# Patient Record
Sex: FEMALE | Race: WHITE | NOT HISPANIC OR LATINO | Employment: UNEMPLOYED | URBAN - METROPOLITAN AREA
[De-identification: names, ages, dates, MRNs, and addresses within clinical notes are randomized per-mention and may not be internally consistent; named-entity substitution may affect disease eponyms.]

---

## 2017-05-24 ENCOUNTER — ALLSCRIPTS OFFICE VISIT (OUTPATIENT)
Dept: OTHER | Facility: OTHER | Age: 57
End: 2017-05-24

## 2017-05-28 ENCOUNTER — GENERIC CONVERSION - ENCOUNTER (OUTPATIENT)
Dept: OTHER | Facility: OTHER | Age: 57
End: 2017-05-28

## 2017-11-24 ENCOUNTER — HOSPITAL ENCOUNTER (EMERGENCY)
Facility: HOSPITAL | Age: 57
Discharge: HOME | End: 2017-11-24
Attending: EMERGENCY MEDICINE | Admitting: EMERGENCY MEDICINE
Payer: COMMERCIAL

## 2017-11-24 VITALS
WEIGHT: 154 LBS | HEIGHT: 64 IN | DIASTOLIC BLOOD PRESSURE: 82 MMHG | RESPIRATION RATE: 20 BRPM | BODY MASS INDEX: 26.29 KG/M2 | OXYGEN SATURATION: 100 % | HEART RATE: 71 BPM | SYSTOLIC BLOOD PRESSURE: 145 MMHG | TEMPERATURE: 98 F

## 2017-11-24 DIAGNOSIS — S01.01XA SCALP LACERATION: Primary | ICD-10-CM

## 2017-11-24 PROCEDURE — 99282 EMERGENCY DEPT VISIT SF MDM: CPT

## 2017-11-24 PROCEDURE — 90471 IMMUNIZATION ADMIN: CPT

## 2017-11-24 PROCEDURE — 90715 TDAP VACCINE 7 YRS/> IM: CPT | Performed by: EMERGENCY MEDICINE

## 2017-11-24 RX ORDER — LIDOCAINE HYDROCHLORIDE 10 MG/ML
5 INJECTION, SOLUTION EPIDURAL; INFILTRATION; INTRACAUDAL; PERINEURAL ONCE
Status: COMPLETED | OUTPATIENT
Start: 2017-11-24 | End: 2017-11-24

## 2017-11-24 RX ORDER — PRAVASTATIN SODIUM 20 MG
20 TABLET ORAL DAILY
COMMUNITY
End: 2018-05-29 | Stop reason: HOSPADM

## 2017-11-24 RX ORDER — GINSENG 100 MG
1 CAPSULE ORAL ONCE
Status: COMPLETED | OUTPATIENT
Start: 2017-11-24 | End: 2017-11-24

## 2017-11-24 RX ORDER — ACETAMINOPHEN 325 MG/1
650 TABLET ORAL ONCE
Status: COMPLETED | OUTPATIENT
Start: 2017-11-24 | End: 2017-11-24

## 2017-11-24 RX ADMIN — TETANUS TOXOID, REDUCED DIPHTHERIA TOXOID AND ACELLULAR PERTUSSIS VACCINE, ADSORBED 0.5 ML: 5; 2.5; 8; 8; 2.5 SUSPENSION INTRAMUSCULAR at 08:28

## 2017-11-24 RX ADMIN — BACITRACIN ZINC 1 SMALL APPLICATION: 500 OINTMENT TOPICAL at 08:28

## 2017-11-24 RX ADMIN — LIDOCAINE HYDROCHLORIDE 5 ML: 10 INJECTION, SOLUTION EPIDURAL; INFILTRATION; INTRACAUDAL; PERINEURAL at 08:28

## 2017-11-24 RX ADMIN — ACETAMINOPHEN 650 MG: 325 TABLET ORAL at 08:28

## 2017-11-24 NOTE — ED PROCEDURE NOTE
PROCEDURE  Lac Repair  Date/Time: 11/24/2017 8:27 AM  Performed by: Chantal York by: Librado Cardoso     Patient location:  ED and bedside  Laceration details:     Location:  Scalp    Scalp location:  Occipital    Length (cm) of Repair:  3    Depth (mm):  5  Repair type:     Repair type:  Simple  Exploration:     Wound extent: no foreign bodies/material noted      Contaminated: no    Treatment:     Area cleansed with:  Saline  Skin repair:     Repair method:  Staples    Number of staples:  5  Approximation:     Approximation difficulty:  Simple  Post-procedure details:     Dressing:  Antibiotic ointment    Patient tolerance of procedure:   Tolerated well, no immediate complications

## 2017-11-24 NOTE — ED NOTES
Laceration repair to scalp by Dr Matthew Lebron  Patient tolerated well       Roger Carl, RN  11/24/17 7334

## 2017-11-24 NOTE — ED PROVIDER NOTES
History  Chief Complaint   Patient presents with    Head Laceration     To ED with c/o laceration to right posterior scalp  Pt states that she had a shelf fall and hit her head approx 20 minutes ago  Denies any LOC, states that she feels "whoosy"  Denies any N/V  This is 80-year-old female who presents for a 2 cm scalp  Laceration in the right occipital area after a port fell off a shelf at a shopping center just 20 minutes prior to arrival she is not on any anticoagulants there is no loss of consciousness no vomiting she does not have any amnesia retrograde or antegrade last tetanus is unknown [facility] CT head rule is negative and does not require imaging also nexus cervical spine imaging is negative and does not require imaging  Mechanism was considered low risk and I did not feel trauma  Evaluation activation was indicated        History provided by:  Patient  Head Laceration   Location:  Head/neck  Head/neck laceration location:  Head  Length:  2  Depth: Through dermis  Quality: straight    Bleeding: venous    Time since incident:  20 minutes  Injury mechanism:  board fell from shelf  Pain details:     Quality:  Aching    Severity:  Moderate    Timing:  Constant    Progression:  Unchanged  Foreign body present:  No foreign bodies  Tetanus status:  Unknown      Prior to Admission Medications   Prescriptions Last Dose Informant Patient Reported? Taking?   pravastatin (PRAVACHOL) 20 mg tablet  Self Yes Yes   Sig: Take 20 mg by mouth daily      Facility-Administered Medications: None       Past Medical History:   Diagnosis Date    Hyperlipidemia        History reviewed  No pertinent surgical history  History reviewed  No pertinent family history  I have reviewed and agree with the history as documented  Social History   Substance Use Topics    Smoking status: Never Smoker    Smokeless tobacco: Never Used    Alcohol use Yes      Comment: social        Review of Systems   Skin: Positive for wound  All other systems reviewed and are negative  Physical Exam  ED Triage Vitals [11/24/17 0817]   Temperature Pulse Respirations Blood Pressure SpO2   98 °F (36 7 °C) 66 20 (!) 171/82 99 %      Temp Source Heart Rate Source Patient Position - Orthostatic VS BP Location FiO2 (%)   Temporal Monitor Sitting Right arm --      Pain Score       7           Orthostatic Vital Signs  Vitals:    11/24/17 0817   BP: (!) 171/82   Pulse: 66   Patient Position - Orthostatic VS: Sitting       Physical Exam   Constitutional: She is oriented to person, place, and time  She appears well-developed and well-nourished  No distress  HENT:   Right Ear: External ear normal    Left Ear: External ear normal    Nose: Nose normal    Mouth/Throat: Oropharynx is clear and moist    Eyes: Conjunctivae and EOM are normal  Pupils are equal, round, and reactive to light  Neck: Normal range of motion  Neck supple  No tracheal deviation present  No midline cervical tenderness nexus criteria for imaging is negative   Cardiovascular: Normal rate, regular rhythm and intact distal pulses  Exam reveals no friction rub  No murmur heard  Pulmonary/Chest: Effort normal and breath sounds normal  No stridor  No respiratory distress  She has no wheezes  She has no rales  Abdominal: Soft  Bowel sounds are normal  She exhibits no distension  There is no tenderness  Musculoskeletal: Normal range of motion  She exhibits no edema, tenderness or deformity  Neurological: She is alert and oriented to person, place, and time  No cranial nerve deficit  She exhibits normal muscle tone  Coordination normal    Skin: She is not diaphoretic  2 cm right occipital scalp laceration   Psychiatric: She has a normal mood and affect  Her behavior is normal  Judgment and thought content normal    Nursing note and vitals reviewed        ED Medications  Medications   bacitracin topical ointment 1 small application (1 small application Topical Given 11/24/17 0828) lidocaine (PF) (XYLOCAINE-MPF) 1 % injection 5 mL (5 mL Infiltration Given 11/24/17 0828)   tetanus-diphtheria-acellular pertussis (BOOSTRIX) IM injection 0 5 mL (0 5 mL Intramuscular Given 11/24/17 0828)   acetaminophen (TYLENOL) tablet 650 mg (650 mg Oral Given 11/24/17 9713)       Diagnostic Studies  Results Reviewed     None                 No orders to display              Procedures  Procedures       Phone Contacts  ED Phone Contact    ED Course  ED Course                                MDM  CritCare Time    Disposition  Final diagnoses:   Scalp laceration     Time reflects when diagnosis was documented in both MDM as applicable and the Disposition within this note     Time User Action Codes Description Comment    11/24/2017  8:38 AM Dawn Marie Add [S01 01XA] Scalp laceration       ED Disposition     ED Disposition Condition Comment    Discharge  Adrian Buckley discharge to home/self care  Condition at discharge: Stable        Follow-up Information     Follow up With Specialties Details Why 5 Aye Osullivan DO Family Medicine In 1 week For suture removal [id number] [facility]  [phone]          Patient's Medications   Discharge Prescriptions    No medications on file     No discharge procedures on file      ED Provider  Electronically Signed by           Mejia Lisa DO  11/24/17 0900

## 2017-11-24 NOTE — DISCHARGE INSTRUCTIONS
Staple Care   WHAT YOU NEED TO KNOW:   Staples are often used to close a wound  Your staples may be placed for 3 to 14 days, depending on the location of your wound  DISCHARGE INSTRUCTIONS:   Care for your wound:   · Clean:      ¨ You may be able to shower in 24 hours  Do not soak your wound under water  ¨ Gently wash your wound with soap and warm water daily  Lightly pat it dry  Do not cover your wound unless your healthcare provider tells you to  ¨ You may also need to clean your wound with a mixture of hydrogen peroxide and water  Ask how to do this  ¨ Do not apply ointment or cream to the wound unless your healthcare provider tells you to  · Elevate:      ¨ Rest any arm or leg that has a wound on pillows above the level of your heart  Do this as often as possible for 2 days  This will help decrease swelling and pain, and help you heal faster  · Minimize scarring:      ¨ Avoid sunshine on your wound to reduce scarring  Follow up with your healthcare provider as directed: You may need to return for a wound checkup 3 days after your staples are placed  Ask when you should return to get your staples removed  Staple removal:   · A medical staple remover  will be used to take out your staples  Your healthcare provider will slide the tool under each staple, squeeze the handle, and gently pull the staple out  · Medical tape  will be placed on your wound once your staples are removed  This will help keep your wound closed  The medical tape will fall off on its own after several days  Contact your healthcare provider if:   · You have redness, pain, swelling, and pus draining from your wound  · Your pain medicine does not relieve your pain  · You have a fever of 101°F (38 5°C) or higher  · You have an odor coming from your wound  · You have questions or concerns about your condition or care  Return to the emergency department if:   · Your wound reopens      · You have red streaks in your skin that spread out from your wound  · You have severe pain or vomiting  © 2017 [address] [employer] Information is for End User's use only and may not be sold, redistributed or otherwise used for commercial purposes  All illustrations and images included in CareNotes® are the copyrighted property of A D [employer] M [employer]  or Jaime Pickard  The above information is an  only  It is not intended as medical advice for individual conditions or treatments  Talk to your doctor, nurse or pharmacist before following any medical regimen to see if it is safe and effective for you

## 2017-12-01 ENCOUNTER — ALLSCRIPTS OFFICE VISIT (OUTPATIENT)
Dept: OTHER | Facility: OTHER | Age: 57
End: 2017-12-01

## 2017-12-05 NOTE — PROGRESS NOTES
Assessment    1  Concussion without loss of consciousness, initial encounter (850 0) (S06 0X0A)   2  Laceration of scalp, initial encounter (873 0) (S01 01XA)   3  Head injury, closed (959 01) (S09 90XA)    Plan  Concussion without loss of consciousness, initial encounter    · Acetaminophen-Codeine #3 300-30 MG Oral Tablet; TAKE 1 TABLET 3 TIMES  DAILY AS NEEDED FOR PAIN  Flu vaccine need    · Fluzone Quadrivalent Intramuscular Suspension  Laceration of scalp, initial encounter    · Suture Removal POC, by physician other than who closed the wound; Status:Active -  Perform Order; Requested for:[id number]; The patient's laceration is well healed but I do feel she has a concussion  As her laceration is well healed she can take and seeds and I think that the will help her better than the Tylenol  She is going to take 2 Aleve twice a day  I am also going to give her Tylenol with codeine and she is going to take the next 2 days off  I provided her with a note and I recommended she go home and sleep/rest with complete brain rest for the next 2 days  If she is not improving by next week she should let me know at which time I will refer her to a concussion specialist/PT  Discussion/Summary  Possible side effects of new medications were reviewed with the patient/guardian today  The treatment plan was reviewed with the patient/guardian  The patient/guardian understands and agrees with the treatment plan      Chief Complaint  C/O A PIECE  [address] 11/24/2017, SHE WENT TO [facility]  SHE HAD A LACERATION TO HER HEAD  I REMOVED 5 SUTURES THIS AM WITH NO PROBLEM  NO BLEEDING  PATIENT HAS BEEN SUFFERING FROM NAUSEA, AND HEADACHES SINCE THE EVENT  SHE STATES HER VISION IS OK  MAMMO AND COLONOSCOPY ORDER GIVE  FLU VACCINE GIVEN TODAY   PATIENT IS DUE FOR BW BUT SHE ATE THIS AM      History of Present Illness  HPI: Patient is here today to follow-up on the laceration and have her staples removed  1 week ago today she was in a store, [employer], and part of a display fell onto her head  She was bleeding and did fill out an incident report  She they did offer to drive her to the hospital but she chose to drive herself, it was about 3 blocks away  She had 5 staples placed in the laceration on her head  Soon after she developed headache, dizziness, nausea, and some fogginess/confusion  She has had this since then and has not really gotten any better  She has had a very busy week of work-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  She has not really been sleeping well  She has been taking Tylenol which really has not been helping, they advised she not take any Aleve as it is a blood thinner and she had a laceration  She has had concussions in the past but nothing recent  She has not had any fevers or chills  No chest pain or shortness of breath  She has nausea but no vomiting      Active Problems    1  Arthritis (716 90) (M19 90)   2  Hyperlipidemia (272 4) (E78 5)   3  Knee joint cyst (719 86) (M25 869)   4  Knee pain, bilateral (719 46) (M25 561,M25 562)    Past Medical History    1  History of Acute upper respiratory infection (465 9) (J06 9)   2  History of Back Strain (847 9)   3  History of acute bronchitis (V12 69) (Z87 09)   4  History of bursitis (V13 59) (Z87 39)   5  History of Internal Hemorrhoids (455 0)   6  History of Subconjunctival hemorrhage, unspecified laterality (372 72) (H11 30)   7  History of Trauma (959 9)   8  History of Traumatic Arthritis Of Multiple Sites (716 19)  Active Problems And Past Medical History Reviewed: The active problems and past medical history were reviewed and updated today  Family History  Mother    1  Family history of CABG   2  Family history of Coronary artery disease  Father    3  Family history of Hiatal hernia  Maternal Grandmother    4  Family history of Stroke Syndrome (V17 1)  Family History    5   Family history of Hyperlipidemia   6  Family history of Migraine Headache   7  Family history of Thyroid Disorder (V18 19)  Family History Reviewed: The family history was reviewed and updated today  Social History    · Never a smoker  The social history was reviewed and updated today  Surgical History    1  History of Tubal Ligation  Surgical History Reviewed: The surgical history was reviewed and updated today  Current Meds   1  Pravastatin Sodium 40 MG Oral Tablet; Take 1 tablet by mouth at bedtime; Therapy: 71ANQ1805 to (Evaluate:25Mar2018)  Requested for: 33FPT6180; Last   Rx:26Nov2017 Ordered    The medication list was reviewed and updated today  Allergies    1  Meloxicam TABS    Vitals   Recorded: 15NQY4569 08:18AM   Temperature 98 1 F   Heart Rate 73   Systolic 608 mm Hg   Diastolic 92 mm Hg   Height 5 ft 3 in   Weight 155 lb 12 oz   BMI Calculated 27 59 kg/m2   BSA Calculated 1 74 m2   O2 Saturation 97     Physical Exam    Constitutional   General appearance: No acute distress, well appearing and well nourished  appears tired  Eyes   Conjunctiva and lids: No swelling, erythema or discharge  Ears, Nose, Mouth, and Throat   External inspection of ears and nose: Normal     Pulmonary   Respiratory effort: No increased work of breathing or signs of respiratory distress  Musculoskeletal   Gait and station: Normal     Skin   Skin and subcutaneous tissue: Abnormal   (There is a well-healed laceration on the right side of the back of the scalp, staples are removed)   Neurologic   Cranial nerves: Cranial nerves 2-12 intact  Sensation: No sensory loss  Psychiatric   Orientation to person, place, and time: Normal     Mood and affect: Normal          Message  Return to work or school:   Segun Olivas is under my professional care  She was seen in my office on 12/1/17       Please excuse her from work today, 12/1/17     Zahra Martinez MD       Signatures   Electronically signed by : Thierry Rosales Lucetta Prader, M D ; Dec  1 2017  8:52AM EST                       (Author)

## 2017-12-13 ENCOUNTER — GENERIC CONVERSION - ENCOUNTER (OUTPATIENT)
Dept: OTHER | Facility: OTHER | Age: 57
End: 2017-12-13

## 2017-12-27 ENCOUNTER — APPOINTMENT (OUTPATIENT)
Dept: PHYSICAL THERAPY | Facility: CLINIC | Age: 57
End: 2017-12-27
Payer: COMMERCIAL

## 2017-12-27 PROCEDURE — G8979 MOBILITY GOAL STATUS: HCPCS

## 2017-12-27 PROCEDURE — G8978 MOBILITY CURRENT STATUS: HCPCS

## 2017-12-27 PROCEDURE — 97162 PT EVAL MOD COMPLEX 30 MIN: CPT

## 2017-12-28 ENCOUNTER — GENERIC CONVERSION - ENCOUNTER (OUTPATIENT)
Dept: FAMILY MEDICINE CLINIC | Facility: CLINIC | Age: 57
End: 2017-12-28

## 2018-01-04 ENCOUNTER — APPOINTMENT (OUTPATIENT)
Dept: PHYSICAL THERAPY | Facility: CLINIC | Age: 58
End: 2018-01-04
Payer: COMMERCIAL

## 2018-01-04 PROCEDURE — G0283 ELEC STIM OTHER THAN WOUND: HCPCS

## 2018-01-04 PROCEDURE — 97010 HOT OR COLD PACKS THERAPY: CPT

## 2018-01-04 PROCEDURE — 97014 ELECTRIC STIMULATION THERAPY: CPT

## 2018-01-04 PROCEDURE — 97112 NEUROMUSCULAR REEDUCATION: CPT

## 2018-01-04 PROCEDURE — 97140 MANUAL THERAPY 1/> REGIONS: CPT

## 2018-01-08 ENCOUNTER — APPOINTMENT (OUTPATIENT)
Dept: PHYSICAL THERAPY | Facility: CLINIC | Age: 58
End: 2018-01-08
Payer: COMMERCIAL

## 2018-01-08 PROCEDURE — 97010 HOT OR COLD PACKS THERAPY: CPT

## 2018-01-08 PROCEDURE — 97014 ELECTRIC STIMULATION THERAPY: CPT

## 2018-01-08 PROCEDURE — 97140 MANUAL THERAPY 1/> REGIONS: CPT

## 2018-01-08 PROCEDURE — G0283 ELEC STIM OTHER THAN WOUND: HCPCS

## 2018-01-08 PROCEDURE — 97112 NEUROMUSCULAR REEDUCATION: CPT

## 2018-01-10 ENCOUNTER — APPOINTMENT (OUTPATIENT)
Dept: PHYSICAL THERAPY | Facility: CLINIC | Age: 58
End: 2018-01-10
Payer: COMMERCIAL

## 2018-01-10 PROCEDURE — 97140 MANUAL THERAPY 1/> REGIONS: CPT

## 2018-01-10 PROCEDURE — G0283 ELEC STIM OTHER THAN WOUND: HCPCS

## 2018-01-10 PROCEDURE — 97014 ELECTRIC STIMULATION THERAPY: CPT

## 2018-01-10 PROCEDURE — 97112 NEUROMUSCULAR REEDUCATION: CPT

## 2018-01-10 PROCEDURE — 97010 HOT OR COLD PACKS THERAPY: CPT

## 2018-01-10 NOTE — RESULT NOTES
Verified Results  (1) COMPREHENSIVE METABOLIC PANEL [id number] 19:80EU [facility]     Test Name Result Flag Reference   Glucose, Serum 89 mg/dL  65-99   BUN 22 mg/dL  6-24   Creatinine, Serum 0 84 mg/dL  0 57-1 00   eGFR If NonAfricn Am 78 mL/min/1 73  >59   eGFR If Africn Am 90 mL/min/1 73  >59   BUN/Creatinine Ratio 26 H 9-23   ALT (SGPT) 15 IU/L  0-32   Albumin, Serum 4 9 g/dL  3 5-5 5   Globulin, Total 2 2 g/dL  1 5-4 5   A/G Ratio 2 2  1 1-2 5   Bilirubin, Total 0 5 mg/dL  0 0-1 2   Alkaline Phosphatase, S 60 IU/L     AST (SGOT) 17 IU/L  0-40   Sodium, Serum 143 mmol/L  134-144   Potassium, Serum 4 2 mmol/L  3 5-5 2   Chloride, Serum 101 mmol/L     Carbon Dioxide, Total 25 mmol/L  18-29   Calcium, Serum 9 7 mg/dL  8 7-10 2   Protein, Total, Serum 7 1 g/dL  6 0-8 5     (LC) Lipid Profile With Non-HDL Cholesterol [id number] 12:00AM [facility]     Test Name Result Flag Reference   Cholesterol, Total 236 mg/dL H 100-199   Triglycerides 89 mg/dL  0-149   HDL Cholesterol 99 mg/dL  >39   According to ATP-III Guidelines, HDL-C >59 mg/dL is considered a  negative risk factor for CHD  VLDL Cholesterol Royal 18 mg/dL  5-40   LDL Cholesterol Calc 119 mg/dL H 0-99   Non-HDL Cholesterol 137 mg/dL H 0-129     (1) CBC/PLT/DIFF [id number] 12:00AM [facility]     Test Name Result Flag Reference   WBC 4 2 x10E3/uL  3 4-10 8   RBC 4 49 x10E6/uL  3 77-5 28   Hemoglobin 13 8 g/dL  11 1-15 9   Hematocrit 42 2 %  34 0-46  6   MCV 94 fL  79-97   MCH 30 7 pg  26 6-33 0   Baso (Absolute) 0 0 x10E3/uL  0 0-0 2   Immature Granulocytes 0 %     Immature Grans (Abs) 0 0 x10E3/uL  0 0-0 1   Eos 3 %     Basos 1 %     Neutrophils (Absolute) 3 1 x10E3/uL  1 4-7 0   Lymphs (Absolute) 0 7 x10E3/uL  0 7-3 1   Monocytes(Absolute) 0 3 x10E3/uL  0 1-0 9   Eos (Absolute) 0 1 x10E3/uL  0 0-0 4   MCHC 32 7 g/dL  31 5-35 7   RDW 12 9 %  12 3-15 4   Platelets 820 L71B6/RB  150-379   Neutrophils 75 %     Lymphs 15 %     Monocytes 6 % (1) TSH [id number] 12:00AM [facility]     Test Name Result Flag Reference   TSH 1 850 uIU/mL  0 450-4 500     [facility]) Sedimentation Rate-Westergren [id number] 12:00AM Val[facility] Moulding     Test Name Result Flag Reference   Sedimentation Rate-Westergren 2 mm/hr  0-40       Discussion/Summary   labs are stable   cont current Rx

## 2018-01-11 NOTE — MISCELLANEOUS
Message  Return to work or school:   Fany Miller is under my professional care  She was seen in my office on 12/1/17       Please excuse her from work today, 12/1/17     Carrie Reid MD       Signatures   Electronically signed by : JASNO Aragon ; Dec  1 2017  8:52AM EST                       (Author)

## 2018-01-12 NOTE — PROGRESS NOTES
Assessment   1  Encounter for screening for osteoporosis (V82 81) (X83 808)  2  Plantar fasciitis (728 71) (M72 2)  3  Hyperlipidemia (272 4) (E78 5)  4  Encounter for preventive health examination (V70 0) (Z00 00)    Plan  Encounter for screening for osteoporosis    · * DXA BONE DENSITY SPINE HIP AND PELVIS; Status:Hold For - Scheduling;  Requested for:01Sep2016;   Encounter for screening mammogram for malignant neoplasm of breast    · * MAMMO SCREENING BILATERAL W CAD; Status:Hold For - Scheduling; Requested  for:01Sep2016;   Hyperlipidemia    · Renew: Pravastatin Sodium 40 MG Oral Tablet; take 1 tablet daily at bedtime    Discussion/Summary  healthy adult female Currently, she eats a healthy diet and has an adequate exercise regimen  the risks and benefits of cervical cancer screening were discussed We'll schedule with Dr Jessica Rojas cancer screening: mammogram has been ordered  Colorectal cancer screening: colonoscopy has been ordered  Osteoporosis screening: bone mineral density testing has been ordered  Screening lab work includes hemoglobin, glucose, lipid profile and thyroid function testing  She was advised to be evaluated by an ophthalmologist and Dermatology  Advice and education were given regarding weight bearing exercise  She's given orders for mammography and DEXA scanning and she has her colonoscopy orders  She will return for fasting blood work  She will restart her pravastatin prior to getting the blood work  She will see the ophthalmologist as well as dermatologist  She is given information regarding exercises and is reminded to have an annual well exam       Chief Complaint  PT IS HERE FOR HER YEARLY [address]  PT WILL BE GIVEN MAMMO AND DEXA ORDERS TODAY IN OFFICE  PT CURRENTLY HAS COLONOSCOPY ORDERS  PT WILL SCHEDULE TO PAP DONE HERE WITH DR Parker Joy  History of Present Illness  , Adult Female:  The patient is being seen for a health maintenance evaluation  The last health maintenance visit was 2 year(s) ago  General Health: The patient's health since the last visit is described as good  She has regular dental visits  She denies vision problems  She denies hearing loss  Immunizations status: up to date  Lifestyle:  She consumes a diverse and healthy diet  She does not have any weight concerns  She exercises regularly  She does not use tobacco  She consumes alcohol  She denies drug use  Reproductive health: the patient is postmenopausal    Screening: cancer screening reviewed and updated  metabolic screening reviewed and updated  risk screening reviewed and updated  Additional History: [facility] was given mammography and DEXA scan orders  She has colonoscopy orders and will return for fasting blood work  HPI: Here for wellness visit      Review of Systems    Constitutional: No fever, no chills, feels well, no tiredness, no recent weight gain or weight loss  Eyes: No complaints of eye pain, no red eyes, no eyesight problems, no discharge, no dry eyes, no itching of eyes  ENT: no complaints of earache, no loss of hearing, no nose bleeds, no nasal discharge, no sore throat, no hoarseness  Cardiovascular: No complaints of slow heart rate, no fast heart rate, no chest pain, no palpitations, no leg claudication, no lower extremity edema  Respiratory: No complaints of shortness of breath, no wheezing, no cough, no SOB on exertion, no orthopnea, no PND  Gastrointestinal: No complaints of abdominal pain, no constipation, no nausea or vomiting, no diarrhea, no bloody stools  Genitourinary: No complaints of dysuria, no incontinence, no pelvic pain, no dysmenorrhea, no vaginal discharge or bleeding  Musculoskeletal: No complaints of arthralgias, no myalgias, no joint swelling or stiffness, no limb pain or swelling  Integumentary: No complaints of skin rash or lesions, no itching, no skin wounds, no breast pain or lump     Neurological: No complaints of headache, no confusion, no convulsions, no numbness, no dizziness or fainting, no tingling, no limb weakness, no difficulty walking  Psychiatric: Not suicidal, no sleep disturbance, no anxiety or depression, no change in personality, no emotional problems  Endocrine: No complaints of proptosis, no hot flashes, no muscle weakness, no deepening of the voice, no feelings of weakness  Hematologic/Lymphatic: No complaints of swollen glands, no swollen glands in the neck, does not bleed easily, does not bruise easily  Active Problems   1  Arthritis (716 90) (M19 90)  2  Bronchitis, acute (466 0) (J20 9)  3  Conjunctivitis (372 30) (H10 9)  4  Encounter for screening for malignant neoplasm of colon (V76 51) (Z12 11)  5  Encounter for screening for osteoporosis (V82 81) (Z13 820)  6  Encounter for screening mammogram for malignant neoplasm of breast (V76 12)   (Z12 31)  7  Encounter for vitamin deficiency screening (V77 99) (Z13 21)  8  Fatigue (780 79) (R53 83)  9  Hyperlipidemia (272 4) (E78 5)  10  Impingement syndrome of shoulder (726 2) (M75 40)  11  Screening for diabetes mellitus (V77 1) (Z13 1)  12   Screening for thyroid disorder (V77 0) (Z13 29)    Past Medical History    · History of Acute upper respiratory infection (465 9) (J06 9)   · History of Back Strain (847 9)   · History of Head Injury (959 01)   · History of acute bronchitis (V12 69) (Z87 09)   · History of bursitis (V13 59) (Z87 39)   · History of Internal Hemorrhoids (455 0)   · History of Subconjunctival hemorrhage, unspecified laterality (372 72) (H11 30)   · History of Trauma (959 9)   · History of Traumatic Arthritis Of Multiple Sites (716 19)    Surgical History    · History of Tubal Ligation    Family History  Mother    · Family history of CABG   · Family history of Coronary artery disease  Father    · Family history of Hiatal hernia  Maternal Grandmother    · Family history of Stroke Syndrome (V17 1)  Family History    · Family history of Hyperlipidemia   · Family history of Migraine Headache   · Family history of Thyroid Disorder (V18 19)    Social History    · Never a smoker    Current Meds  1  Pravastatin Sodium 40 MG Oral Tablet; take 1 tablet daily at bedtime; Therapy: [id number] to (Javier Tiwari)  Requested for: [id number]; Last   Rx:[id number] Ordered    Allergies   1  Meloxicam TABS    Vitals   Recorded: [id number] 81:56ZG   Systolic 803   Diastolic 82   Heart Rate 45   Temperature 98 1 F   O2 Saturation 98   Height 5 ft 3 in   Weight 147 lb 12 00 oz   BMI Calculated 26 17   BSA Calculated 1 7     Physical Exam    Constitutional   General appearance: No acute distress, well appearing and well nourished  Eyes   Conjunctiva and lids: No swelling, erythema or discharge  Pupils and irises: Equal, round and reactive to light  Ears, Nose, Mouth, and Throat   External inspection of ears and nose: Normal     Otoscopic examination: Tympanic membranes translucent with normal light reflex  Canals patent without erythema  Oropharynx: Normal with no erythema, edema, exudate or lesions  Pulmonary   Respiratory effort: No increased work of breathing or signs of respiratory distress  Auscultation of lungs: Clear to auscultation  Cardiovascular   Palpation of heart: Normal PMI, no thrills  Auscultation of heart: Normal rate and rhythm, normal S1 and S2, without murmurs  Examination of extremities for edema and/or varicosities: Normal     Abdomen   Abdomen: Non-tender, no masses  Liver and spleen: No hepatomegaly or splenomegaly  Lymphatic   Palpation of lymph nodes in neck: No lymphadenopathy  Musculoskeletal   Gait and station: Normal     Digits and nails: Normal without clubbing or cyanosis  Inspection/palpation of joints, bones, and muscles: Normal     Skin   Skin and subcutaneous tissue: Normal without rashes or lesions  Neurologic   Cranial nerves: Cranial nerves 2-12 intact      Reflexes: 2+ and symmetric  Sensation: No sensory loss      Psychiatric   Orientation to person, place, and time: Normal     Mood and affect: Normal        Signatures   Electronically signed by : Tereza Hinds DO; Sep  1 2016  6:47PM EST                       (Author)

## 2018-01-13 VITALS
BODY MASS INDEX: 26.58 KG/M2 | DIASTOLIC BLOOD PRESSURE: 80 MMHG | TEMPERATURE: 97.6 F | HEART RATE: 71 BPM | OXYGEN SATURATION: 98 % | SYSTOLIC BLOOD PRESSURE: 130 MMHG | HEIGHT: 63 IN | WEIGHT: 150 LBS

## 2018-01-14 NOTE — MISCELLANEOUS
Message  Message Free Text Note Form: pt braxton regarding sinus infection  seen in the office on Wednesday  started on augmentin  seems to be helping with sinus infection but still feeling very tired and achey  trying to do regular activities and getting very tired  no fever  no shortness of breath  still getting coughing  drinking fluids      Discussion/Summary  Discussion Summary:   Continue with augmentin  if no better in a few days, call office and will change antibiotic  increase fluids  rest    call if not feeling better        Signatures   Electronically signed by : Terry Gonzalez DO; May 28 2017 10:48AM EST                       (Author)

## 2018-01-15 ENCOUNTER — APPOINTMENT (OUTPATIENT)
Dept: PHYSICAL THERAPY | Facility: CLINIC | Age: 58
End: 2018-01-15
Payer: COMMERCIAL

## 2018-01-15 PROCEDURE — G0283 ELEC STIM OTHER THAN WOUND: HCPCS

## 2018-01-15 PROCEDURE — 97010 HOT OR COLD PACKS THERAPY: CPT

## 2018-01-15 PROCEDURE — 97140 MANUAL THERAPY 1/> REGIONS: CPT

## 2018-01-15 PROCEDURE — 97014 ELECTRIC STIMULATION THERAPY: CPT

## 2018-01-15 PROCEDURE — 97112 NEUROMUSCULAR REEDUCATION: CPT

## 2018-01-15 NOTE — RESULT NOTES
Verified Results  ([facility]) UA/M w/rflx Culture, Comp [id number] 12:00AM Vivian Nap     Test Name Result Flag Reference   Specific Gravity 1 028  1 005-1 030   pH 6 0  5 0-7 5   Urine-Color Yellow  Yellow   Appearance Clear  Clear   WBC Esterase Negative  Negative   Protein Trace  Negative/Trace   Bacteria None seen  None seen/Few   Microscopic Examination See below:     Urinalysis Reflex Comment     This specimen will not reflex to a Urine Culture  WBC 0-5 /hpf  0 -  5   RBC 0-2 /hpf  0 -  2   Epithelial Cells (non renal) 0-10 /hpf  0 - 10   Mucus Threads Present  Not Estab  Glucose Negative  Negative   Ketones Trace A Negative   Occult Blood Trace A Negative   Bilirubin Negative  Negative   Urobilinogen,Semi-Qn 0 2 EU/dL  0 2-1 0   Nitrite, Urine Negative  Negative       Discussion/Summary   urine continues to show blood  next step is CT abdomen and pelvis to look for source

## 2018-01-17 NOTE — RESULT NOTES
Verified Results  * CT ABDOMEN PELVIS WO CONTRAST [id number] 07:51AM Yari Brunson Order Number: [id number]    - Patient Instructions: To schedule this appointment, please contact Central Scheduling at [phone]   Order Number: [id number]    - Patient Instructions: To schedule this appointment, please contact Central Scheduling at [phone]  Test Name Result Flag Reference   CT ABDOMEN PELVIS WO CONTRAST (Report)     CT ABDOMEN AND PELVIS WITHOUT IV CONTRAST     INDICATION: 63-year-old woman with hematuria and pain for one month  History of appendectomy  COMPARISON: None  TECHNIQUE: CT examination of the abdomen and pelvis was performed without intravenous contrast  This examination, like all CT scans performed in the [facility], was performed utilizing techniques to minimize radiation dose exposure,    including the use of iterative reconstruction and automated exposure control  Axial, sagittal, and coronal reformatted images were submitted for interpretation  As requested, the study was performed without intravenous or gastrointestinal contrast  This limits evaluation of the abdominal and pelvic for surgery and a significant abnormality could go undetected as a result  FINDINGS:     ABDOMEN     LOWER CHEST: No significant abnormalities identified in the lower chest      LIVER/BILIARY TREE: Several relatively low-attenuation liver masses, the largest a 7 mm mass in the posterior segment of the right lobe, incompletely characterized on this unenhanced study, although most likely cysts  If clinically indicated, these can   be evaluated more accurately with contrast-enhanced CT or MRI  No bile duct dilatation  GALLBLADDER: No calcified gallstones  No pericholecystic inflammatory change  SPLEEN: Unremarkable  PANCREAS: Unremarkable  ADRENAL GLANDS: Unremarkable  KIDNEYS/URETERS: Unremarkable  No hydronephrosis       STOMACH AND BOWEL: Unremarkable  APPENDIX: No findings to suggest appendicitis  ABDOMINOPELVIC CAVITY: No lymphadenopathy or mass  No ascites  No extraluminal gas  VESSELS: Unremarkable for patient's age  PELVIS     REPRODUCTIVE ORGANS: Suggestion of one or two fibroids in the uterine fundus  No evidence of adnexal mass  URINARY BLADDER: Unremarkable  ABDOMINAL WALL/INGUINAL REGIONS: Unremarkable  OSSEOUS STRUCTURES: No acute fracture or destructive osseous lesion  IMPRESSION:     1  Several small indeterminate liver masses, most likely cysts  If clinically indicated, these can be characterized more accurately with contrast-enhanced CT or MRI  2  Probable small uterine fibroids  3  Otherwise unremarkable unenhanced CT the abdomen and pelvis  Workstation performed: [id number]     Signed by:   Rohan Arevalo MD   11/3/16       Discussion/Summary   CAT scan shows small hepatic cysts and a uterine fibroid  No apparent cause of hematuria is noted  Next step is to see urology

## 2018-01-18 ENCOUNTER — APPOINTMENT (OUTPATIENT)
Dept: PHYSICAL THERAPY | Facility: CLINIC | Age: 58
End: 2018-01-18
Payer: COMMERCIAL

## 2018-01-18 PROCEDURE — G0283 ELEC STIM OTHER THAN WOUND: HCPCS

## 2018-01-18 PROCEDURE — 97112 NEUROMUSCULAR REEDUCATION: CPT

## 2018-01-18 PROCEDURE — 97140 MANUAL THERAPY 1/> REGIONS: CPT

## 2018-01-18 PROCEDURE — 97010 HOT OR COLD PACKS THERAPY: CPT

## 2018-01-18 PROCEDURE — 97014 ELECTRIC STIMULATION THERAPY: CPT

## 2018-01-18 NOTE — RESULT NOTES
Verified Results  ([address] [facility]) UA/M w/rflx Culture, Routine [id number] 12:00AM Bradley Vera     Test Name Result Flag Reference   Specific Gravity 1 021  1 005-1 030   pH 5 5  5 0-7 5   Urine-Color Yellow  Yellow   Appearance Cloudy A Clear   WBC Esterase Negative  Negative   Protein Negative  Negative/Trace   Glucose Negative  Negative   Ketones Negative  Negative   Occult Blood 1+ A Negative   Bilirubin Negative  Negative   Urobilinogen,Semi-Qn 0 2 EU/dL  0 2-1 0   Nitrite, Urine Negative  Negative   Microscopic Examination See below:     Urinalysis Reflex Comment     This specimen will not reflex to a Urine Culture  WBC 0-5 /hpf  0 -  5   RBC 3-10 /hpf A 0 -  2   Epithelial Cells (non renal) 0-10 /hpf  0 - 10   Mucus Threads Present  Not Estab  Bacteria Few  None seen/Few       Discussion/Summary   urine shows small amount of blood  no sign of infection  I would like a repeat clean catch to verify if blood is present

## 2018-01-22 ENCOUNTER — APPOINTMENT (OUTPATIENT)
Dept: PHYSICAL THERAPY | Facility: CLINIC | Age: 58
End: 2018-01-22
Payer: COMMERCIAL

## 2018-01-22 PROCEDURE — 97110 THERAPEUTIC EXERCISES: CPT

## 2018-01-22 PROCEDURE — 97010 HOT OR COLD PACKS THERAPY: CPT

## 2018-01-22 PROCEDURE — G0283 ELEC STIM OTHER THAN WOUND: HCPCS

## 2018-01-22 PROCEDURE — 97140 MANUAL THERAPY 1/> REGIONS: CPT

## 2018-01-22 PROCEDURE — 97014 ELECTRIC STIMULATION THERAPY: CPT

## 2018-01-22 PROCEDURE — 97112 NEUROMUSCULAR REEDUCATION: CPT

## 2018-01-23 VITALS
HEART RATE: 73 BPM | DIASTOLIC BLOOD PRESSURE: 92 MMHG | TEMPERATURE: 98.1 F | HEIGHT: 63 IN | OXYGEN SATURATION: 97 % | BODY MASS INDEX: 27.6 KG/M2 | SYSTOLIC BLOOD PRESSURE: 148 MMHG | WEIGHT: 155.75 LBS

## 2018-01-24 VITALS
HEART RATE: 64 BPM | DIASTOLIC BLOOD PRESSURE: 78 MMHG | OXYGEN SATURATION: 96 % | SYSTOLIC BLOOD PRESSURE: 132 MMHG | BODY MASS INDEX: 27.82 KG/M2 | WEIGHT: 157 LBS | TEMPERATURE: 98.6 F | HEIGHT: 63 IN

## 2018-01-25 ENCOUNTER — OFFICE VISIT (OUTPATIENT)
Dept: PHYSICAL THERAPY | Facility: CLINIC | Age: 58
End: 2018-01-25
Payer: COMMERCIAL

## 2018-01-25 DIAGNOSIS — G44.89 OTHER HEADACHE SYNDROME: ICD-10-CM

## 2018-01-25 DIAGNOSIS — S06.0X9D CONCUSSION WITH PROLONGED (MORE THAN 24 HOURS) LOSS OF CONSCIOUSNESS AND RETURN TO PRE-EXISTING CONSCIOUS LEVEL, SUBSEQUENT ENCOUNTER: Primary | ICD-10-CM

## 2018-01-25 DIAGNOSIS — M54.2 CERVICALGIA: ICD-10-CM

## 2018-01-25 PROCEDURE — 97140 MANUAL THERAPY 1/> REGIONS: CPT

## 2018-01-25 PROCEDURE — 97110 THERAPEUTIC EXERCISES: CPT

## 2018-01-25 NOTE — PROGRESS NOTES
Daily Note     Today's date: 2018  Patient name: Jesús Jimenez  : 1960  MRN: [id number]  Referring provider: Al Terrell MD  Dx:   Encounter Diagnoses   Name Primary? 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, but now has trouble getting OOB  Objective: See treatment diary below      Assessment: Tolerated treatment fair  Patient c/o nausea post Rx  Plan:Cont POC  TENS missed this visit, cont NV    Dx: concussion  EPOC: 18  CO-MORBIDITIES:none  PERSONAL FACTORS:     Precautions: [address], concussion    Daily Treatment Diary     Manual              MFE Ut, levator, cspine psp 15 mins                                                                    Exercise Diary              UBE 1'/'            Balance- Side- stepping Foam 3laps            Balance- tandem walking 3 laps            rockerboard ML AP 1' ea            SLS  30" x2            VOR  x1 horz 30"x1            VOR x1 vert 30"x1            UT stretch hep            Levator stretch hep            Chin tucks supin np            Targets w/ n w/o stopping 10 x ea            Trunk rotations holding ball 15"x2                                                                                                                        Modalities              TENS w/ MH  NP

## 2018-01-29 ENCOUNTER — OFFICE VISIT (OUTPATIENT)
Dept: PHYSICAL THERAPY | Facility: CLINIC | Age: 58
End: 2018-01-29
Payer: COMMERCIAL

## 2018-01-29 DIAGNOSIS — G44.89 OTHER HEADACHE SYNDROME: ICD-10-CM

## 2018-01-29 DIAGNOSIS — S06.0X9D CONCUSSION WITH PROLONGED (MORE THAN 24 HOURS) LOSS OF CONSCIOUSNESS AND RETURN TO PRE-EXISTING CONSCIOUS LEVEL, SUBSEQUENT ENCOUNTER: Primary | ICD-10-CM

## 2018-01-29 DIAGNOSIS — M54.2 CERVICALGIA: ICD-10-CM

## 2018-01-29 PROCEDURE — 97014 ELECTRIC STIMULATION THERAPY: CPT

## 2018-01-29 PROCEDURE — G0283 ELEC STIM OTHER THAN WOUND: HCPCS

## 2018-01-29 PROCEDURE — 97140 MANUAL THERAPY 1/> REGIONS: CPT

## 2018-01-29 PROCEDURE — 97010 HOT OR COLD PACKS THERAPY: CPT

## 2018-01-29 PROCEDURE — 97112 NEUROMUSCULAR REEDUCATION: CPT

## 2018-01-29 NOTE — PROGRESS NOTES
Daily Note     Daily Note     Today's date: 2018  Patient name: Liyah Lord  : 1960  MRN: [id number]  Referring provider: Duane Roche MD  Dx:   Encounter Diagnoses   Name Primary?  Concussion with prolonged (more than 24 hours) loss of consciousness and return to pre-existing conscious level, subsequent encounter Yes    Other headache syndrome     Cervicalgia                   Subjective:Pt states she is so busy on her weekends working that by [facility] she is spent  C/o pain on the R side of her head  Objective: See treatment diary below      Assessment: Tolerated treatment fair  Pt w/ less tightness noted in psp  Cont'd TENS w/ MH post ex's  Plan:Cont POC  Progress balance and vest ex's as lefty'd    Dx: concussion  EPOC: 18  CO-MORBIDITIES:none  PERSONAL FACTORS:     Precautions: Ha, concussion    Daily Treatment Diary     Manual             MFE Ut, levator, cspine psp 15 mins 12  mins                                                                   Exercise Diary             UBE /' np           Balance- Side- stepping Foam 3laps Foam 3 laps           Balance- tandem walking 3 laps 3 laps           rockerboard ML AP 1' ea 1' ea           SLS  30" x2 30" x2           VOR  x1 horz 30"x1 30"x2           VOR x1 vert 30"x1 30"x2           UT stretch hep 30"x2           Levator stretch hep 30"x2           Chin tucks supin np 10           Targets w/ n w/o stopping 10 x ea 10x ea           Trunk rotations holding ball 15"x2 15"x2                                                                                                                       Modalities             TENS w/ MH  NP 10 min

## 2018-01-30 ENCOUNTER — TRANSCRIBE ORDERS (OUTPATIENT)
Dept: ADMINISTRATIVE | Facility: HOSPITAL | Age: 58
End: 2018-01-30

## 2018-01-30 DIAGNOSIS — F07.81 POST CONCUSSION SYNDROME: Primary | ICD-10-CM

## 2018-02-01 ENCOUNTER — EVALUATION (OUTPATIENT)
Dept: PHYSICAL THERAPY | Facility: CLINIC | Age: 58
End: 2018-02-01
Payer: COMMERCIAL

## 2018-02-01 ENCOUNTER — HOSPITAL ENCOUNTER (OUTPATIENT)
Dept: CT IMAGING | Facility: HOSPITAL | Age: 58
Discharge: HOME | End: 2018-02-01
Payer: COMMERCIAL

## 2018-02-01 DIAGNOSIS — G44.89 OTHER HEADACHE SYNDROME: ICD-10-CM

## 2018-02-01 DIAGNOSIS — F07.81 POST CONCUSSION SYNDROME: ICD-10-CM

## 2018-02-01 DIAGNOSIS — M54.2 CERVICALGIA: ICD-10-CM

## 2018-02-01 DIAGNOSIS — S06.0X9D CONCUSSION WITH PROLONGED (MORE THAN 24 HOURS) LOSS OF CONSCIOUSNESS AND RETURN TO PRE-EXISTING CONSCIOUS LEVEL, SUBSEQUENT ENCOUNTER: Primary | ICD-10-CM

## 2018-02-01 PROCEDURE — 97140 MANUAL THERAPY 1/> REGIONS: CPT | Performed by: PHYSICAL THERAPIST

## 2018-02-01 PROCEDURE — 97112 NEUROMUSCULAR REEDUCATION: CPT | Performed by: PHYSICAL THERAPIST

## 2018-02-01 PROCEDURE — G0283 ELEC STIM OTHER THAN WOUND: HCPCS | Performed by: PHYSICAL THERAPIST

## 2018-02-01 PROCEDURE — 97010 HOT OR COLD PACKS THERAPY: CPT | Performed by: PHYSICAL THERAPIST

## 2018-02-01 PROCEDURE — 97014 ELECTRIC STIMULATION THERAPY: CPT | Performed by: PHYSICAL THERAPIST

## 2018-02-01 PROCEDURE — 70450 CT HEAD/BRAIN W/O DYE: CPT

## 2018-02-01 NOTE — PROGRESS NOTES
PT RE EVALUATION    Today's date: 2018  Patient name: Kimo Girard  : 1960  MRN: [id number]  Referring provider: Shamika Johnston MD  Dx:   Encounter Diagnoses   Name Primary?  Concussion with prolonged (more than 24 hours) loss of consciousness and return to pre-existing conscious level, subsequent encounter Yes    Other headache syndrome     Cervicalgia                   Assessment  Impairments: abnormal or restricted ROM, impaired physical strength and pain with function  Other impairment: difficulty focusing, increased nausea, dizziness    Assessment details: Pt is a 62y o  year old female coming to outpatient PT s/p concussion  Pt presents with decreased cervical ROM, decreased balance, decreased UE ROM and strength and overall decreased functional mobility  Pt would benefit from skilled PT services in order to address these deficits and reach maximum level of function  Understanding of Dx/Px/POC: good   Prognosis: good    Plan    Planned modality interventions: thermotherapy: hydrocollator packs and TENS  Planned therapy interventions: manual therapy, joint mobilization, therapeutic exercise, home exercise program, flexibility and neuromuscular re-education  Other planned therapy interventions: oculomotor ex  Frequency: 2x week  Duration in weeks: 6  Treatment plan discussed with: patient        Subjective Evaluation    History of Present Illness  Mechanism of injury: Pt reports less tightness in her R posterior neck  Pt continues to have lateral head pain  Pt continues to have nausea  Pt continues to have dizziness and " fuzziness"  when scanning her eyes to look for times on a shelf in a store and when driving  Pt has some good days and other bad days    Pain  At best pain ratin  At worst pain ratin  Location: neck  Progression: no change (slight improvement in some areas, however no general improvement)          Objective     Active Range of Motion   Cervical/Thoracic Spine Cervical    Flexion: 40 degrees   Extension: 50 degrees   Left lateral flexion: 20 degrees   Right lateral flexion: 18 degrees   Left rotation: 48 degrees   Right rotation: 44 degrees with pain  Left Shoulder   Flexion: 140 degrees   Abduction: 110 degrees     Right Shoulder   Flexion: 150 degrees   Abduction: 130 degrees     Strength/Myotome Testing     Left Shoulder     Planes of Motion   Flexion: 4   Abduction: 4     Right Shoulder     Planes of Motion   Flexion: 4   Abduction: 4     Additional Strength Details  CT SIB testing ( balance):  EO firm surface: 2 03 sway index  EC firm surface: 3 98 sway index  EO foam surface; 2 72 sway index  EC foam surface; unable      Dx: concussion  EPOC: 2/7/18  CO-MORBIDITIES:none  PERSONAL FACTORS:     Precautions: Ha, concussion    Daily Treatment Diary     Manual  1/25 1/29 2/1          MFE Ut, levator, cspine psp 15 mins 12  mins 15'                                                                  Exercise Diary  1/25 1/269 2/1          UBE 1'/1' np np          Balance- Side- stepping Foam 3laps Foam 3 laps np          Balance- tandem walking 3 laps 3 laps np          rockerboard ML AP 1' ea 1' ea np          SLS  30" x2 30" x2 np          VOR  x1 horz 30"x1 30"x2 30"x2          VOR x1 vert 30"x1 30"x2 30"x2          UT stretch hep 30"x2 30"x2          Levator stretch hep 30"x2 30"x2          Chin tucks supin np 10 10          Targets w/ n w/o stopping 10 x ea 10x ea 10 ea          Trunk rotations holding ball 15"x2 15"x2 np                                                                                                                      Modalities  1/25 1/29 2/1          TENS w/ MH  NP 10 min 10'

## 2018-02-05 ENCOUNTER — APPOINTMENT (OUTPATIENT)
Dept: PHYSICAL THERAPY | Facility: CLINIC | Age: 58
End: 2018-02-05
Payer: COMMERCIAL

## 2018-02-08 ENCOUNTER — OFFICE VISIT (OUTPATIENT)
Dept: PHYSICAL THERAPY | Facility: CLINIC | Age: 58
End: 2018-02-08
Payer: COMMERCIAL

## 2018-02-08 DIAGNOSIS — M54.2 CERVICALGIA: Primary | ICD-10-CM

## 2018-02-08 DIAGNOSIS — S06.0X9D CONCUSSION WITH PROLONGED (MORE THAN 24 HOURS) LOSS OF CONSCIOUSNESS AND RETURN TO PRE-EXISTING CONSCIOUS LEVEL, SUBSEQUENT ENCOUNTER: ICD-10-CM

## 2018-02-08 DIAGNOSIS — G44.89 OTHER HEADACHE SYNDROME: ICD-10-CM

## 2018-02-08 PROCEDURE — G0283 ELEC STIM OTHER THAN WOUND: HCPCS | Performed by: PHYSICAL THERAPIST

## 2018-02-08 PROCEDURE — G8978 MOBILITY CURRENT STATUS: HCPCS | Performed by: PHYSICAL THERAPIST

## 2018-02-08 PROCEDURE — 97140 MANUAL THERAPY 1/> REGIONS: CPT | Performed by: PHYSICAL THERAPIST

## 2018-02-08 PROCEDURE — 97010 HOT OR COLD PACKS THERAPY: CPT | Performed by: PHYSICAL THERAPIST

## 2018-02-08 PROCEDURE — G8979 MOBILITY GOAL STATUS: HCPCS | Performed by: PHYSICAL THERAPIST

## 2018-02-08 PROCEDURE — 97110 THERAPEUTIC EXERCISES: CPT | Performed by: PHYSICAL THERAPIST

## 2018-02-08 PROCEDURE — 97112 NEUROMUSCULAR REEDUCATION: CPT | Performed by: PHYSICAL THERAPIST

## 2018-02-08 PROCEDURE — 97014 ELECTRIC STIMULATION THERAPY: CPT | Performed by: PHYSICAL THERAPIST

## 2018-02-08 NOTE — PROGRESS NOTES
Daily Note     Today's date: 2018  Patient name: Hedy Kapoor  : 1960  MRN: [id number]  Referring provider: Ajay Miguel MD  Dx:   Encounter Diagnoses   Name Primary?  Cervicalgia Yes    Concussion with prolonged (more than 24 hours) loss of consciousness and return to pre-existing conscious level, subsequent encounter     Other headache syndrome                   Subjective: Pt reports having more bad days than good days recently  Pt had a stressful ride to her daughter's home in bad weather and has had a stressful schedule recently with a birth of a grand daughter  Pt forgot to take medication given to her by the neurologist       Objective: See treatment diary below      Assessment: Tolerated treatment fair  Patient demonstrated fatigue post treatment  Pt felt nausea after the UBE  (-) [place] Fail test B      Plan: Continue per plan of care      Precautions: [place]ulk[place], concussion    Daily Treatment Diary     Manual           MFE Ut, levator, cspine psp 15 mins 12  mins 15' th         Cervical distraction    th         SOR    th                          15'             Exercise Diary           UBE ' np np 3' BW         Balance- Side- stepping Foam 3laps Foam 3 laps np          Balance- tandem walking 3 laps 3 laps np          rockerboard ML AP 1' ea 1' ea np          SLS  30" x2 30" x2 np          VOR  x1 horz 30"x1 30"x2 30"x2 30"x2         VOR x1 vert 30"x1 30"x2 30"x2 30"x2         UT stretch hep 30"x2 30"x2 30"x2         Levator stretch hep 30"x2 30"x2 30"x2         Chin tucks supin np 10 10 10         Targets w/ n w/o stopping 10 x ea 10x ea 10 ea 10 ea         Trunk rotations holding ball 15"x2 15"x2 np                                                                                                                      Modalities           TENS w/ MH  NP 10 min 10' 10'

## 2018-02-15 ENCOUNTER — OFFICE VISIT (OUTPATIENT)
Dept: PHYSICAL THERAPY | Facility: CLINIC | Age: 58
End: 2018-02-15
Payer: COMMERCIAL

## 2018-02-15 DIAGNOSIS — G44.89 OTHER HEADACHE SYNDROME: ICD-10-CM

## 2018-02-15 DIAGNOSIS — M54.2 CERVICALGIA: Primary | ICD-10-CM

## 2018-02-15 DIAGNOSIS — S06.0X9D CONCUSSION WITH PROLONGED (MORE THAN 24 HOURS) LOSS OF CONSCIOUSNESS AND RETURN TO PRE-EXISTING CONSCIOUS LEVEL, SUBSEQUENT ENCOUNTER: ICD-10-CM

## 2018-02-15 PROCEDURE — G0283 ELEC STIM OTHER THAN WOUND: HCPCS

## 2018-02-15 PROCEDURE — 97014 ELECTRIC STIMULATION THERAPY: CPT

## 2018-02-15 PROCEDURE — 97140 MANUAL THERAPY 1/> REGIONS: CPT

## 2018-02-15 PROCEDURE — 97010 HOT OR COLD PACKS THERAPY: CPT

## 2018-02-15 PROCEDURE — 97112 NEUROMUSCULAR REEDUCATION: CPT

## 2018-02-15 NOTE — PROGRESS NOTES
Daily Note     Today's date: 2/15/2018  Patient name: Molina Esteves  : 1960  MRN: [id number]  Referring provider: Barbi Rg MD  Dx:   Encounter Diagnoses   Name Primary?  Cervicalgia Yes    Concussion with prolonged (more than 24 hours) loss of consciousness and return to pre-existing conscious level, subsequent encounter     Other headache syndrome                   Subjective: Pt c/o having a bad morning w/ nausea and dizzy since yesterday  States she had to work 9-4 yesterday and stated it was difficult  Objective: See treatment diary below      Assessment: Limited ex's to vestibular and held balance ex's  Pt states since Tues she has had a rough time  Pt had difficulty w/ scanning ex  Plan: Continue per plan of care  Progress treatment as tolerated      Precautions: Ha, concussion    Daily Treatment Diary     Manual  1/25 1/29 2/1 2/8 2/15        MFE Ut, levator, cspine psp 15 mins 12  mins 15' th JK        Cervical distraction    th JK        SOR    th JK                         15' 15'            Exercise Diary  1/25 1/26 2/1 2/8 2/15        UBE ' np np 3' BW 3' BW        Balance- Side- stepping Foam 3laps Foam 3 laps np  np        Balance- tandem walking 3 laps 3 laps np  np        rockerboard ML AP 1' ea 1' ea np  np        SLS  30" x2 30" x2 np  np        VOR  x1 horz 30"x1 30"x2 30"x2 30"x2 30"x2        VOR x1 vert 30"x1 30"x2 30"x2 30"x2 30"x2        UT stretch hep 30"x2 30"x2 30"x2 30"x2        Levator stretch hep 30"x2 30"x2 30"x2 30"x2        Chin tucks supin np 10 10 10 10        Targets w/ n w/o stopping 10 x ea 10x ea 10 ea 10 ea 10 ea        Trunk rotations holding ball 15"x2 15"x2 np  np                                                                                                                    Modalities  1/25 1/29 2/1 2/8 2/15        TENS w/ MH  NP 10 min 10' 10' 10'

## 2018-02-19 ENCOUNTER — APPOINTMENT (OUTPATIENT)
Dept: PHYSICAL THERAPY | Facility: CLINIC | Age: 58
End: 2018-02-19
Payer: COMMERCIAL

## 2018-02-22 ENCOUNTER — OFFICE VISIT (OUTPATIENT)
Dept: PHYSICAL THERAPY | Facility: CLINIC | Age: 58
End: 2018-02-22
Payer: COMMERCIAL

## 2018-02-22 DIAGNOSIS — G44.89 OTHER HEADACHE SYNDROME: ICD-10-CM

## 2018-02-22 DIAGNOSIS — M54.2 CERVICALGIA: Primary | ICD-10-CM

## 2018-02-22 DIAGNOSIS — S06.0X9D CONCUSSION WITH PROLONGED (MORE THAN 24 HOURS) LOSS OF CONSCIOUSNESS AND RETURN TO PRE-EXISTING CONSCIOUS LEVEL, SUBSEQUENT ENCOUNTER: ICD-10-CM

## 2018-02-22 PROCEDURE — 97140 MANUAL THERAPY 1/> REGIONS: CPT | Performed by: PHYSICAL THERAPIST

## 2018-02-22 PROCEDURE — 97010 HOT OR COLD PACKS THERAPY: CPT | Performed by: PHYSICAL THERAPIST

## 2018-02-22 PROCEDURE — 97014 ELECTRIC STIMULATION THERAPY: CPT | Performed by: PHYSICAL THERAPIST

## 2018-02-22 PROCEDURE — G0283 ELEC STIM OTHER THAN WOUND: HCPCS | Performed by: PHYSICAL THERAPIST

## 2018-02-22 PROCEDURE — 97112 NEUROMUSCULAR REEDUCATION: CPT | Performed by: PHYSICAL THERAPIST

## 2018-02-22 NOTE — PROGRESS NOTES
Daily Note     Today's date: 2018  Patient name: Dennie Passe  : 1960  MRN: [id number]  Referring provider: Maxine Nicole MD  Dx:   Encounter Diagnosis     ICD-10-CM    1  Cervicalgia M54 2    2  Concussion with prolonged (more than 24 hours) loss of consciousness and return to pre-existing conscious level, subsequent encounter S06  0X9D    3  Other headache syndrome G44 89                   Subjective: Pt reports she is feeling better today  Mild neck pain noted  Objective: See treatment diary below      Assessment: Tolerated treatment well  Patient demonstrated fatigue post treatment  Pt was able to resume there ex/ balance program       Plan: Continue per plan of care       Dx: concussion, neck pain  EPOC: 3/22/18  CO-MORBIDITIES: none  PERSONAL FACTORS: Chyrl Rias; pt is a  and needs to perform fine motor tasks    Daily Treatment Diary     Manual  1/25 1/29 2/1 2/8 2/15 2/22       MFE Ut, levator, cspine psp 15 mins 12  mins 15' th JK th       Cervical distraction    th JK th       SOR    th JK th                        15' 15' 15'           Exercise Diary  1/25 1/26 2/1 2/8 2/15 2/22       UBE 1'/1' np np 3' BW 3' BW 1 5'/1 5'       Balance- Side- stepping Foam 3laps Foam 3 laps np  np 3 laps       Balance- tandem walking 3 laps 3 laps np  np 2 laps & BW       rockerboard ML AP 1' ea 1' ea np  np 1'       SLS  30" x2 30" x2 np  np        VOR  x1 horz 30"x1 30"x2 30"x2 30"x2 30"x2 30"x2       VOR x1 vert 30"x1 30"x2 30"x2 30"x2 30"x2 30"x2       UT stretch hep 30"x2 30"x2 30"x2 30"x2 30"x2       Levator stretch hep 30"x2 30"x2 30"x2 30"x2 30"x2       Chin tucks supin np 10 10 10 10 10       Targets w/ n w/o stopping 10 x ea 10x ea 10 ea 10 ea 10 ea 10 ea       Trunk rotations holding ball 15"x2 15"x2 np  np        Tb LPD      O2x10       Tb row      O2x10                                                                                         Modalities  1/25 1/29 2/1 2/8 2/15 2/22 TENS w/ MH  NP 10 min 10' 10' 10' 10'

## 2018-03-01 ENCOUNTER — OFFICE VISIT (OUTPATIENT)
Dept: PHYSICAL THERAPY | Facility: CLINIC | Age: 58
End: 2018-03-01
Payer: COMMERCIAL

## 2018-03-01 DIAGNOSIS — M54.2 CERVICALGIA: Primary | ICD-10-CM

## 2018-03-01 DIAGNOSIS — G44.89 OTHER HEADACHE SYNDROME: ICD-10-CM

## 2018-03-01 DIAGNOSIS — S06.0X9D CONCUSSION WITH PROLONGED (MORE THAN 24 HOURS) LOSS OF CONSCIOUSNESS AND RETURN TO PRE-EXISTING CONSCIOUS LEVEL, SUBSEQUENT ENCOUNTER: ICD-10-CM

## 2018-03-01 PROCEDURE — 97014 ELECTRIC STIMULATION THERAPY: CPT | Performed by: PHYSICAL THERAPIST

## 2018-03-01 PROCEDURE — G0283 ELEC STIM OTHER THAN WOUND: HCPCS | Performed by: PHYSICAL THERAPIST

## 2018-03-01 PROCEDURE — 97140 MANUAL THERAPY 1/> REGIONS: CPT | Performed by: PHYSICAL THERAPIST

## 2018-03-01 PROCEDURE — 97112 NEUROMUSCULAR REEDUCATION: CPT | Performed by: PHYSICAL THERAPIST

## 2018-03-01 PROCEDURE — 97010 HOT OR COLD PACKS THERAPY: CPT | Performed by: PHYSICAL THERAPIST

## 2018-03-01 NOTE — PROGRESS NOTES
Daily Note     Today's date: 3/1/2018  Patient name: Anabel Carter  : 1960  MRN: [id number]  Referring provider: Ely Manzanares MD  Dx:   Encounter Diagnosis     ICD-10-CM    1  Cervicalgia M54 2    2  Concussion with prolonged (more than 24 hours) loss of consciousness and return to pre-existing conscious level, subsequent encounter S06  0X9D    3  Other headache syndrome G44 89                   Subjective: Pt reports she finally had a good day yesterday  Yesterday was the first day that she did not have nausea at work  3/10 neck pain currently  Objective: See treatment diary below      Assessment: Tolerated treatment well  Patient exhibited good technique with therapeutic exercises      Plan: Continue per plan of care         Dx: concussion, neck pain  EPOC: 3/22/18  CO-MORBIDITIES: none  PERSONAL FACTORS: [facility]; pt is a  and needs to perform fine motor tasks    Daily Treatment Diary     Manual  1/25 1/29 2/1 2/8 2/15 2/22 3/1      MFE Ut, levator, cspine psp 15 mins 12  mins 13' th JK th th      Cervical distraction    th JK th th      SOR    th JK th th                       13' 15' 15' 15'          Exercise Diary  1/25 1/26 2/1 2/8 2/15 2/22 3/1      UBE 1'/1' np np 3' BW 3' BW 1 5'/1 5' 2'/2'      Balance- Side- stepping Foam 3laps Foam 3 laps np  np 3 laps 3 laps      Balance- tandem walking 3 laps 3 laps np  np 2 laps & BW 3 laps on foam      rockerboard ML AP 1' ea 1' ea np  np 1' 1' ea      SLS  30" x2 30" x2 np  np  15"x2 B      VOR  x1 horz 30"x1 30"x2 30"x2 30"x2 30"x2 30"x2 30"x2      VOR x1 vert 30"x1 30"x2 30"x2 30"x2 30"x2 30"x2       UT stretch hep 30"x2 30"x2 30"x2 30"x2 30"x2       Levator stretch hep 30"x2 30"x2 30"x2 30"x2 30"x2       Chin tucks supin np 10 10 10 10 10 10 quad      Targets w/ n w/o stopping 10 x ea 10x ea 10 ea 10 ea 10 ea 10 ea 10 ea      Trunk rotations holding ball 15"x2 15"x2 np  np        Tb LPD      O2x10 O2x10      Tb row      O2x10 O2x10 Imaginary target       30"x2                                                                           Modalities  1/25 1/29 2/1 2/8 2/15 2/22 3/1      TENS w/ MH  NP 10 min 10' 10' 10' 10' 10'

## 2018-03-05 ENCOUNTER — OFFICE VISIT (OUTPATIENT)
Dept: PHYSICAL THERAPY | Facility: CLINIC | Age: 58
End: 2018-03-05
Payer: COMMERCIAL

## 2018-03-05 DIAGNOSIS — S06.0X9D CONCUSSION WITH PROLONGED (MORE THAN 24 HOURS) LOSS OF CONSCIOUSNESS AND RETURN TO PRE-EXISTING CONSCIOUS LEVEL, SUBSEQUENT ENCOUNTER: ICD-10-CM

## 2018-03-05 DIAGNOSIS — M54.2 CERVICALGIA: Primary | ICD-10-CM

## 2018-03-05 DIAGNOSIS — G44.89 OTHER HEADACHE SYNDROME: ICD-10-CM

## 2018-03-05 PROCEDURE — 97010 HOT OR COLD PACKS THERAPY: CPT | Performed by: PHYSICAL THERAPIST

## 2018-03-05 PROCEDURE — G0283 ELEC STIM OTHER THAN WOUND: HCPCS | Performed by: PHYSICAL THERAPIST

## 2018-03-05 PROCEDURE — 97110 THERAPEUTIC EXERCISES: CPT | Performed by: PHYSICAL THERAPIST

## 2018-03-05 PROCEDURE — 97140 MANUAL THERAPY 1/> REGIONS: CPT | Performed by: PHYSICAL THERAPIST

## 2018-03-05 PROCEDURE — 97112 NEUROMUSCULAR REEDUCATION: CPT | Performed by: PHYSICAL THERAPIST

## 2018-03-05 PROCEDURE — 97014 ELECTRIC STIMULATION THERAPY: CPT | Performed by: PHYSICAL THERAPIST

## 2018-03-05 NOTE — PROGRESS NOTES
Dx: concussion, neck pain  EPOC: 3/22/18  CO-MORBIDITIES: none  PERSONAL FACTORS: Paty Neighbors; pt is a  and needs to perform fine motor tasks    Daily Treatment Diary     Manual  1/25 1/29 2/1 2/8 2/15 2/22 3/1 3/5     MFE Ut, levator, cspine psp 15 mins 12  mins 13' th JK th th      Cervical distraction    th JK th th      SOR    th JK th th                       13' 15' 15' 15'          Exercise Diary  1/25 1/26 2/1 2/8 2/15 2/22 3/1 3/5     UBE 1'/1' np np 3' BW 3' BW 1 5'/1 5' 2'/2' 2'/2'     Balance- Side- stepping Foam 3laps Foam 3 laps np  np 3 laps 3 laps 3 laps     Balance- tandem walking 3 laps 3 laps np  np 2 laps & BW 3 laps on foam 3 laps on foam     rockerboard ML AP 1' ea 1' ea np  np 1' 1' ea 1' ea     SLS  30" x2 30" x2 np  np  15"x2 B 15"x2B     VOR  x1 horz 30"x1 30"x2 30"x2 30"x2 30"x2 30"x2 30"x2 30"x2     VOR x1 vert 30"x1 30"x2 30"x2 30"x2 30"x2 30"x2       UT stretch hep 30"x2 30"x2 30"x2 30"x2 30"x2  20"x3     Levator stretch hep 30"x2 30"x2 30"x2 30"x2 30"x2  20"x3     Chin tucks supin np 10 10 10 10 10 10 quad 10 quad     Targets w/ n w/o stopping 10 x ea 10x ea 10 ea 10 ea 10 ea 10 ea 10 ea      Trunk rotations holding ball 15"x2 15"x2 np  np        Tb LPD      O2x10 O2x10 O2x10     Tb row      O2x10 O2x10 O2x10     Imaginary target       30"x2                                                                           Modalities  1/25 1/29 2/1 2/8 2/15 2/22 3/1      TENS w/ MH  NP 10 min 10' 10' 10' 10' 10'

## 2018-03-05 NOTE — PROGRESS NOTES
Daily Note     Today's date: 3/5/2018  Patient name: Layo Eagle  : 1960  MRN: [id number]  Referring provider: Farshad Love MD  Dx:   Encounter Diagnosis     ICD-10-CM    1  Cervicalgia M54 2    2  Concussion with prolonged (more than 24 hours) loss of consciousness and return to pre-existing conscious level, subsequent encounter S06  0X9D    3  Other headache syndrome G44 89                   Subjective: Pt reports her neck feels sore today, 3/10  However her head continues to feel clear  Objective: See treatment diary below      Assessment: Tolerated treatment well  Patient demonstrated fatigue post treatment  Pt continues to have increased muscle spasm and relief with cervical distraction and SOR  Plan: Continue per plan of care       Dx: concussion, neck pain  EPOC: 3/22/18  CO-MORBIDITIES: none  PERSONAL FACTORS: Chary Camp; pt is a  and needs to perform fine motor tasks    Daily Treatment Diary     Manual  1/25 1/29 2/1 2/8 2/15 2/22 3/1 3/5     MFE Ut, levator, cspine psp 15 mins 12  mins 15' th JK th th th     Cervical distraction    th JK th th th     SOR    th JK th th th                      13' 15' 15' 15' 15'         Exercise Diary  1/25 1/26 2/1 2/8 2/15 2/22 3/1 3/5     UBE 1'/1' np np 3' BW 3' BW 1 5'/1 5' 2'/2' 2'2'     Balance- Side- stepping Foam 3laps Foam 3 laps np  np 3 laps 3 laps 3 laps     Balance- tandem walking 3 laps 3 laps np  np 2 laps & BW 3 laps on foam 3 laps on foam     rockerboard ML AP 1' ea 1' ea np  np 1' 1' ea 1' ea     SLS  30" x2 30" x2 np  np  15"x2 B 15"x2     VOR  x1 horz 30"x1 30"x2 30"x2 30"x2 30"x2 30"x2 30"x2 30"x2     VOR x1 vert 30"x1 30"x2 30"x2 30"x2 30"x2 30"x2       UT stretch hep 30"x2 30"x2 30"x2 30"x2 30"x2  20"x3     Levator stretch hep 30"x2 30"x2 30"x2 30"x2 30"x2  20"x3     Chin tucks supin np 10 10 10 10 10 10 quad 10 quad     Targets w/ n w/o stopping 10 x ea 10x ea 10 ea 10 ea 10 ea 10 ea 10 ea      Trunk rotations holding ball 15"x2 15"x2 np  np        Tb LPD      O2x10 O2x10 O2x10     Tb row      O2x10 O2x10 O2x10     Imaginary target       30"x2                                                                           Modalities  1/25 1/29 2/1 2/8 2/15 2/22 3/1      TENS w/ MH  NP 10 min 10' 10' 10' 10' 10'

## 2018-03-08 ENCOUNTER — OFFICE VISIT (OUTPATIENT)
Dept: PHYSICAL THERAPY | Facility: CLINIC | Age: 58
End: 2018-03-08
Payer: COMMERCIAL

## 2018-03-08 DIAGNOSIS — M54.2 CERVICALGIA: Primary | ICD-10-CM

## 2018-03-08 DIAGNOSIS — G44.89 OTHER HEADACHE SYNDROME: ICD-10-CM

## 2018-03-08 DIAGNOSIS — S06.0X9D CONCUSSION WITH PROLONGED (MORE THAN 24 HOURS) LOSS OF CONSCIOUSNESS AND RETURN TO PRE-EXISTING CONSCIOUS LEVEL, SUBSEQUENT ENCOUNTER: ICD-10-CM

## 2018-03-08 PROCEDURE — 97112 NEUROMUSCULAR REEDUCATION: CPT | Performed by: PHYSICAL THERAPIST

## 2018-03-08 PROCEDURE — 97010 HOT OR COLD PACKS THERAPY: CPT | Performed by: PHYSICAL THERAPIST

## 2018-03-08 PROCEDURE — G0283 ELEC STIM OTHER THAN WOUND: HCPCS | Performed by: PHYSICAL THERAPIST

## 2018-03-08 PROCEDURE — 97014 ELECTRIC STIMULATION THERAPY: CPT | Performed by: PHYSICAL THERAPIST

## 2018-03-08 PROCEDURE — 97140 MANUAL THERAPY 1/> REGIONS: CPT | Performed by: PHYSICAL THERAPIST

## 2018-03-08 NOTE — PROGRESS NOTES
Daily Note     Today's date: 3/8/2018  Patient name: Jose Williamson  : 1960  MRN: [id number]  Referring provider: Pamela Nguyen MD  Dx:   Encounter Diagnosis     ICD-10-CM    1  Cervicalgia M54 2    2  Concussion with prolonged (more than 24 hours) loss of consciousness and return to pre-existing conscious level, subsequent encounter S06  0X9D    3  Other headache syndrome G44 89                   Subjective: Pt reports she felt off balance when performing an exercise video at home  Pt notes sound and light bother her  She is not able to listen to the radio in the car  Objective: See treatment diary below      Assessment: Tolerated treatment well  Patient exhibited good technique with therapeutic exercises  Pt is tolerating more ex, is able to complete ex without flare up of sx at a faster pace  Plan: Continue per plan of care       Dx: concussion, neck pain  EPOC: 3/22/18  CO-MORBIDITIES: none  PERSONAL FACTORS: Olu Infante; pt is a  and needs to perform fine motor tasks    Daily Treatment Diary     Manual  1/25 1/29 2/1 2/8 2/15 2/22 3/1 3/5 3/8    MFE Ut, levator, cspine psp 15 mins 12  mins 15' th JK th th th th    Cervical distraction    th JK th th th th    SOR    th JK th th th th                     13' 15' 15' 15' 15' 10'        Exercise Diary  1/25 1/26 2/1 2/8 2/15 2/22 3/1 3/5 3/8    UBE 1'/1' np np 3' BW 3' BW 1 5'/1 5' 2'/2' 2'2' 2'/2'    Balance- Side- stepping Foam 3laps Foam 3 laps np  np 3 laps 3 laps 3 laps 3 laps    Balance- tandem walking 3 laps 3 laps np  np 2 laps & BW 3 laps on foam 3 laps on foam 3 laps on foam    rockerboard ML AP 1' ea 1' ea np  np 1' 1' ea 1' ea 1' ea    SLS  30" x2 30" x2 np  np  15"x2 B 15"x2 20"x2    VOR  x1 horz 30"x1 30"x2 30"x2 30"x2 30"x2 30"x2 30"x2 30"x2 30"x2 busy    VOR x1 vert 30"x1 30"x2 30"x2 30"x2 30"x2 30"x2   30"x2 busy    UT stretch hep 30"x2 30"x2 30"x2 30"x2 30"x2  20"x3     Levator stretch hep 30"x2 30"x2 30"x2 30"x2 30"x2  20"x3 Chin tucks supin np 10 10 10 10 10 10 quad 10 quad 10    Targets w/ n w/o stopping 10 x ea 10x ea 10 ea 10 ea 10 ea 10 ea 10 ea  10 ea    Trunk rotations holding ball 15"x2 15"x2 np  np    15"x2    Tb LPD      O2x10 O2x10 O2x10 O2x10    Tb row      O2x10 O2x10 O2x10 O2x10    Imaginary target       30"x2  30"x2                                                                         Modalities  1/25 1/29 2/1 2/8 2/15 2/22 3/1  3/8    TENS w/ MH  NP 10 min 10' 10' 10' 10' 10'  10'

## 2018-03-19 ENCOUNTER — OFFICE VISIT (OUTPATIENT)
Dept: PHYSICAL THERAPY | Facility: CLINIC | Age: 58
End: 2018-03-19
Payer: COMMERCIAL

## 2018-03-19 DIAGNOSIS — M54.2 CERVICALGIA: ICD-10-CM

## 2018-03-19 DIAGNOSIS — S06.0X9D CONCUSSION WITH PROLONGED (MORE THAN 24 HOURS) LOSS OF CONSCIOUSNESS AND RETURN TO PRE-EXISTING CONSCIOUS LEVEL, SUBSEQUENT ENCOUNTER: Primary | ICD-10-CM

## 2018-03-19 DIAGNOSIS — G44.89 OTHER HEADACHE SYNDROME: ICD-10-CM

## 2018-03-19 PROCEDURE — 97110 THERAPEUTIC EXERCISES: CPT

## 2018-03-19 PROCEDURE — 97140 MANUAL THERAPY 1/> REGIONS: CPT

## 2018-03-19 PROCEDURE — G0283 ELEC STIM OTHER THAN WOUND: HCPCS

## 2018-03-19 PROCEDURE — 97014 ELECTRIC STIMULATION THERAPY: CPT

## 2018-03-19 PROCEDURE — 97010 HOT OR COLD PACKS THERAPY: CPT

## 2018-03-19 NOTE — PROGRESS NOTES
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  Pt c/o knee pain  Objective: See treatment diary below      Assessment: Tolerated treatment fair  Limited ex's due to knee pain  Pt deferred SLS and tandem on foam   Pt reports a little looser in the cspine post Rx  Good response to MFR  Plan: Continue per plan of care  Progress treatment as tolerated      Dx: concussion, neck pain  EPOC: 3/22/18  CO-MORBIDITIES: none  PERSONAL FACTORS: Jonathan Cohn; pt is a  and needs to perform fine motor tasks    Daily Treatment Diary     Manual  1/25 1/29 2/1 2/8 2/15 2/22 3/1 3/5 3/8 3/19   MFE Ut, levator, cspine psp 15 mins 12  mins 15' th JK th th th th JK   Cervical distraction    th JK th th th th JK     SOR    th JK th th th th JK                    15' 15' 15' 15' 15' 10' 15'       Exercise Diary  1/25 1/26 2/1 2/8 2/15 2/22 3/1 3/5 3/8 3/19   UBE 1'/1' np np 3' BW 3' BW 1 5'/1 5' 2'/2' 2'2' 2'/2' 2'/2'   Balance- Side- stepping Foam 3laps Foam 3 laps np  np 3 laps 3 laps 3 laps 3 laps 3 laps  On foam   Balance- tandem walking 3 laps 3 laps np  np 2 laps & BW 3 laps on foam 3 laps on foam 3 laps on foam np   rockerboard ML AP 1' ea 1' ea np  np 1' 1' ea 1' ea 1' ea 1'ea   SLS  30" x2 30" x2 np  np  15"x2 B 15"x2 20"x2 np   VOR  x1 horz 30"x1 30"x2 30"x2 30"x2 30"x2 30"x2 30"x2 30"x2 30"x2 busy 30"x2  busy   VOR x1 vert 30"x1 30"x2 30"x2 30"x2 30"x2 30"x2   30"x2 busy 30"x2  busy   UT stretch hep 30"x2 30"x2 30"x2 30"x2 30"x2  20"x3  20"x3   Levator stretch hep 30"x2 30"x2 30"x2 30"x2 30"x2  20"x3  20"x3   Chin tucks supin np 10 10 10 10 10 10 quad 10 quad 10 10   Targets w/ n w/o stopping 10 x ea 10x ea 10 ea 10 ea 10 ea 10 ea 10 ea  10 ea 10   Trunk rotations holding ball 15"x2 15"x2 np  np    15"x2 15"x2   Tb LPD      O2x10 O2x10 O2x10 O2x10 G20   Tb row      O2x10 O2x10 O2x10 O2x10 20GT   Imaginary target       30"x2  30"x2 30"x2                                                                        Modalities  1/25 1/29 2/1 2/8 2/15 2/22 3/1  3/8 3/19   TENS w/ MH  NP 10 min 10' 10' 10' 10' 10'  10' 10'

## 2018-03-20 ENCOUNTER — OFFICE VISIT (OUTPATIENT)
Dept: FAMILY MEDICINE CLINIC | Facility: CLINIC | Age: 58
End: 2018-03-20
Payer: COMMERCIAL

## 2018-03-20 VITALS
HEART RATE: 71 BPM | HEIGHT: 64 IN | TEMPERATURE: 98.1 F | SYSTOLIC BLOOD PRESSURE: 132 MMHG | DIASTOLIC BLOOD PRESSURE: 76 MMHG | OXYGEN SATURATION: 98 % | WEIGHT: 157 LBS | BODY MASS INDEX: 26.8 KG/M2

## 2018-03-20 DIAGNOSIS — S83.421A SPRAIN OF LATERAL COLLATERAL LIGAMENT OF RIGHT KNEE, INITIAL ENCOUNTER: Primary | ICD-10-CM

## 2018-03-20 DIAGNOSIS — S06.0X9S CONCUSSION WITH LOSS OF CONSCIOUSNESS, SEQUELA (HCC): ICD-10-CM

## 2018-03-20 PROBLEM — M25.561 KNEE PAIN, BILATERAL: Status: ACTIVE | Noted: 2017-05-24

## 2018-03-20 PROBLEM — S06.0X9A CONCUSSION WITH LOSS OF CONSCIOUSNESS: Status: ACTIVE | Noted: 2018-03-20

## 2018-03-20 PROBLEM — M25.869 KNEE JOINT CYST: Status: ACTIVE | Noted: 2017-05-24

## 2018-03-20 PROBLEM — M25.562 KNEE PAIN, BILATERAL: Status: ACTIVE | Noted: 2017-05-24

## 2018-03-20 PROCEDURE — 99213 OFFICE O/P EST LOW 20 MIN: CPT | Performed by: FAMILY MEDICINE

## 2018-03-20 NOTE — PROGRESS NOTES
Assessment/Plan:    Concussion with loss of consciousness  This is been a very long process and the patient has finally started to notice some improvement  She is attending physical therapy twice a week and will continue to do so until cleared by the   Physical therapist        Diagnoses and all orders for this visit:    Sprain of lateral collateral ligament of right knee, initial encounter  -     Ambulatory referral to Physical Therapy; Future      As the patient is already attending physical therapy I simply added an order for the addition of therapy on her knee  I think she has a mild sprain that has worsened over the past 2 months without any treatment  Hopefully physical therapy will cause resolution relatively quickly  If not, I did provide her with a card for orthopedic surgery  Subjective:      Patient ID: Vikki Muir is a 62 y o  female      The pt is here because her right knee has been hurting for about 2 months  Gradually getting worse  Now at the end of the day she's putting the heating pad on it  It hurts to drive, putting her foot on the brake  Hurts in side to side motion, not so much flexion and extension  Worse at night  Throbs  Sometimes even bothers up up towards her hip  Seemed a little swollen once, last week  Feels "inside"  Stairs are hard    She has meloxicam from when she had her head injury but has not been taking it  She is still in PT for her concussion  Over the past couple of weeks the headache/concussion sx have finally started to ease up              The following portions of the patient's history were reviewed and updated as appropriate: allergies, current medications, past family history, past medical history, past social history, past surgical history and problem list     Review of Systems      Objective:  Vitals:    03/20/18 0917   BP: 132/76   Pulse: 71   Temp: 98 1 °F (36 7 °C)   SpO2: 98%   Weight: 71 2 kg (157 lb)   Height: 5' 4" (1 626 m)      Physical Exam Constitutional: She is oriented to person, place, and time  She appears well-developed and well-nourished  Musculoskeletal:        Right knee: She exhibits swelling (mild edema over the lateral ligaments)  She exhibits normal range of motion, no effusion, no ecchymosis, no deformity, no laceration, no erythema, normal alignment, no LCL laxity, normal patellar mobility, no bony tenderness, normal meniscus and no MCL laxity  Tenderness found  LCL tenderness noted  No medial joint line, no lateral joint line, no MCL and no patellar tendon tenderness noted  Neurological: She is alert and oriented to person, place, and time  Skin: Skin is warm and dry  Psychiatric: She has a normal mood and affect

## 2018-03-20 NOTE — PATIENT INSTRUCTIONS
If you are taking meloxicam do not take advil (ibuprofen or aleve)  You can take 2 extra strength tylenol (acetaminophen) every 8 hours for pain

## 2018-03-20 NOTE — ASSESSMENT & PLAN NOTE
This is been a very long process and the patient has finally started to notice some improvement    She is attending physical therapy twice a week and will continue to do so until cleared by the   Physical therapist

## 2018-03-22 ENCOUNTER — APPOINTMENT (OUTPATIENT)
Dept: PHYSICAL THERAPY | Facility: CLINIC | Age: 58
End: 2018-03-22
Payer: COMMERCIAL

## 2018-03-26 ENCOUNTER — OFFICE VISIT (OUTPATIENT)
Dept: PHYSICAL THERAPY | Facility: CLINIC | Age: 58
End: 2018-03-26
Payer: COMMERCIAL

## 2018-03-26 DIAGNOSIS — S06.0X9S CONCUSSION WITH LOSS OF CONSCIOUSNESS, SEQUELA (HCC): ICD-10-CM

## 2018-03-26 DIAGNOSIS — G44.89 OTHER HEADACHE SYNDROME: ICD-10-CM

## 2018-03-26 DIAGNOSIS — M54.2 CERVICALGIA: ICD-10-CM

## 2018-03-26 DIAGNOSIS — S06.0X9D CONCUSSION WITH PROLONGED (MORE THAN 24 HOURS) LOSS OF CONSCIOUSNESS AND RETURN TO PRE-EXISTING CONSCIOUS LEVEL, SUBSEQUENT ENCOUNTER: Primary | ICD-10-CM

## 2018-03-26 PROCEDURE — 97014 ELECTRIC STIMULATION THERAPY: CPT

## 2018-03-26 PROCEDURE — 97010 HOT OR COLD PACKS THERAPY: CPT

## 2018-03-26 PROCEDURE — G0283 ELEC STIM OTHER THAN WOUND: HCPCS

## 2018-03-26 PROCEDURE — 97112 NEUROMUSCULAR REEDUCATION: CPT

## 2018-03-26 PROCEDURE — 97140 MANUAL THERAPY 1/> REGIONS: CPT

## 2018-03-26 PROCEDURE — G8979 MOBILITY GOAL STATUS: HCPCS

## 2018-03-26 PROCEDURE — G8978 MOBILITY CURRENT STATUS: HCPCS

## 2018-03-26 NOTE — PROGRESS NOTES
Daily Note     Today's date: 3/26/2018  Patient name: Nelson Maravilla  : 1960  MRN: [id number]  Referring provider: Chen Clement MD  Dx:   Encounter Diagnosis     ICD-10-CM    1  Concussion with prolonged (more than 24 hours) loss of consciousness and return to pre-existing conscious level, subsequent encounter S06  0X9D    2  Cervicalgia M54 2    3  Other headache syndrome G44 89                   Subjective: Pt reports falling down 3 steps at home on Fri   C/o increased knee pain from 3/10 to 8/10  States over the weekend she has had more dizziness and nausea stating she thought all of that had resolved  Pt has new script in sxs for knee  Objective: See treatment diary below      Assessment: Tolerated treatment fair  Patient cont'd w/ c/o's nausea post rx  Limited ex's to vestibular  Plan: Continue per plan of care  Work towards DC w/ vestibular Dx    Dx: concussion, neck pain  EPOC: 3/22/18  CO-MORBIDITIES: none  PERSONAL FACTORS: Maddie Netter; pt is a  and needs to perform fine motor tasks    Daily Treatment Diary     Manual  3/26 1/29 2/1 2/8 2/15 2/22 3/1 3/5 3/8 3/19   MFE Ut, levator, cspine psp JK 12  mins 15' th JK th th th th JK   Cervical distraction JK   th JK th th th th JK     SOR JK   th JK th th th th JK                 15'   15' 15' 15' 15' 15' 10' 15'       Exercise Diary  3/26 1/26 2/1 2/8 2/15 2/22 3/1 3/5 3/8 3/19   UBE 2'/2' np np 3' BW 3' BW 1 5'/1 5' 2'/2' 2'2' 2'/2' 2'/2'   Balance- Side- stepping NP Foam 3 laps np  np 3 laps 3 laps 3 laps 3 laps 3 laps  On foam   Balance- tandem walking np 3 laps np  np 2 laps & BW 3 laps on foam 3 laps on foam 3 laps on foam np   rockerboard ML AP np 1' ea np  np 1' 1' ea 1' ea 1' ea 1'ea   SLS  np 30" x2 np  np  15"x2 B 15"x2 20"x2 np   VOR  x1 horz 30"x2  busy 30"x2 30"x2 30"x2 30"x2 30"x2 30"x2 30"x2 30"x2 busy 30"x2  busy   VOR x1 vert 30"x2  busy 30"x2 30"x2 30"x2 30"x2 30"x2   30"x2 busy 30"x2  busy   UT stretch hep 30"x2 30"x2 30"x2 30"x2 30"x2  20"x3  20"x3   Levator stretch hep 30"x2 30"x2 30"x2 30"x2 30"x2  20"x3  20"x3   Chin tucks supin 10 10 10 10 10 10 10 quad 10 quad 10 10   Targets w/ n w/o stopping 10 x ea 10x ea 10 ea 10 ea 10 ea 10 ea 10 ea  10 ea 10   Trunk rotations holding ball 15"x2 15"x2 np  np    15"x2 15"x2   Tb LPD O10x2     O2x10 O2x10 O2x10 O2x10 G20   Tb row O10x2     O2x10 O2x10 O2x10 O2x10 20GT   Imaginary target       30"x2  30"x2 30"x2                                                                        Modalities  3/26 1/29 2/1 2/8 2/15 2/22 3/1  3/8 3/19   TENS w/ MH  10' 10 min 10' 10' 10' 10' 10'  10' 10'

## 2018-04-02 ENCOUNTER — APPOINTMENT (OUTPATIENT)
Dept: PHYSICAL THERAPY | Facility: CLINIC | Age: 58
End: 2018-04-02
Payer: COMMERCIAL

## 2018-04-03 ENCOUNTER — TELEPHONE (OUTPATIENT)
Dept: FAMILY MEDICINE CLINIC | Facility: CLINIC | Age: 58
End: 2018-04-03

## 2018-04-03 DIAGNOSIS — M25.569 KNEE PAIN, UNSPECIFIED CHRONICITY, UNSPECIFIED LATERALITY: Primary | ICD-10-CM

## 2018-04-05 ENCOUNTER — APPOINTMENT (OUTPATIENT)
Dept: RADIOLOGY | Facility: CLINIC | Age: 58
End: 2018-04-05
Payer: COMMERCIAL

## 2018-04-05 ENCOUNTER — APPOINTMENT (OUTPATIENT)
Dept: PHYSICAL THERAPY | Facility: CLINIC | Age: 58
End: 2018-04-05
Payer: COMMERCIAL

## 2018-04-05 ENCOUNTER — OFFICE VISIT (OUTPATIENT)
Dept: OBGYN CLINIC | Facility: CLINIC | Age: 58
End: 2018-04-05
Payer: COMMERCIAL

## 2018-04-05 VITALS
HEIGHT: 64 IN | SYSTOLIC BLOOD PRESSURE: 130 MMHG | BODY MASS INDEX: 26.46 KG/M2 | HEART RATE: 80 BPM | DIASTOLIC BLOOD PRESSURE: 82 MMHG | WEIGHT: 155 LBS

## 2018-04-05 DIAGNOSIS — S86.911A STRAIN OF RIGHT KNEE, INITIAL ENCOUNTER: Primary | ICD-10-CM

## 2018-04-05 DIAGNOSIS — M25.569 KNEE PAIN, UNSPECIFIED CHRONICITY, UNSPECIFIED LATERALITY: ICD-10-CM

## 2018-04-05 DIAGNOSIS — M25.561 RIGHT KNEE PAIN, UNSPECIFIED CHRONICITY: ICD-10-CM

## 2018-04-05 PROCEDURE — 99203 OFFICE O/P NEW LOW 30 MIN: CPT | Performed by: ORTHOPAEDIC SURGERY

## 2018-04-05 PROCEDURE — 73564 X-RAY EXAM KNEE 4 OR MORE: CPT

## 2018-04-05 NOTE — PROGRESS NOTES
Assessment:     1  Strain of right knee, initial encounter    2  Knee pain, unspecified chronicity, unspecified laterality        Plan:     Problem List Items Addressed This Visit        Other    Strain of right knee - Primary     Findings consistent with right knee strain  Discussed findings and treatment options with the patient  I reviewed patient's right knee x-ray with her  I recommended patient to attend physical therapy to rehabilitate her right knee  I also provided patient a prescription for getting a knee brace for support  If patient's symptoms persist, we may have to obtain a MRI of her right knee to further evaluate the soft tissue around the joint  Patient should continue taking meloxicam that was prescribed by her neurologist   I will see patient back in 6-8 weeks for re-evaluation  All patient's questions were answered to his satisfaction  This note is created using dictation transcription  It may contains topographical errors, grammatical errors, improperly dictated words, background noise and other errors  Relevant Orders    XR knee 4+ vw right injury    Ambulatory referral to Physical Therapy    Brace      Other Visit Diagnoses     Knee pain, unspecified chronicity, unspecified laterality             Subjective:     Patient ID: Phil Funez is a 62 y o  female  Chief Complaint:  68-year-old white female with few months duration of gradual increased right knee pain  She does not remember injury to her right knee  She did suffer a concussion from an object falling on her head  She thinks she might have pivoted on her right knee since the pain started after the concussion  She is complaining of pain or along the outer aspect of her right knee  Prolonged walking activity seems to increase the intensity of the pain  She has sensation of giving away but no locking  Pain is constant and dull aching sometime is worse    She does have difficulty with climbing stairs or fast walking activities  Her initial pain was 8 now has settled down to a 3  She also has some swelling initially which also has subsided  Information on patient's intake form was reviewed  Allergy:  No Known Allergies  Medications:  all current active meds have been reviewed  Past Medical History:  Past Medical History:   Diagnosis Date    Hyperlipidemia      Past Surgical History:  Past Surgical History:   Procedure Laterality Date     SECTION      FOOT SURGERY Left 2007    TUBAL LIGATION       Family History:  Family History   Problem Relation Age of Onset    Coronary artery disease Mother     Other Mother      CABG    Hiatal hernia Father     Stroke Maternal Grandmother     Hyperlipidemia Family     Migraines Family     Thyroid disease Family      Social History:  History   Alcohol Use    Yes     Comment: social     History   Drug Use No     History   Smoking Status    Never Smoker   Smokeless Tobacco    Never Used     Review of Systems   Constitutional: Negative  HENT: Negative  Eyes: Negative  Respiratory: Negative  Cardiovascular: Negative  Gastrointestinal: Negative  Endocrine: Negative  Genitourinary: Negative  Musculoskeletal: Positive for arthralgias (Right knee) and joint swelling (Right knee)  Skin: Negative  Allergic/Immunologic: Negative  Neurological: Negative  Hematological: Negative  Psychiatric/Behavioral: Negative  Objective:  BP Readings from Last 1 Encounters:   18 130/82      Wt Readings from Last 1 Encounters:   18 70 3 kg (155 lb)      BMI:   Estimated body mass index is 26 61 kg/m² as calculated from the following:    Height as of this encounter: 5' 4" (1 626 m)  Weight as of this encounter: 70 3 kg (155 lb)  BSA:   Estimated body surface area is 1 76 meters squared as calculated from the following:    Height as of this encounter: 5' 4" (1 626 m)  Weight as of this encounter: 70 3 kg (155 lb)     Physical Exam Constitutional: She is oriented to person, place, and time  She appears well-developed  HENT:   Head: Normocephalic and atraumatic  Eyes: EOM are normal  Pupils are equal, round, and reactive to light  Neck: Neck supple  Musculoskeletal:        Right knee: She exhibits no effusion  Neurological: She is alert and oriented to person, place, and time  Skin: Skin is warm  Psychiatric: She has a normal mood and affect  Nursing note and vitals reviewed  Right Knee Exam     Tenderness   The patient is experiencing no tenderness  Range of Motion   The patient has normal right knee ROM  Muscle Strength     The patient has normal right knee strength  Tests   Darlin:  Medial - negative Lateral - negative  Lachman:  Anterior - negative    Posterior - negative  Drawer:       Anterior - negative    Posterior - negative  Varus: negative  Valgus: negative  Patellar Apprehension: negative    Other   Erythema: absent  Sensation: normal  Pulse: present  Swelling: none  Other tests: no effusion present    Comments:  Some discomfort with Darlin's test but no sharp pain      Left Knee Exam   Left knee exam is normal             I have personally reviewed pertinent films in PACS and my interpretation is Right knee show good joint alignment  No soft tissue calcification or DJD

## 2018-04-05 NOTE — ASSESSMENT & PLAN NOTE
Findings consistent with right knee strain  Discussed findings and treatment options with the patient  I reviewed patient's right knee x-ray with her  I recommended patient to attend physical therapy to rehabilitate her right knee  I also provided patient a prescription for getting a knee brace for support  If patient's symptoms persist, we may have to obtain a MRI of her right knee to further evaluate the soft tissue around the joint  Patient should continue taking meloxicam that was prescribed by her neurologist   I will see patient back in 6-8 weeks for re-evaluation  All patient's questions were answered to his satisfaction  This note is created using dictation transcription  It may contains topographical errors, grammatical errors, improperly dictated words, background noise and other errors

## 2018-04-09 ENCOUNTER — OFFICE VISIT (OUTPATIENT)
Dept: PHYSICAL THERAPY | Facility: CLINIC | Age: 58
End: 2018-04-09
Payer: COMMERCIAL

## 2018-04-09 DIAGNOSIS — S06.0X9S CONCUSSION WITH LOSS OF CONSCIOUSNESS, SEQUELA (HCC): ICD-10-CM

## 2018-04-09 DIAGNOSIS — M54.2 CERVICALGIA: Primary | ICD-10-CM

## 2018-04-09 PROCEDURE — 97140 MANUAL THERAPY 1/> REGIONS: CPT | Performed by: PHYSICAL THERAPIST

## 2018-04-09 PROCEDURE — 97112 NEUROMUSCULAR REEDUCATION: CPT | Performed by: PHYSICAL THERAPIST

## 2018-04-09 PROCEDURE — 97010 HOT OR COLD PACKS THERAPY: CPT | Performed by: PHYSICAL THERAPIST

## 2018-04-09 PROCEDURE — 97014 ELECTRIC STIMULATION THERAPY: CPT | Performed by: PHYSICAL THERAPIST

## 2018-04-09 PROCEDURE — G0283 ELEC STIM OTHER THAN WOUND: HCPCS | Performed by: PHYSICAL THERAPIST

## 2018-04-09 NOTE — PROGRESS NOTES
PT RE EVALUATION    Today's date: 2018  Patient name: Jose Marx  : 1960  MRN: [id number]  Referring provider: Grace Rudolph MD  Dx:   Encounter Diagnoses   Name Primary?  Cervicalgia Yes    Concussion with loss of consciousness, sequela (HCC)                   Assessment  Impairments: abnormal or restricted ROM, impaired physical strength and pain with function  Other impairment: difficulty focusing, increased nausea, dizziness    Assessment details: Pt is a 62y o  year old female coming to outpatient PT s/p concussion  Pt presents with continued decreased cervical ROM, decreased balance, decreased UE ROM and strength and overall decreased functional mobility  Oculomotor deficits are improving gradually  Recommend pt continue 2-3 more weeks with transition to HEP 2* pt is reaching a functional plateau with concussion sx  Pt continues to have cervical spine pain and stiffness  Pt may need further diagnostic testing for this area  Cognitive concussion check list completed and will be scanned into the " media" section of patients chart  Pt may benefit from cognitive therapy at [facility] site  Understanding of Dx/Px/POC: good   Prognosis: good    Goals  STG (3-4 weeks)  1  Decrease pain by 25% -partial met  2  Improve cervical ROM to WFL's - partial met  LTG's ( 6-8 weeks)  1  Improve FOTO score by 8-11 points - not met  2   Work performance is improved to maximal level of function- partial met      Plan  Patient would benefit from: skilled PT  Planned modality interventions: thermotherapy: hydrocollator packs and TENS  Planned therapy interventions: manual therapy, joint mobilization, therapeutic exercise, home exercise program, flexibility and neuromuscular re-education  Other planned therapy interventions: oculomotor ex  Frequency: 2x week  Duration in weeks: 3  Treatment plan discussed with: patient        Subjective Evaluation    History of Present Illness  Mechanism of injury: Pt reports she continues to get boughts 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  Pt has difficulty remembering things that clients have told her and remembering names  Pt continues to have light and sound sensitivity  Pt plans on returning to sewing activities today     Pain  At best pain ratin  At worst pain ratin  Location: neck  Progression: no change (slight improvement in some areas, however no general improvement)    Social Support  Steps to enter house: yes  3  Stairs in house: yes   12  Lives in: multiple-level home  Lives with: significant other    Employment status: working  Treatments  Current treatment: physical therapy  Patient Goals  Patient goal: to be able to return to work and sewing with less pain and nausea        Objective     Active Range of Motion   Cervical/Thoracic Spine   Cervical    Flexion: 42 degrees   Extension: 50 degrees   Left lateral flexion: 23 degrees   Right lateral flexion: 15 degrees   Left rotation: 40 degrees   Right rotation: 58 degrees with pain  Left Shoulder   Flexion: 140 degrees   Abduction: 110 degrees     Right Shoulder   Flexion: 150 degrees   Abduction: 130 degrees     Strength/Myotome Testing     Left Shoulder     Planes of Motion   Flexion: 4   Abduction: 4     Right Shoulder     Planes of Motion   Flexion: 4   Abduction: 4     Additional Strength Details  CT SIB testing ( balance):  EO firm surface: 2 03 sway index  EC firm surface: 3 98 sway index  EO foam surface; 2 72 sway index  EC foam surface; unable    General Comments     Cervical/Thoracic Comments  Vestibular Examination:          Modified CT SIB testing:  EO on firm surface: 2 11   sway index  EC on firm surface: 3 54  sway index  EO on foam surface: 2 21   sway index -improved  EC on form surface:  3 56 sway index -improved    Spontaneous nystagmus room light: negative  Gaze holding nystagmus room light: negative  Skew deviation room light: negative  Smooth pursuits:  vertical:  normal   Horizontal: normal    VOR cancellation: normal  Near point convergence:  9 cm  ( normal 4- 6 cm)  Oculomotor mobility: good          Dx: concussion  EPOC: 2/7/18  CO-MORBIDITIES:none  PERSONAL FACTORS:     Daily Treatment Diary     Manual  3/26 4/9 2/1 2/8 2/15 2/22 3/1 3/5 3/8 3/19   MFE Ut, levator, cspine psp JK th 13' th JK th th th th JK   Cervical distraction JK th  th JK th th th th JK     SOR JK th  th JK th th th th JK                 15' 10'  15' 15' 15' 15' 15' 10' 15'       Exercise Diary  3/26 4/9 2/1 2/8 2/15 2/22 3/1 3/5 3/8 3/19   UBE 2'/2' np np 3' BW 3' BW 1 5'/1 5' 2'/2' 2'2' 2'/2' 2'/2'   Balance- Side- stepping NP  np  np 3 laps 3 laps 3 laps 3 laps 3 laps  On foam   Balance- tandem walking np  np  np 2 laps & BW 3 laps on foam 3 laps on foam 3 laps on foam np   rockerboard ML AP np  np  np 1' 1' ea 1' ea 1' ea 1'ea   SLS  np  np  np  15"x2 B 15"x2 20"x2 np   VOR  x1 horz 30"x2  busy 30"x2 30"x2 30"x2 30"x2 30"x2 30"x2 30"x2 30"x2 busy 30"x2  busy   VOR x1 vert 30"x2  busy 30"x2 30"x2 30"x2 30"x2 30"x2   30"x2 busy 30"x2  busy   UT stretch hep 30"x2 30"x2 30"x2 30"x2 30"x2  20"x3  20"x3   Levator stretch hep 30"x2 30"x2 30"x2 30"x2 30"x2  20"x3  20"x3   Chin tucks supin 10 10 10 10 10 10 10 quad 10 quad 10 10   Targets w/ n w/o stopping 10 x ea 10x ea 10 ea 10 ea 10 ea 10 ea 10 ea  10 ea 10   Trunk rotations holding ball 15"x2 15"x2 np  np    15"x2 15"x2   Tb LPD O10x2 Gx20    O2x10 O2x10 O2x10 O2x10 G20   Tb row O10x2 Gx20    O2x10 O2x10 O2x10 O2x10 20GT   Imaginary target       30"x2  30"x2 30"x2                                                                        Modalities  3/26 4/9   2/15 2/22 3/1  3/8 3/19   TENS w/ MH  10' 10'   10' 10' 10'  10' 10'

## 2018-04-14 DIAGNOSIS — E78.2 MIXED HYPERLIPIDEMIA: Primary | ICD-10-CM

## 2018-04-15 RX ORDER — PRAVASTATIN SODIUM 40 MG
TABLET ORAL
Qty: 30 TABLET | Refills: 3 | Status: SHIPPED | OUTPATIENT
Start: 2018-04-15 | End: 2018-08-15 | Stop reason: SDUPTHER

## 2018-04-16 ENCOUNTER — EVALUATION (OUTPATIENT)
Dept: PHYSICAL THERAPY | Facility: CLINIC | Age: 58
End: 2018-04-16
Payer: COMMERCIAL

## 2018-04-16 DIAGNOSIS — S86.911D STRAIN OF RIGHT KNEE, SUBSEQUENT ENCOUNTER: ICD-10-CM

## 2018-04-16 PROCEDURE — G8979 MOBILITY GOAL STATUS: HCPCS | Performed by: PHYSICAL THERAPIST

## 2018-04-16 PROCEDURE — 97161 PT EVAL LOW COMPLEX 20 MIN: CPT | Performed by: PHYSICAL THERAPIST

## 2018-04-16 PROCEDURE — G8978 MOBILITY CURRENT STATUS: HCPCS | Performed by: PHYSICAL THERAPIST

## 2018-04-16 NOTE — PROGRESS NOTES
PT Evaluation     Today's date: 2018  Patient name: Marcelina Colin  : 1960  MRN: [id number]  Referring provider: Karen Mitchell MD  Dx:   Encounter Diagnosis     ICD-10-CM    1  Strain of right knee, subsequent encounter S86 [id number] Ambulatory referral to Physical Therapy                  Assessment  Impairments: impaired physical strength, lacks appropriate home exercise program and pain with function    Assessment details: Pt is a 62y o  year old female coming to outpatient PT with R knee tendonitis onset several months ago  Pt presents with increased pain and tenderness to palpation,  decreased R LE strength and overall decreased functional mobility  Pt would benefit from skilled PT services in order to address these deficits and reach maximum level of function  Thank you kindly for the referral!  Understanding of Dx/Px/POC: good   Prognosis: good    Goals  STG's ( 3-4 weeks)  1  Pt will be independent in HEP  2  Decrease pain to 1-2/10 with activity  LTG's ( 6- 8 weeks)  1  Improve FOTO score by 8-10 points  2  Improve R LE strength by 1/2 grade  3  Pt will be able to go up the steps with less pain    Plan  Patient would benefit from: PT eval and skilled PT  Planned modality interventions: cryotherapy  Planned therapy interventions: manual therapy, joint mobilization, neuromuscular re-education, therapeutic exercise and home exercise program  Frequency: 2x week  Duration in weeks: 6  Treatment plan discussed with: patient        Subjective Evaluation    History of Present Illness  Mechanism of injury: Pt reports her R knee has been hurting at least 3 months 2* unknown etiology  Pt reports her R knee would throb and then she would need to crack her knee about 15 times per day  Pt has increased pain and difficulty going up and down the steps, driving when placing her foot from the gas pedal to the brake pedal  Pt notes it feels like her knee shifts when she moves her knee   Pt has increased pain when walking long distances  Pt shifts to her L side when standing at work  Hobbies: sewing  Work; hairdresser  Gait: mild antalgic gait pattern, decreased weight bearing R LE  Pain  At best pain rating: 3  At worst pain ratin  Location: R knee  Quality: dull ache  Relieving factors: rest and ice    Social Support  Steps to enter house: yes  3  Stairs in house: yes   12  Lives in: multiple-level home  Lives with: significant other      Diagnostic Tests  X-ray: normal  Treatments  Current treatment: physical therapy  Patient Goals  Patient goals for therapy: decreased pain  Patient goal: to learn exercises for her knee;  Pt would like to drive and go up and down her spiral stair case with less pain        Objective     Neurological Testing     Sensation     Hip   Left Hip   Intact: light touch    Right Hip   Intact: light touch    Reflexes   Left   Patellar (L4): normal (2+)  Achilles (S1): normal (2+)    Right   Patellar (L4): normal (2+)  Achilles (S1): normal (2+)    Active Range of Motion   Left Knee   Normal active range of motion    Right Knee   Flexion: 120 degrees   Extension: 0 degrees     Passive Range of Motion     Right Knee   Flexion: 140 degrees   Extension: 0 degrees     Strength/Myotome Testing     Left Hip   Normal muscle strength    Right Hip   Planes of Motion   Flexion: 4  Extension: 4+  Abduction: 4+ (pain)  Adduction: 4  External rotation: 4+  Internal rotation: 4+    Left Knee   Normal strength    Right Knee   Flexion: 4  Extension: 4+    Additional Strength Details  (+) TTP R ITB distal insertion in R knee  Ankle df MMT: 4+/5  HS flexibility: R: 58*  L: 72* with opposite knee extended  (+) Perla's test  (-) ACL, PCL, MCL testing  (+) pain with LCL test, no laxity        Dx: R knee ITB tendonitis  EPOC: 18  CO-MORBIDITIES: concussion  PERSONAL FACTORS: wants to be able to stand with less pain, walk, and go up spiral staircase      Daily Treatment Diary     Manual              R knee GISTM/ tiger tail ITB             MFR R lateral knee                                                        Exercise Diary              bike             HS stretch             ITB stretch             bridges             Quad sets             clamshells             SLR             DLS: adductor ball squee             DLS; bent leg fall out                                                                                                                                                                Modalities

## 2018-04-20 ENCOUNTER — OFFICE VISIT (OUTPATIENT)
Dept: PHYSICAL THERAPY | Facility: CLINIC | Age: 58
End: 2018-04-20
Payer: COMMERCIAL

## 2018-04-20 DIAGNOSIS — S86.911D STRAIN OF RIGHT KNEE, SUBSEQUENT ENCOUNTER: Primary | ICD-10-CM

## 2018-04-20 DIAGNOSIS — M54.2 CERVICALGIA: Primary | ICD-10-CM

## 2018-04-20 DIAGNOSIS — S06.0X9S CONCUSSION WITH LOSS OF CONSCIOUSNESS, SEQUELA (HCC): ICD-10-CM

## 2018-04-20 PROCEDURE — 97140 MANUAL THERAPY 1/> REGIONS: CPT

## 2018-04-20 PROCEDURE — 97110 THERAPEUTIC EXERCISES: CPT

## 2018-04-20 NOTE — PROGRESS NOTES
Daily Note     Today's date: 2018  Patient name: Rao Lora  : 1960  MRN: [id number]  Referring provider: Lauren Chong MD  Dx:   Encounter Diagnosis     ICD-10-CM    1  Strain of right knee, subsequent encounter S86 [id number]                   Subjective: Pt c/o pain in the knee when moving from the gas pedal to the brake  Objective: See treatment diary below      Assessment: Tolerated treatment well  Patient would benefit from continued PT      Plan: Continue per plan of care  Progress treatment as tolerated      Dx: R knee ITB tendonitis  EPOC: 18  CO-MORBIDITIES: concussion  PERSONAL FACTORS: wants to be able to stand with less pain, walk, and go up spiral staircase      Daily Treatment Diary     Manual              R knee GISTM/ tiger tail ITB JK            MFR R lateral knee JK                                       10'                Exercise Diary              bike 6'            HS stretch 1'            ITB stretch 1'            bridges 10x10"            Quad sets 10x10"            clamshells 10            SLR 10            DLS: adductor ball squee 10x10"            DLS; bent leg fall out 10                                                                                                                                                               Modalities

## 2018-04-20 NOTE — PROGRESS NOTES
Daily Note     Today's date: 2018  Patient name: Ashlyn Bellamy  : 1960  MRN: [id number]  Referring provider: Belle Chung MD  Dx:   Encounter Diagnosis     ICD-10-CM    1  Cervicalgia M54 2    2  Concussion with loss of consciousness, sequela ([facility] [address] ) S06 0X9S                   Subjective: Pt reports still getting a stiff neck  States the dizziness is only on occasion  Objective: See treatment diary below      Assessment: Tolerated treatment well  Patient demonstrated fatigue post treatment and would benefit from continued PT      Plan: Continue per plan of care  Progress treatment as tolerated      Dx: concussion  EPOC: 18  CO-MORBIDITIES:none  PERSONAL FACTORS:     Daily Treatment Diary     Manual  3/26 4/9 4/20          MFE Ut, levator, cspine psp JK th JK          Cervical distraction JK th JK          SOR JK th JK                        15' 10' 15'              Exercise Diary  3/26 4/9 4/20          UBE 2'/2' np 3'           Balance- Side- stepping NP            Balance- tandem walking np            rockerboard ML AP np            SLS  np            VOR  x1 horz 30"x2  busy 30"x2 30"x2          VOR x1 vert 30"x2  busy 30"x2 30"x2          UT stretch hep 30"x2 30"x2          Levator stretch hep 30"x2 30"x2          Chin tucks supin 10 10           Targets w/ n w/o stopping 10 x ea 10x ea           Trunk rotations holding ball 15"x2 15"x2           Tb LPD O10x2 Gx20 Gx20          Tb row O10x2 Gx20 Gx20          Imaginary target                                                                                  Modalities  3/26 4/9 4/20          TENS w/ MH  10' 10' defer

## 2018-04-23 ENCOUNTER — OFFICE VISIT (OUTPATIENT)
Dept: PHYSICAL THERAPY | Facility: CLINIC | Age: 58
End: 2018-04-23
Payer: COMMERCIAL

## 2018-04-23 DIAGNOSIS — S06.0X9S CONCUSSION WITH LOSS OF CONSCIOUSNESS, SEQUELA (HCC): ICD-10-CM

## 2018-04-23 DIAGNOSIS — S86.911D STRAIN OF RIGHT KNEE, SUBSEQUENT ENCOUNTER: Primary | ICD-10-CM

## 2018-04-23 DIAGNOSIS — M54.2 CERVICALGIA: Primary | ICD-10-CM

## 2018-04-23 PROCEDURE — 97110 THERAPEUTIC EXERCISES: CPT

## 2018-04-23 PROCEDURE — 97140 MANUAL THERAPY 1/> REGIONS: CPT

## 2018-04-23 NOTE — PROGRESS NOTES
Daily Note     Today's date: 2018  Patient name: Rao Lora  : 1960  MRN: [id number]  Referring provider: Lauern Chong MD  Dx:   Encounter Diagnosis     ICD-10-CM    1  Cervicalgia M54 2    2  Concussion with loss of consciousness, sequela ([address] ) S06 0X9S                   Subjective: Pt reports her neck is sore from working this weekend  States neck is stiff to turn austyn to the R       Objective: See treatment diary below      Assessment: Tolerated treatment well  Patient responds well to [address]  Pt reported neck is looser post manuals  Plan: Continue per plan of care  Progress treatment as tolerated      Dx: concussion  EPOC: 18  CO-MORBIDITIES:none  PERSONAL FACTORS:     Daily Treatment Diary     Manual  3/26 4/9 4/23       3/19   MFE Ut, levator, cspine psp JK th JK       JK   Cervical distraction JK th JK       JK     SOR JK th JK       JK                 15' 10' 15'       15'       Exercise Diary  3/26 4/9 4/23       3/19   UBE 2'/2' np 2'/2'       2'/2'   Balance- Side- stepping NP         3 laps  On foam   Balance- tandem walking np         np   rockerboard ML AP np  1' ea       1'ea   SLS  np         np   VOR  x1 horz 30"x2  busy 30"x2 30"x2  busy       30"x2  busy   VOR x1 vert 30"x2  busy 30"x2 Busy  30"x2       30"x2  busy   UT stretch hep 30"x2 30"x2       20"x3   Levator stretch hep 30"x2 30"x2       20"x3   Chin tucks supin 10 10 10       10   Targets w/ n w/o stopping 10 x ea 10x ea np       10   Trunk rotations holding ball 15"x2 15"x2 15"x2       15"x2   Tb LPD O10x2 Gx20 Gx20       G20   Tb row O10x2 Gx20 Gx20       20GT   Imaginary target          30"x2                                                                        Modalities  3/26 4/9 4/23       3/19   TENS w/ MH  10' 10' defer       10'

## 2018-04-26 ENCOUNTER — OFFICE VISIT (OUTPATIENT)
Dept: PHYSICAL THERAPY | Facility: CLINIC | Age: 58
End: 2018-04-26
Payer: COMMERCIAL

## 2018-04-26 ENCOUNTER — EVALUATION (OUTPATIENT)
Dept: PHYSICAL THERAPY | Facility: CLINIC | Age: 58
End: 2018-04-26
Payer: COMMERCIAL

## 2018-04-26 DIAGNOSIS — S86.911D STRAIN OF RIGHT KNEE, SUBSEQUENT ENCOUNTER: Primary | ICD-10-CM

## 2018-04-26 DIAGNOSIS — S06.0X9S CONCUSSION WITH LOSS OF CONSCIOUSNESS, SEQUELA (HCC): ICD-10-CM

## 2018-04-26 DIAGNOSIS — M54.2 CERVICALGIA: Primary | ICD-10-CM

## 2018-04-26 PROCEDURE — G8979 MOBILITY GOAL STATUS: HCPCS | Performed by: PHYSICAL THERAPIST

## 2018-04-26 PROCEDURE — 97140 MANUAL THERAPY 1/> REGIONS: CPT | Performed by: PHYSICAL THERAPIST

## 2018-04-26 PROCEDURE — G8980 MOBILITY D/C STATUS: HCPCS | Performed by: PHYSICAL THERAPIST

## 2018-04-26 PROCEDURE — 97112 NEUROMUSCULAR REEDUCATION: CPT | Performed by: PHYSICAL THERAPIST

## 2018-04-26 PROCEDURE — 97110 THERAPEUTIC EXERCISES: CPT | Performed by: PHYSICAL THERAPIST

## 2018-04-26 NOTE — PROGRESS NOTES
PT Discharge    Today's date: 2018  Patient name: Riki Ricardo  : 1960  MRN: [id number]  Referring provider: Jessica Ray MD  Dx:   Encounter Diagnoses   Name Primary?  Cervicalgia Yes    Concussion with loss of consciousness, sequela (HCC)                   Assessment  Impairments: abnormal or restricted ROM, impaired physical strength and pain with function  Other impairment: difficulty focusing, increased nausea, dizziness    Assessment details: Since last re assessment, pain levels in cervical spine are unchanged, CT- SIB testing has improved, with slow functional progress  Pt has reached a functional plateau at this time with concussion tx  Will d/c to an independent HEP  Understanding of Dx/Px/POC: good   Prognosis: good    Goals  STG's ( 3-4 weeks)  1  Decrease pain by 25% -partial met  2  Improve cervical ROM to WFL's - not met  3  Improve R shoulder ROM to WFL's - met  LTG's ( 6-8 weeks)  1  Pt will be independent in HEP- met  2  Work performance is improved to maximal is improved -met  3  Improve FOTO score by 8-11 points - not met    Plan  Planned modality interventions: thermotherapy: hydrocollator packs and TENS  Planned therapy interventions: manual therapy, joint mobilization, therapeutic exercise, home exercise program, flexibility and neuromuscular re-education  Other planned therapy interventions: oculomotor ex  Frequency: d/c to HEP  Treatment plan discussed with: patient        Subjective Evaluation    History of Present Illness  Mechanism of injury: Pt reports her symptoms have been feeling about the same with good days and bad days  Pt has increased tightness and pain today in R posterior neck  Pt continues to have lateral head pain  Pt continues to have nausea  Pt continues to have dizziness and " fuzziness"  when scanning her eyes to look for objects on a shelf in a store and when driving    Pt reports less dizziness when completing fine motor tasks at work, but still feels nausea and sick to her stomach at times  Pt has returned to some sewing activities, but is able to do it with effort    Pain  At best pain ratin  At worst pain ratin  Location: neck  Progression: no change (slight improvement in some areas, however no general improvement)          Objective     Active Range of Motion   Cervical/Thoracic Spine   Cervical    Flexion: 32 degrees with pain  Extension: 60 degrees with pain  Left lateral flexion: 25 degrees with pain  Right lateral flexion: 25 degrees   Left rotation: 40 degrees   Right rotation: 41 degrees with pain  Left Shoulder   Flexion: 140 degrees   Abduction: 162 degrees     Right Shoulder   Flexion: 150 degrees   Abduction: 160 degrees     Strength/Myotome Testing     Left Shoulder     Planes of Motion   Flexion: 4   Abduction: 4+   External rotation at 0°: 4+   Internal rotation at 0°: 4+     Right Shoulder     Planes of Motion   Flexion: 4   Abduction: 4+   External rotation at 0°: 4+   Internal rotation at 0°: 4+     Additional Strength Details  Balance testing: Modified CT- SIB  EO firm surface: 1 29 sway index -improved  EC firm surface: 2 25 sway index improved  EO foam surface: 1 86 sway index improved  EC firm surface: 3 88 sway index -improved  CT SIB testing ( balance):  EO firm surface: 2 03 sway index  EC firm surface: 3 98 sway index  EO foam surface; 2 72 sway index  EC foam surface; unable      Dx: concussion  EPOC: 18  CO-MORBIDITIES:none  PERSONAL FACTORS:     Precautions: Ha, concussion      Daily Treatment Diary     Manual  3/26 4/9 4/23 4/26      3/19   MFE Ut, levator, cspine psp JK th JK th      JK   Cervical distraction JK th JK th      JK     SOR JK th JK th      JK                 15' 10' 15' 10'      15'       Exercise Diary  3/26 4/9 4/23 4/26      3/19   UBE 2'/2' np 2'/2' 2'/2'      2'/2'   Balance- Side- stepping NP         3 laps  On foam   Balance- tandem walking np         np   rockerboard ML AP np 1' ea 1'      1'ea   SLS  np         np   VOR  x1 horz 30"x2  busy 30"x2 30"x2  busy 30"x2 busy      30"x2  busy   VOR x1 vert 30"x2  busy 30"x2 Busy  30"x2 30"x2 busy      30"x2  busy   UT stretch hep 30"x2 30"x2       20"x3   Levator stretch hep 30"x2 30"x2       20"x3   Chin tucks supin 10 10 10       10   Targets w/ n w/o stopping 10 x ea 10x ea np       10   Trunk rotations holding ball 15"x2 15"x2 15"x2 20"x2      15"x2   Tb LPD O10x2 Gx20 Gx20 Gx20      G20   Tb row O10x2 Gx20 Gx20 Gx20      20GT   Imaginary target          30"x2                                                                        Modalities  3/26 4/9 4/23       3/19   TENS w/ MH  10' 10' defer       10'

## 2018-04-26 NOTE — PROGRESS NOTES
Daily Note     Today's date: 2018  Patient name: Shana Islas  : 1960  MRN: [id number]  Referring provider: Michael Robledo MD  Dx:   Encounter Diagnosis     ICD-10-CM    1  Strain of right knee, subsequent encounter S86 911D                   Subjective: 3/10 R knee pain today  Pt reports it feels better in the beginning of the day, and feels more sore at the end of the day  Pt has been using ice  Objective: See treatment diary below      Assessment: Tolerated treatment well  Patient would benefit from continued PT  Pt continues to have tightness and pain in R ITB with increased muscle tone      Plan: Continue per plan of care   Trial of US performed for pain    Dx: R knee ITB tendonitis  EPOC: 18  CO-MORBIDITIES: concussion  PERSONAL FACTORS: wants to be able to stand with less pain, walk, and go up spiral staircase      Daily Treatment Diary     Manual            R knee GISTM/ tiger tail ITB JK JK th          MFR R lateral knee JK JK th                                     10' 10' 10'              Exercise Diary            bike 6' 6'           HS stretch 1' 1' 1'          ITB stretch 1' 1' 1'          bridges 10x10" 10x10" 10 x10"          Quad sets 10x10" 10x10" 10 x10"          clamshells 10  10          SLR 10 10 10          DLS: adductor ball squee 10x10" 10x10" 10 x10"          DLS; bent leg fall out 10 10 10 x10" velcro                                                                                                                                                             Modalities              CUS x8'/1 5 w/cm R ITB 8'            R ITB inhibition th

## 2018-04-30 ENCOUNTER — APPOINTMENT (OUTPATIENT)
Dept: PHYSICAL THERAPY | Facility: CLINIC | Age: 58
End: 2018-04-30
Payer: COMMERCIAL

## 2018-04-30 ENCOUNTER — OFFICE VISIT (OUTPATIENT)
Dept: PHYSICAL THERAPY | Facility: CLINIC | Age: 58
End: 2018-04-30
Payer: COMMERCIAL

## 2018-04-30 DIAGNOSIS — S86.911D STRAIN OF RIGHT KNEE, SUBSEQUENT ENCOUNTER: Primary | ICD-10-CM

## 2018-04-30 PROCEDURE — 97110 THERAPEUTIC EXERCISES: CPT

## 2018-04-30 PROCEDURE — G8980 MOBILITY D/C STATUS: HCPCS | Performed by: PHYSICAL THERAPIST

## 2018-04-30 PROCEDURE — 97140 MANUAL THERAPY 1/> REGIONS: CPT

## 2018-04-30 PROCEDURE — G8979 MOBILITY GOAL STATUS: HCPCS | Performed by: PHYSICAL THERAPIST

## 2018-04-30 NOTE — PROGRESS NOTES
Daily Note     Today's date: 2018  Patient name: Kaylene Augustine  : 1960  MRN: [id number]  Referring provider: Debrah Ahumada, MD  Dx:   Encounter Diagnosis     ICD-10-CM    1  Strain of right knee, subsequent encounter S86 911D                   Subjective: Pt reports swelling and increased pain post working  States she did get some relief post US LV   Objective: See treatment diary below      Assessment: Tolerated treatment fair  Patient exhibited good technique with therapeutic exercises and would benefit from continued PT      Plan: Continue per plan of care  Progress treatment as tolerated  Dx: R knee ITB tendonitis  EPOC: 18  CO-MORBIDITIES: concussion  PERSONAL FACTORS: wants to be able to stand with less pain, walk, and go up spiral staircase      Daily Treatment Diary     Manual           R knee GISTM/ tiger tail ITB JK JK  JK         MFR R lateral knee JK JK  JK                                    10' 10' 10' 10'             Exercise Diary           bike 6' 6'  6'         HS stretch 1' 1' 1' np         ITB stretch 1' 1' 1' np         bridges 10x10" 10x10" 10 x10" 10x10"         Quad sets 10x10" 10x10" 10 x10" 10x10"         clamshells 10  10 10 p!          SLR 10 10 10 15         DLS: adductor ball squee 10x10" 10x10" 10 x10" 10x10"         DLS; bent leg fall out 10 10 10 x10" velcro 10x10"  velcro                                                                                                                                                            Modalities             CUS x8'/1 5 w/cm R ITB 8' 8'           R ITB inhibition  JK

## 2018-05-03 ENCOUNTER — OFFICE VISIT (OUTPATIENT)
Dept: OBGYN CLINIC | Facility: CLINIC | Age: 58
End: 2018-05-03
Payer: COMMERCIAL

## 2018-05-03 VITALS
HEIGHT: 64 IN | SYSTOLIC BLOOD PRESSURE: 140 MMHG | HEART RATE: 76 BPM | BODY MASS INDEX: 25.61 KG/M2 | WEIGHT: 150 LBS | DIASTOLIC BLOOD PRESSURE: 82 MMHG

## 2018-05-03 DIAGNOSIS — M25.561 ACUTE PAIN OF RIGHT KNEE: ICD-10-CM

## 2018-05-03 DIAGNOSIS — M25.461 EFFUSION OF RIGHT KNEE: Primary | ICD-10-CM

## 2018-05-03 PROCEDURE — 99213 OFFICE O/P EST LOW 20 MIN: CPT | Performed by: PHYSICIAN ASSISTANT

## 2018-05-03 PROCEDURE — 20610 DRAIN/INJ JOINT/BURSA W/O US: CPT | Performed by: PHYSICIAN ASSISTANT

## 2018-05-03 RX ORDER — LIDOCAINE HYDROCHLORIDE 10 MG/ML
7 INJECTION, SOLUTION INFILTRATION; PERINEURAL
Status: COMPLETED | OUTPATIENT
Start: 2018-05-03 | End: 2018-05-03

## 2018-05-03 RX ORDER — BETAMETHASONE SODIUM PHOSPHATE AND BETAMETHASONE ACETATE 3; 3 MG/ML; MG/ML
6 INJECTION, SUSPENSION INTRA-ARTICULAR; INTRALESIONAL; INTRAMUSCULAR; SOFT TISSUE
Status: COMPLETED | OUTPATIENT
Start: 2018-05-03 | End: 2018-05-03

## 2018-05-03 RX ADMIN — LIDOCAINE HYDROCHLORIDE 7 ML: 10 INJECTION, SOLUTION INFILTRATION; PERINEURAL at 14:21

## 2018-05-03 RX ADMIN — BETAMETHASONE SODIUM PHOSPHATE AND BETAMETHASONE ACETATE 6 MG: 3; 3 INJECTION, SUSPENSION INTRA-ARTICULAR; INTRALESIONAL; INTRAMUSCULAR; SOFT TISSUE at 14:21

## 2018-05-03 NOTE — PROGRESS NOTES
Assessment:     1  Effusion of right knee    2  Acute pain of right knee        Plan:     Problem List Items Addressed This Visit        Musculoskeletal and Integument    Effusion of right knee - Primary    Relevant Orders    Lyme disease, PCR    Sedimentation rate, automated    C-reactive protein    RIGO Screen w/ Reflex to Titer/Pattern    RF Screen w/ Reflex to Titer       Other    Acute pain of right knee          The patient was seen and examined by Dr Nasir Johnson and myself  Findings consistent with right knee pain and effusion  Findings and treatment options were discussed with the patient  Recommend aspiration and cortisone injection to the right knee joint today  Patient tolerated the procedure well  Advised to apply cold compress today  She was sent for blood work for Lyme, rheumatoid factor, RIGO, C-reactive protein and sed rate  She will follow up in 2 weeks to go over results  All questions were answered to patient's satisfaction  Subjective:     Patient ID: Tea Pearl is a 62 y o  female  Chief Complaint:  44-year-old white female following up for right knee pain  She has been attending physical therapy since last visit  She states her knee has become more swollen and she is feeling no improvement  She describes the pain as dull and aching  She denies any sharp pain, locking, catching or giving way  She states she did have Lyme disease 21 years ago  It was treated with antibiotics at that time  She denies any recent tick bites, history of gout or rheumatoid arthritis        Allergy:  No Known Allergies  Medications:  all current active meds have been reviewed  Past Medical History:  Past Medical History:   Diagnosis Date    Concussion     Hyperlipidemia      Past Surgical History:  Past Surgical History:   Procedure Laterality Date     SECTION      FOOT SURGERY Left     TUBAL LIGATION       Family History:  Family History   Problem Relation Age of Onset    Coronary artery disease Mother     Other Mother      CABG    Hiatal hernia Father     Stroke Maternal Grandmother     Hyperlipidemia Family     Migraines Family     Thyroid disease Family      Social History:  History   Alcohol Use    Yes     Comment: social     History   Drug Use No     History   Smoking Status    Never Smoker   Smokeless Tobacco    Never Used     Review of Systems   Constitutional: Negative  HENT: Negative  Eyes: Negative  Respiratory: Negative  Cardiovascular: Negative  Gastrointestinal: Negative  Endocrine: Negative  Genitourinary: Negative  Musculoskeletal: Positive for arthralgias (Right knee) and joint swelling (Right knee)  Skin: Negative  Allergic/Immunologic: Negative  Neurological: Negative  Hematological: Negative  Psychiatric/Behavioral: Negative  Objective:  BP Readings from Last 1 Encounters:   05/03/18 140/82      Wt Readings from Last 1 Encounters:   05/03/18 68 kg (150 lb)      BMI:   Estimated body mass index is 25 75 kg/m² as calculated from the following:    Height as of this encounter: 5' 4" (1 626 m)  Weight as of this encounter: 68 kg (150 lb)  BSA:   Estimated body surface area is 1 73 meters squared as calculated from the following:    Height as of this encounter: 5' 4" (1 626 m)  Weight as of this encounter: 68 kg (150 lb)  Physical Exam   Constitutional: She is oriented to person, place, and time  She appears well-developed  HENT:   Head: Normocephalic and atraumatic  Eyes: EOM are normal  Pupils are equal, round, and reactive to light  Neck: Neck supple  Musculoskeletal:        Right knee: She exhibits no effusion  Neurological: She is alert and oriented to person, place, and time  Skin: Skin is warm  Psychiatric: She has a normal mood and affect  Nursing note and vitals reviewed  Right Knee Exam     Tenderness   The patient is experiencing no tenderness           Range of Motion   Extension: normal Flexion: 130     Muscle Strength     The patient has normal right knee strength      Tests   Darlin:  Medial - negative Lateral - negative  Lachman:  Anterior - negative    Posterior - negative  Drawer:       Anterior - negative    Posterior - negative  Varus: negative  Valgus: negative  Patellar Apprehension: negative    Other   Erythema: absent  Sensation: normal  Pulse: present  Swelling: none  Other tests: no effusion present      Left Knee Exam   Left knee exam is normal             Large joint arthrocentesis  Date/Time: 5/3/2018 2:21 PM  Consent given by: patient  Site marked: site marked  Timeout: Immediately prior to procedure a time out was called to verify the correct patient, procedure, equipment, support staff and site/side marked as required   Supporting Documentation  Indications: pain and joint swelling   Procedure Details  Location: knee - R knee  Needle size: 18 G (25 gauge for anesthetic)  Approach: superolateral   Medications administered: 7 mL lidocaine 1 %; 6 mg betamethasone acetate-betamethasone sodium phosphate 6 (3-3) mg/mL    Aspirate amount: 32 mL  Aspirate: clear and yellow  Patient tolerance: patient tolerated the procedure well with no immediate complications  Dressing:  Sterile dressing applied

## 2018-05-09 ENCOUNTER — OFFICE VISIT (OUTPATIENT)
Dept: OBGYN CLINIC | Facility: CLINIC | Age: 58
End: 2018-05-09
Payer: COMMERCIAL

## 2018-05-09 VITALS
HEART RATE: 80 BPM | DIASTOLIC BLOOD PRESSURE: 82 MMHG | SYSTOLIC BLOOD PRESSURE: 130 MMHG | WEIGHT: 150 LBS | BODY MASS INDEX: 25.61 KG/M2 | HEIGHT: 64 IN

## 2018-05-09 DIAGNOSIS — M25.561 ACUTE PAIN OF RIGHT KNEE: ICD-10-CM

## 2018-05-09 DIAGNOSIS — M25.461 EFFUSION OF RIGHT KNEE: Primary | ICD-10-CM

## 2018-05-09 PROCEDURE — 99213 OFFICE O/P EST LOW 20 MIN: CPT | Performed by: ORTHOPAEDIC SURGERY

## 2018-05-09 NOTE — PROGRESS NOTES
Assessment:     1  Effusion of right knee    2  Acute pain of right knee        Plan:     Problem List Items Addressed This Visit        Musculoskeletal and Integument    Effusion of right knee - Primary       Other    Acute pain of right knee          The patient was seen and examined by Dr Joyce Dolan and myself  Findings consistent with right knee pain and recurrent effusion  Findings and treatment options were discussed with the patient  RA factor, RIGO, C-reactive protein and sed rate blood work were negative  Lyme disease test is still pending  We will follow-up on Lyme disease test and call patient with results  If it is positive, we will have her follow up with her PCP to start antibiotic treatment  If it is negative, we may send her to a rheumatologist for further evaluation and treatment  Continue ice and NSAIDs  All questions were answered to patient's satisfaction  Subjective:     Patient ID: Marcelina Colin is a 62 y o  female  Chief Complaint:  45-year-old white female following up for right knee pain  An aspiration and cortisone injection to the right knee joint was performed a week ago  She felt relief immediately after the procedure, but since then the knee has been progressively getting more swollen and painful  She is here to review blood work        Allergy:  No Known Allergies  Medications:  all current active meds have been reviewed  Past Medical History:  Past Medical History:   Diagnosis Date    Concussion     Hyperlipidemia      Past Surgical History:  Past Surgical History:   Procedure Laterality Date     SECTION      FOOT SURGERY Left 2007    TUBAL LIGATION       Family History:  Family History   Problem Relation Age of Onset    Coronary artery disease Mother     Other Mother      CABG    Hiatal hernia Father     Stroke Maternal Grandmother     Hyperlipidemia Family     Migraines Family     Thyroid disease Family      Social History:  History   Alcohol Use    Yes Comment: social     History   Drug Use No     History   Smoking Status    Never Smoker   Smokeless Tobacco    Never Used     Review of Systems   Constitutional: Negative  HENT: Negative  Eyes: Negative  Respiratory: Negative  Cardiovascular: Negative  Gastrointestinal: Negative  Endocrine: Negative  Genitourinary: Negative  Musculoskeletal: Positive for arthralgias (Right knee) and joint swelling (Right knee)  Skin: Negative  Allergic/Immunologic: Negative  Neurological: Negative  Hematological: Negative  Psychiatric/Behavioral: Negative  Objective:  BP Readings from Last 1 Encounters:   05/09/18 130/82      Wt Readings from Last 1 Encounters:   05/09/18 68 kg (150 lb)      BMI:   Estimated body mass index is 25 75 kg/m² as calculated from the following:    Height as of this encounter: 5' 4" (1 626 m)  Weight as of this encounter: 68 kg (150 lb)  BSA:   Estimated body surface area is 1 73 meters squared as calculated from the following:    Height as of this encounter: 5' 4" (1 626 m)  Weight as of this encounter: 68 kg (150 lb)  Physical Exam   Constitutional: She is oriented to person, place, and time  She appears well-developed  HENT:   Head: Normocephalic and atraumatic  Eyes: EOM are normal  Pupils are equal, round, and reactive to light  Neck: Neck supple  Musculoskeletal:        Right knee: She exhibits no effusion  Neurological: She is alert and oriented to person, place, and time  Skin: Skin is warm  Psychiatric: She has a normal mood and affect  Nursing note and vitals reviewed  Right Knee Exam     Tenderness   The patient is experiencing no tenderness  Range of Motion   Extension: normal   Flexion: 130     Muscle Strength     The patient has normal right knee strength      Tests   Darlin:  Medial - negative Lateral - negative  Lachman:  Anterior - negative    Posterior - negative  Drawer:       Anterior - negative Posterior - negative  Varus: negative  Valgus: negative  Patellar Apprehension: negative    Other   Erythema: absent  Sensation: normal  Pulse: present  Swelling: moderate  Other tests: no effusion present      Left Knee Exam   Left knee exam is normal             Procedures

## 2018-05-10 LAB
ANA SER QL: NEGATIVE
B BURGDOR DNA SPEC QL NAA+PROBE: NEGATIVE
CRP SERPL-MCNC: 2.2 MG/L (ref 0–4.9)
ERYTHROCYTE [SEDIMENTATION RATE] IN BLOOD BY WESTERGREN METHOD: 2 MM/HR (ref 0–40)
RHEUMATOID FACT SERPL-ACNC: <10 IU/ML (ref 0–13.9)

## 2018-05-11 DIAGNOSIS — M25.561 ACUTE PAIN OF RIGHT KNEE: ICD-10-CM

## 2018-05-11 DIAGNOSIS — M25.461 EFFUSION OF RIGHT KNEE: Primary | ICD-10-CM

## 2018-05-17 ENCOUNTER — OFFICE VISIT (OUTPATIENT)
Dept: OBGYN CLINIC | Facility: CLINIC | Age: 58
End: 2018-05-17
Payer: COMMERCIAL

## 2018-05-17 VITALS
HEART RATE: 72 BPM | DIASTOLIC BLOOD PRESSURE: 90 MMHG | SYSTOLIC BLOOD PRESSURE: 142 MMHG | BODY MASS INDEX: 25.61 KG/M2 | HEIGHT: 64 IN | WEIGHT: 150 LBS

## 2018-05-17 DIAGNOSIS — M25.561 ACUTE PAIN OF RIGHT KNEE: Primary | ICD-10-CM

## 2018-05-17 DIAGNOSIS — M25.461 EFFUSION OF RIGHT KNEE: ICD-10-CM

## 2018-05-17 PROCEDURE — 99213 OFFICE O/P EST LOW 20 MIN: CPT | Performed by: ORTHOPAEDIC SURGERY

## 2018-05-17 NOTE — PROGRESS NOTES
Assessment:     1  Acute pain of right knee    2  Effusion of right knee        Plan:     Problem List Items Addressed This Visit        Musculoskeletal and Integument    Effusion of right knee    Relevant Orders    MRI knee right  wo contrast       Other    Acute pain of right knee - Primary    Relevant Orders    MRI knee right  wo contrast          The patient was seen and examined by Dr Elton Osborn and myself  Findings consistent with right knee pain and recurrent effusion  Findings and treatment options were discussed with the patient  RA factor, RIGO, C-reactive protein, Lyme disease and sed rate blood work were negative  She continues to have sharp pain over the lateral aspect of her knee despite aspiration and cortisone injection and physical therapy  Recommend MRI of the right knee to further evaluate the joint  She will follow-up with MRI results and Dr Elton Osborn will make further treatment recommendations at that time  All questions were answered to patient's satisfaction  Subjective:     Patient ID: Deedee Vyas is a 62 y o  female  Chief Complaint:  70-year-old white female following up for right knee pain and effusion  She continues to have swelling of her knee with pain  All of the blood work came out negative  She is unable to squat due to pain  She feels most the pain over the lateral aspect of her knee        Allergy:  No Known Allergies  Medications:  all current active meds have been reviewed  Past Medical History:  Past Medical History:   Diagnosis Date    Concussion     Hyperlipidemia      Past Surgical History:  Past Surgical History:   Procedure Laterality Date     SECTION      FOOT SURGERY Left 2007    TUBAL LIGATION       Family History:  Family History   Problem Relation Age of Onset    Coronary artery disease Mother     Other Mother      CABG    Hiatal hernia Father     Stroke Maternal Grandmother     Hyperlipidemia Family     Migraines Family     Thyroid disease Family Social History:  History   Alcohol Use    Yes     Comment: social     History   Drug Use No     History   Smoking Status    Never Smoker   Smokeless Tobacco    Never Used     Review of Systems   Constitutional: Negative  HENT: Negative  Eyes: Negative  Respiratory: Negative  Cardiovascular: Negative  Gastrointestinal: Negative  Endocrine: Negative  Genitourinary: Negative  Musculoskeletal: Positive for arthralgias (Right knee) and joint swelling (Right knee)  Skin: Negative  Allergic/Immunologic: Negative  Neurological: Negative  Hematological: Negative  Psychiatric/Behavioral: Negative  Objective:  BP Readings from Last 1 Encounters:   05/17/18 142/90      Wt Readings from Last 1 Encounters:   05/17/18 68 kg (150 lb)      BMI:   Estimated body mass index is 25 75 kg/m² as calculated from the following:    Height as of this encounter: 5' 4" (1 626 m)  Weight as of this encounter: 68 kg (150 lb)  BSA:   Estimated body surface area is 1 73 meters squared as calculated from the following:    Height as of this encounter: 5' 4" (1 626 m)  Weight as of this encounter: 68 kg (150 lb)  Physical Exam   Constitutional: She is oriented to person, place, and time  She appears well-developed  HENT:   Head: Normocephalic and atraumatic  Eyes: EOM are normal  Pupils are equal, round, and reactive to light  Neck: Neck supple  Musculoskeletal:        Right knee: She exhibits no effusion  Neurological: She is alert and oriented to person, place, and time  Skin: Skin is warm  Psychiatric: She has a normal mood and affect  Nursing note and vitals reviewed  Right Knee Exam     Tenderness   The patient is experiencing tenderness in the lateral joint line  Range of Motion   Extension: normal   Flexion: 130     Muscle Strength     The patient has normal right knee strength      Tests   Darlin:  Medial - negative Lateral - positive  Lachman:  Anterior - negative    Posterior - negative  Drawer:       Anterior - negative    Posterior - negative  Varus: negative  Valgus: negative  Patellar Apprehension: negative    Other   Erythema: absent  Sensation: normal  Pulse: present  Swelling: moderate  Other tests: no effusion present      Left Knee Exam   Left knee exam is normal             Procedures

## 2018-05-24 ENCOUNTER — HOSPITAL ENCOUNTER (OUTPATIENT)
Dept: MRI IMAGING | Facility: HOSPITAL | Age: 58
Discharge: HOME | End: 2018-05-24
Payer: COMMERCIAL

## 2018-05-24 DIAGNOSIS — M25.461 EFFUSION OF RIGHT KNEE: ICD-10-CM

## 2018-05-24 DIAGNOSIS — M25.561 ACUTE PAIN OF RIGHT KNEE: ICD-10-CM

## 2018-05-24 PROCEDURE — 73721 MRI JNT OF LWR EXTRE W/O DYE: CPT

## 2018-05-29 ENCOUNTER — OFFICE VISIT (OUTPATIENT)
Dept: OBGYN CLINIC | Facility: CLINIC | Age: 58
End: 2018-05-29
Payer: COMMERCIAL

## 2018-05-29 VITALS
HEIGHT: 64 IN | WEIGHT: 150 LBS | SYSTOLIC BLOOD PRESSURE: 130 MMHG | BODY MASS INDEX: 25.61 KG/M2 | DIASTOLIC BLOOD PRESSURE: 80 MMHG | HEART RATE: 82 BPM

## 2018-05-29 DIAGNOSIS — S83.241A ACUTE MEDIAL MENISCUS TEAR OF RIGHT KNEE, INITIAL ENCOUNTER: Primary | ICD-10-CM

## 2018-05-29 PROBLEM — M25.561 ACUTE PAIN OF RIGHT KNEE: Status: RESOLVED | Noted: 2018-04-05 | Resolved: 2018-05-29

## 2018-05-29 PROCEDURE — 99215 OFFICE O/P EST HI 40 MIN: CPT | Performed by: ORTHOPAEDIC SURGERY

## 2018-05-29 RX ORDER — COVID-19 ANTIGEN TEST
KIT MISCELLANEOUS
COMMUNITY
End: 2019-05-30 | Stop reason: ALTCHOICE

## 2018-05-29 RX ORDER — SODIUM CHLORIDE, SODIUM LACTATE, POTASSIUM CHLORIDE, CALCIUM CHLORIDE 600; 310; 30; 20 MG/100ML; MG/100ML; MG/100ML; MG/100ML
75 INJECTION, SOLUTION INTRAVENOUS CONTINUOUS
Status: CANCELLED | OUTPATIENT
Start: 2018-06-04

## 2018-05-29 RX ORDER — CHLORHEXIDINE GLUCONATE 4 G/100ML
SOLUTION TOPICAL DAILY PRN
Status: CANCELLED | OUTPATIENT
Start: 2018-06-04

## 2018-05-29 NOTE — H&P
Assessment:     1  Acute medial meniscus tear of right knee, initial encounter        Plan:     Problem List Items Addressed This Visit        Musculoskeletal and Integument    Acute medial meniscus tear of right knee - Primary     Findings consistent with right medial meniscal tear, posterior root  Discussed findings and treatment options with the patient  I review patient's right knee MRI with her  I discussed prognosis of her right knee injury  I recommended surgical treatment since patient continued to have symptoms in her right knee despite conservative treatment  Patient agreed to proceed with the surgery  We will schedule patient as outpatient procedure  All patient's questions were answered to his satisfaction  This note is created using dictation transcription  It may contain typographical errors, grammatical errors, improperly dictated words, background noise and other errors  Discussed with patient surgical risks and complications including but not limited to infection, persistent pain, nerve and vessel injury, complications associated with anesthesia, DVT, etc               Subjective:     Patient ID: Roman Negron is a 62 y o  female  Chief Complaint:  26-year-old white female following up for right knee pain  She continued to have symptoms in her right knee  She has the knee symptoms for a few months now  She had tried conservative treatment with aspiration and cortisone injection which did not resolve her knee issue  She is here to review her right knee MRI        Allergy:  No Known Allergies  Medications:  all current active meds have been reviewed  Past Medical History:  Past Medical History:   Diagnosis Date    Concussion     Hyperlipidemia      Past Surgical History:  Past Surgical History:   Procedure Laterality Date     SECTION      FOOT SURGERY Left 2007    TUBAL LIGATION       Family History:  Family History   Problem Relation Age of Onset    Coronary artery disease Mother     Other Mother      CABG    Hiatal hernia Father     Stroke Maternal Grandmother     Hyperlipidemia Family     Migraines Family     Thyroid disease Family      Social History:  History   Alcohol Use    Yes     Comment: social     History   Drug Use No     History   Smoking Status    Never Smoker   Smokeless Tobacco    Never Used     Review of Systems   Constitutional: Negative  HENT: Negative  Eyes: Negative  Respiratory: Negative  Cardiovascular: Negative  Gastrointestinal: Negative  Endocrine: Negative  Genitourinary: Negative  Musculoskeletal: Positive for arthralgias (Right knee), gait problem (Limping) and joint swelling (Right knee)  Skin: Negative  Allergic/Immunologic: Negative  Hematological: Negative  Psychiatric/Behavioral: Negative  Objective:  BP Readings from Last 1 Encounters:   05/29/18 130/80      Wt Readings from Last 1 Encounters:   05/29/18 68 kg (150 lb)      BMI:   Estimated body mass index is 25 75 kg/m² as calculated from the following:    Height as of this encounter: 5' 4" (1 626 m)  Weight as of this encounter: 68 kg (150 lb)  BSA:   Estimated body surface area is 1 73 meters squared as calculated from the following:    Height as of this encounter: 5' 4" (1 626 m)  Weight as of this encounter: 68 kg (150 lb)  Physical Exam   Constitutional: She is oriented to person, place, and time  She appears well-developed  HENT:   Head: Normocephalic and atraumatic  Eyes: EOM are normal  Pupils are equal, round, and reactive to light  Neck: Neck supple  Cardiovascular: Normal rate and regular rhythm  Exam reveals no gallop  No murmur heard  Pulmonary/Chest: Effort normal and breath sounds normal  She has no wheezes  She has no rales  Musculoskeletal:        Right knee: She exhibits effusion (Grade 2)  Neurological: She is alert and oriented to person, place, and time  Skin: Skin is warm     Psychiatric: She has a normal mood and affect  Nursing note and vitals reviewed  Right Knee Exam     Tenderness   The patient is experiencing no tenderness  Range of Motion   Extension: normal   Flexion: 130     Muscle Strength     The patient has normal right knee strength      Tests   Darlin:  Medial - negative Lateral - negative  Lachman:  Anterior - negative    Posterior - negative  Drawer:       Anterior - negative    Posterior - negative  Varus: negative  Valgus: negative  Patellar Apprehension: negative    Other   Erythema: absent  Sensation: normal  Pulse: present  Swelling: mild  Other tests: effusion (Grade 2) present      Left Knee Exam   Left knee exam is normal             Procedures

## 2018-05-29 NOTE — PROGRESS NOTES
Assessment:     1  Acute medial meniscus tear of right knee, initial encounter        Plan:     Problem List Items Addressed This Visit        Musculoskeletal and Integument    Acute medial meniscus tear of right knee - Primary     Findings consistent with right medial meniscal tear, posterior root  Discussed findings and treatment options with the patient  I review patient's right knee MRI with her  I discussed prognosis of her right knee injury  I recommended surgical treatment since patient continued to have symptoms in her right knee despite conservative treatment  Patient agreed to proceed with the surgery  We will schedule patient as outpatient procedure  All patient's questions were answered to his satisfaction  This note is created using dictation transcription  It may contain typographical errors, grammatical errors, improperly dictated words, background noise and other errors  Discussed with patient surgical risks and complications including but not limited to infection, persistent pain, nerve and vessel injury, complications associated with anesthesia, DVT, etc               Subjective:     Patient ID: Tea Pearl is a 62 y o  female  Chief Complaint:  60-year-old white female following up for right knee pain  She continued to have symptoms in her right knee  She has the knee symptoms for a few months now  She had tried conservative treatment with aspiration and cortisone injection which did not resolve her knee issue  She is here to review her right knee MRI        Allergy:  No Known Allergies  Medications:  all current active meds have been reviewed  Past Medical History:  Past Medical History:   Diagnosis Date    Concussion     Hyperlipidemia      Past Surgical History:  Past Surgical History:   Procedure Laterality Date     SECTION      FOOT SURGERY Left 2007    TUBAL LIGATION       Family History:  Family History   Problem Relation Age of Onset    Coronary artery disease Mother     Other Mother      CABG    Hiatal hernia Father     Stroke Maternal Grandmother     Hyperlipidemia Family     Migraines Family     Thyroid disease Family      Social History:  History   Alcohol Use    Yes     Comment: social     History   Drug Use No     History   Smoking Status    Never Smoker   Smokeless Tobacco    Never Used     Review of Systems   Constitutional: Negative  HENT: Negative  Eyes: Negative  Respiratory: Negative  Cardiovascular: Negative  Gastrointestinal: Negative  Endocrine: Negative  Genitourinary: Negative  Musculoskeletal: Positive for arthralgias (Right knee), gait problem (Limping) and joint swelling (Right knee)  Skin: Negative  Allergic/Immunologic: Negative  Hematological: Negative  Psychiatric/Behavioral: Negative  Objective:  BP Readings from Last 1 Encounters:   05/29/18 130/80      Wt Readings from Last 1 Encounters:   05/29/18 68 kg (150 lb)      BMI:   Estimated body mass index is 25 75 kg/m² as calculated from the following:    Height as of this encounter: 5' 4" (1 626 m)  Weight as of this encounter: 68 kg (150 lb)  BSA:   Estimated body surface area is 1 73 meters squared as calculated from the following:    Height as of this encounter: 5' 4" (1 626 m)  Weight as of this encounter: 68 kg (150 lb)  Physical Exam   Constitutional: She is oriented to person, place, and time  She appears well-developed  HENT:   Head: Normocephalic and atraumatic  Eyes: EOM are normal  Pupils are equal, round, and reactive to light  Neck: Neck supple  Cardiovascular: Normal rate and regular rhythm  Exam reveals no gallop  No murmur heard  Pulmonary/Chest: Effort normal and breath sounds normal  She has no wheezes  She has no rales  Musculoskeletal:        Right knee: She exhibits effusion (Grade 2)  Neurological: She is alert and oriented to person, place, and time  Skin: Skin is warm     Psychiatric: She has a normal mood and affect  Nursing note and vitals reviewed  Right Knee Exam     Tenderness   The patient is experiencing no tenderness  Range of Motion   Extension: normal   Flexion: 130     Muscle Strength     The patient has normal right knee strength      Tests   Darlin:  Medial - negative Lateral - negative  Lachman:  Anterior - negative    Posterior - negative  Drawer:       Anterior - negative    Posterior - negative  Varus: negative  Valgus: negative  Patellar Apprehension: negative    Other   Erythema: absent  Sensation: normal  Pulse: present  Swelling: mild  Other tests: effusion (Grade 2) present      Left Knee Exam   Left knee exam is normal             Procedures

## 2018-05-29 NOTE — ASSESSMENT & PLAN NOTE
Findings consistent with right medial meniscal tear, posterior root  Discussed findings and treatment options with the patient  I review patient's right knee MRI with her  I discussed prognosis of her right knee injury  I recommended surgical treatment since patient continued to have symptoms in her right knee despite conservative treatment  Patient agreed to proceed with the surgery  We will schedule patient as outpatient procedure  All patient's questions were answered to his satisfaction  This note is created using dictation transcription  It may contain typographical errors, grammatical errors, improperly dictated words, background noise and other errors      Discussed with patient surgical risks and complications including but not limited to infection, persistent pain, nerve and vessel injury, complications associated with anesthesia, DVT, etc

## 2018-05-30 ENCOUNTER — TRANSCRIBE ORDERS (OUTPATIENT)
Dept: ADMINISTRATIVE | Facility: HOSPITAL | Age: 58
End: 2018-05-30

## 2018-05-30 ENCOUNTER — HOSPITAL ENCOUNTER (OUTPATIENT)
Dept: NON INVASIVE DIAGNOSTICS | Facility: HOSPITAL | Age: 58
Discharge: HOME | End: 2018-05-30
Attending: ORTHOPAEDIC SURGERY
Payer: COMMERCIAL

## 2018-05-30 ENCOUNTER — APPOINTMENT (OUTPATIENT)
Dept: PREADMISSION TESTING | Facility: HOSPITAL | Age: 58
End: 2018-05-30
Payer: COMMERCIAL

## 2018-05-30 ENCOUNTER — APPOINTMENT (OUTPATIENT)
Dept: LAB | Facility: HOSPITAL | Age: 58
End: 2018-05-30
Attending: ORTHOPAEDIC SURGERY
Payer: COMMERCIAL

## 2018-05-30 DIAGNOSIS — S83.241A ACUTE MEDIAL MENISCUS TEAR OF RIGHT KNEE, INITIAL ENCOUNTER: Primary | ICD-10-CM

## 2018-05-30 DIAGNOSIS — S83.241A ACUTE MEDIAL MENISCUS TEAR OF RIGHT KNEE, INITIAL ENCOUNTER: ICD-10-CM

## 2018-05-30 LAB
ANION GAP SERPL CALCULATED.3IONS-SCNC: 10 MMOL/L (ref 4–13)
APTT PPP: 29 SECONDS (ref 24–36)
BASOPHILS # BLD AUTO: 0.04 THOUSANDS/ΜL (ref 0–0.1)
BASOPHILS NFR BLD AUTO: 1 % (ref 0–1)
BUN SERPL-MCNC: 16 MG/DL (ref 5–25)
CALCIUM SERPL-MCNC: 9.9 MG/DL (ref 8.3–10.1)
CHLORIDE SERPL-SCNC: 104 MMOL/L (ref 100–108)
CO2 SERPL-SCNC: 30 MMOL/L (ref 21–32)
CREAT SERPL-MCNC: 0.75 MG/DL (ref 0.6–1.3)
EOSINOPHIL # BLD AUTO: 0.07 THOUSAND/ΜL (ref 0–0.61)
EOSINOPHIL NFR BLD AUTO: 1 % (ref 0–6)
ERYTHROCYTE [DISTWIDTH] IN BLOOD BY AUTOMATED COUNT: 12.1 % (ref 11.6–15.1)
GFR SERPL CREATININE-BSD FRML MDRD: 89 ML/MIN/1.73SQ M
GLUCOSE SERPL-MCNC: 115 MG/DL (ref 65–140)
HCT VFR BLD AUTO: 39.5 % (ref 34.8–46.1)
HGB BLD-MCNC: 13 G/DL (ref 11.5–15.4)
IMM GRANULOCYTES # BLD AUTO: 0.02 THOUSAND/UL (ref 0–0.2)
IMM GRANULOCYTES NFR BLD AUTO: 0 % (ref 0–2)
INR PPP: 1.01 (ref 0.86–1.17)
LYMPHOCYTES # BLD AUTO: 0.64 THOUSANDS/ΜL (ref 0.6–4.47)
LYMPHOCYTES NFR BLD AUTO: 13 % (ref 14–44)
MCH RBC QN AUTO: 30.1 PG (ref 26.8–34.3)
MCHC RBC AUTO-ENTMCNC: 32.9 G/DL (ref 31.4–37.4)
MCV RBC AUTO: 91 FL (ref 82–98)
MONOCYTES # BLD AUTO: 0.33 THOUSAND/ΜL (ref 0.17–1.22)
MONOCYTES NFR BLD AUTO: 7 % (ref 4–12)
NEUTROPHILS # BLD AUTO: 3.81 THOUSANDS/ΜL (ref 1.85–7.62)
NEUTS SEG NFR BLD AUTO: 78 % (ref 43–75)
NRBC BLD AUTO-RTO: 0 /100 WBCS
PLATELET # BLD AUTO: 278 THOUSANDS/UL (ref 149–390)
PMV BLD AUTO: 11.8 FL (ref 8.9–12.7)
POTASSIUM SERPL-SCNC: 3.7 MMOL/L (ref 3.5–5.3)
PROTHROMBIN TIME: 12.7 SECONDS (ref 11.8–14.2)
RBC # BLD AUTO: 4.32 MILLION/UL (ref 3.81–5.12)
SODIUM SERPL-SCNC: 144 MMOL/L (ref 136–145)
WBC # BLD AUTO: 4.91 THOUSAND/UL (ref 4.31–10.16)

## 2018-05-30 PROCEDURE — 80048 BASIC METABOLIC PNL TOTAL CA: CPT

## 2018-05-30 PROCEDURE — 85025 COMPLETE CBC W/AUTO DIFF WBC: CPT

## 2018-05-30 PROCEDURE — 85610 PROTHROMBIN TIME: CPT

## 2018-05-30 PROCEDURE — 36415 COLL VENOUS BLD VENIPUNCTURE: CPT

## 2018-05-30 PROCEDURE — 93005 ELECTROCARDIOGRAM TRACING: CPT

## 2018-05-30 PROCEDURE — 85730 THROMBOPLASTIN TIME PARTIAL: CPT

## 2018-05-30 RX ORDER — ACETAMINOPHEN 500 MG
500 TABLET ORAL EVERY 6 HOURS PRN
COMMUNITY
End: 2019-02-25

## 2018-05-30 NOTE — PRE-PROCEDURE INSTRUCTIONS
Pre-Surgery Instructions:   Medication Instructions    acetaminophen (TYLENOL) 500 mg tablet Instructed patient per Anesthesia Guidelines   Glucosamine-Chondroitin (GLUCOSAMINE CHONDR COMPLEX PO) Instructed patient per Anesthesia Guidelines   pravastatin (PRAVACHOL) 40 mg tablet Instructed patient per Anesthesia Guidelines  Before your operation, you play an important role in decreasing your risk for infection by washing with special antiseptic soap  This is an effective way to reduce bacteria on the skin which may help to prevent infections at the surgical site  Please read the following directions in advance  1  In the week before your operation purchase a 4 ounce bottle of antiseptic soap containing chlorhexidine gluconate 4%  Some brand names include: Aplicare, Endure, and Hibiclens  The cost is usually less than $5 00  · For your convenience, the [address] [place] [address] carries the soap  · It may also be available at your doctor's office or pre-admission testing center, and at most retail pharmacies  · If you are allergic or sensitive to soaps containing chlorhexidine gluconate (CHG), please let your doctor know so another antiseptic soap can be suggested  · CHG antiseptic soap is for external use only  2      The day before your operation follow these directions carefully to get ready  · Place clean lines (sheets) on your bed; you should sleep on clean sheets after your evening shower  · Get clean towels and washcloths ready - you need enough for 2 showers  · Set aside clean underwear, pajamas, and clothing to wear after the shower  Reminders:  · DO NOT use any other soap or body rinse on your skin during or after the antiseptic showers  · DO NOT use lotion , powder, deodorant, or perfume/aftershave of any kind on your skin after your antiseptic shower  · DO NOT shave any body parts in the 24 hours/the day before your operation    · DO NOT get the antiseptic soap in your eyes, ears, nose, mouth, or vaginal area  3      You will need to shower the night before AND the morning of your Surgery  Shower 1:  · The evening before your operation, take the fist shower  · First, shampoo your hair with regular shampoo and rinse it completely before you use the anitseptic soap  After washing and rinsing your hair, rinse your body  · Next, use a clean wash cloth to apply the antiseptic soap and wash your body from the neck down to your toes using 1/2 bottle of the antiseptic soap  You will use the other 1/2 bottle for the second shower  · Clean the area where your incision will be; later this area well for about 2 minutes  · If you ar having head or neck surgery, wash areas with the antiseptic soap  · Rinse yourself completely with running water  · Use a clean towel to dry off  · Wear clean underwear and clothing/pajamas  Shower 2:  · The Morning of your operation, take the second shower following the same steps as Shower 1 using the second 1/2 of the bottle of antiseptic soap  · Use clean cloths and towels to was and dry yourself off  · Wear clean underwear and clothing

## 2018-05-31 ENCOUNTER — ANESTHESIA EVENT (OUTPATIENT)
Dept: PERIOP | Facility: HOSPITAL | Age: 58
End: 2018-05-31
Payer: COMMERCIAL

## 2018-05-31 LAB
ATRIAL RATE: 71 BPM
P AXIS: 60 DEGREES
PR INTERVAL: 184 MS
QRS AXIS: 39 DEGREES
QRSD INTERVAL: 84 MS
QT INTERVAL: 386 MS
QTC INTERVAL: 419 MS
T WAVE AXIS: -19 DEGREES
VENTRICULAR RATE: 71 BPM

## 2018-05-31 PROCEDURE — 93010 ELECTROCARDIOGRAM REPORT: CPT | Performed by: INTERNAL MEDICINE

## 2018-06-01 ENCOUNTER — TELEPHONE (OUTPATIENT)
Dept: FAMILY MEDICINE CLINIC | Facility: CLINIC | Age: 58
End: 2018-06-01

## 2018-06-01 NOTE — TELEPHONE ENCOUNTER
Anila called in looking for confirmation to see if they can move forward with the pt surgery due to the only EKG on file in epic shows timi High stated as long as your okay with it they will perform the surgery on the pt  Noman Peralta is in the office til 2pm today and can be reached at  [phone]

## 2018-06-01 NOTE — TELEPHONE ENCOUNTER
Please forward to Dr Clayton Carrillo  She saw the patient last in March   I have not seen her in several years

## 2018-06-02 NOTE — TELEPHONE ENCOUNTER
I spoke to Zulma Cordero her of this as a possible delay to her surgery    And left a message for Anila at pre admission

## 2018-06-02 NOTE — TELEPHONE ENCOUNTER
The patient had an abnormal EKG which unfortunately means that the cardiologist needs to say it is okay that she go forward with the surgery without any further workup  I see that the surgery is scheduled for Monday which is basically when were going to be able to start talking about all this as today is Saturday  If the orthopedist does not want to delay the surgery they can certainly speak with the cardiologist who read the EKG and see if they feel she needs any further workup prior to going forward with surgery  Otherwise, I can't clear her with an abnormal EKG until the cardiologist says it is okay

## 2018-06-04 ENCOUNTER — ANESTHESIA (OUTPATIENT)
Dept: PERIOP | Facility: HOSPITAL | Age: 58
End: 2018-06-04
Payer: COMMERCIAL

## 2018-06-04 ENCOUNTER — TELEPHONE (OUTPATIENT)
Dept: FAMILY MEDICINE CLINIC | Facility: CLINIC | Age: 58
End: 2018-06-04

## 2018-06-04 ENCOUNTER — HOSPITAL ENCOUNTER (OUTPATIENT)
Facility: HOSPITAL | Age: 58
Setting detail: OUTPATIENT SURGERY
Discharge: HOME | End: 2018-06-04
Attending: ORTHOPAEDIC SURGERY | Admitting: ORTHOPAEDIC SURGERY
Payer: COMMERCIAL

## 2018-06-04 VITALS
BODY MASS INDEX: 25.61 KG/M2 | SYSTOLIC BLOOD PRESSURE: 146 MMHG | HEART RATE: 58 BPM | WEIGHT: 150 LBS | OXYGEN SATURATION: 97 % | DIASTOLIC BLOOD PRESSURE: 71 MMHG | HEIGHT: 64 IN | RESPIRATION RATE: 16 BRPM | TEMPERATURE: 97.9 F

## 2018-06-04 DIAGNOSIS — S83.241D ACUTE MEDIAL MENISCUS TEAR OF RIGHT KNEE, SUBSEQUENT ENCOUNTER: Primary | ICD-10-CM

## 2018-06-04 PROCEDURE — 29881 ARTHRS KNE SRG MNISECTMY M/L: CPT | Performed by: ORTHOPAEDIC SURGERY

## 2018-06-04 PROCEDURE — 29881 ARTHRS KNE SRG MNISECTMY M/L: CPT | Performed by: PHYSICIAN ASSISTANT

## 2018-06-04 RX ORDER — GLYCOPYRROLATE 0.2 MG/ML
INJECTION INTRAMUSCULAR; INTRAVENOUS AS NEEDED
Status: DISCONTINUED | OUTPATIENT
Start: 2018-06-04 | End: 2018-06-04 | Stop reason: SURG

## 2018-06-04 RX ORDER — SODIUM CHLORIDE, SODIUM LACTATE, POTASSIUM CHLORIDE, CALCIUM CHLORIDE 600; 310; 30; 20 MG/100ML; MG/100ML; MG/100ML; MG/100ML
75 INJECTION, SOLUTION INTRAVENOUS CONTINUOUS
Status: DISCONTINUED | OUTPATIENT
Start: 2018-06-04 | End: 2018-06-04 | Stop reason: HOSPADM

## 2018-06-04 RX ORDER — METOCLOPRAMIDE HYDROCHLORIDE 5 MG/ML
10 INJECTION INTRAMUSCULAR; INTRAVENOUS ONCE AS NEEDED
Status: DISCONTINUED | OUTPATIENT
Start: 2018-06-04 | End: 2018-06-04 | Stop reason: HOSPADM

## 2018-06-04 RX ORDER — CHLORHEXIDINE GLUCONATE 4 G/100ML
SOLUTION TOPICAL DAILY PRN
Status: DISCONTINUED | OUTPATIENT
Start: 2018-06-04 | End: 2018-06-04 | Stop reason: HOSPADM

## 2018-06-04 RX ORDER — PROPOFOL 10 MG/ML
INJECTION, EMULSION INTRAVENOUS AS NEEDED
Status: DISCONTINUED | OUTPATIENT
Start: 2018-06-04 | End: 2018-06-04 | Stop reason: SURG

## 2018-06-04 RX ORDER — ONDANSETRON 2 MG/ML
4 INJECTION INTRAMUSCULAR; INTRAVENOUS EVERY 6 HOURS PRN
Status: DISCONTINUED | OUTPATIENT
Start: 2018-06-04 | End: 2018-06-04 | Stop reason: HOSPADM

## 2018-06-04 RX ORDER — BUPIVACAINE HYDROCHLORIDE AND EPINEPHRINE 5; 5 MG/ML; UG/ML
INJECTION, SOLUTION EPIDURAL; INTRACAUDAL; PERINEURAL AS NEEDED
Status: DISCONTINUED | OUTPATIENT
Start: 2018-06-04 | End: 2018-06-04 | Stop reason: HOSPADM

## 2018-06-04 RX ORDER — OXYCODONE HYDROCHLORIDE AND ACETAMINOPHEN 5; 325 MG/1; MG/1
1 TABLET ORAL EVERY 4 HOURS PRN
Qty: 20 TABLET | Refills: 0 | Status: SHIPPED | OUTPATIENT
Start: 2018-06-04 | End: 2018-06-14

## 2018-06-04 RX ORDER — MIDAZOLAM HYDROCHLORIDE 1 MG/ML
INJECTION INTRAMUSCULAR; INTRAVENOUS AS NEEDED
Status: DISCONTINUED | OUTPATIENT
Start: 2018-06-04 | End: 2018-06-04 | Stop reason: SURG

## 2018-06-04 RX ORDER — ACETAMINOPHEN 325 MG/1
650 TABLET ORAL EVERY 6 HOURS PRN
Status: DISCONTINUED | OUTPATIENT
Start: 2018-06-04 | End: 2018-06-04 | Stop reason: HOSPADM

## 2018-06-04 RX ORDER — FENTANYL CITRATE 50 UG/ML
INJECTION, SOLUTION INTRAMUSCULAR; INTRAVENOUS AS NEEDED
Status: DISCONTINUED | OUTPATIENT
Start: 2018-06-04 | End: 2018-06-04 | Stop reason: SURG

## 2018-06-04 RX ORDER — ONDANSETRON 2 MG/ML
INJECTION INTRAMUSCULAR; INTRAVENOUS AS NEEDED
Status: DISCONTINUED | OUTPATIENT
Start: 2018-06-04 | End: 2018-06-04 | Stop reason: SURG

## 2018-06-04 RX ORDER — OXYCODONE HYDROCHLORIDE AND ACETAMINOPHEN 5; 325 MG/1; MG/1
1 TABLET ORAL EVERY 4 HOURS PRN
Status: DISCONTINUED | OUTPATIENT
Start: 2018-06-04 | End: 2018-06-04 | Stop reason: HOSPADM

## 2018-06-04 RX ORDER — KETOROLAC TROMETHAMINE 30 MG/ML
INJECTION, SOLUTION INTRAMUSCULAR; INTRAVENOUS AS NEEDED
Status: DISCONTINUED | OUTPATIENT
Start: 2018-06-04 | End: 2018-06-04 | Stop reason: SURG

## 2018-06-04 RX ORDER — FENTANYL CITRATE/PF 50 MCG/ML
50 SYRINGE (ML) INJECTION
Status: DISCONTINUED | OUTPATIENT
Start: 2018-06-04 | End: 2018-06-04 | Stop reason: HOSPADM

## 2018-06-04 RX ADMIN — DEXAMETHASONE SODIUM PHOSPHATE 4 MG: 10 INJECTION INTRAMUSCULAR; INTRAVENOUS at 12:20

## 2018-06-04 RX ADMIN — CEFAZOLIN SODIUM 1000 MG: 1 SOLUTION INTRAVENOUS at 12:10

## 2018-06-04 RX ADMIN — PROPOFOL 200 MG: 10 INJECTION, EMULSION INTRAVENOUS at 12:10

## 2018-06-04 RX ADMIN — GLYCOPYRROLATE 0.2 MG: 0.2 INJECTION, SOLUTION INTRAMUSCULAR; INTRAVENOUS at 11:46

## 2018-06-04 RX ADMIN — KETOROLAC TROMETHAMINE 30 MG: 30 INJECTION, SOLUTION INTRAMUSCULAR at 12:37

## 2018-06-04 RX ADMIN — FENTANYL CITRATE 25 MCG: 50 INJECTION, SOLUTION INTRAMUSCULAR; INTRAVENOUS at 12:28

## 2018-06-04 RX ADMIN — MIDAZOLAM HYDROCHLORIDE 2 MG: 1 INJECTION, SOLUTION INTRAMUSCULAR; INTRAVENOUS at 11:46

## 2018-06-04 RX ADMIN — ONDANSETRON 4 MG: 2 INJECTION INTRAMUSCULAR; INTRAVENOUS at 12:35

## 2018-06-04 RX ADMIN — FENTANYL CITRATE 50 MCG: 50 INJECTION, SOLUTION INTRAMUSCULAR; INTRAVENOUS at 12:10

## 2018-06-04 RX ADMIN — SODIUM CHLORIDE, SODIUM LACTATE, POTASSIUM CHLORIDE, AND CALCIUM CHLORIDE 75 ML/HR: .6; .31; .03; .02 INJECTION, SOLUTION INTRAVENOUS at 10:45

## 2018-06-04 RX ADMIN — OXYCODONE HYDROCHLORIDE AND ACETAMINOPHEN 1 TABLET: 5; 325 TABLET ORAL at 13:28

## 2018-06-04 RX ADMIN — FENTANYL CITRATE 25 MCG: 50 INJECTION, SOLUTION INTRAMUSCULAR; INTRAVENOUS at 12:25

## 2018-06-04 NOTE — OP NOTE
OPERATIVE REPORT  PATIENT NAME: Uriel Barriga    :  1960  MRN: [id number]  Pt Location:  OR ROOM 01    SURGERY DATE: 2018    Surgeon(s) and Role:     * Lenore Harada, MD - Primary     * Deb Roe PA-C - Assisting    Preop Diagnosis:  Acute medial meniscus tear of right knee, initial encounter [S83 241A]    Post-Op Diagnosis Codes:     * Acute medial meniscus tear of right knee, initial encounter [S83 241A]    Procedure(s) (LRB):  ARTHROSCOPY KNEE, PARTIAL MEDIAL MENISCECTOMY: ABRASION CHONDROPLASTY (Right)    Specimen(s):  * No specimens in log *    Estimated Blood Loss:   Minimal    Drains:  NA    Anesthesia Type:   LMA    Operative Indications:  Acute medial meniscus tear of right knee, initial encounter [N44 547D]  Indications: Uriel Barriga is a 62y o  years old female diagnosed with right medial meniscal posterior root tear  Patient failed conservative treatments and elected to proceed with surgical intervention  The risks and complications are discussed with the patient  The patient consented to the procedure  Procedure: Patient was brought into the OR and placed in supine position  Patient was anesthetized and intubated with LMA without any complications  Patient's right knee was prep and draped in sterile fashion with tourniquet in the upper thigh  A time out was call and identified the right knee was the operating site  3 portals were utilized for instrumentation  Each portal was injected with 1 cc of Marcaine 0 5% with epinephrine  2cc was injected into the joint from the superior-lateral aspect  A superior-lateral protal was use for the inflow which is set to 30 mmHg  The scope was introduced into the knee from the lateral portal  Inspection of the knee was carried out in clockwise fashion  A medial protal was also created for instrumentation  Patellofemoral joint showed grade 1 degenerative changes  Anterior medial plica was not present   Medial compartment showed grade 2-3 degenerative changes  Medial meniscus was torn in the posterior root  ACL and PCL were intact  Lateral compartment showed grade 0 degenerative changes  Lateral meniscus was intact  Attention was turn to medial compartment and partial medial meniscectomy was done back to the stable rim in the posterior root  Medial femoral condyle articular surface was also debrided to remove loose cartilage  Using mechanical shaver and biters to carry out the procedure  Excess fluid was drain out of the knee 25 cc of 0 5% Marcaine with epinephrine was injected into the knee joint for post-op pain control  All counts were correct  The incisions were closed with Nylon  A sterile bulky dressing was applied  The patient tolerated the procedure well without any complications  Patient was then extubated and transferred to recovery room for post-op care  The family was contacted  Mrs Bonilla Cleaves was required in the OR in helping performing the procedures by manipulating the knee for visualization      Complications:   None    Patient Disposition:  PACU     SIGNATURE: Erasto De Anda MD  DATE: June 4, 2018  TIME: 12:52 PM

## 2018-06-04 NOTE — H&P (VIEW-ONLY)
Assessment:     1  Acute medial meniscus tear of right knee, initial encounter        Plan:     Problem List Items Addressed This Visit        Musculoskeletal and Integument    Acute medial meniscus tear of right knee - Primary     Findings consistent with right medial meniscal tear, posterior root  Discussed findings and treatment options with the patient  I review patient's right knee MRI with her  I discussed prognosis of her right knee injury  I recommended surgical treatment since patient continued to have symptoms in her right knee despite conservative treatment  Patient agreed to proceed with the surgery  We will schedule patient as outpatient procedure  All patient's questions were answered to his satisfaction  This note is created using dictation transcription  It may contain typographical errors, grammatical errors, improperly dictated words, background noise and other errors  Discussed with patient surgical risks and complications including but not limited to infection, persistent pain, nerve and vessel injury, complications associated with anesthesia, DVT, etc               Subjective:     Patient ID: Primo Arita is a 62 y o  female  Chief Complaint:  55-year-old white female following up for right knee pain  She continued to have symptoms in her right knee  She has the knee symptoms for a few months now  She had tried conservative treatment with aspiration and cortisone injection which did not resolve her knee issue  She is here to review her right knee MRI        Allergy:  No Known Allergies  Medications:  all current active meds have been reviewed  Past Medical History:  Past Medical History:   Diagnosis Date    Concussion     Hyperlipidemia      Past Surgical History:  Past Surgical History:   Procedure Laterality Date     SECTION      FOOT SURGERY Left 2007    TUBAL LIGATION       Family History:  Family History   Problem Relation Age of Onset    Coronary artery disease Mother     Other Mother      CABG    Hiatal hernia Father     Stroke Maternal Grandmother     Hyperlipidemia Family     Migraines Family     Thyroid disease Family      Social History:  History   Alcohol Use    Yes     Comment: social     History   Drug Use No     History   Smoking Status    Never Smoker   Smokeless Tobacco    Never Used     Review of Systems   Constitutional: Negative  HENT: Negative  Eyes: Negative  Respiratory: Negative  Cardiovascular: Negative  Gastrointestinal: Negative  Endocrine: Negative  Genitourinary: Negative  Musculoskeletal: Positive for arthralgias (Right knee), gait problem (Limping) and joint swelling (Right knee)  Skin: Negative  Allergic/Immunologic: Negative  Hematological: Negative  Psychiatric/Behavioral: Negative  Objective:  BP Readings from Last 1 Encounters:   05/29/18 130/80      Wt Readings from Last 1 Encounters:   05/29/18 68 kg (150 lb)      BMI:   Estimated body mass index is 25 75 kg/m² as calculated from the following:    Height as of this encounter: 5' 4" (1 626 m)  Weight as of this encounter: 68 kg (150 lb)  BSA:   Estimated body surface area is 1 73 meters squared as calculated from the following:    Height as of this encounter: 5' 4" (1 626 m)  Weight as of this encounter: 68 kg (150 lb)  Physical Exam   Constitutional: She is oriented to person, place, and time  She appears well-developed  HENT:   Head: Normocephalic and atraumatic  Eyes: EOM are normal  Pupils are equal, round, and reactive to light  Neck: Neck supple  Cardiovascular: Normal rate and regular rhythm  Exam reveals no gallop  No murmur heard  Pulmonary/Chest: Effort normal and breath sounds normal  She has no wheezes  She has no rales  Musculoskeletal:        Right knee: She exhibits effusion (Grade 2)  Neurological: She is alert and oriented to person, place, and time  Skin: Skin is warm     Psychiatric: She has a normal mood and affect  Nursing note and vitals reviewed  Right Knee Exam     Tenderness   The patient is experiencing no tenderness  Range of Motion   Extension: normal   Flexion: 130     Muscle Strength     The patient has normal right knee strength      Tests   Darlin:  Medial - negative Lateral - negative  Lachman:  Anterior - negative    Posterior - negative  Drawer:       Anterior - negative    Posterior - negative  Varus: negative  Valgus: negative  Patellar Apprehension: negative    Other   Erythema: absent  Sensation: normal  Pulse: present  Swelling: mild  Other tests: effusion (Grade 2) present      Left Knee Exam   Left knee exam is normal             Procedures

## 2018-06-04 NOTE — DISCHARGE INSTRUCTIONS
The principal surgical findings in your knee were:    1  Right knee medial meniscus tear    2  Right knee DJD      The following corrective procedures were performed:     1  Abrasion chrondroplasty medial femoral condyle    2  Arthroscopic partial meniscectomy       FOLLOW-UP:     You will need an appt  in: As scheduled   Please call the office at   GENERAL INSTRUCTIONS:    ·  Apply an ice pack to your knee for the next 12-24 hours  ·  If crutches were prescribed, you should limit weight bearing at all times as instructed  Keep knee stiff the first day, avoid too much bending  ·  Take it easy for at least 24 hours  Do not drive for 3-5 days  You may move about, but stay around home or hotel  ·  If you have an upset stomach, take only cool, clear liquids, such as Gatorade, Jello, or Ginger ale  If nausea persists for more than 25 hours, notify my office  ·  Low grade temperature is not uncommon after surgery  However, if your temperature exceeds 101 degrees, please notify my office  ·  The Novocain in your knee will keep your knee numb until tonight  When the medicine wears off, you will feel pain for several days to one week  This is normal after arthroscopic surgery  ·  On the day after surgery, you may walk normally and bend the knee normally  ·       You may continue using a cane or crutches to minimize any discomfort the first 24 to 48 hours  ·  It is normal to have swelling and discomfort in the knee for several days to one week after arthroscopic surgery  ·  You should take on 325 mg enteric-coated aspirin in the morning and one tablet at night to help minimize the chance of developing phlebitis (clots in the vein)  This should also be taken with food or a glass of milk to avoid stomach upset  Examples of enteric-coated aspirin are Ecotrin, Ascriptin, Or Bufferin  DO NOT TAKE this if you are known to be allergic to it or have a prior history of stomach ulcer disease    NOTE: If, after taking the enteric coated aspirin, you develop any pain in the stomach, nausea, vomiting, or stomach irritation, you should stop the medication immediately because it may cause stomach ulcers  Also, if you continue taking this medication while you are having pain in the stomach or nausea or stomach cramps, an ulcer could develop  ·       To reduce pain and swelling, place several pillows under your knee for the first 24 to 48 hours  The knee should be elevated above the heart  Ice should be applied to the knee during this period and this will help decrease discomfort and swelling  Apply to the knee for 30 minutes every 2 hours  ·       Any prescription you received before surgery or after surgery should be filled immediately, and taken according to directions on the label  Taking the medicine with food or with a glass of milk will avoid stomach upset  ·       Weight bearing as per instructions from the surgeon  EXERCISES:      Begin doing gentle exercises right away  Exercising will reduce the swelling, increase motion, and prevent muscle weakness  The following exercises should be performed during the first week and a half, following surgery  The advanced knee exercise program will be started when you are seen in the office  1  QUAD SETS: Straighten as straight as possible and then clench the thigh muscles tightly  Keep the muscles clenched tightly to the slow count of 3 then relax  Repeat this exercise 10-30 times every hour  2  STRAIGHT LEG RAISING: With the knee held straight and the quadriceps muscle contracted, raise your leg up 6 to 8 inches and hold it to the slow count of 3  Repeat this exercise 10-30 times every hour  3  RANGE OF MOTION: You should start bending your knee to the point of pain and increase bending it until full motion is obtained  Repeat this exercise 10-30 times every hour          For the first 48 hours inhale deeply and hold your breath for 3 seconds; exhale completely  Repeat 10 times, 4 times daily  If you smoke, avoid cigarettes for 48 hours  BANDAGES:     ·  Your bandage may show blood stains within 1-12 hours  This is motly fluid that was used to irrigate your knee, slightly tinged with blood  It is no cause for concern  However, if your bandage becomes saturated, notify my office right away  ·       You may remove the bulky dressings in 2 days and applied band aids to the small skin incisions  You may shower in 3 days after surgery  WORK:   Plan to take 3 or 4 days off from work  You can resume work when you are comfortable  (This can be a week or more depending on the type of work you do)  Do not walk or stand for excessive periods  Do not operate heavy machinery that requires pedals

## 2018-06-04 NOTE — ANESTHESIA PREPROCEDURE EVALUATION
Review of Systems/Medical History  Patient summary reviewed  Chart reviewed      Cardiovascular  EKG reviewed, Hyperlipidemia,    Pulmonary  Negative pulmonary ROS        GI/Hepatic  Negative GI/hepatic ROS          Negative  ROS        Endo/Other  Negative endo/other ROS      GYN  Negative gynecology ROS          Hematology  Negative hematology ROS      Musculoskeletal    Arthritis     Neurology  Negative neurology ROS      Psychology   Negative psychology ROS              Physical Exam    Airway    Mallampati score: II  TM Distance: >3 FB  Neck ROM: full     Dental   No notable dental hx     Cardiovascular  Cardiovascular exam normal    Pulmonary  Pulmonary exam normal     Other Findings        Anesthesia Plan  ASA Score- 2     Anesthesia Type- general with ASA Monitors  Additional Monitors:   Airway Plan:         Plan Factors-    Induction- intravenous  Postoperative Plan-     Informed Consent- Anesthetic plan and risks discussed with patient

## 2018-06-04 NOTE — DISCHARGE SUMMARY
[facility] [place]  Raman Chacon, 62 y o  female  MRN: [id number]      Preoperative diagnosis: right knee medial meniscus tear, DJD    Postoperative diagnosis: right knee medial meniscus tear, DJD    Procedure: right knee arthroscopy with partial medial meniscectomy and abrasion chondroplasty    After procedure, patient was brought to PACU  Pain was controlled with IV and oral pain medication  Patient was discharged to home after cleared by anesthesia and when criteria was met  Condition: good    Discharge medications:   See after visit summary for reconciled discharge medications provided to patient and family  Please refer to discharge instructions for further information

## 2018-06-14 ENCOUNTER — OFFICE VISIT (OUTPATIENT)
Dept: OBGYN CLINIC | Facility: CLINIC | Age: 58
End: 2018-06-14

## 2018-06-14 VITALS
BODY MASS INDEX: 25.61 KG/M2 | WEIGHT: 150 LBS | HEIGHT: 64 IN | SYSTOLIC BLOOD PRESSURE: 131 MMHG | HEART RATE: 72 BPM | DIASTOLIC BLOOD PRESSURE: 89 MMHG

## 2018-06-14 DIAGNOSIS — Z47.89 AFTERCARE FOLLOWING SURGERY OF THE MUSCULOSKELETAL SYSTEM: Primary | ICD-10-CM

## 2018-06-14 PROBLEM — S83.241A ACUTE MEDIAL MENISCUS TEAR OF RIGHT KNEE: Status: RESOLVED | Noted: 2018-04-05 | Resolved: 2018-06-14

## 2018-06-14 PROCEDURE — 99024 POSTOP FOLLOW-UP VISIT: CPT | Performed by: ORTHOPAEDIC SURGERY

## 2018-06-14 NOTE — PROGRESS NOTES
Assessment:     1  Aftercare following surgery of the musculoskeletal system        Plan:     Problem List Items Addressed This Visit        Other    Aftercare following surgery of the musculoskeletal system - Primary     Removed sutures  I review was patient her intraop photos of her knee  I advised patient to continue knee range of motion and low-impact exercises such as stationary bike  I provide patient a prescription for attending physical therapy if she feels her knee motion and strength in is not coming along as quickly  I would like to see patient back in 4 weeks for re-evaluation  All patient's questions were answered to his satisfaction  This note is created using dictation transcription  It may contain typographical errors, grammatical errors, improperly dictated words, background noise and other errors  Relevant Orders    Ambulatory referral to Physical Therapy         Subjective:     Patient ID: Sally Stevenson is a 62 y o  female  Chief Complaint:  31-year-old white female status post right knee arthroscopy partial medial meniscectomy on 2018  Patient is doing well  She denies fever, chills, or sweats  Her knee pain is better compared to before surgery  She still has some difficulty extending her knee completely and tightness in bending her knee  Patient is walking without use of any assistive device        Allergy:  No Known Allergies  Medications:  all current active meds have been reviewed  Past Medical History:  Past Medical History:   Diagnosis Date    Concussion     Hyperlipidemia      Past Surgical History:  Past Surgical History:   Procedure Laterality Date     SECTION      FOOT SURGERY Left     HEMORROIDECTOMY      KNEE ARTHROSCOPY Right 2018    LA KNEE SCOPE,MED/LAT MENISECTOMY Right 2018    Procedure: ARTHROSCOPY KNEE, PARTIAL MEDIAL MENISCECTOMY: ABRASION CHONDROPLASTY;  Surgeon: Gi Delgado MD;  Location: [facility] OR;  Service: Orthopedics  TUBAL LIGATION       Family History:  Family History   Problem Relation Age of Onset    Coronary artery disease Mother     Other Mother         CABG    Hiatal hernia Father     Stroke Maternal Grandmother     Hyperlipidemia Family     Migraines Family     Thyroid disease Family      Social History:  History   Alcohol Use    Yes     Comment: social     History   Drug Use No     History   Smoking Status    Former Smoker    Packs/day: 0 33    Years: 5 00    Quit date: 1984   Smokeless Tobacco    Never Used     Review of Systems   Constitutional: Negative  HENT: Negative  Eyes: Negative  Respiratory: Negative  Cardiovascular: Negative  Gastrointestinal: Negative  Endocrine: Negative  Genitourinary: Negative  Musculoskeletal: Positive for arthralgias (Right knee), gait problem (Limping) and joint swelling (Right knee)  Skin: Negative  Allergic/Immunologic: Negative  Hematological: Negative  Psychiatric/Behavioral: Negative  Objective:  BP Readings from Last 1 Encounters:   06/14/18 131/89      Wt Readings from Last 1 Encounters:   06/14/18 68 kg (150 lb)      BMI:   Estimated body mass index is 25 75 kg/m² as calculated from the following:    Height as of this encounter: 5' 4" (1 626 m)  Weight as of this encounter: 68 kg (150 lb)  BSA:   Estimated body surface area is 1 73 meters squared as calculated from the following:    Height as of this encounter: 5' 4" (1 626 m)  Weight as of this encounter: 68 kg (150 lb)  Physical Exam   Constitutional: She is oriented to person, place, and time  She appears well-developed  HENT:   Head: Normocephalic and atraumatic  Eyes: EOM are normal  Pupils are equal, round, and reactive to light  Neck: Neck supple  Musculoskeletal:        Right knee: She exhibits effusion (Grade 1)  Neurological: She is alert and oriented to person, place, and time  Skin: Skin is warm     Psychiatric: She has a normal mood and affect  Nursing note and vitals reviewed  Right Knee Exam     Tenderness   Right knee tenderness location: anterior  Range of Motion   Extension:  -5 abnormal   Flexion:  90 abnormal     Muscle Strength     The patient has normal right knee strength  Tests   Darlin:  Medial - negative Lateral - negative  Varus: negative  Valgus: negative  Patellar Apprehension: negative    Other   Erythema: absent  Scars: present (Incision intact    No signs of infection)  Sensation: normal  Pulse: present  Swelling: mild  Other tests: effusion (Grade 1) present    Comments:  Calf is soft nontender      Left Knee Exam   Left knee exam is normal             Procedures   Removed sutures

## 2018-06-14 NOTE — ASSESSMENT & PLAN NOTE
Removed sutures  I review was patient her intraop photos of her knee  I advised patient to continue knee range of motion and low-impact exercises such as stationary bike  I provide patient a prescription for attending physical therapy if she feels her knee motion and strength in is not coming along as quickly  I would like to see patient back in 4 weeks for re-evaluation  All patient's questions were answered to his satisfaction  This note is created using dictation transcription  It may contain typographical errors, grammatical errors, improperly dictated words, background noise and other errors

## 2018-07-26 ENCOUNTER — OFFICE VISIT (OUTPATIENT)
Dept: OBGYN CLINIC | Facility: CLINIC | Age: 58
End: 2018-07-26

## 2018-07-26 VITALS
HEIGHT: 64 IN | HEART RATE: 72 BPM | BODY MASS INDEX: 26.63 KG/M2 | WEIGHT: 156 LBS | DIASTOLIC BLOOD PRESSURE: 80 MMHG | SYSTOLIC BLOOD PRESSURE: 122 MMHG

## 2018-07-26 DIAGNOSIS — Z47.89 AFTERCARE FOLLOWING SURGERY OF THE MUSCULOSKELETAL SYSTEM: Primary | ICD-10-CM

## 2018-07-26 PROCEDURE — 99024 POSTOP FOLLOW-UP VISIT: CPT | Performed by: ORTHOPAEDIC SURGERY

## 2018-07-26 NOTE — PROGRESS NOTES
Assessment:     1  Aftercare following surgery of the musculoskeletal system        Plan:     Problem List Items Addressed This Visit        Other    Aftercare following surgery of the musculoskeletal system - Primary     Patient is doing excellent status post right knee arthroscopy with partial medial meniscectomy  Discussed importance of continuing low-impact exercises  She may slowly increase activity as tolerated  Continue ice and NSAIDs as needed  Follow-up as needed if any problems arise  All patient's questions were answered to his satisfaction  Plan discussed with Dr Michael Griffin  This note is created using dictation transcription  It may contain typographical errors, grammatical errors, improperly dictated words, background noise and other errors  Subjective:     Patient ID: Rao Lora is a 62 y o  female  Chief Complaint:  59-year-old white female status post right knee arthroscopy partial medial meniscectomy on 2018  Patient is doing well  She has been doing low-impact exercises on her own  She feels improvement in range of motion strength  The pain that she had prior to surgery has resolved  She is very pleased with the results        Allergy:  No Known Allergies  Medications:  all current active meds have been reviewed  Past Medical History:  Past Medical History:   Diagnosis Date    Concussion     Hyperlipidemia      Past Surgical History:  Past Surgical History:   Procedure Laterality Date     SECTION      FOOT SURGERY Left 2007    HEMORROIDECTOMY      KNEE ARTHROSCOPY Right 2018    VA KNEE SCOPE,MED/LAT MENISECTOMY Right 2018    Procedure: ARTHROSCOPY KNEE, PARTIAL MEDIAL MENISCECTOMY: ABRASION CHONDROPLASTY;  Surgeon: Yaneth Burns MD;  Location:  MAIN OR;  Service: Orthopedics    TUBAL LIGATION       Family History:  Family History   Problem Relation Age of Onset    Coronary artery disease Mother     Other Mother         CABG    Hiatal hernia Father     Stroke Maternal Grandmother     Hyperlipidemia Family     Migraines Family     Thyroid disease Family      Social History:  History   Alcohol Use    Yes     Comment: social     History   Drug Use No     History   Smoking Status    Former Smoker    Packs/day: 0 33    Years: 5 00    Quit date: 1984   Smokeless Tobacco    Never Used     Review of Systems   Constitutional: Negative  HENT: Negative  Eyes: Negative  Respiratory: Negative  Cardiovascular: Negative  Gastrointestinal: Negative  Endocrine: Negative  Genitourinary: Negative  Musculoskeletal: Positive for arthralgias (Right knee) and joint swelling (Right knee)  Negative for gait problem  Skin: Negative  Allergic/Immunologic: Negative  Hematological: Negative  Psychiatric/Behavioral: Negative  Objective:  BP Readings from Last 1 Encounters:   07/26/18 122/80      Wt Readings from Last 1 Encounters:   07/26/18 70 8 kg (156 lb)      BMI:   Estimated body mass index is 26 78 kg/m² as calculated from the following:    Height as of this encounter: 5' 4" (1 626 m)  Weight as of this encounter: 70 8 kg (156 lb)  BSA:   Estimated body surface area is 1 76 meters squared as calculated from the following:    Height as of this encounter: 5' 4" (1 626 m)  Weight as of this encounter: 70 8 kg (156 lb)  Physical Exam   Constitutional: She is oriented to person, place, and time  She appears well-developed  HENT:   Head: Normocephalic and atraumatic  Eyes: EOM are normal  Pupils are equal, round, and reactive to light  Neck: Neck supple  Musculoskeletal:        Right knee: She exhibits effusion (trace)  Neurological: She is alert and oriented to person, place, and time  Skin: Skin is warm  Psychiatric: She has a normal mood and affect  Nursing note and vitals reviewed  Right Knee Exam     Tenderness   The patient is experiencing no tenderness           Range of Motion   The patient has normal right knee ROM  Muscle Strength     The patient has normal right knee strength  Tests   Darlin:  Medial - negative Lateral - negative  Varus: negative  Valgus: negative  Patellar Apprehension: negative    Other   Erythema: absent  Scars: present  Sensation: normal  Pulse: present  Swelling: mild  Other tests: effusion (trace) present    Comments:  Calf is soft nontender  Healed incisions        Left Knee Exam   Left knee exam is normal             Procedures

## 2018-07-26 NOTE — ASSESSMENT & PLAN NOTE
Patient is doing excellent status post right knee arthroscopy with partial medial meniscectomy  Discussed importance of continuing low-impact exercises  She may slowly increase activity as tolerated  Continue ice and NSAIDs as needed  Follow-up as needed if any problems arise  All patient's questions were answered to his satisfaction  Plan discussed with Dr John Garcia  This note is created using dictation transcription  It may contain typographical errors, grammatical errors, improperly dictated words, background noise and other errors

## 2018-08-15 DIAGNOSIS — E78.2 MIXED HYPERLIPIDEMIA: ICD-10-CM

## 2018-08-15 RX ORDER — PRAVASTATIN SODIUM 40 MG
TABLET ORAL
Qty: 30 TABLET | Refills: 3 | Status: SHIPPED | OUTPATIENT
Start: 2018-08-15

## 2018-11-02 ENCOUNTER — OFFICE VISIT (OUTPATIENT)
Dept: FAMILY MEDICINE CLINIC | Facility: CLINIC | Age: 58
End: 2018-11-02
Payer: COMMERCIAL

## 2018-11-02 VITALS
SYSTOLIC BLOOD PRESSURE: 120 MMHG | WEIGHT: 153.5 LBS | OXYGEN SATURATION: 99 % | HEART RATE: 60 BPM | HEIGHT: 64 IN | DIASTOLIC BLOOD PRESSURE: 90 MMHG | BODY MASS INDEX: 26.21 KG/M2 | TEMPERATURE: 98.4 F

## 2018-11-02 DIAGNOSIS — Z23 NEED FOR VACCINATION: ICD-10-CM

## 2018-11-02 DIAGNOSIS — Z13.1 SCREENING FOR DIABETES MELLITUS: ICD-10-CM

## 2018-11-02 DIAGNOSIS — Z13.220 SCREENING, LIPID: ICD-10-CM

## 2018-11-02 DIAGNOSIS — M54.2 CHRONIC NECK PAIN: ICD-10-CM

## 2018-11-02 DIAGNOSIS — G89.29 CHRONIC NECK PAIN: ICD-10-CM

## 2018-11-02 DIAGNOSIS — Z13.29 SCREENING FOR THYROID DISORDER: ICD-10-CM

## 2018-11-02 DIAGNOSIS — Z12.39 BREAST CANCER SCREENING: ICD-10-CM

## 2018-11-02 DIAGNOSIS — Z11.59 ENCOUNTER FOR HEPATITIS C SCREENING TEST FOR LOW RISK PATIENT: ICD-10-CM

## 2018-11-02 DIAGNOSIS — M50.90 CERVICAL DISC DISEASE: Primary | ICD-10-CM

## 2018-11-02 DIAGNOSIS — Z87.828 HISTORY OF TEAR OF MENISCUS OF KNEE JOINT: ICD-10-CM

## 2018-11-02 DIAGNOSIS — Z13.0 SCREENING, ANEMIA, DEFICIENCY, IRON: ICD-10-CM

## 2018-11-02 DIAGNOSIS — Z12.11 COLON CANCER SCREENING: ICD-10-CM

## 2018-11-02 PROBLEM — M25.461 EFFUSION OF RIGHT KNEE: Status: RESOLVED | Noted: 2018-05-03 | Resolved: 2018-11-02

## 2018-11-02 PROBLEM — S06.0X9A CONCUSSION WITH LOSS OF CONSCIOUSNESS: Status: RESOLVED | Noted: 2018-03-20 | Resolved: 2018-11-02

## 2018-11-02 PROBLEM — M25.562 KNEE PAIN, BILATERAL: Status: RESOLVED | Noted: 2017-05-24 | Resolved: 2018-11-02

## 2018-11-02 PROBLEM — M25.561 KNEE PAIN, BILATERAL: Status: RESOLVED | Noted: 2017-05-24 | Resolved: 2018-11-02

## 2018-11-02 PROBLEM — M25.869 KNEE JOINT CYST: Status: RESOLVED | Noted: 2017-05-24 | Resolved: 2018-11-02

## 2018-11-02 PROBLEM — Z47.89 AFTERCARE FOLLOWING SURGERY OF THE MUSCULOSKELETAL SYSTEM: Status: RESOLVED | Noted: 2018-06-14 | Resolved: 2018-11-02

## 2018-11-02 PROCEDURE — 99214 OFFICE O/P EST MOD 30 MIN: CPT | Performed by: FAMILY MEDICINE

## 2018-11-02 PROCEDURE — 90682 RIV4 VACC RECOMBINANT DNA IM: CPT

## 2018-11-02 PROCEDURE — 3008F BODY MASS INDEX DOCD: CPT | Performed by: FAMILY MEDICINE

## 2018-11-02 PROCEDURE — 90471 IMMUNIZATION ADMIN: CPT

## 2018-11-02 NOTE — ASSESSMENT & PLAN NOTE
The patient is doing very well after surgery  She is still having some discomfort here and there and we discussed that this is common and may be something that she does deal with for the long term given her injury

## 2018-11-02 NOTE — PROGRESS NOTES
Subjective:   Chief Complaint   Patient presents with    Neck Pain     pt states she had a concussion last year and is still having pain on the rt side of her neck        Patient ID: Laverne Hinds is a 62 y o  female  The pt is here today because of her persistent neck pain  11 months ago she was hit in the head with a shelf that fell in a [employer]  She had a significant concussion  She had neck pain  She saw me initially afterwards and did miss work because of her concussion sx  As the concussion resolved the neck pain only got worse  She did do PT for both the concussion and the neck  Her neck pain still has not gotten much better  She has it all of the time  She feels it at night, can wake her from sleep  She does have some numbness and tingling in the left arm/hand - not sure if this is from the neck problem or because 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  She did eventually get a  - he had her get an MRI somewhere in [place]  She was advised that there were bulging discs, or something, on the MRI  I did not ever get this report  She has a hard time turning her neck with FROM without pain  She is tired of being in pain    Additionally, she has had surgery on the right knee for a torn meniscus since last seeing me with Dr Stephen Mayorga  She still has some discomfort and occasional swelling  Nothing like before the surgery  Feels this is healing well      The following portions of the patient's history were reviewed and updated as appropriate: allergies, current medications, past family history, past medical history, past social history, past surgical history and problem list     Review of Systems      Objective:  Vitals:    11/02/18 0840   BP: 120/90   Pulse: 60   Temp: 98 4 °F (36 9 °C)   SpO2: 99%   Weight: 69 6 kg (153 lb 8 oz)   Height: 5' 4" (1 626 m)      Physical Exam   Constitutional: She is oriented to person, place, and time  She appears well-developed and well-nourished  No distress  Musculoskeletal:        Cervical back: She exhibits decreased range of motion, tenderness, pain and spasm (left trapezius)  She exhibits no bony tenderness, no swelling and no edema  Pain starts at the base of the skull on the left - travels down the trapezius   Neurological: She is alert and oriented to person, place, and time  No cranial nerve deficit  Coordination normal    Skin: Skin is warm and dry  No rash noted  She is not diaphoretic  No erythema  Psychiatric: She has a normal mood and affect  Her behavior is normal  Judgment and thought content normal          Assessment/Plan:    Cervical disc disease  I have asked the patient to get me the results of her MRI  After I received these I will place a referral for pain management  She is going to need the films for pain management so I asked that she have the disc of the films mailed to her so she can take it to her appointment  History of tear of meniscus of knee joint  The patient is doing very well after surgery  She is still having some discomfort here and there and we discussed that this is common and may be something that she does deal with for the long term given her injury  Diagnoses and all orders for this visit:    Cervical disc disease    Chronic neck pain    History of tear of meniscus of knee joint    Breast cancer screening  -     Mammo screening bilateral w cad; Future    Colon cancer screening  -     Ambulatory referral to Gastroenterology; Future    Screening for diabetes mellitus  -     Comprehensive metabolic panel; Future  -     Comprehensive metabolic panel    Screening for thyroid disorder  -     TSH, 3rd generation with Free T4 reflex; Future  -     TSH, 3rd generation with Free T4 reflex    Screening, anemia, deficiency, iron  -     CBC and differential; Future  -     CBC and differential    Screening, lipid  -     Lipid Panel with Direct LDL reflex;  Future  -     Lipid Panel with Direct LDL reflex    Encounter for hepatitis C screening test for low risk patient  -     Hepatitis C antibody; Future  -     Hepatitis C antibody    The pt is due for colonoscopy, mammogram and routine bloodwork  I gave her orders for bloodwork, mammo and GI doctors names to call and schedule colonoscopy

## 2018-11-02 NOTE — ASSESSMENT & PLAN NOTE
I have asked the patient to get me the results of her MRI  After I received these I will place a referral for pain management  She is going to need the films for pain management so I asked that she have the disc of the films mailed to her so she can take it to her appointment

## 2018-11-02 NOTE — PATIENT INSTRUCTIONS
Have the  fax the results from the MRI to me - [phone]  Also have the  or the facility you had the MRI at [facility] you the actual film on a disc  You will take these films to the spine doctor

## 2018-11-06 ENCOUNTER — TELEPHONE (OUTPATIENT)
Dept: PAIN MEDICINE | Facility: MEDICAL CENTER | Age: 58
End: 2018-11-06

## 2018-11-06 NOTE — TELEPHONE ENCOUNTER
New pt being referred for Dr Debbie Ryan for neck pain    No prior pain [facility] (will get referral)  MRI done (will bring in disc)    Pt scheduled for 11/14 and new pt pw mailed

## 2018-11-09 ENCOUNTER — TELEPHONE (OUTPATIENT)
Dept: FAMILY MEDICINE CLINIC | Facility: CLINIC | Age: 58
End: 2018-11-09

## 2018-11-09 DIAGNOSIS — G89.29 CHRONIC NECK PAIN: ICD-10-CM

## 2018-11-09 DIAGNOSIS — M54.2 CHRONIC NECK PAIN: ICD-10-CM

## 2018-11-09 DIAGNOSIS — M50.90 CERVICAL DISC DISEASE: Primary | ICD-10-CM

## 2018-11-09 NOTE — TELEPHONE ENCOUNTER
Michelle from [employer] called we need a order because she has keystone first as a secondary  She is having the procedure Nov 13  The code is m54 2  [id number]    She is coming for a consult      npi [phone]    Fax [phone]

## 2018-11-09 NOTE — TELEPHONE ENCOUNTER
I ordered the referral  This is all that needs to be done when [facility] patient see a specialist   They should be able to see it in the system since they are within [facility] [place]

## 2018-11-13 ENCOUNTER — CONSULT (OUTPATIENT)
Dept: PAIN MEDICINE | Facility: CLINIC | Age: 58
End: 2018-11-13
Payer: COMMERCIAL

## 2018-11-13 VITALS
HEART RATE: 72 BPM | WEIGHT: 153 LBS | SYSTOLIC BLOOD PRESSURE: 120 MMHG | BODY MASS INDEX: 26.26 KG/M2 | DIASTOLIC BLOOD PRESSURE: 86 MMHG

## 2018-11-13 DIAGNOSIS — M54.2 NECK PAIN: ICD-10-CM

## 2018-11-13 DIAGNOSIS — M47.812 SPONDYLOSIS OF CERVICAL REGION WITHOUT MYELOPATHY OR RADICULOPATHY: Primary | ICD-10-CM

## 2018-11-13 PROCEDURE — 99244 OFF/OP CNSLTJ NEW/EST MOD 40: CPT | Performed by: ANESTHESIOLOGY

## 2018-11-13 RX ORDER — GABAPENTIN 300 MG/1
300 CAPSULE ORAL
Qty: 30 CAPSULE | Refills: 0 | Status: SHIPPED | OUTPATIENT
Start: 2018-11-13 | End: 2018-12-15 | Stop reason: SDUPTHER

## 2018-11-13 RX ORDER — CLONAZEPAM 0.5 MG/1
0.5 TABLET ORAL 2 TIMES DAILY
COMMUNITY
End: 2019-02-25

## 2018-11-13 NOTE — PROGRESS NOTES
Assessment:  1  Spondylosis of cervical region without myelopathy or radiculopathy    2  Neck pain        Plan:    The Patient's neck pain persists despite time, relative rest, activity modification and therapy  Based on the patient's symptoms and examination, I suspect that their pain is being generated by the cervical facet joints  The facet joints are only one of several possible cervical pain generators  Unfortunately, studies have demonstrated that history and examination alone are unreliable  I will schedule the patient for diagnostic cervical medial branch blockade using a double block paradigm  If the patient receives significant pain relief of appropriate duration with bupivacaine 0 25%, we will confirm with bupivacaine  0 75%  If the patient demonstrates appropriate response to medial branch blockade we will schedule for radiofrequency ablation of the blocked nerves to provide long-term pain relief  As her pain is keeping her up at bedtime I will start her on gabapentin 300 mg at bedtime  I did review the potential side effects of gabapentin, not limited to, but including dizziness, drowsiness, weakness, tired feeling, nausea, diarrhea, constipation, blurred vision, headache, swelling, dry mouth; or loss of balance or coordination  In the office today, we reviewed the nature of the patient's pathology in depth using  diagrams and models  I discussed the approach we would use for the medial branch block and provided literature for home review  The patient understands the risks associated with the procedure including bleeding, infection, tissue injury, allergic reaction and paralysis and provided written and verbal consent in the office today  My impressions and treatment recommendations were discussed in detail with the patient who verbalized understanding and had no further questions  Discharge instructions were provided   I personally saw and examined the patient and I agree with the above discussed plan of care  Orders Placed This Encounter   Procedures    FL spine and pain procedure     Standing Status:   Future     Standing Expiration Date:   11/13/2022     Order Specific Question:   Reason for Exam:     Answer:   Right C4, C5, C6, and C7 MBB 1  Order Specific Question:   Is the patient pregnant? Answer:   Unknown     Order Specific Question:   Anticoagulant hold needed? Answer:   no     New Medications Ordered This Visit   Medications    clonazePAM (KlonoPIN) 0 5 mg tablet     Sig: Take 0 5 mg by mouth 2 (two) times a day    gabapentin (NEURONTIN) 300 mg capsule     Sig: Take 1 capsule (300 mg total) by mouth daily at bedtime     Dispense:  30 capsule     Refill:  0     Referred by Dr Soniya Paris     History of Present Illness:    Radha Lee is a 62 y o  female with 1 year history of right-sided neck pain  She was at a store when a displaced fell on her head  She developed a concussion that she has had severe right-sided neck pain since  She has undergone physical therapy utilize exercise TENS heat nice and nonsteroidal anti-inflammatories  She follows with Neurology her right-sided neck pain persists which is moderate rates it a 7/10 on the visual analog scale interfering with daily living activities  Her pain is constant worse at night describes cramping shooting in achy noting that lying down, standing, bending, exercise all aggravate her symptoms  I have personally reviewed and/or updated the patient's past medical history, past surgical history, family history, social history, current medications, allergies, and vital signs today  Review of Systems:    Review of Systems   Constitutional: Negative for fever and unexpected weight change  HENT: Negative for trouble swallowing  Eyes: Negative for visual disturbance  Respiratory: Negative for shortness of breath and wheezing  Cardiovascular: Negative for chest pain and palpitations  Gastrointestinal: Positive for nausea  Negative for constipation, diarrhea and vomiting  Endocrine: Negative for cold intolerance, heat intolerance and polydipsia  Genitourinary: Negative for difficulty urinating and frequency  Musculoskeletal: Positive for joint swelling (joint stiffness)  Negative for arthralgias, gait problem and myalgias  Skin: Negative for rash  Neurological: Positive for dizziness and weakness  Negative for seizures, syncope and headaches  Hematological: Does not bruise/bleed easily  Psychiatric/Behavioral: Negative for dysphoric mood  All other systems reviewed and are negative  Patient Active Problem List   Diagnosis    Arthritis    Hyperlipidemia    History of tear of meniscus of knee joint    Cervical disc disease    Neck pain    Spondylosis of cervical region without myelopathy or radiculopathy       Past Medical History:   Diagnosis Date    Concussion     Hyperlipidemia        Past Surgical History:   Procedure Laterality Date     SECTION      FOOT SURGERY Left     HEMORROIDECTOMY      KNEE ARTHROSCOPY Right 2018    KNEE SURGERY      [place] KNEE SCOPE,MED/LAT MENISECTOMY Right 2018    Procedure: ARTHROSCOPY KNEE, PARTIAL MEDIAL MENISCECTOMY: ABRASION CHONDROPLASTY;  Surgeon: Kylee Alvarenga MD;  Location: [facility];  Service: Orthopedics    TUBAL LIGATION         Family History   Problem Relation Age of Onset    Coronary artery disease Mother     Other Mother         CABG    Hiatal hernia Father     Stroke Maternal Grandmother     Hyperlipidemia Family     Migraines Family     Thyroid disease Family        Social History     Occupational History    Not on file       Social History Main Topics    Smoking status: Former Smoker     Packs/day: 0 33     Years: 5 00     Quit date:     Smokeless tobacco: Never Used    Alcohol use Yes      Comment: social    Drug use: No    Sexual activity: Not on file       Current Outpatient Prescriptions on File Prior to Visit   Medication Sig    acetaminophen (TYLENOL) 500 mg tablet Take 500 mg by mouth every 6 (six) hours as needed for mild pain    Glucosamine-Chondroitin (GLUCOSAMINE CHONDR COMPLEX PO) Take by mouth daily      Naproxen Sodium (ALEVE) 220 MG CAPS Take by mouth    pravastatin (PRAVACHOL) 40 mg tablet TAKE 1 TABLET BY MOUTH AT BEDTIME     No current facility-administered medications on file prior to visit  No Known Allergies    Physical Exam:    /86   Pulse 72   Wt 69 4 kg (153 lb)   BMI 26 26 kg/m²     Constitutional: normal, well developed, well nourished, alert, in no distress and non-toxic and no overt pain behavior  Eyes: anicteric  HEENT: grossly intact  Neck: supple, symmetric, trachea midline and no masses   Pulmonary:even and unlabored  Cardiovascular:No edema or pitting edema present  Skin:Normal without rashes or lesions and well hydrated  Psychiatric:Mood and affect appropriate  Neurologic:Cranial Nerves II-XII grossly intact  Musculoskeletal:normal,   Inspection:  Normal station and gait  Normal cervical curves and head posture  Skin intact without erythema  No sensory loss  There is no atrophy  Palpation:  There is tenderness to palpation overlying the right cervical paraspinals, cervical facet joints  There is no tenderness over the upper trapezius muscles bilateral  No shoulder tenderness  Motor/Strength:  5/5 strength in the bilateral upper extremities  Reflexes:  equal and symmetric in the upper limbs  Sensation:   Sensation intact to light touch and pinprick in the upper limbs  Maneuvers:  Negative Spurling's maneuver  Negative Lhermitte's sign  Positive pain on the right side of the neck with had compression positive cervical facet loading and positive pain with right side bending and right rotation          Imaging  MRI Cervical Spine 6/13/18     Impression  1   Exaggerated lordosis without focal osseous abnormality    2  Disc bulges at C4-5 and C5-6   3  No focal disc herniation      I have personally reviewed pertinent films in PACS

## 2018-11-29 ENCOUNTER — HOSPITAL ENCOUNTER (OUTPATIENT)
Dept: RADIOLOGY | Facility: CLINIC | Age: 58
Discharge: HOME | End: 2018-11-29
Payer: COMMERCIAL

## 2018-11-29 VITALS
TEMPERATURE: 97.9 F | RESPIRATION RATE: 18 BRPM | DIASTOLIC BLOOD PRESSURE: 77 MMHG | SYSTOLIC BLOOD PRESSURE: 137 MMHG | HEART RATE: 69 BPM | OXYGEN SATURATION: 97 %

## 2018-11-29 DIAGNOSIS — M54.2 NECK PAIN: ICD-10-CM

## 2018-11-29 DIAGNOSIS — M47.812 SPONDYLOSIS OF CERVICAL REGION WITHOUT MYELOPATHY OR RADICULOPATHY: ICD-10-CM

## 2018-11-29 PROCEDURE — 64490 INJ PARAVERT F JNT C/T 1 LEV: CPT | Performed by: ANESTHESIOLOGY

## 2018-11-29 PROCEDURE — 64492 INJ PARAVERT F JNT C/T 3 LEV: CPT | Performed by: ANESTHESIOLOGY

## 2018-11-29 PROCEDURE — 64491 INJ PARAVERT F JNT C/T 2 LEV: CPT | Performed by: ANESTHESIOLOGY

## 2018-11-29 RX ORDER — BUPIVACAINE HCL/PF 2.5 MG/ML
10 VIAL (ML) INJECTION ONCE
Status: COMPLETED | OUTPATIENT
Start: 2018-11-29 | End: 2018-11-29

## 2018-11-29 RX ADMIN — BUPIVACAINE HYDROCHLORIDE 6 ML: 2.5 INJECTION, SOLUTION EPIDURAL; INFILTRATION; INTRACAUDAL at 08:46

## 2018-11-29 NOTE — H&P
History of Present Illness: The patient is a 62 y o  female who presents with complaints of  Right-sided neck pain      Patient Active Problem List   Diagnosis    Arthritis    Hyperlipidemia    History of tear of meniscus of knee joint    Cervical disc disease    Neck pain    Spondylosis of cervical region without myelopathy or radiculopathy       Past Medical History:   Diagnosis Date    Concussion     Hyperlipidemia        Past Surgical History:   Procedure Laterality Date     SECTION      FOOT SURGERY Left     HEMORROIDECTOMY      KNEE ARTHROSCOPY Right 2018    KNEE SURGERY      [place] KNEE SCOPE,MED/LAT MENISECTOMY Right 2018    Procedure: ARTHROSCOPY KNEE, PARTIAL MEDIAL MENISCECTOMY: ABRASION CHONDROPLASTY;  Surgeon: Irlanda Arnold MD;  Location:  MAIN OR;  Service: Orthopedics    TUBAL LIGATION           Current Outpatient Prescriptions:     acetaminophen (TYLENOL) 500 mg tablet, Take 500 mg by mouth every 6 (six) hours as needed for mild pain, Disp: , Rfl:     clonazePAM (KlonoPIN) 0 5 mg tablet, Take 0 5 mg by mouth 2 (two) times a day, Disp: , Rfl:     gabapentin (NEURONTIN) 300 mg capsule, Take 1 capsule (300 mg total) by mouth daily at bedtime, Disp: 30 capsule, Rfl: 0    Glucosamine-Chondroitin (GLUCOSAMINE CHONDR COMPLEX PO), Take by mouth daily  , Disp: , Rfl:     Naproxen Sodium (ALEVE) 220 MG CAPS, Take by mouth, Disp: , Rfl:     pravastatin (PRAVACHOL) 40 mg tablet, TAKE 1 TABLET BY MOUTH AT BEDTIME, Disp: 30 tablet, Rfl: 3    No Known Allergies    Physical Exam:   Vitals:    18 0837   BP: 139/84   Pulse: 88   Resp: 18   Temp: 97 9 °F (36 6 °C)   SpO2: 98%     General: Awake, Alert, Oriented x 3, Mood and affect appropriate  Respiratory: Respirations even and unlabored  Cardiovascular: Peripheral pulses intact; no edema  Musculoskeletal Exam: Decreased range of motion cervical spine    ASA Score: II    Patient/Chart Verification  Patient ID Verified: Verbal  ID Band Applied: No  Consents Confirmed: Procedural  H&P( within 30 days) Verified: To be obtained in the Pre-Procedure area  Interval H&P(within 24 hr) Complete (required for Outpatients and Surgery Admit only): To be obtained in the Pre-Procedure area  Allergies Reviewed: Yes  Anticoag/NSAID held?: No (denies)  Currently on antibiotics?: No  Pre-op Lab/Test Results Available: N/A  Pregnancy Lab Collected: N/A comment    Assessment:   1  Spondylosis of cervical region without myelopathy or radiculopathy    2  Neck pain        Plan: Right C4, C5, C6, and C7 MBB 1

## 2018-11-29 NOTE — DISCHARGE INSTRUCTIONS

## 2018-12-10 DIAGNOSIS — M54.2 NECK PAIN: ICD-10-CM

## 2018-12-10 RX ORDER — GABAPENTIN 300 MG/1
300 CAPSULE ORAL
Qty: 30 CAPSULE | Refills: 0 | OUTPATIENT
Start: 2018-12-10

## 2018-12-13 ENCOUNTER — HOSPITAL ENCOUNTER (OUTPATIENT)
Dept: RADIOLOGY | Facility: CLINIC | Age: 58
Discharge: HOME | End: 2018-12-13
Payer: COMMERCIAL

## 2018-12-13 VITALS
SYSTOLIC BLOOD PRESSURE: 148 MMHG | HEART RATE: 70 BPM | RESPIRATION RATE: 20 BRPM | OXYGEN SATURATION: 98 % | TEMPERATURE: 98.4 F | DIASTOLIC BLOOD PRESSURE: 88 MMHG

## 2018-12-13 DIAGNOSIS — M47.812 CERVICAL SPONDYLOSIS WITHOUT MYELOPATHY: ICD-10-CM

## 2018-12-13 DIAGNOSIS — M54.2 NECK PAIN: ICD-10-CM

## 2018-12-13 PROCEDURE — 64492 INJ PARAVERT F JNT C/T 3 LEV: CPT | Performed by: ANESTHESIOLOGY

## 2018-12-13 PROCEDURE — 64490 INJ PARAVERT F JNT C/T 1 LEV: CPT | Performed by: ANESTHESIOLOGY

## 2018-12-13 PROCEDURE — 64491 INJ PARAVERT F JNT C/T 2 LEV: CPT | Performed by: ANESTHESIOLOGY

## 2018-12-13 RX ORDER — BUPIVACAINE HYDROCHLORIDE 7.5 MG/ML
10 INJECTION, SOLUTION EPIDURAL; RETROBULBAR ONCE
Status: COMPLETED | OUTPATIENT
Start: 2018-12-13 | End: 2018-12-13

## 2018-12-13 RX ADMIN — BUPIVACAINE HYDROCHLORIDE 3 ML: 7.5 INJECTION, SOLUTION EPIDURAL; RETROBULBAR at 09:56

## 2018-12-13 NOTE — H&P
History of Present Illness: The patient is a 62 y o  female who presents with complaints of neck pain      Patient Active Problem List   Diagnosis    Arthritis    Hyperlipidemia    History of tear of meniscus of knee joint    Cervical disc disease    Neck pain    Spondylosis of cervical region without myelopathy or radiculopathy       Past Medical History:   Diagnosis Date    Concussion     Hyperlipidemia        Past Surgical History:   Procedure Laterality Date     SECTION      FOOT SURGERY Left 2007    HEMORROIDECTOMY      KNEE ARTHROSCOPY Right 2018    KNEE SURGERY      [place] KNEE SCOPE,MED/LAT MENISECTOMY Right 2018    Procedure: ARTHROSCOPY KNEE, PARTIAL MEDIAL MENISCECTOMY: ABRASION CHONDROPLASTY;  Surgeon: Nay Jaime MD;  Location:  MAIN OR;  Service: Orthopedics    TUBAL LIGATION           Current Outpatient Prescriptions:     acetaminophen (TYLENOL) 500 mg tablet, Take 500 mg by mouth every 6 (six) hours as needed for mild pain, Disp: , Rfl:     clonazePAM (KlonoPIN) 0 5 mg tablet, Take 0 5 mg by mouth 2 (two) times a day, Disp: , Rfl:     gabapentin (NEURONTIN) 300 mg capsule, Take 1 capsule (300 mg total) by mouth daily at bedtime, Disp: 30 capsule, Rfl: 0    Glucosamine-Chondroitin (GLUCOSAMINE CHONDR COMPLEX PO), Take by mouth daily  , Disp: , Rfl:     Naproxen Sodium (ALEVE) 220 MG CAPS, Take by mouth, Disp: , Rfl:     pravastatin (PRAVACHOL) 40 mg tablet, TAKE 1 TABLET BY MOUTH AT BEDTIME, Disp: 30 tablet, Rfl: 3    Current Facility-Administered Medications:     bupivacaine (PF) (MARCAINE) 0 75 % injection 10 mL, 10 mL, Other, Once, Loki Camargo, DO    No Known Allergies    Physical Exam:   Vitals:    18 0942   BP: 115/86   Pulse: 74   Resp: 20   Temp: 98 4 °F (36 9 °C)   SpO2: 95%     General: Awake, Alert, Oriented x 3, Mood and affect appropriate  Respiratory: Respirations even and unlabored  Cardiovascular: Peripheral pulses intact; no edema  Musculoskeletal Exam:  Decreased range of motion cervical spine    ASA Score: II    Patient/Chart Verification  Patient ID Verified: Verbal  ID Band Applied: No  Consents Confirmed: Procedural  H&P( within 30 days) Verified: To be obtained in the Pre-Procedure area  Interval H&P(within 24 hr) Complete (required for Outpatients and Surgery Admit only): To be obtained in the Pre-Procedure area  Allergies Reviewed: Yes  Anticoag/NSAID held?: No  Currently on antibiotics?: No  Pre-op Lab/Test Results Available: N/A  Pregnancy Lab Collected: N/A comment    Assessment:   1  Cervical spondylosis without myelopathy    2   Neck pain        Plan: RT C4,C5,C6,C7 MBB #2

## 2018-12-13 NOTE — DISCHARGE INSTRUCTIONS

## 2018-12-15 DIAGNOSIS — M54.2 NECK PAIN: ICD-10-CM

## 2018-12-16 RX ORDER — GABAPENTIN 300 MG/1
300 CAPSULE ORAL
Qty: 30 CAPSULE | Refills: 0 | Status: SHIPPED | OUTPATIENT
Start: 2018-12-16 | End: 2019-02-25

## 2018-12-19 ENCOUNTER — TELEPHONE (OUTPATIENT)
Dept: PAIN MEDICINE | Facility: MEDICAL CENTER | Age: 58
End: 2018-12-19

## 2018-12-19 NOTE — TELEPHONE ENCOUNTER
Pt scheduled for her RFA for 1/7/19, pt is aware that she needs to contact  no later than 1/2/19 with her new insurance changes or the procedure will need to be r/s'd

## 2018-12-19 NOTE — TELEPHONE ENCOUNTER
Pt called to see if her pain diary was received  States that she stopped by the office on Monday and stuck it in the window because no one was there  She wants to know if Dr Horn Home had a chance to review it yet, because she is anxious to schedule her next procedure  Please call patient to advise  She can be reached at

## 2018-12-26 NOTE — TELEPHONE ENCOUNTER
L/m to verify insurance  As pt left message with another  and Just need to verify she only has the 1 insurance now and the ID number

## 2019-01-03 ENCOUNTER — TELEPHONE (OUTPATIENT)
Dept: RADIOLOGY | Facility: CLINIC | Age: 59
End: 2019-01-03

## 2019-01-03 DIAGNOSIS — R25.2 SPASM: Primary | ICD-10-CM

## 2019-01-03 RX ORDER — METHOCARBAMOL 500 MG/1
500 TABLET, FILM COATED ORAL 3 TIMES DAILY PRN
Qty: 60 TABLET | Refills: 0 | Status: SHIPPED | OUTPATIENT
Start: 2019-01-03 | End: 2019-02-25

## 2019-01-03 NOTE — TELEPHONE ENCOUNTER
S/w pt, advised of above  Explained robaxin is a muscle relaxer, often causes drowsiness  Pt verbalized agreement  Advised pt, Do not drive or operate machinery until you are familiar with how this medication will affect you  Cb if se's or questions arise  Anticipate a rx will be sent to [facility] today per pt's request  Pt verbalized understanding and appreciation

## 2019-01-03 NOTE — TELEPHONE ENCOUNTER
S/w pt, c/o increased pain x ~ 3 days  No change in location  Pt states she did do some exercizing otherwise, no obvious cause for the increase in pain  Pt questioned something for pain  Confirmed gabapentin 300 mg po qhs as prescribed on 12/13 w/ no relief of daytime pain, no se's  Pt confirmed + sleep  Advised pt, ok to use rest, ice/ heat, otc medications as directed  Will discuss w/ Dr Vijay Song and cb to advise  Pt verbalized understanding and confirmed [facility] [place]

## 2019-01-03 NOTE — TELEPHONE ENCOUNTER
Patient came to the window asking question about her RF on Monday  She was given a brochure & time of the appt  Patient stated that she still has quite a bit of neck pain and is wondering if she could have a prescription for pain medication  Please call the patient at

## 2019-01-07 ENCOUNTER — HOSPITAL ENCOUNTER (OUTPATIENT)
Dept: RADIOLOGY | Facility: CLINIC | Age: 59
Discharge: HOME | End: 2019-01-07
Attending: ANESTHESIOLOGY | Admitting: ANESTHESIOLOGY
Payer: COMMERCIAL

## 2019-01-07 ENCOUNTER — TELEPHONE (OUTPATIENT)
Dept: RADIOLOGY | Facility: CLINIC | Age: 59
End: 2019-01-07

## 2019-01-07 VITALS
RESPIRATION RATE: 18 BRPM | HEART RATE: 84 BPM | DIASTOLIC BLOOD PRESSURE: 85 MMHG | TEMPERATURE: 97.8 F | OXYGEN SATURATION: 97 % | SYSTOLIC BLOOD PRESSURE: 147 MMHG

## 2019-01-07 DIAGNOSIS — M47.812 CERVICAL SPONDYLOSIS WITHOUT MYELOPATHY: ICD-10-CM

## 2019-01-07 DIAGNOSIS — M54.2 NECK PAIN: ICD-10-CM

## 2019-01-07 PROCEDURE — 64633 DESTROY CERV/THOR FACET JNT: CPT | Performed by: ANESTHESIOLOGY

## 2019-01-07 PROCEDURE — 64634 DESTROY C/TH FACET JNT ADDL: CPT | Performed by: ANESTHESIOLOGY

## 2019-01-07 RX ORDER — LIDOCAINE HYDROCHLORIDE 10 MG/ML
5 INJECTION, SOLUTION EPIDURAL; INFILTRATION; INTRACAUDAL; PERINEURAL ONCE
Status: COMPLETED | OUTPATIENT
Start: 2019-01-07 | End: 2019-01-07

## 2019-01-07 RX ADMIN — LIDOCAINE HYDROCHLORIDE 5 ML: 10 INJECTION, SOLUTION EPIDURAL; INFILTRATION; INTRACAUDAL; PERINEURAL at 13:51

## 2019-01-07 RX ADMIN — LIDOCAINE HYDROCHLORIDE 4 ML: 20 INJECTION, SOLUTION EPIDURAL; INFILTRATION; INTRACAUDAL at 13:51

## 2019-01-07 NOTE — DISCHARGE INSTRUCTIONS

## 2019-01-07 NOTE — H&P
History of Present Illness: The patient is a 62 y o  female who presents with complaints of neck pain      Patient Active Problem List   Diagnosis    Arthritis    Hyperlipidemia    History of tear of meniscus of knee joint    Cervical disc disease    Neck pain    Spondylosis of cervical region without myelopathy or radiculopathy       Past Medical History:   Diagnosis Date    Concussion     Hyperlipidemia        Past Surgical History:   Procedure Laterality Date     SECTION      FOOT SURGERY Left 2007    HEMORROIDECTOMY      KNEE ARTHROSCOPY Right 2018    KNEE SURGERY      [place] KNEE SCOPE,MED/LAT MENISECTOMY Right 2018    Procedure: ARTHROSCOPY KNEE, PARTIAL MEDIAL MENISCECTOMY: ABRASION CHONDROPLASTY;  Surgeon: Conner King MD;  Location:  MAIN OR;  Service: Orthopedics    TUBAL LIGATION           Current Outpatient Prescriptions:     acetaminophen (TYLENOL) 500 mg tablet, Take 500 mg by mouth every 6 (six) hours as needed for mild pain, Disp: , Rfl:     clonazePAM (KlonoPIN) 0 5 mg tablet, Take 0 5 mg by mouth 2 (two) times a day, Disp: , Rfl:     gabapentin (NEURONTIN) 300 mg capsule, TAKE 1 CAPSULE (300 MG TOTAL) BY MOUTH DAILY AT BEDTIME, Disp: 30 capsule, Rfl: 0    Glucosamine-Chondroitin (GLUCOSAMINE CHONDR COMPLEX PO), Take by mouth daily  , Disp: , Rfl:     methocarbamol (ROBAXIN) 500 mg tablet, Take 1 tablet (500 mg total) by mouth 3 (three) times a day as needed for muscle spasms, Disp: 60 tablet, Rfl: 0    Naproxen Sodium (ALEVE) 220 MG CAPS, Take by mouth, Disp: , Rfl:     pravastatin (PRAVACHOL) 40 mg tablet, TAKE 1 TABLET BY MOUTH AT BEDTIME, Disp: 30 tablet, Rfl: 3    Current Facility-Administered Medications:     lidocaine (PF) (XYLOCAINE-MPF) 1 % injection 5 mL, 5 mL, Other, Once, Loki Camargo,     lidocaine (PF) (XYLOCAINE-MPF) 2 % injection 5 mL, 5 mL, Perineural, Once, Loki Camargo DO    No Known Allergies    Physical Exam:   General: Awake, Alert, Oriented x 3, Mood and affect appropriate  Respiratory: Respirations even and unlabored  Cardiovascular: Peripheral pulses intact; no edema  Musculoskeletal Exam:  Pain with cervical extension    ASA Score: II         Assessment:   1  Cervical spondylosis without myelopathy    2   Neck pain        Plan: RT C4,C5,C6,C7 RFA

## 2019-01-08 NOTE — TELEPHONE ENCOUNTER
S/w pt, c/o increased pain s/t procedure  Improving  Advised pt, this should continue to improve with rest, ice / heat, medications as prescrbed / directed  cb if s/s infection present; redness, drainage, swelling at the site, fever 100+ or sunburn like sensation in the area of the procedure  Fu as scheduled on 2/25 at 0815 w/ DG, sooner if questions / concerns arise  Pt verbalized understanding and appreciation

## 2019-01-10 ENCOUNTER — TELEPHONE (OUTPATIENT)
Dept: PAIN MEDICINE | Facility: CLINIC | Age: 59
End: 2019-01-10

## 2019-01-20 DIAGNOSIS — R25.2 SPASM: ICD-10-CM

## 2019-01-20 DIAGNOSIS — M54.2 NECK PAIN: ICD-10-CM

## 2019-01-20 RX ORDER — GABAPENTIN 300 MG/1
300 CAPSULE ORAL
Qty: 30 CAPSULE | Refills: 0 | OUTPATIENT
Start: 2019-01-20

## 2019-01-21 RX ORDER — METHOCARBAMOL 500 MG/1
500 TABLET, FILM COATED ORAL 3 TIMES DAILY PRN
Qty: 60 TABLET | Refills: 0 | OUTPATIENT
Start: 2019-01-21

## 2019-01-29 ENCOUNTER — HOSPITAL ENCOUNTER (EMERGENCY)
Facility: HOSPITAL | Age: 59
Discharge: HOME | End: 2019-01-29
Attending: EMERGENCY MEDICINE
Payer: COMMERCIAL

## 2019-01-29 ENCOUNTER — APPOINTMENT (EMERGENCY)
Dept: RADIOLOGY | Facility: HOSPITAL | Age: 59
End: 2019-01-29
Payer: COMMERCIAL

## 2019-01-29 VITALS
BODY MASS INDEX: 25.61 KG/M2 | TEMPERATURE: 98.5 F | RESPIRATION RATE: 20 BRPM | WEIGHT: 150 LBS | HEART RATE: 78 BPM | HEIGHT: 64 IN | DIASTOLIC BLOOD PRESSURE: 88 MMHG | OXYGEN SATURATION: 98 % | SYSTOLIC BLOOD PRESSURE: 133 MMHG

## 2019-01-29 DIAGNOSIS — M75.31 CALCIFIC TENDONITIS OF RIGHT SHOULDER: Primary | ICD-10-CM

## 2019-01-29 PROCEDURE — 96372 THER/PROPH/DIAG INJ SC/IM: CPT

## 2019-01-29 PROCEDURE — 99283 EMERGENCY DEPT VISIT LOW MDM: CPT

## 2019-01-29 PROCEDURE — 73030 X-RAY EXAM OF SHOULDER: CPT

## 2019-01-29 RX ORDER — KETOROLAC TROMETHAMINE 30 MG/ML
30 INJECTION, SOLUTION INTRAMUSCULAR; INTRAVENOUS ONCE
Status: COMPLETED | OUTPATIENT
Start: 2019-01-29 | End: 2019-01-29

## 2019-01-29 RX ADMIN — KETOROLAC TROMETHAMINE 30 MG: 30 INJECTION, SOLUTION INTRAMUSCULAR; INTRAVENOUS at 11:12

## 2019-01-29 NOTE — ED NOTES
Estephanie applied rt arm, distal neurovasc intact, removed at D/C due to pt driving, pt verb understanding, pt states she will F/U with Ortho/surg as directed        Andre Pederson, KASSI  01/29/19 [id number]
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
## 2019-01-29 NOTE — DISCHARGE INSTRUCTIONS
Calcific Tendinitis   WHAT YOU NEED TO KNOW:   Calcific tendinitis is a condition that occurs when calcium collects in the tendons of the shoulder  Tendons are bands of tissue that connect muscle to bone  The calcium can make the tendons swell and cause severe pain  DISCHARGE INSTRUCTIONS:   Medicines: You may need any of the following:  · NSAIDs  may decrease swelling and pain  This medicine can be bought with or without a doctor's order  This medicine can cause stomach bleeding or kidney problems in certain people  If you take blood thinner medicine, always ask your healthcare provider if NSAIDs are safe for you  Always read the medicine label and follow the directions on it before using this medicine  · Steroids  help decrease inflammation  · Take your medicine as directed  Contact your healthcare provider if you think your medicine is not helping or if you have side effects  Tell him or her if you are allergic to any medicine  Keep a list of the medicines, vitamins, and herbs you take  Include the amounts, and when and why you take them  Bring the list or the pill bottles to follow-up visits  Carry your medicine list with you in case of an emergency  Go to physical therapy:  A physical therapist can teach you exercises to help improve movement and strength, and to decrease pain  Apply ice:  Apply ice on your shoulder for 15 to 20 minutes every hour or as directed  Use an ice pack, or put crushed ice in a plastic bag  Cover it with a towel  Ice helps prevent tissue damage and decreases swelling and pain  Apply heat:  Apply heat on your shoulder for 20 to 30 minutes every 2 hours for as many days as directed  Heat helps decrease pain and muscle spasms  Rest your arm:  Healthcare providers may have you place an item, such as a ball, between your side and elbow while you rest  This may help decrease stiffness and pain    Follow up with your healthcare provider as directed:  Bring a list of any questions you have so you remember to ask them during your visits  Contact your healthcare provider if:   · You have worse pain and stiffness in your shoulder  · You have new or more trouble moving your arm  · You have questions or concerns about your condition or care  Return to the emergency department if:   · You cannot move your arm  · You have severe pain in your arm or shoulder  © 2017 [employer] Information is for End User's use only and may not be sold, redistributed or otherwise used for commercial purposes  All illustrations and images included in CareNotes® are the copyrighted property of [employer]  or Jaime Pick[employer]  The above information is an  only  It is not intended as medical advice for individual conditions or treatments  Talk to your doctor, nurse or pharmacist before following any medical regimen to see if it is safe and effective for you

## 2019-01-31 ENCOUNTER — OFFICE VISIT (OUTPATIENT)
Dept: OBGYN CLINIC | Facility: CLINIC | Age: 59
End: 2019-01-31
Payer: COMMERCIAL

## 2019-01-31 VITALS
HEIGHT: 64 IN | BODY MASS INDEX: 26.46 KG/M2 | SYSTOLIC BLOOD PRESSURE: 150 MMHG | DIASTOLIC BLOOD PRESSURE: 80 MMHG | HEART RATE: 60 BPM | WEIGHT: 155 LBS

## 2019-01-31 DIAGNOSIS — M75.31 CALCIFIC TENDINITIS OF RIGHT SHOULDER: Primary | ICD-10-CM

## 2019-01-31 PROCEDURE — 99213 OFFICE O/P EST LOW 20 MIN: CPT | Performed by: ORTHOPAEDIC SURGERY

## 2019-01-31 PROCEDURE — 20610 DRAIN/INJ JOINT/BURSA W/O US: CPT | Performed by: PHYSICIAN ASSISTANT

## 2019-01-31 RX ORDER — LIDOCAINE HYDROCHLORIDE 10 MG/ML
7 INJECTION, SOLUTION INFILTRATION; PERINEURAL
Status: COMPLETED | OUTPATIENT
Start: 2019-01-31 | End: 2019-01-31

## 2019-01-31 RX ORDER — INDOMETHACIN 50 MG/1
50 CAPSULE ORAL 2 TIMES DAILY WITH MEALS
Qty: 10 CAPSULE | Refills: 0 | Status: SHIPPED | OUTPATIENT
Start: 2019-01-31 | End: 2019-02-25

## 2019-01-31 RX ORDER — BETAMETHASONE SODIUM PHOSPHATE AND BETAMETHASONE ACETATE 3; 3 MG/ML; MG/ML
6 INJECTION, SUSPENSION INTRA-ARTICULAR; INTRALESIONAL; INTRAMUSCULAR; SOFT TISSUE
Status: COMPLETED | OUTPATIENT
Start: 2019-01-31 | End: 2019-01-31

## 2019-01-31 RX ADMIN — BETAMETHASONE SODIUM PHOSPHATE AND BETAMETHASONE ACETATE 6 MG: 3; 3 INJECTION, SUSPENSION INTRA-ARTICULAR; INTRALESIONAL; INTRAMUSCULAR; SOFT TISSUE at 09:49

## 2019-01-31 RX ADMIN — LIDOCAINE HYDROCHLORIDE 7 ML: 10 INJECTION, SOLUTION INFILTRATION; PERINEURAL at 09:49

## 2019-01-31 NOTE — LETTER
January 31, 2019     Patient: Layo Eagle   YOB: 1960   Date of Visit: 1/31/2019       To Whom it May Concern:    Rachell Nice is under my professional care  She was seen in my office on 1/31/2019  She  is unable to return to work at this time due to calcific tendinitis in her right shoulder  If you have any questions or concerns, please don't hesitate to call           Sincerely,          Woodrow Toscano MD        CC: No Recipients
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
## 2019-02-07 ENCOUNTER — APPOINTMENT (OUTPATIENT)
Dept: PHYSICAL THERAPY | Facility: CLINIC | Age: 59
End: 2019-02-07
Payer: COMMERCIAL

## 2019-02-07 ENCOUNTER — EVALUATION (OUTPATIENT)
Dept: PHYSICAL THERAPY | Facility: CLINIC | Age: 59
End: 2019-02-07
Payer: COMMERCIAL

## 2019-02-07 DIAGNOSIS — M75.31 CALCIFIC TENDINITIS OF RIGHT SHOULDER: Primary | ICD-10-CM

## 2019-02-07 PROCEDURE — 97162 PT EVAL MOD COMPLEX 30 MIN: CPT | Performed by: PHYSICAL THERAPIST
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
## 2019-02-11 ENCOUNTER — OFFICE VISIT (OUTPATIENT)
Dept: PHYSICAL THERAPY | Facility: CLINIC | Age: 59
End: 2019-02-11
Payer: COMMERCIAL

## 2019-02-11 DIAGNOSIS — M75.31 CALCIFIC TENDINITIS OF RIGHT SHOULDER: Primary | ICD-10-CM

## 2019-02-11 PROCEDURE — 97110 THERAPEUTIC EXERCISES: CPT | Performed by: PHYSICAL THERAPIST

## 2019-02-11 PROCEDURE — 97140 MANUAL THERAPY 1/> REGIONS: CPT | Performed by: PHYSICAL THERAPIST

## 2019-02-11 NOTE — PROGRESS NOTES
Daily Note     Today's date: 2019  Patient name: George Martin  : 1960  MRN: [id number]  Referring provider: Ann Jean-Baptiste PA-C  Dx:   Encounter Diagnosis     ICD-10-CM    1  Calcific tendinitis of right shoulder M75 31                   Subjective: Patient reports feeling sore after initial evaluation  She reports having more pain in (L) shoulder today due to working at the [employer] yesterday  She reports good tolerance to HEP  Objective: See treatment diary below      Assessment: Patient completed documented program  Patient demonstrates improvement in (B) PROM during stretching today  She reports having difficulty with pulling up her pants and rolling in bed, so (B) shoulder IR and extension stretching with a cane was added  Patient continues to have pain with AROM in multiple planes, especially shoulder extension  Scapular stabilization exercises were modified to decrease extension motions at the shoulder  Plan: Continue with current POC         Daily Treatment Diary     DX: (R) calcific tendinitis, (B) shoulder pain - suspected frozen shoulder  EPOC: 3/21/19  Precautions: standard  CO-MORBIDITES: hyperlipidemia   PERSONAL FACTORS: NA    Manual           (B) shoulder PROM 6'         (B) Sh' Mobs 2'                   (B) Sh' IASTM                        Exercise Diary  HEP                   Pulleys flex/ABD  2'/2'        Pendulums          Sh' Isometrics                    Chair flexion stretch          Supine cane flexion   10x standing B        Supine cane horiz ABD/ADD          Standing IR/ext cane stretch  10x B        Supine cane ER          Towel Flex/Abd table stretch          Supine (B) Sh' AAROM Flexion          S/L (B) Sh' AAROM ABD  10x B                  Standing scap retractions  5"x10        TB rows          TB Sh Ext  OTB 10x to neutral        TB Elbow Ext          DB Elbow Flex Modalities

## 2019-02-18 ENCOUNTER — OFFICE VISIT (OUTPATIENT)
Dept: PHYSICAL THERAPY | Facility: CLINIC | Age: 59
End: 2019-02-18
Payer: COMMERCIAL

## 2019-02-18 DIAGNOSIS — M75.31 CALCIFIC TENDINITIS OF RIGHT SHOULDER: Primary | ICD-10-CM

## 2019-02-18 PROCEDURE — 97140 MANUAL THERAPY 1/> REGIONS: CPT | Performed by: PHYSICAL THERAPIST

## 2019-02-18 PROCEDURE — 97110 THERAPEUTIC EXERCISES: CPT | Performed by: PHYSICAL THERAPIST

## 2019-02-18 NOTE — PROGRESS NOTES
Daily Note     Today's date: 2019  Patient name: Rhina Naidu  : 1960  MRN: [id number]  Referring provider: Maryjane Lynch PA-C  Dx:   Encounter Diagnosis     ICD-10-CM    1  Calcific tendinitis of right shoulder M75 31                   Subjective: Patient reports good tolerance to LV  She continues to feel increased pain by the end of the day and feels achy all around  She is going to see her PCP tomorrow 19 to address her muscle aches  She thinks her ROM has gotten a bit better and feels less pain with hiking pants up  Objective: See treatment diary below      Assessment: Patient completed documented program  Patient continues to demonstrate improvement in (B) PROM during stretching with most limitations seen in (B) abduction  Cueing was required during stretching activities not to push range into pain  Strengthening activities are tolerated well in below shoulder level ranges  Continue progressing ROM and strength exercises as tolerated  Treatment and documentation performed by Cezar ZHU under the direct supervision of Elias Moreno PT, DPT  Plan: Continue with current POC         Daily Treatment Diary     DX: (R) calcific tendinitis, (B) shoulder pain - suspected frozen shoulder  EPOC: 3/21/19  Precautions: standard  CO-MORBIDITES: hyperlipidemia   PERSONAL FACTORS: NA    Manual          (B) shoulder PROM 6' 13'        (B) Sh' Mobs 2' 2'                  (B) Sh' IASTM                        Exercise Diary  HEP                  Pulleys flex/ABD  2'/2' 2'/2'       Pendulums 2/7         Sh' Isometrics 7                   Chair flexion stretch          Supine cane flexion   10x standing B        Supine cane horiz ABD/ADD   10x ea       Standing IR/ext cane stretch  10x B 3x30" ea       Supine cane ER   10x ea       Towel Flex/Abd table stretch   10x ea       Supine (B) Sh' AAROM Flexion          S/L (B) Sh' AAROM ABD  10x B                  Standing scap retractions  5"x10 5"x10       TB rows   OTB 10x       TB Sh Ext  OTB 10x to neutral OTB 10x       TB Elbow Ext   OTB 10x       DB Elbow Flex                                                                                              Modalities

## 2019-02-19 ENCOUNTER — OFFICE VISIT (OUTPATIENT)
Dept: FAMILY MEDICINE CLINIC | Facility: CLINIC | Age: 59
End: 2019-02-19
Payer: COMMERCIAL

## 2019-02-19 VITALS
DIASTOLIC BLOOD PRESSURE: 88 MMHG | WEIGHT: 153 LBS | TEMPERATURE: 97.7 F | HEIGHT: 64 IN | HEART RATE: 70 BPM | SYSTOLIC BLOOD PRESSURE: 122 MMHG | OXYGEN SATURATION: 99 % | BODY MASS INDEX: 26.12 KG/M2

## 2019-02-19 DIAGNOSIS — Z13.1 SCREENING FOR DIABETES MELLITUS: ICD-10-CM

## 2019-02-19 DIAGNOSIS — E78.2 MIXED HYPERLIPIDEMIA: ICD-10-CM

## 2019-02-19 DIAGNOSIS — R68.89 SUSPECTED LYME DISEASE: Primary | ICD-10-CM

## 2019-02-19 DIAGNOSIS — Z13.0 SCREENING, ANEMIA, DEFICIENCY, IRON: ICD-10-CM

## 2019-02-19 DIAGNOSIS — Z13.29 SCREENING FOR THYROID DISORDER: ICD-10-CM

## 2019-02-19 PROCEDURE — 99213 OFFICE O/P EST LOW 20 MIN: CPT | Performed by: FAMILY MEDICINE

## 2019-02-19 RX ORDER — DOXYCYCLINE 100 MG/1
100 CAPSULE ORAL 2 TIMES DAILY
Qty: 20 CAPSULE | Refills: 0 | Status: SHIPPED | OUTPATIENT
Start: 2019-02-19 | End: 2019-03-01
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
## 2019-02-21 LAB
ALBUMIN SERPL-MCNC: 4.8 G/DL (ref 3.5–5.5)
ALBUMIN/GLOB SERPL: 1.9 {RATIO} (ref 1.2–2.2)
ALP SERPL-CCNC: 116 IU/L (ref 39–117)
ALT SERPL-CCNC: 23 IU/L (ref 0–32)
AST SERPL-CCNC: 17 IU/L (ref 0–40)
B BURGDOR IGG PATRN SER IB-IMP: NEGATIVE
B BURGDOR IGG+IGM SER-ACNC: 1.5 ISR (ref 0–0.9)
B BURGDOR IGM PATRN SER IB-IMP: NEGATIVE
B BURGDOR18KD IGG SER QL IB: PRESENT
B BURGDOR23KD IGG SER QL IB: ABNORMAL
B BURGDOR23KD IGM SER QL IB: ABNORMAL
B BURGDOR28KD IGG SER QL IB: ABNORMAL
B BURGDOR30KD IGG SER QL IB: ABNORMAL
B BURGDOR39KD IGG SER QL IB: PRESENT
B BURGDOR39KD IGM SER QL IB: PRESENT
B BURGDOR41KD IGG SER QL IB: PRESENT
B BURGDOR41KD IGM SER QL IB: ABNORMAL
B BURGDOR45KD IGG SER QL IB: ABNORMAL
B BURGDOR58KD IGG SER QL IB: ABNORMAL
B BURGDOR66KD IGG SER QL IB: ABNORMAL
B BURGDOR93KD IGG SER QL IB: ABNORMAL
BASOPHILS # BLD AUTO: 0 X10E3/UL (ref 0–0.2)
BASOPHILS NFR BLD AUTO: 0 %
BILIRUB SERPL-MCNC: 0.3 MG/DL (ref 0–1.2)
BUN SERPL-MCNC: 17 MG/DL (ref 6–24)
BUN/CREAT SERPL: 29 (ref 9–23)
CALCIUM SERPL-MCNC: 10.2 MG/DL (ref 8.7–10.2)
CHLORIDE SERPL-SCNC: 100 MMOL/L (ref 96–106)
CHOLEST SERPL-MCNC: 162 MG/DL (ref 100–199)
CO2 SERPL-SCNC: 27 MMOL/L (ref 20–29)
CREAT SERPL-MCNC: 0.59 MG/DL (ref 0.57–1)
EOSINOPHIL # BLD AUTO: 0 X10E3/UL (ref 0–0.4)
EOSINOPHIL NFR BLD AUTO: 1 %
ERYTHROCYTE [DISTWIDTH] IN BLOOD BY AUTOMATED COUNT: 13.4 % (ref 12.3–15.4)
GLOBULIN SER-MCNC: 2.5 G/DL (ref 1.5–4.5)
GLUCOSE SERPL-MCNC: 92 MG/DL (ref 65–99)
HCT VFR BLD AUTO: 37.1 % (ref 34–46.6)
HDLC SERPL-MCNC: 69 MG/DL
HGB BLD-MCNC: 12.4 G/DL (ref 11.1–15.9)
IMM GRANULOCYTES # BLD: 0.1 X10E3/UL (ref 0–0.1)
IMM GRANULOCYTES NFR BLD: 1 %
LDLC SERPL CALC-MCNC: 73 MG/DL (ref 0–99)
LDLC/HDLC SERPL: 1.1 RATIO (ref 0–3.2)
LYMPHOCYTES # BLD AUTO: 1.1 X10E3/UL (ref 0.7–3.1)
LYMPHOCYTES NFR BLD AUTO: 15 %
MCH RBC QN AUTO: 29.9 PG (ref 26.6–33)
MCHC RBC AUTO-ENTMCNC: 33.4 G/DL (ref 31.5–35.7)
MCV RBC AUTO: 89 FL (ref 79–97)
MONOCYTES # BLD AUTO: 0.6 X10E3/UL (ref 0.1–0.9)
MONOCYTES NFR BLD AUTO: 8 %
NEUTROPHILS # BLD AUTO: 5.4 X10E3/UL (ref 1.4–7)
NEUTROPHILS NFR BLD AUTO: 75 %
PLATELET # BLD AUTO: 221 X10E3/UL (ref 150–379)
POTASSIUM SERPL-SCNC: 4.2 MMOL/L (ref 3.5–5.2)
PROT SERPL-MCNC: 7.3 G/DL (ref 6–8.5)
RBC # BLD AUTO: 4.15 X10E6/UL (ref 3.77–5.28)
SL AMB EGFR AFRICAN AMERICAN: 117 ML/MIN/1.73
SL AMB EGFR NON AFRICAN AMERICAN: 101 ML/MIN/1.73
SL AMB VLDL CHOLESTEROL CALC: 20 MG/DL (ref 5–40)
SODIUM SERPL-SCNC: 145 MMOL/L (ref 134–144)
TRIGL SERPL-MCNC: 101 MG/DL (ref 0–149)
TSH SERPL DL<=0.005 MIU/L-ACNC: 1.67 UIU/ML (ref 0.45–4.5)
WBC # BLD AUTO: 7.1 X10E3/UL (ref 3.4–10.8)

## 2019-02-22 ENCOUNTER — OFFICE VISIT (OUTPATIENT)
Dept: PHYSICAL THERAPY | Facility: CLINIC | Age: 59
End: 2019-02-22
Payer: COMMERCIAL

## 2019-02-22 DIAGNOSIS — M75.31 CALCIFIC TENDINITIS OF RIGHT SHOULDER: Primary | ICD-10-CM

## 2019-02-22 PROCEDURE — 97112 NEUROMUSCULAR REEDUCATION: CPT | Performed by: PHYSICAL THERAPIST

## 2019-02-22 PROCEDURE — 97140 MANUAL THERAPY 1/> REGIONS: CPT | Performed by: PHYSICAL THERAPIST

## 2019-02-22 PROCEDURE — 97110 THERAPEUTIC EXERCISES: CPT | Performed by: PHYSICAL THERAPIST

## 2019-02-22 NOTE — PROGRESS NOTES
Daily Note     Today's date: 2019  Patient name: Nelson Maravilla  : 1960  MRN: [id number]  Referring provider: Vilinda Leventhal, PA-C  Dx:   Encounter Diagnosis     ICD-10-CM    1  Calcific tendinitis of right shoulder M75 31                   Subjective: Patient reports feeling some muscle soreness after LV  She saw her PCP on Tuesday who tested her for Lyme's disease - she is now on antibiotics to address her muscle aches and has been feeling better  Her (L) shoulder continues to be more painful with movements and at work  Objective: See treatment diary below      Assessment: Patient completed documented program  Patient continues to progress ROM with (B) PROM stretching  AAROM was performed to increase strength throughout range to which patient was able to further increase ROM  She continues to have painful arcs during eccentric lowering and initiating movement, L>R  Cane AROM was added to HEP to practice initiating ROM below shoulder level  Continue progressing ROM and strength exercises as tolerated  Treatment and documentation performed by Yolanda ZHU under the direct supervision of Gagan Chauhan PT, DPT  Plan: Continue with current POC         Daily Treatment Diary     DX: (R) calcific tendinitis, (B) shoulder pain - suspected frozen shoulder  EPOC: 3/21/19  Precautions: standard  CO-MORBIDITES: hyperlipidemia   PERSONAL FACTORS: NA    Manual         (B) shoulder PROM 6' 13' 12'       (B) Sh' Mobs 2' 2' 4'                 (B) Sh' IASTM                        Exercise Diary  HEP                 Pulleys flex/ABD  2'/2' 2'/2'       Pendulums          Sh' Isometrics                    Doorway ER stretch    30"x3      Chair flexion stretch          Supine cane flexion   10x standing B  Standing 10x      Supine cane horiz ABD/ADD   10x ea Standing 10x abd      Standing IR/ext cane stretch  10x B 3x30" ea       Supine cane ER   10x ea Towel Flex/Abd table stretch   10x ea       Supine (B) Sh' AAROM Flexion    10x B      S/L (B) Sh' AAROM ABD  10x B  10x B                Standing scap retractions  5"x10 5"x10       TB rows   OTB 10x OTB 10x      TB Sh Ext  OTB 10x to neutral OTB 10x OTB 10x      TB Elbow Ext   OTB 10x OTB 10x      DB Elbow Flex          Sidelying ER    10x B                                                                                Modalities

## 2019-02-25 ENCOUNTER — OFFICE VISIT (OUTPATIENT)
Dept: PAIN MEDICINE | Facility: CLINIC | Age: 59
End: 2019-02-25
Payer: COMMERCIAL

## 2019-02-25 VITALS
HEIGHT: 64 IN | WEIGHT: 153 LBS | BODY MASS INDEX: 26.12 KG/M2 | SYSTOLIC BLOOD PRESSURE: 132 MMHG | DIASTOLIC BLOOD PRESSURE: 82 MMHG | HEART RATE: 68 BPM

## 2019-02-25 DIAGNOSIS — M50.90 CERVICAL DISC DISEASE: ICD-10-CM

## 2019-02-25 DIAGNOSIS — M54.2 NECK PAIN: Primary | ICD-10-CM

## 2019-02-25 DIAGNOSIS — M79.18 CERVICAL MYOFASCIAL PAIN SYNDROME: ICD-10-CM

## 2019-02-25 DIAGNOSIS — M47.812 SPONDYLOSIS OF CERVICAL REGION WITHOUT MYELOPATHY OR RADICULOPATHY: ICD-10-CM

## 2019-02-25 PROCEDURE — 99213 OFFICE O/P EST LOW 20 MIN: CPT | Performed by: NURSE PRACTITIONER

## 2019-02-25 RX ORDER — TIZANIDINE 2 MG/1
4 TABLET ORAL EVERY 8 HOURS PRN
Qty: 90 TABLET | Refills: 1 | Status: SHIPPED | OUTPATIENT
Start: 2019-02-25 | End: 2019-04-15

## 2019-02-25 NOTE — PROGRESS NOTES
Assessment:  1  Neck pain    2  Spondylosis of cervical region without myelopathy or radiculopathy    3  Cervical disc disease    4  Cervical myofascial pain syndrome        Plan:  While the patient was in the office today, I did discuss with the patient at this point time since she does note at least 50% improvement as a result of the radiofrequency ablation procedure on the right side, it may take some more time for the myofascial component to improve, even another 1-2 months  However, explained to the patient that with regards to the radiofrequency ablation procedure the results could last anywhere from 6-8 months or as long as 3-5 years and for now we will take a watch and wait approach  The patient was agreeable and verbalized an understanding  I encouraged the patient to continue with her home exercise and stretching program and to try to use 20 minutes of low heat before she does her home exercises and stretching  The patient was agreeable and verbalized an understanding  With regards to her medication regimen, explained to the patient at this point since the methocarbamol does not seem to be helpful we could try different muscle relaxer such as tizanidine and I advised the patient that does take a good 2 weeks of taking a muscle relaxer consistently to know if it is going to be helpful  I advised her to start the muscle relaxer at bedtime and then work herself up to 3 a day as tolerated  I advised the patient that if they experience any side effects or issues with the changes in their medication regiment, they should give our office a call to discuss  I also advised the patient not to drive or operate machinery until they see how the changes in the medication regimen affects them  The patient was agreeable and verbalized an understanding       At her next office visit we would consider may be starting her on Cymbalta since she had previously tried and failed gabapentin to see if that would help with her overall joint pains and just make her pain symptoms more manageable, however, we will see how she does with more time and the addition of the tizanidine in place of the methocarbamol  The patient was agreeable and verbalized an understanding  The patient will follow-up in 7 weeks for medication prescription refill and reevaluation  The patient was advised to contact the office should their symptoms worsen in the interim  The patient was agreeable and verbalized an understanding  History of Present Illness: The patient is a 62 y o  female last seen on 1/7/19 who presents for a follow up office visit in regards to chronic pain secondary to cervical spondylosis and myofascial pain  The patient currently reports that since her last office visit she does feel that the sharp burning pain that she feels was because of the nerves has definitely improved by at least 50% or more, however, she continues with the stiffness and feels that her shoulder issues are not helping her neck but that there is definitely overall improvement  However, she feels that her biggest issue is the stiffness and does not feel that the methocarbamol is helping  She is currently doing physical therapy for her shoulders and presents today to discuss her treatment plan  Current pain medications includes:   Methocarbamol 500 mg t i d  p r n  for spasms   The patient reports that this regimen is providing minimal pain relief  The patient is reporting no side effects from this pain medication regimen  I have personally reviewed and/or updated the patient's past medical history, past surgical history, family history, social history, current medications, allergies, and vital signs today  Review of Systems:    Review of Systems   Respiratory: Negative for shortness of breath  Cardiovascular: Negative for chest pain  Gastrointestinal: Negative for constipation, diarrhea, nausea and vomiting  Musculoskeletal: Negative for arthralgias, gait problem, joint swelling (joint stiffness) and myalgias  Skin: Negative for rash  Neurological: Negative for dizziness, seizures and weakness  All other systems reviewed and are negative          Past Medical History:   Diagnosis Date    Concussion     Hyperlipidemia     Tendinitis        Past Surgical History:   Procedure Laterality Date     SECTION      FOOT SURGERY Left 2007    HEMORROIDECTOMY      KNEE ARTHROSCOPY Right 2018    KNEE SURGERY      AL KNEE SCOPE,MED/LAT MENISECTOMY Right 2018    Procedure: ARTHROSCOPY KNEE, PARTIAL MEDIAL MENISCECTOMY: ABRASION CHONDROPLASTY;  Surgeon: Elle Eaton MD;  [facility] OR;  Service: Orthopedics    TUBAL LIGATION         Family History   Problem Relation Age of Onset    Coronary artery disease Mother     Other Mother         CABG    Hiatal hernia Father     Stroke Maternal Grandmother     Hyperlipidemia Family     Migraines Family     Thyroid disease Family        Social History     Occupational History    Not on file   Tobacco Use    Smoking status: Former Smoker     Packs/day: 0 33     Years: 5 00     Pack years: 1 65     Last attempt to quit:      Years since quittin 1    Smokeless tobacco: Never Used   Substance and Sexual Activity    Alcohol use: Yes     Comment: social    Drug use: No    Sexual activity: Not on file         Current Outpatient Medications:     acetaminophen (TYLENOL) 500 mg tablet, Take 500 mg by mouth every 6 (six) hours as needed for mild pain, Disp: , Rfl:     clonazePAM (KlonoPIN) 0 5 mg tablet, Take 0 5 mg by mouth 2 (two) times a day, Disp: , Rfl:     doxycycline monohydrate (MONODOX) 100 mg capsule, Take 1 capsule (100 mg total) by mouth 2 (two) times a day for 10 days, Disp: 20 capsule, Rfl: 0    gabapentin (NEURONTIN) 300 mg capsule, TAKE 1 CAPSULE (300 MG TOTAL) BY MOUTH DAILY AT BEDTIME (Patient not taking: Reported on 1/31/2019 ), Disp: 30 capsule, Rfl: 0    Glucosamine-Chondroitin (GLUCOSAMINE CHONDR COMPLEX PO), Take by mouth daily  , Disp: , Rfl:     Ibuprofen-Diphenhydramine Cit (MOTRIN PM PO), Take by mouth, Disp: , Rfl:     indomethacin (INDOCIN) 50 mg capsule, Take 1 capsule (50 mg total) by mouth 2 (two) times a day with meals (Patient not taking: Reported on 2/19/2019), Disp: 10 capsule, Rfl: 0    methocarbamol (ROBAXIN) 500 mg tablet, Take 1 tablet (500 mg total) by mouth 3 (three) times a day as needed for muscle spasms, Disp: 60 tablet, Rfl: 0    Naproxen Sodium (ALEVE) 220 MG CAPS, Take by mouth, Disp: , Rfl:     pravastatin (PRAVACHOL) 40 mg tablet, TAKE 1 TABLET BY MOUTH AT BEDTIME, Disp: 30 tablet, Rfl: 3    No Known Allergies    Physical Exam:    LMP  (LMP Unknown)     Constitutional:normal, well developed, well nourished, alert, in no distress and non-toxic and no overt pain behavior  Eyes:anicteric  HEENT:grossly intact  Neck:supple, symmetric, trachea midline and no masses   Pulmonary:even and unlabored  Cardiovascular:No edema or pitting edema present  Skin:Normal without rashes or lesions and well hydrated  Psychiatric:Mood and affect appropriate  Neurologic:Cranial Nerves II-XII grossly intact  Musculoskeletal:normal      Imaging  No orders to display         No orders of the defined types were placed in this encounter

## 2019-03-01 ENCOUNTER — OFFICE VISIT (OUTPATIENT)
Dept: PHYSICAL THERAPY | Facility: CLINIC | Age: 59
End: 2019-03-01
Payer: COMMERCIAL

## 2019-03-01 DIAGNOSIS — M75.31 CALCIFIC TENDINITIS OF RIGHT SHOULDER: Primary | ICD-10-CM

## 2019-03-01 PROCEDURE — 97110 THERAPEUTIC EXERCISES: CPT

## 2019-03-01 PROCEDURE — 97140 MANUAL THERAPY 1/> REGIONS: CPT

## 2019-03-01 NOTE — PROGRESS NOTES
Daily Note     Today's date: 3/1/2019  Patient name: Anabel Carter  : 1960  MRN: [id number]  Referring provider: Donna Polk PA-C  Dx:   Encounter Diagnosis     ICD-10-CM    1  Calcific tendinitis of right shoulder M75 31                   Subjective: Patient reports yesterday had been a good day at work, but she reports today is a bad day and has a hard time lifting her L arm  Objective: See treatment diary below      Assessment: Patient completed documented program  She presented with discomfort in b/l arms, greater in L, and limited mobility in both  She had pain and very limited ROM on pulleys  Focused session on Passive and AA ROM with therapist assist with good tolerance  Was able to significantly increase b/l ROM in all plains  Ended session with light strengthening which she tolerated well after shoulders had been loosened up  Plan: Continue with current POC         Daily Treatment Diary     DX: (R) calcific tendinitis, (B) shoulder pain - suspected frozen shoulder  EPOC: 3/21/19  Precautions: standard  CO-MORBIDITES: hyperlipidemia   PERSONAL FACTORS: NA    Manual  2/11 2/18 2/22 3/1      (B) shoulder PROM 6' 13' 12' 15'      (B) Sh' Mobs 2' 2' 4'                 (B) Sh' IASTM          Scap ROM    5'          Exercise Diary  HEP 2/11 2/18 2/22 3/1               Pulleys flex/ABD  2'/2' 2'/2'  2'/2'     Pendulums          Sh' Isometrics                    Doorway ER stretch    30"x3      Chair flexion stretch          Supine cane flexion   10x standing B  Standing 10x      Supine cane horiz ABD/ADD   10x ea Standing 10x abd      Standing IR/ext cane stretch  10x B 3x30" ea       Supine cane ER   10x ea       Towel Flex/Abd table stretch   10x ea       Supine (B) Sh' AAROM Flexion    10x B 10x S/L     S/L (B) Sh' AAROM ABD  10x B  10x B 10x S/L               Standing scap retractions  5"x10 5"x10       TB rows   OTB 10x OTB 10x OTB  10     TB Sh Ext  OTB 10x to neutral OTB 10x OTB 10x OTB  10     TB Elbow Ext   OTB 10x OTB 10x OTB  10     DB Elbow Flex          Sidelying ER    10x B 10x     AA S/L flex, abduction     B/l  10 ea                                                                     Modalities

## 2019-03-06 ENCOUNTER — TELEPHONE (OUTPATIENT)
Dept: FAMILY MEDICINE CLINIC | Facility: CLINIC | Age: 59
End: 2019-03-06

## 2019-03-06 NOTE — TELEPHONE ENCOUNTER
Was here over a week ago  She saw for you for lyme  She finished the medication and she is not feeling any better  What should she do now

## 2019-03-07 ENCOUNTER — TELEPHONE (OUTPATIENT)
Dept: FAMILY MEDICINE CLINIC | Facility: CLINIC | Age: 59
End: 2019-03-07

## 2019-03-07 NOTE — TELEPHONE ENCOUNTER
LEFT MESSAGE FOR YOU YESTERDAY, DID NOT RECEIVE A RESPONSE  IN A LOT OF PAIN, JOINT PAIN EVERYWHERE, CANNOT SLEEP, READY TO GO TO [facility] [address]  ABX NO HELP, NOT ANY BETTER SINCE BEING SEEN      PLEASE ADVISE .[phone]

## 2019-03-07 NOTE — TELEPHONE ENCOUNTER
If the antibiotic is not helping, and the pain is worse she can certainly go to the hospital  She can come in for a re-evaluation, it has been close to 3 weeks since she has been seen  She is seeing pain management  Maybe we need to consider rheumatology for autoimmune disorders  Would be helpful if she can come in

## 2019-03-08 ENCOUNTER — OFFICE VISIT (OUTPATIENT)
Dept: PHYSICAL THERAPY | Facility: CLINIC | Age: 59
End: 2019-03-08
Payer: COMMERCIAL

## 2019-03-08 ENCOUNTER — OFFICE VISIT (OUTPATIENT)
Dept: FAMILY MEDICINE CLINIC | Facility: CLINIC | Age: 59
End: 2019-03-08
Payer: COMMERCIAL

## 2019-03-08 VITALS
HEIGHT: 64 IN | SYSTOLIC BLOOD PRESSURE: 112 MMHG | HEART RATE: 78 BPM | DIASTOLIC BLOOD PRESSURE: 78 MMHG | OXYGEN SATURATION: 98 % | WEIGHT: 153.75 LBS | BODY MASS INDEX: 26.25 KG/M2 | TEMPERATURE: 98.1 F

## 2019-03-08 DIAGNOSIS — M25.512 ACUTE PAIN OF LEFT SHOULDER: Primary | ICD-10-CM

## 2019-03-08 DIAGNOSIS — M75.31 CALCIFIC TENDINITIS OF RIGHT SHOULDER: ICD-10-CM

## 2019-03-08 DIAGNOSIS — M75.31 CALCIFIC TENDINITIS OF RIGHT SHOULDER: Primary | ICD-10-CM

## 2019-03-08 DIAGNOSIS — M25.50 ARTHRALGIA OF MULTIPLE JOINTS: ICD-10-CM

## 2019-03-08 PROBLEM — Z13.29 SCREENING FOR THYROID DISORDER: Status: RESOLVED | Noted: 2019-02-19 | Resolved: 2019-03-08

## 2019-03-08 PROBLEM — R68.89 SUSPECTED LYME DISEASE: Status: RESOLVED | Noted: 2019-02-19 | Resolved: 2019-03-08

## 2019-03-08 PROBLEM — Z13.1 SCREENING FOR DIABETES MELLITUS: Status: RESOLVED | Noted: 2019-02-19 | Resolved: 2019-03-08

## 2019-03-08 PROBLEM — Z13.0 SCREENING, ANEMIA, DEFICIENCY, IRON: Status: RESOLVED | Noted: 2019-02-19 | Resolved: 2019-03-08

## 2019-03-08 PROCEDURE — 97140 MANUAL THERAPY 1/> REGIONS: CPT

## 2019-03-08 PROCEDURE — 3008F BODY MASS INDEX DOCD: CPT | Performed by: FAMILY MEDICINE

## 2019-03-08 PROCEDURE — 99213 OFFICE O/P EST LOW 20 MIN: CPT | Performed by: FAMILY MEDICINE

## 2019-03-08 PROCEDURE — 97110 THERAPEUTIC EXERCISES: CPT

## 2019-03-08 NOTE — PATIENT INSTRUCTIONS
Xray left shoulder   Continue physical therapy  Keep appt with ortho  Prednisone 10mg 4 tabs for 2 days, 3 tab for 2 days, 2 tabs for 2 days, 1 tab for 2 days   No advil, motrin or aleve  Ok for tylenol  Melatonin or tylenol pm at bedtime

## 2019-03-08 NOTE — TELEPHONE ENCOUNTER
Multiple notes on this patient  Asking if she can come in for re-evaluation since medications not helping     May need to check rheumatological tests and see specialist  (she is already seeing pain management)

## 2019-03-08 NOTE — PROGRESS NOTES
Daily Note     Today's date: 3/8/2019  Patient name: Nancy Tariq  : 1960  MRN: [id number]  Referring provider: Sp Ortiz PA-C  Dx:   Encounter Diagnosis     ICD-10-CM    1  Calcific tendinitis of right shoulder M75 31                   Subjective: Patient reports no improvement since last visit  Objective: See treatment diary below      Assessment: Patient completed documented program  She presented with discomfort in b/l arms, greater in L, and limited mobility in both  She had pain and very limited ROM on pulleys  Focused session on Passive and AA ROM with therapist assist with good tolerance  Was able to significantly increase b/l ROM in all plains  Ended session with light strengthening which she tolerated well after shoulders had been loosened up  Plan: Continue with current POC  Daily Treatment Diary     DX: (R) calcific tendinitis, (B) shoulder pain - suspected frozen shoulder  EPOC: 3/21/19  Precautions: standard  CO-MORBIDITES: hyperlipidemia   PERSONAL FACTORS: NA    Manual  2/11 2/18 2/22 3/1 3/8     (B) shoulder PROM 6' 13' 12' 15' 15     (B) Sh' Mobs 2' 2' 4'                 (B) Sh' IASTM          Scap ROM    5' 5         Exercise Diary  HEP 2/11 2/18 2/22 3/1 3/8              Pulleys flex/ABD  2'/2' 2'/2'  2'/2' 2'/4'    Pendulums          Sh' Isometrics      5"x10  ea              Doorway ER stretch    30"x3      Chair flexion stretch          Supine cane flexion   10x standing B  Standing 10x  p!     Supine cane horiz ABD/ADD   10x ea Standing 10x abd      Standing IR/ext cane stretch  10x B 3x30" ea       Supine cane ER   10x ea       Towel Flex/Abd table stretch   10x ea       Supine (B) Sh' AAROM Flexion    10x B 10x S/L 10x S/L    S/L (B) Sh' AAROM ABD  10x B  10x B 10x S/L 10x S/L              Standing scap retractions  5"x10 5"x10       TB rows   OTB 10x OTB 10x OTB  10 OTB  10    TB Sh Ext  OTB 10x to neutral OTB 10x OTB 10x OTB  10 OTB  10    TB Elbow Ext   OTB 10x OTB 10x OTB  10 OTB  10    DB Elbow Flex          Sidelying ER    10x B 10x 10x    AA S/L flex, abduction     B/l  10 ea B/l  10 ea                                                                    Modalities

## 2019-03-08 NOTE — PROGRESS NOTES
Subjective:   Chief Complaint   Patient presents with    Pain     pt is here to discuss constant pain all over her body  Pt has tried ointments, aleve, and heat for her pain and nothing seems to help  Pt is due for colonoscopy and mammo  Pt states she still has orders at home and is aware that she needs to have both procedures done  Pt had FBW on 2/19/2019  Pt is due for hep c screening at next blood draw  Patient ID: Laverne Hinds is a 62 y o  female  Complains of bilateral shoulder, constant pain, increased pain when laying on shoulders  Having trouble sleeping at night  Had neck ablation in December, right shoulder was painful prior to ablation and more pain after  The nerve pain improved with ablation,  Had xray of her right shoulder that showed calcification  Had injection in right shoulder and helped with pain in shoulder but still with decreased range of motion  Doing home exercises  Going to therapy twice a week  Knees and hips are painful intermittently  Had lyme disease 23 years ago when she was pregnant with son  Had concussion  1 year ago  Minimal relief of shoulder pain with hot shower  Left work early because she can only tolerate so much activity without aggravating her shoulder symptoms        The following portions of the patient's history were reviewed and updated as appropriate: allergies, current medications, past family history, past medical history, past social history, past surgical history and problem list     Review of Systems   Constitutional: Positive for fatigue  Negative for fever  HENT: Negative  Eyes: Negative  Respiratory: Negative  Negative for cough  Cardiovascular: Negative  Gastrointestinal: Negative  Endocrine: Negative  Genitourinary: Negative  Musculoskeletal: Positive for arthralgias and neck pain  Skin: Negative  Allergic/Immunologic: Negative  Neurological: Negative  Negative for numbness     Psychiatric/Behavioral: Positive for sleep disturbance  Objective:  Vitals:    03/08/19 0824   BP: 112/78   Pulse: 78   Temp: 98 1 °F (36 7 °C)   SpO2: 98%   Weight: 69 7 kg (153 lb 12 oz)   Height: 5' 4" (1 626 m)      Physical Exam   Constitutional: She is oriented to person, place, and time  She appears well-developed and well-nourished  HENT:   Head: Normocephalic and atraumatic  Cardiovascular: Normal rate, regular rhythm and normal heart sounds  Pulmonary/Chest: Effort normal and breath sounds normal    Abdominal: Soft  Bowel sounds are normal    Musculoskeletal: She exhibits tenderness  She exhibits no deformity  Tenderness anterior shoulder bilaterally  Decreased range of motion bilaterally    Neurological: She is alert and oriented to person, place, and time  Skin: Skin is warm and dry  Psychiatric: She has a normal mood and affect  Her behavior is normal  Judgment and thought content normal    Nursing note and vitals reviewed  Assessment/Plan:    No problem-specific Assessment & Plan notes found for this encounter  Diagnoses and all orders for this visit:    Acute pain of left shoulder  Comments:  xray left shoulder, trial prednsione taper dose  Orders:  -     XR shoulder 2+ vw left;  Future    Calcific tendinitis of right shoulder  Comments:  keep follow up with ortho    Arthralgia of multiple joints

## 2019-03-11 ENCOUNTER — OFFICE VISIT (OUTPATIENT)
Dept: PHYSICAL THERAPY | Facility: CLINIC | Age: 59
End: 2019-03-11
Payer: COMMERCIAL

## 2019-03-11 DIAGNOSIS — M25.512 ACUTE PAIN OF LEFT SHOULDER: ICD-10-CM

## 2019-03-11 DIAGNOSIS — M75.31 CALCIFIC TENDINITIS OF RIGHT SHOULDER: Primary | ICD-10-CM

## 2019-03-11 PROCEDURE — 97110 THERAPEUTIC EXERCISES: CPT | Performed by: PHYSICAL THERAPIST

## 2019-03-11 PROCEDURE — 97140 MANUAL THERAPY 1/> REGIONS: CPT | Performed by: PHYSICAL THERAPIST

## 2019-03-11 NOTE — PROGRESS NOTES
Daily Note     Today's date: 3/11/2019  Patient name: Yoselyn Lamar  : 1960  MRN: [id number]  Referring provider: Payal Ryan PA-C  Dx:   Encounter Diagnosis     ICD-10-CM    1  Calcific tendinitis of right shoulder M75 31    2  Acute pain of left shoulder M25 512 XR shoulder 2+ vw left    xray left shoulder, trial prednsione taper dose                  Subjective: Patient reports she is feeling slightly better today - saw PCP last week - prescribed prednisone taper dose  She reports she was able to sleep through the night without excruciating pain  Objective: See treatment diary below      Assessment: Patient completed documented program  She displayed improved ROM following manual techniques  Patient had difficulty and discomfort with AAROM flexion and abduction however no persistent increase in pain  She had good form with TB exercises - progressed repetitions  She reported feeling fatigued post treatment but had no complaints of increased pain  Plan: Continue with current POC  Daily Treatment Diary     DX: (R) calcific tendinitis, (B) shoulder pain - suspected frozen shoulder  EPOC: 3/21/19  Precautions: standard  CO-MORBIDITES: hyperlipidemia   PERSONAL FACTORS: NA    Manual  3/1 3/8 3/11       (B) shoulder PROM 15' 15 10'       (B) Sh' Mobs   5'                 (B) Sh' IASTM          Scap ROM 5' 5            Exercise Diary  HEP 3/1 3/8 3/11                Pulleys flex/ABD  2'/2' 2'/4' 2'/2'      Pendulums          Sh' Isometrics   5"x10  ea                 Doorway ER stretch          Chair flexion stretch          Supine cane flexion    p!        Supine cane horiz ABD/ADD          Standing IR/ext cane stretch          Supine cane ER          Towel Flex/Abd table stretch          Supine (B) Sh' AAROM Flexion  10x S/L 10x S/L Cane   10x      S/L (B) Sh' AAROM ABD  10x S/L 10x S/L 10x                Standing scap retractions          TB rows  OTB  10 OTB  10 OTB  20 TB Sh Ext  OTB  10 OTB  10 OTB  20x      TB Elbow Ext  OTB  10 OTB  10 OTB  20x      DB Elbow Flex          Sidelying ER  10x 10x 10x ea      AA S/L flex, abduction  B/l  10 ea B/l  10 ea                                                                       Modalities

## 2019-03-14 ENCOUNTER — OFFICE VISIT (OUTPATIENT)
Dept: OBGYN CLINIC | Facility: CLINIC | Age: 59
End: 2019-03-14
Payer: COMMERCIAL

## 2019-03-14 ENCOUNTER — APPOINTMENT (OUTPATIENT)
Dept: RADIOLOGY | Facility: CLINIC | Age: 59
End: 2019-03-14
Payer: COMMERCIAL

## 2019-03-14 VITALS
BODY MASS INDEX: 25.95 KG/M2 | HEIGHT: 64 IN | DIASTOLIC BLOOD PRESSURE: 80 MMHG | SYSTOLIC BLOOD PRESSURE: 122 MMHG | WEIGHT: 152 LBS | HEART RATE: 82 BPM

## 2019-03-14 DIAGNOSIS — M75.31 CALCIFIC TENDINITIS OF RIGHT SHOULDER: ICD-10-CM

## 2019-03-14 DIAGNOSIS — M25.512 LEFT SHOULDER PAIN, UNSPECIFIED CHRONICITY: ICD-10-CM

## 2019-03-14 DIAGNOSIS — M25.512 ACUTE PAIN OF LEFT SHOULDER: Primary | ICD-10-CM

## 2019-03-14 PROCEDURE — 73030 X-RAY EXAM OF SHOULDER: CPT

## 2019-03-14 PROCEDURE — 99213 OFFICE O/P EST LOW 20 MIN: CPT | Performed by: ORTHOPAEDIC SURGERY

## 2019-03-14 NOTE — PROGRESS NOTES
Assessment:     1  Acute pain of left shoulder    2  Calcific tendinitis of right shoulder        Plan:     Problem List Items Addressed This Visit        Musculoskeletal and Integument    Calcific tendinitis of right shoulder       Other    Acute pain of left shoulder - Primary    Relevant Orders    XR shoulder 2+ vw left          Findings consistent with right shoulder calcified tendinitis and acute left shoulder pain  Discussed findings and treatment options with the patient  Patient's right shoulder is improving with therapy  I recommend her to continue attending physical therapy to rehabilitate her right shoulder  As far as her left shoulder condition which may be related to her other joint pain  I recommend patient to follow up with her family physician for further evaluation  I also recommended patient to do shoulder exercises with her left shoulder  I will see patient back in 6 weeks for re-evaluation  All patient's questions were answered to her satisfaction  This note is created using dictation transcription  It may contain typographical errors, grammatical errors, improperly dictated words, background noise and other errors  Subjective:     Patient ID: Anabel Carter is a 62 y o  female  Chief Complaint:  60-year-old female follow-up right shoulder calcified tendinitis  She has been attending physical therapy and feels the shoulder is feeling better  She still do not have full motion but the pain has improved  She had new episode of pain in her neck, shoulder, hip, and knee few weeks ago and was seen by her family physician  She has Lyme titer test which is negative  She was placed on prednisone taper dose which does provide her with pain relief  She is concerned about her left shoulder has similar condition as the right and requested x-ray  Since she start taking the prednisone her shoulder pain is less and she is able to move it without limitation      Allergy:  No Known Allergies  Medications:  all current active meds have been reviewed  Past Medical History:  Past Medical History:   Diagnosis Date    Concussion     Hyperlipidemia     Tendinitis      Past Surgical History:  Past Surgical History:   Procedure Laterality Date     SECTION      FOOT SURGERY Left 2007    HEMORROIDECTOMY      KNEE ARTHROSCOPY Right 2018    KNEE SURGERY      PA KNEE SCOPE,MED/LAT MENISECTOMY Right 2018    Procedure: ARTHROSCOPY KNEE, PARTIAL MEDIAL MENISCECTOMY: ABRASION CHONDROPLASTY;  Surgeon: Nay Jaime MD;  Location:  MAIN OR;  Service: Orthopedics    TUBAL LIGATION       Family History:  Family History   Problem Relation Age of Onset    Coronary artery disease Mother     Other Mother         CABG    Hiatal hernia Father     Stroke Maternal Grandmother     Hyperlipidemia Family     Migraines Family     Thyroid disease Family      Social History:  Social History     Substance and Sexual Activity   Alcohol Use Yes    Comment: social     Social History     Substance and Sexual Activity   Drug Use No     Social History     Tobacco Use   Smoking Status Former Smoker    Packs/day: 0 33    Years: 5 00    Pack years: 1 65    Last attempt to quit:     Years since quittin 2   Smokeless Tobacco Never Used     Review of Systems   Constitutional: Negative  HENT: Negative  Eyes: Negative  Respiratory: Negative  Cardiovascular: Negative  Gastrointestinal: Negative  Endocrine: Negative  Genitourinary: Negative  Musculoskeletal: Positive for arthralgias (right shoulder) and neck pain  Skin: Negative  Allergic/Immunologic: Negative  Neurological: Negative  Hematological: Negative  Psychiatric/Behavioral: Negative            Objective:  BP Readings from Last 1 Encounters:   19 122/80      Wt Readings from Last 1 Encounters:   19 68 9 kg (152 lb)      BMI:   Estimated body mass index is 26 09 kg/m² as calculated from the following:    Height as of this encounter: 5' 4" (1 626 m)  Weight as of this encounter: 68 9 kg (152 lb)  BSA:   Estimated body surface area is 1 74 meters squared as calculated from the following:    Height as of this encounter: 5' 4" (1 626 m)  Weight as of this encounter: 68 9 kg (152 lb)  Physical Exam   Constitutional: She is oriented to person, place, and time  She appears well-developed  HENT:   Head: Normocephalic and atraumatic  Eyes: Conjunctivae and EOM are normal    Neck: Neck supple  Neurological: She is alert and oriented to person, place, and time  Skin: Skin is warm  Psychiatric: She has a normal mood and affect  Nursing note and vitals reviewed  Right Shoulder Exam     Tenderness   The patient is experiencing no tenderness  Range of Motion   Active abduction: 130   Forward flexion: 150     Muscle Strength   The patient has normal right shoulder strength  Tests   Apprehension: negative  Cross arm: negative  Impingement: positive  Drop arm: negative    Other   Erythema: absent  Sensation: normal  Pulse: present      Left Shoulder Exam     Tenderness   The patient is experiencing no tenderness  Range of Motion   The patient has normal left shoulder ROM  Muscle Strength   The patient has normal left shoulder strength  Tests   Drop arm: negative    Other   Erythema: absent  Sensation: normal  Pulse: present             I have personally reviewed pertinent films in PACS and my interpretation is left shoulder showed good joint alignment  No DJD or soft tissue calcification  Type II acromial process

## 2019-03-15 ENCOUNTER — OFFICE VISIT (OUTPATIENT)
Dept: PHYSICAL THERAPY | Facility: CLINIC | Age: 59
End: 2019-03-15
Payer: COMMERCIAL

## 2019-03-15 DIAGNOSIS — M75.31 CALCIFIC TENDINITIS OF RIGHT SHOULDER: ICD-10-CM

## 2019-03-15 DIAGNOSIS — M25.512 ACUTE PAIN OF LEFT SHOULDER: Primary | ICD-10-CM

## 2019-03-15 PROCEDURE — 97140 MANUAL THERAPY 1/> REGIONS: CPT | Performed by: PHYSICAL THERAPIST

## 2019-03-15 PROCEDURE — 97110 THERAPEUTIC EXERCISES: CPT | Performed by: PHYSICAL THERAPIST

## 2019-03-15 NOTE — PROGRESS NOTES
Daily Note     Today's date: 3/15/2019  Patient name: Yoselyn Lamar  : 1960  MRN: [id number]  Referring provider: Payal Ryan PA-C  Dx:   Encounter Diagnosis     ICD-10-CM    1  Acute pain of left shoulder M25 512    2  Calcific tendinitis of right shoulder M75 31                   Subjective: Patient reports she is feeling much better since taking the prednisone  Today is her last day with it so she is worried she will start to feel increased pain again  She feels her range has gotten much better with weakness being her biggest deficit  Objective: See treatment diary below      Assessment: Patient completed documented program  She continues to progress with PROM and AAROM demonstrating increased ranges t/o  Strength training was progressed today with additional theraband TE's and some increased sets, however patient anxious to increase resistance of band due to upcoming busy weekend  Plan: Continue with current POC  Daily Treatment Diary     DX: (R) calcific tendinitis, (B) shoulder pain - suspected frozen shoulder  EPOC: 3/21/19  Precautions: standard  CO-MORBIDITES: hyperlipidemia   PERSONAL FACTORS: NA    Manual  3/1 3/8 3/11 3/15      (B) shoulder PROM 15' 15 10' 10'      (B) Sh' Mobs   5' 2'                (B) Sh' IASTM          Scap ROM 5' 5            Exercise Diary  HEP 3/1 3/8 3/11 3/15               Pulleys flex/ABD  2'/2' 2'/4' 2'/2' 3'/3'     Pendulums          Sh' Isometrics   5"x10  ea                 Doorway ER stretch          Chair flexion stretch          Supine cane flexion    p!        Supine cane horiz ABD/ADD          Standing IR/ext cane stretch          Supine cane ER          Towel Flex/Abd table stretch          Supine (B) Sh' AAROM Flexion  10x S/L 10x S/L Cane   10x 10x ea     S/L (B) Sh' AAROM ABD  10x S/L 10x S/L 10x 10x ea               Standing scap retractions          TB rows  OTB  10 OTB  10 OTB  20 OTB 10x2      TB Sh Ext  OTB  10 OTB  10 OTB  20x OTB 10x2     TB Elbow Ext  OTB  10 OTB  10 OTB  20x OTB 10x2     TB sh' IR      OTB 10 ea     TB sh' ER     OTB 10 ea     DB Elbow Flex          Sidelying ER  10x 10x 10x ea      AA S/L flex, abduction  B/l  10 ea B/l  10 ea                 Standing sh' flex     10 ea     Standing sh' scaption      10 ea     Standing sh' abd     10 ea                             Modalities

## 2019-03-18 ENCOUNTER — APPOINTMENT (OUTPATIENT)
Dept: PHYSICAL THERAPY | Facility: CLINIC | Age: 59
End: 2019-03-18
Payer: COMMERCIAL

## 2019-03-21 ENCOUNTER — OFFICE VISIT (OUTPATIENT)
Dept: FAMILY MEDICINE CLINIC | Facility: CLINIC | Age: 59
End: 2019-03-21
Payer: COMMERCIAL

## 2019-03-21 VITALS
TEMPERATURE: 99.7 F | DIASTOLIC BLOOD PRESSURE: 90 MMHG | BODY MASS INDEX: 25.95 KG/M2 | HEART RATE: 80 BPM | SYSTOLIC BLOOD PRESSURE: 128 MMHG | HEIGHT: 64 IN | OXYGEN SATURATION: 98 % | WEIGHT: 152 LBS

## 2019-03-21 DIAGNOSIS — R06.2 WHEEZING WITHOUT DIAGNOSIS OF ASTHMA: ICD-10-CM

## 2019-03-21 DIAGNOSIS — J20.9 ACUTE BRONCHITIS, UNSPECIFIED ORGANISM: Primary | ICD-10-CM

## 2019-03-21 PROBLEM — M54.2 NECK PAIN: Status: RESOLVED | Noted: 2018-11-02 | Resolved: 2019-03-21

## 2019-03-21 PROCEDURE — 3008F BODY MASS INDEX DOCD: CPT | Performed by: FAMILY MEDICINE

## 2019-03-21 PROCEDURE — 99214 OFFICE O/P EST MOD 30 MIN: CPT | Performed by: FAMILY MEDICINE

## 2019-03-21 RX ORDER — PREDNISONE 10 MG/1
TABLET ORAL
Qty: 40 TABLET | Refills: 0 | Status: SHIPPED | OUTPATIENT
Start: 2019-03-21 | End: 2019-04-15

## 2019-03-21 RX ORDER — CEFUROXIME AXETIL 500 MG/1
500 TABLET ORAL EVERY 12 HOURS SCHEDULED
Qty: 20 TABLET | Refills: 0 | Status: SHIPPED | OUTPATIENT
Start: 2019-03-21 | End: 2019-03-31

## 2019-03-21 RX ORDER — PROMETHAZINE HYDROCHLORIDE AND CODEINE PHOSPHATE 6.25; 1 MG/5ML; MG/5ML
5 SYRUP ORAL EVERY 4 HOURS PRN
Qty: 120 ML | Refills: 0 | Status: SHIPPED | OUTPATIENT
Start: 2019-03-21 | End: 2019-04-15

## 2019-03-21 NOTE — PATIENT INSTRUCTIONS
Acute Bronchitis   WHAT YOU NEED TO KNOW:   Acute bronchitis is swelling and irritation in the air passages of your lungs  This irritation may cause you to cough or have other breathing problems  Acute bronchitis often starts because of another illness, such as a cold or the flu  The illness spreads from your nose and throat to your windpipe and airways  Bronchitis is often called a chest cold  Acute bronchitis lasts about 3 to 6 weeks and is usually not a serious illness  Your cough can last for several weeks  DISCHARGE INSTRUCTIONS:   Return to the emergency department if:   · You cough up blood  · Your lips or fingernails turn blue  · You feel like you are not getting enough air when you breathe  Contact your healthcare provider if:   · You have a fever  · Your breathing problems do not go away or get worse  · Your cough does not get better within 4 weeks  · You have questions or concerns about your condition or care  Self-care:   · Get more rest   Rest helps your body to heal  Slowly start to do more each day  Rest when you feel it is needed  · Avoid irritants in the air  Avoid chemicals, fumes, and dust  Wear a face mask if you must work around dust or fumes  Stay inside on days when air pollution levels are high  If you have allergies, stay inside when pollen counts are high  Do not use aerosol products, such as spray-on deodorant, bug spray, and hair spray  · Do not smoke or be around others who smoke  Nicotine and other chemicals in cigarettes and cigars damages the cilia that move mucus out of your lungs  Ask your healthcare provider for information if you currently smoke and need help to quit  E-cigarettes or smokeless tobacco still contain nicotine  Talk to your healthcare provider before you use these products  · Drink liquids as directed  Liquids help keep your air passages moist and help you cough up mucus   You may need to drink more liquids when you have acute bronchitis  Ask how much liquid to drink each day and which liquids are best for you  · Use a humidifier or vaporizer  Use a cool mist humidifier or a vaporizer to increase air moisture in your home  This may make it easier for you to breathe and help decrease your cough  Decrease risk for acute bronchitis:   · Get the vaccinations you need  Ask your healthcare provider if you should get vaccinated against the flu or pneumonia  · Prevent the spread of germs  You can decrease your risk of acute bronchitis and other illnesses by doing the following:     [facility] your hands often with soap and water  Carry germ-killing hand lotion or gel with you  You can use the lotion or gel to clean your hands when soap and water are not available  ¨ Do not touch your eyes, nose, or mouth unless you have washed your hands first     ¨ Always cover your mouth when you cough to prevent the spread of germs  It is best to cough into a tissue or your shirt sleeve instead of into your hand  Ask those around you cover their mouths when they cough  ¨ Try to avoid people who have a cold or the flu  If you are sick, stay away from others as much as possible  Medicines: Your healthcare provider may  give you any of the following:  · Ibuprofen or acetaminophen  are medicines that help lower your fever  They are available without a doctor's order  Ask your healthcare provider which medicine is right for you  Ask how much to take and how often to take it  Follow directions  These medicines can cause stomach bleeding if not taken correctly  Ibuprofen can cause kidney damage  Do not take ibuprofen if you have kidney disease, an ulcer, or allergies to aspirin  Acetaminophen can cause liver damage  Do not take more than 4,000 milligrams in 24 hours  · Decongestants  help loosen mucus in your lungs and make it easier to cough up  This can help you breathe easier  · Cough suppressants  decrease your urge to cough   If your cough produces mucus, do not take a cough suppressant unless your healthcare provider tells you to  Your healthcare provider may suggest that you take a cough suppressant at night so you can rest     · Inhalers  may be given  Your healthcare provider may give you one or more inhalers to help you breathe easier and cough less  An inhaler gives your medicine to open your airways  Ask your healthcare provider to show you how to use your inhaler correctly  · Take your medicine as directed  Contact your healthcare provider if you think your medicine is not helping or if you have side effects  Tell him of her if you are allergic to any medicine  Keep a list of the medicines, vitamins, and herbs you take  Include the amounts, and when and why you take them  Bring the list or the pill bottles to follow-up visits  Carry your medicine list with you in case of an emergency  Follow up with your healthcare provider as directed:  Write down questions you have so you will remember to ask them during your follow-up visits  © 2017 [employer] Information is for End User's use only and may not be sold, redistributed or otherwise used for commercial purposes  All illustrations and images included in CareNotes® are the copyrighted property of [employer]  or Jaime Pickard  The above information is an  only  It is not intended as medical advice for individual conditions or treatments  Talk to your doctor, nurse or pharmacist before following any medical regimen to see if it is safe and effective for you

## 2019-03-21 NOTE — PROGRESS NOTES
Subjective:   Chief Complaint   Patient presents with    Cold Like Symptoms     pt started with a runny nose, congestion and sneezing on Sunday  Pt now thinks it moved to her chest  She has a cough that is productive at times  Pt had been producing yellow mucus when blowing her nose  Pt also complains of chills and SOB  Pt took Dayquil and Nyquil  Pt is due for colonoscopy and mammo  Pt states she still has orders at home and is aware that she needs to have both procedures done  Pt had FBW on 2/19/2019  Pt is due for hep c screening at next blood draw  Patient ID: Solo Conley is a 62 y o  female  The pt is here because she has been sick for 6 days   It started with nasal congestion   Now she feels it more in her chest  She is c/o SOB and wheezing, says her chest feels very tight  No current sinus pain/pressure  no ear pain and pressure  + cough  + headaches  no PND  no sore throat  She has felt subjective fevers/chills  No body aches  She does not have a history of asthma  She has never used an inhaler      The following portions of the patient's history were reviewed and updated as appropriate: allergies, current medications, past family history, past medical history, past social history, past surgical history and problem list     Review of Systems      Objective:  Vitals:    03/21/19 1512   BP: 128/90   Pulse: 80   Temp: 99 7 °F (37 6 °C)   SpO2: 98%   Weight: 68 9 kg (152 lb)   Height: 5' 4" (1 626 m)      Physical Exam   Constitutional: She is oriented to person, place, and time  Vital signs are normal  She appears well-developed and well-nourished  She does not appear ill  HENT:   Head: Normocephalic and atraumatic  Right Ear: Tympanic membrane, external ear and ear canal normal    Left Ear: Tympanic membrane, external ear and ear canal normal    Nose: Rhinorrhea present  Right sinus exhibits no maxillary sinus tenderness and no frontal sinus tenderness   Left sinus exhibits no maxillary sinus tenderness and no frontal sinus tenderness  Mouth/Throat: Mucous membranes are normal  Posterior oropharyngeal erythema present  No oropharyngeal exudate or posterior oropharyngeal edema  Eyes: Conjunctivae, EOM and lids are normal    Pulmonary/Chest: Effort normal  No respiratory distress  She has wheezes (Inspiratory and expiratory wheezes bilateral upper lobes as well as intermittent in the lower lobes; generalized rhonchi in the bilateral upper lobes)  Lymphadenopathy:     She has no cervical adenopathy  Neurological: She is alert and oriented to person, place, and time  Skin: Skin is warm, dry and intact  Psychiatric: She has a normal mood and affect  Assessment/Plan:    No problem-specific Assessment & Plan notes found for this encounter  Diagnoses and all orders for this visit:    Acute bronchitis, unspecified organism  -     cefuroxime (CEFTIN) 500 mg tablet; Take 1 tablet (500 mg total) by mouth every 12 (twelve) hours for 10 days  -     promethazine-codeine (PHENERGAN WITH CODEINE) 6 25-10 mg/5 mL syrup; Take 5 mL by mouth every 4 (four) hours as needed for cough    Wheezing without diagnosis of asthma  -     predniSONE 10 mg tablet; 5 tabs daily x3 days, 4 tabs daily x2 days, 3 tabs daily x2 days, 2 tabs daily x 2 days, 1 tab daily x2 days        Given the fact that the patient is wheezing I am going to start her on a prednisone taper  She did just complete a prednisone taper for her shoulder pain though she felt considerably better as far as her shoulders were concerned while on it so has no problem restarting one  I am also going to prescribe an antibiotic given how her lungs sound today  I do not necessarily think she has pneumonia but I am concerned with the significant rhonchi  If her symptoms worsen I will need to get a chest x-ray, she will call if this is the case  I did also prescribe cough syrup for her

## 2019-03-22 ENCOUNTER — APPOINTMENT (OUTPATIENT)
Dept: PHYSICAL THERAPY | Facility: CLINIC | Age: 59
End: 2019-03-22
Payer: COMMERCIAL

## 2019-03-25 ENCOUNTER — EVALUATION (OUTPATIENT)
Dept: PHYSICAL THERAPY | Facility: CLINIC | Age: 59
End: 2019-03-25
Payer: COMMERCIAL

## 2019-03-25 DIAGNOSIS — M25.512 ACUTE PAIN OF LEFT SHOULDER: Primary | ICD-10-CM

## 2019-03-25 DIAGNOSIS — M75.31 CALCIFIC TENDINITIS OF RIGHT SHOULDER: ICD-10-CM

## 2019-03-25 PROCEDURE — 97110 THERAPEUTIC EXERCISES: CPT | Performed by: PHYSICAL THERAPIST

## 2019-03-25 NOTE — PROGRESS NOTES
PT Evaluation     Today's date: 3/25/2019  Patient name: Kimo Girard  : 1960  MRN: [id number]  Referring provider: Tee Moon PA-C  Dx:   Encounter Diagnosis     ICD-10-CM    1  Acute pain of left shoulder M25 512    2  Calcific tendinitis of right shoulder M75 31                   Assessment  Assessment details: Since IE, patient reports significant improvements with (B) shoulder pain, ROM, strength, and functional mobility  She demonstrates with decreased pain levels, improved AROM bilaterally and slight increases in strength t/o (B) shoulders  She continues to have some deficits with (R) shoulder AROM overhead and weakness bilaterally  Skilled PT is required to review HEP with patient to increase independence and allow patient to return to desired level of function with all activities  No further referral appears necessary at this time based upon examination results  Thank you very much for your referral    Impairments: activity intolerance, impaired physical strength, pain with function, scapular dyskinesis, poor posture  and poor body mechanics    Symptom irritability: lowUnderstanding of Dx/Px/POC: excellent   Prognosis: good    Goals  ST  Decrease pain to 6/10 max in 3 weeks  - met  2  Increase (B) Shoulder AROM: Flexion to 120 degrees, Abduction to 120 degrees, Functional ER to T1, Functional IR to L1 in 3 weeks  - met  3  Increase (B) UE strength by 1/2 MMT grade in 3 weeks  - met  4  I with HEP in 3 weeks  - met   5  Improve FOTO score to 48 in 3 weeks  - met  LT  Decrease pain to 3/10 max in 6 weeks  - partially met   2  Increase (B) Shoulder AROM: Flexion to 150 degrees, Abduction to 150 degrees, Functional ER to T3, Functional IR to T10 in 6 weeks  - partially met  3  Increase (B) UE strength to 4+/5 all planes in 6 weeks  - partially met  4  I with HEP in 6 weeks  - met   5  Improve FOTO score to 59 in 6 weeks  - met      Plan  Plan details: Review HEP with patient NV     Patient would benefit from: skilled physical therapy  Referral necessary: No  Planned therapy interventions: joint mobilization, activity modification, manual therapy, motor coordination training, neuromuscular re-education, body mechanics training, patient education, postural training, strengthening, stretching, therapeutic activities, therapeutic exercise, functional ROM exercises and home exercise program  Frequency: 1x week  Duration in visits: 2  Duration in weeks: 2  Plan of Care beginning date: 3/25/2019  Plan of Care expiration date: 2019  Treatment plan discussed with: patient        Subjective Evaluation    History of Present Illness  Mechanism of injury: Patient reports she feels a little stronger and has significantly improved her ROM and pain since starting therapy  She is able to get dressed, do her hair, sleep, and perform overhead activities with decreased pain  She continues to have difficulty with high overhead reaching and feels some weakness with picking up her grandchildren  Quality of life: good    Pain  Current pain ratin  At best pain rating: 3  At worst pain ratin  Location: (R) Shoulder  Quality: dull ache and sharp    Social Support    Employment status: working  Hand dominance: left      Diagnostic Tests  X-ray: abnormal  Patient Goals  Patient goals for therapy: decreased pain, increased motion, increased strength, return to work and return to [employer] activities  Patient goal: Return to active lifestyle         Objective     Static Posture     Head  Forward  Shoulders  Depressed  Thoracic Spine  Hyperkyphosis      Postural Observations  Seated posture: fair  Standing posture: fair        Palpation     Additional Palpation Details  Slight TTP (B) anterior shoulder - biceps region (R>L)    Active Range of Motion   Left Shoulder   Flexion: 140 degrees   Extension: 55 degrees   Abduction: 158 degrees   External rotation BTH: T1   Internal rotation BTB: T11     Right Shoulder   Flexion: 120 degrees   Extension: 50 degrees   Abduction: 121 degrees   External rotation BTH: C7   Internal rotation BTB: L3     Passive Range of Motion     Right Shoulder   Flexion: 94 degrees   Abduction: 76 degrees   External rotation 45°: 35 degrees   Internal rotation 45°: 45 degrees     Strength/Myotome Testing     Left Shoulder     Planes of Motion   Flexion: 4   Extension: 4+   Abduction: 4   External rotation at 0°: 4+   Internal rotation at 0°: 4     Isolated Muscles   Biceps: 4+   Triceps: 4+     Right Shoulder     Planes of Motion   Flexion: 4   Extension: 4+   Abduction: 4   External rotation at 0°: 4   Internal rotation at 0°: 4     Isolated Muscles   Biceps: 4+   Triceps: 4+     Tests     Left Shoulder   Negative empty can, full can and Hawkin's  Right Shoulder   Positive empty can and full can  Negative Hawkin's  Additional Tests Details  Empty can/full can on (R) (+) for weakness, slight pain  Daily Treatment Diary     DX: (R) calcific tendinitis, (B) shoulder pain - suspected frozen shoulder  EPOC: 3/21/19  Precautions: standard  CO-MORBIDITES: hyperlipidemia   PERSONAL FACTORS: NA    Manual  3/1 3/8 3/11 3/15      (B) shoulder PROM 15' 15 10' 10'      (B) Sh' Mobs   5' 2'                (B) Sh' IASTM          Scap ROM 5' 5            Exercise Diary  HEP 3/1 3/8 3/11 3/15 3/25              Pulleys flex/ABD  2'/2' 2'/4' 2'/2' 3'/3' 2'/2'    Pendulums 2/7         Sh' Isometrics 2/7  5"x10  ea                 Doorway ER stretch 2/22         Chair flexion stretch          Supine cane flexion  2/22  p!        Supine cane horiz ABD/ADD 2/22         Standing IR/ext cane stretch          Supine cane ER          Towel Flex/Abd table stretch          Supine (B) Sh' AAROM Flexion  10x S/L 10x S/L Cane   10x 10x ea     S/L (B) Sh' AAROM ABD  10x S/L 10x S/L 10x 10x ea               Standing scap retractions          TB rows  OTB  10 OTB  10 OTB  20 OTB 10x2  OTB 10x2    TB Sh Ext OTB  10 OTB  10 OTB  20x OTB 10x2 OTB 10x2    TB Elbow Ext  OTB  10 OTB  10 OTB  20x OTB 10x2 OTB 10x2    TB sh' IR      OTB 10 ea OTB 10x2    TB sh' ER     OTB 10 ea OTB 10x2    DB Elbow Flex      2# 10x2    Sidelying ER  10x 10x 10x ea      AA S/L flex, abduction  B/l  10 ea B/l  10 ea                 Standing sh' flex     10 ea     Standing sh' scaption      10 ea     Standing sh' abd     10 ea                             Modalities

## 2019-03-29 ENCOUNTER — OFFICE VISIT (OUTPATIENT)
Dept: PHYSICAL THERAPY | Facility: CLINIC | Age: 59
End: 2019-03-29
Payer: COMMERCIAL

## 2019-03-29 DIAGNOSIS — M25.512 ACUTE PAIN OF LEFT SHOULDER: ICD-10-CM

## 2019-03-29 DIAGNOSIS — M75.31 CALCIFIC TENDINITIS OF RIGHT SHOULDER: Primary | ICD-10-CM

## 2019-03-29 PROCEDURE — 97110 THERAPEUTIC EXERCISES: CPT | Performed by: PHYSICAL THERAPIST

## 2019-03-29 NOTE — PROGRESS NOTES
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 without feeling weakness  Objective: See treatment diary below      Assessment: Patient completed documented program  HEP was reviewed with strengthening exercises added and stretching progressions with cane  She demonstrates independence with HEP and continues to feel decreased pain and increased functional mobility - will continue to exercise on her own at home at this time  Plan: DC skilled PT         Daily Treatment Diary     DX: (R) calcific tendinitis, (B) shoulder pain - suspected frozen shoulder  EPOC: 3/21/19  Precautions: standard  CO-MORBIDITES: hyperlipidemia   PERSONAL FACTORS: NA    Manual  3/1 3/8 3/11 3/15      (B) shoulder PROM 15' 15 10' 10'      (B) Sh' Mobs   5' 2'                (B) Sh' IASTM          Scap ROM 5' 5            Exercise Diary  HEP 3/1 3/8 3/11 3/15 3/25 3/29             Pulleys flex/ABD  2'/2' 2'/4' 2'/2' 3'/3' 2'/2'    Pendulums          Sh' Isometrics   5"x10  ea                 Doorway ER stretch          Chair flexion stretch          Supine cane flexion    p!    10x ea stand   Supine cane horiz ABD/ADD       10x ea stand   Standing IR/ext cane stretch       10x ea   Supine cane ER       10x ea   Towel Flex/Abd table stretch          Supine (B) Sh' AAROM Flexion  10x S/L 10x S/L Cane   10x 10x ea     S/L (B) Sh' AAROM ABD  10x S/L 10x S/L 10x 10x ea               Standing scap retractions          TB rows  OTB  10 OTB  10 OTB  20 OTB 10x2  OTB 10x2 OTB 10x   TB Sh Ext  OTB  10 OTB  10 OTB  20x OTB 10x2 OTB 10x2 OTB 10x   TB Elbow Ext  OTB  10 OTB  10 OTB  20x OTB 10x2 OTB 10x2 OTB 10x   TB sh' IR      OTB 10 ea OTB 10x2 OTB 10x   TB sh' ER     OTB 10 ea OTB 10x2 OTB 10x   DB Elbow Flex      2# 10x2 3# 10x   Sidelying ER  10x 10x 10x ea      AA S/L flex, abduction  B/l  10 ea B/l  10 ea                 Standing sh' flex     10 ea  1# 10x    Standing sh' scaption      10 ea  1# 10x   Standing sh' abd     10 ea  1# 10x             TB horiz' abd       10x       Modalities

## 2019-04-05 ENCOUNTER — TELEPHONE (OUTPATIENT)
Dept: FAMILY MEDICINE CLINIC | Facility: CLINIC | Age: 59
End: 2019-04-05

## 2019-04-15 ENCOUNTER — OFFICE VISIT (OUTPATIENT)
Dept: PAIN MEDICINE | Facility: CLINIC | Age: 59
End: 2019-04-15
Payer: COMMERCIAL

## 2019-04-15 VITALS
DIASTOLIC BLOOD PRESSURE: 88 MMHG | SYSTOLIC BLOOD PRESSURE: 118 MMHG | BODY MASS INDEX: 26.29 KG/M2 | HEART RATE: 74 BPM | WEIGHT: 154 LBS | HEIGHT: 64 IN

## 2019-04-15 DIAGNOSIS — M54.2 NECK PAIN: Primary | ICD-10-CM

## 2019-04-15 DIAGNOSIS — M50.90 CERVICAL DISC DISEASE: ICD-10-CM

## 2019-04-15 DIAGNOSIS — M47.812 SPONDYLOSIS OF CERVICAL REGION WITHOUT MYELOPATHY OR RADICULOPATHY: ICD-10-CM

## 2019-04-15 DIAGNOSIS — M79.18 CERVICAL MYOFASCIAL PAIN SYNDROME: ICD-10-CM

## 2019-04-15 PROCEDURE — 99213 OFFICE O/P EST LOW 20 MIN: CPT | Performed by: NURSE PRACTITIONER

## 2019-05-30 ENCOUNTER — OFFICE VISIT (OUTPATIENT)
Dept: FAMILY MEDICINE CLINIC | Facility: CLINIC | Age: 59
End: 2019-05-30
Payer: COMMERCIAL

## 2019-05-30 VITALS
BODY MASS INDEX: 25.95 KG/M2 | OXYGEN SATURATION: 97 % | TEMPERATURE: 98.9 F | HEIGHT: 64 IN | DIASTOLIC BLOOD PRESSURE: 82 MMHG | WEIGHT: 152 LBS | SYSTOLIC BLOOD PRESSURE: 122 MMHG | HEART RATE: 76 BPM

## 2019-05-30 DIAGNOSIS — E66.3 OVERWEIGHT (BMI 25.0-29.9): ICD-10-CM

## 2019-05-30 DIAGNOSIS — M25.511 CHRONIC PAIN IN RIGHT SHOULDER: Primary | ICD-10-CM

## 2019-05-30 DIAGNOSIS — G89.29 CHRONIC PAIN IN RIGHT SHOULDER: Primary | ICD-10-CM

## 2019-05-30 DIAGNOSIS — Z12.39 BREAST CANCER SCREENING: ICD-10-CM

## 2019-05-30 PROCEDURE — 99213 OFFICE O/P EST LOW 20 MIN: CPT | Performed by: FAMILY MEDICINE

## 2019-05-30 PROCEDURE — 1036F TOBACCO NON-USER: CPT | Performed by: FAMILY MEDICINE

## 2019-05-30 PROCEDURE — 3008F BODY MASS INDEX DOCD: CPT | Performed by: FAMILY MEDICINE

## 2019-06-14 ENCOUNTER — TELEPHONE (OUTPATIENT)
Dept: FAMILY MEDICINE CLINIC | Facility: CLINIC | Age: 59
End: 2019-06-14

## 2019-06-18 ENCOUNTER — TELEPHONE (OUTPATIENT)
Dept: FAMILY MEDICINE CLINIC | Facility: CLINIC | Age: 59
End: 2019-06-18

## 2019-07-01 ENCOUNTER — HOSPITAL ENCOUNTER (OUTPATIENT)
Dept: MRI IMAGING | Facility: HOSPITAL | Age: 59
Discharge: HOME | End: 2019-07-01
Payer: COMMERCIAL

## 2019-07-01 DIAGNOSIS — G89.29 CHRONIC PAIN IN RIGHT SHOULDER: ICD-10-CM

## 2019-07-01 DIAGNOSIS — M25.511 CHRONIC PAIN IN RIGHT SHOULDER: ICD-10-CM

## 2019-07-01 PROCEDURE — 73221 MRI JOINT UPR EXTREM W/O DYE: CPT

## 2019-07-03 ENCOUNTER — DOCUMENTATION (OUTPATIENT)
Dept: FAMILY MEDICINE CLINIC | Facility: CLINIC | Age: 59
End: 2019-07-03

## 2019-07-03 NOTE — PROGRESS NOTES
I sent the results in a message about the results to her my chart account the other day  She does have multiple tears  She needs to see Orthopedic surgery

## 2019-07-05 ENCOUNTER — TELEPHONE (OUTPATIENT)
Dept: FAMILY MEDICINE CLINIC | Facility: CLINIC | Age: 59
End: 2019-07-05

## 2019-07-05 DIAGNOSIS — G89.29 CHRONIC LEFT SHOULDER PAIN: Primary | ICD-10-CM

## 2019-07-05 DIAGNOSIS — M25.512 CHRONIC LEFT SHOULDER PAIN: Primary | ICD-10-CM

## 2019-07-05 NOTE — TELEPHONE ENCOUNTER
Pt called and is wondering if she should see a rheumatologist for her sore muscles because that is a different problem than her tears in her shoulder  Please advise

## 2019-07-10 ENCOUNTER — TELEPHONE (OUTPATIENT)
Dept: FAMILY MEDICINE CLINIC | Facility: CLINIC | Age: 59
End: 2019-07-10

## 2019-07-10 DIAGNOSIS — M25.511 PAIN OF BOTH SHOULDER JOINTS: Primary | ICD-10-CM

## 2019-07-10 DIAGNOSIS — M25.512 PAIN OF BOTH SHOULDER JOINTS: Primary | ICD-10-CM

## 2019-07-10 NOTE — TELEPHONE ENCOUNTER
Pt is going to see [facility]  She needs a dr to dr de la torre to go to see a rumatology dr Candie Duran you put it in the system so she can make the appt       [phone]

## 2019-07-18 ENCOUNTER — TELEPHONE (OUTPATIENT)
Dept: FAMILY MEDICINE CLINIC | Facility: CLINIC | Age: 59
End: 2019-07-18

## 2019-07-18 ENCOUNTER — TELEPHONE (OUTPATIENT)
Dept: OBGYN CLINIC | Facility: HOSPITAL | Age: 59
End: 2019-07-18

## 2019-07-18 ENCOUNTER — OFFICE VISIT (OUTPATIENT)
Dept: OBGYN CLINIC | Facility: CLINIC | Age: 59
End: 2019-07-18
Payer: COMMERCIAL

## 2019-07-18 VITALS
SYSTOLIC BLOOD PRESSURE: 140 MMHG | WEIGHT: 151.2 LBS | HEIGHT: 64 IN | DIASTOLIC BLOOD PRESSURE: 83 MMHG | BODY MASS INDEX: 25.81 KG/M2

## 2019-07-18 DIAGNOSIS — M75.31 CALCIFIC TENDINITIS OF RIGHT SHOULDER: Primary | ICD-10-CM

## 2019-07-18 DIAGNOSIS — M77.8 TENDINITIS OF RIGHT SHOULDER: ICD-10-CM

## 2019-07-18 DIAGNOSIS — M77.8 TENDINITIS OF LEFT SHOULDER: ICD-10-CM

## 2019-07-18 DIAGNOSIS — G89.29 CHRONIC LEFT SHOULDER PAIN: ICD-10-CM

## 2019-07-18 DIAGNOSIS — M75.111 NONTRAUMATIC INCOMPLETE TEAR OF ROTATOR CUFF, RIGHT: ICD-10-CM

## 2019-07-18 DIAGNOSIS — M25.512 CHRONIC LEFT SHOULDER PAIN: ICD-10-CM

## 2019-07-18 PROCEDURE — 20610 DRAIN/INJ JOINT/BURSA W/O US: CPT | Performed by: ORTHOPAEDIC SURGERY

## 2019-07-18 PROCEDURE — 99213 OFFICE O/P EST LOW 20 MIN: CPT | Performed by: ORTHOPAEDIC SURGERY

## 2019-07-18 RX ORDER — LIDOCAINE HYDROCHLORIDE 10 MG/ML
7 INJECTION, SOLUTION INFILTRATION; PERINEURAL
Status: COMPLETED | OUTPATIENT
Start: 2019-07-18 | End: 2019-07-18

## 2019-07-18 RX ORDER — BETAMETHASONE SODIUM PHOSPHATE AND BETAMETHASONE ACETATE 3; 3 MG/ML; MG/ML
6 INJECTION, SUSPENSION INTRA-ARTICULAR; INTRALESIONAL; INTRAMUSCULAR; SOFT TISSUE
Status: COMPLETED | OUTPATIENT
Start: 2019-07-18 | End: 2019-07-18

## 2019-07-18 RX ORDER — NAPROXEN 500 MG/1
500 TABLET ORAL 2 TIMES DAILY WITH MEALS
Qty: 60 TABLET | Refills: 1 | Status: SHIPPED | OUTPATIENT
Start: 2019-07-18 | End: 2019-08-08

## 2019-07-18 RX ADMIN — BETAMETHASONE SODIUM PHOSPHATE AND BETAMETHASONE ACETATE 6 MG: 3; 3 INJECTION, SUSPENSION INTRA-ARTICULAR; INTRALESIONAL; INTRAMUSCULAR; SOFT TISSUE at 11:09

## 2019-07-18 RX ADMIN — LIDOCAINE HYDROCHLORIDE 7 ML: 10 INJECTION, SOLUTION INFILTRATION; PERINEURAL at 11:09

## 2019-07-18 NOTE — PROGRESS NOTES
Assessment:     1  Calcific tendinitis of right shoulder    2  Chronic left shoulder pain    3  Nontraumatic incomplete tear of rotator cuff, right    4  Tendinitis of left shoulder    5  Tendinitis of right shoulder        Plan:     Problem List Items Addressed This Visit        Musculoskeletal and Integument    Calcific tendinitis of right shoulder - Primary    Relevant Orders    Large joint arthrocentesis: bilateral subacromial bursa (Completed)      Other Visit Diagnoses     Chronic left shoulder pain        Nontraumatic incomplete tear of rotator cuff, right        Relevant Orders    Large joint arthrocentesis: bilateral subacromial bursa (Completed)    Tendinitis of left shoulder        Relevant Orders    Large joint arthrocentesis: bilateral subacromial bursa (Completed)    Tendinitis of right shoulder        Relevant Orders    Large joint arthrocentesis: bilateral subacromial bursa (Completed)          Mri reviewed right shoulder shallow partial thickness supraspinatus tendon tear, tendinopathy , bursitis, hypertrophic AC joint  The partial-thickness tear of her rotator cuff does not correlate with her symptoms severity  The MRI does demonstrate diffuse soft tissue edema consistent with inflammation  Diffuse pain right shoulder with limited function secondary to pain, left shoulder limited overhead function with pain  She has had physical therapy, cortisone injection in past, also prednisone taper which has helped calm her pain  She is requesting cortisone injections today to see if they get pain back to base level  She has multiple joint pain and does have appt with rheumatologist for underlying anti inflammatory condition  Will see back after she has appt with rheumatologist, if no better she may need surgery arthroscopic on right shoulder  Stressed the importance of maintaining motion to avoid frozen shoulder  Naproxen sent to [facility]  All patient's questions were answered to her satisfaction    This note is created using dictation transcription  It may contain typographical errors, grammatical errors, improperly dictated words, background noise and other errors  Subjective:     Patient ID: Dick Gong is a 62 y o  female  Chief Complaint:  62year old female in for f/u of bilateral shoulder pain  Seen last in March and given cortisone injection for calcific tendonitis right shoulder and prescribed physical therapy  She did get relief from injection, and did do physical therapy  She has also been treating with PCP for multiple joint pain and does have appt with rheumatologist in near future due to continued joint pain  Her PCP has given 2 prednisone tapers which have helped  Yesterday acute flare up of bilateral shoulder pain, today she is not able to lift or move right shoulder due to diffuse pain, she does have limited motion of left shoulder  She is not able to even comb her hair  Feels weakness in her arms  She does have MRI PCP ordered which showed shallow partial thickness supraspinatus tendon tear with tendinopathy       Allergy:  No Known Allergies  Medications:  all current active meds have been reviewed  Past Medical History:  Past Medical History:   Diagnosis Date    Concussion     Hyperlipidemia     Tendinitis      Past Surgical History:  Past Surgical History:   Procedure Laterality Date     SECTION      FOOT SURGERY Left     HEMORROIDECTOMY      KNEE ARTHROSCOPY Right 2018    KNEE SURGERY      LA KNEE SCOPE,MED/LAT MENISECTOMY Right 2018    Procedure: ARTHROSCOPY KNEE, PARTIAL MEDIAL MENISCECTOMY: ABRASION CHONDROPLASTY;  Surgeon: Juan Alberto Fontanez MD;  Location:  MAIN OR;  Service: Orthopedics    TUBAL LIGATION       Family History:  Family History   Problem Relation Age of Onset    Coronary artery disease Mother     Other Mother         CABG    Hiatal hernia Father     Stroke Maternal Grandmother     Hyperlipidemia Family     Migraines Family     Thyroid disease Family      Social History:  Social History     Substance and Sexual Activity   Alcohol Use Yes    Comment: social     Social History     Substance and Sexual Activity   Drug Use No     Social History     Tobacco Use   Smoking Status Former Smoker    Packs/day: 0 33    Years: 5 00    Pack years: 1 65    Last attempt to quit:     Years since quittin 5   Smokeless Tobacco Never Used     Review of Systems   Constitutional: Negative for chills and fever  HENT: Negative for drooling and sneezing  Eyes: Negative for redness  Respiratory: Negative for cough and wheezing  Cardiovascular: Negative  Gastrointestinal: Negative for nausea and vomiting  Endocrine: Negative  Genitourinary: Negative  Musculoskeletal: Positive for arthralgias (Bilateral shoulder and multiple joint pain) and myalgias  Neurological: Negative  Hematological: Negative  Psychiatric/Behavioral: The patient is not nervous/anxious  Objective:  BP Readings from Last 1 Encounters:   19 140/83      Wt Readings from Last 1 Encounters:   19 68 6 kg (151 lb 3 2 oz)      BMI:   Estimated body mass index is 25 95 kg/m² as calculated from the following:    Height as of this encounter: 5' 4" (1 626 m)  Weight as of this encounter: 68 6 kg (151 lb 3 2 oz)  BSA:   Estimated body surface area is 1 74 meters squared as calculated from the following:    Height as of this encounter: 5' 4" (1 626 m)  Weight as of this encounter: 68 6 kg (151 lb 3 2 oz)  Physical Exam   Constitutional: She is oriented to person, place, and time  She appears well-developed and well-nourished  HENT:   Head: Normocephalic and atraumatic  Eyes: Conjunctivae and EOM are normal    Neck: Neck supple  Pulmonary/Chest: Effort normal    Neurological: She is alert and oriented to person, place, and time  She has normal reflexes  Skin: Skin is warm and dry  Psychiatric: She has a normal mood and affect  Her behavior is normal  Judgment and thought content normal    Nursing note and vitals reviewed  Right Shoulder Exam     Tenderness   Right shoulder tenderness location: diffuse generalized shoulder pain  Range of Motion   Active abduction: 20   Passive abduction: 120   External rotation: 30   Forward flexion: 120     Tests   Villafana test: positive  Cross arm: positive  Impingement: positive    Other   Erythema: absent  Scars: absent  Sensation: normal  Pulse: present    Comments:  Noted weakness in all planes secondary to pain      Left Shoulder Exam     Tenderness   Left shoulder tenderness location: diffuse shoulder pain  Range of Motion   Active abduction: 90   Passive abduction: 130   Forward flexion: 90     Tests   Villafana test: positive  Impingement: positive    Other   Erythema: absent  Scars: absent  Sensation: normal  Pulse: present     Comments:  Noted weakness in all planes of left shoulder secondary to pain            I have personally reviewed pertinent films in PACS and my interpretation is MRI right shoulder 7/1/19 partial thickness tear of supraspinatus tendon, partial thickness supraspinatus bursal surface tear, moderate supraspinatus/infraspinatus tendinopathy, mild long head tendinopathy  X-rays right shoulder minimal degenerative changes, small calcification adjacent to humeral head  X-rays left left shoulder no acute osseus abnormalities, mild osteoarthritis glenohumeral joint         Large joint arthrocentesis: bilateral subacromial bursa  Date/Time: 7/18/2019 11:09 AM  Consent given by: patient  Site marked: site marked  Timeout: Immediately prior to procedure a time out was called to verify the correct patient, procedure, equipment, support staff and site/side marked as required   Supporting Documentation  Indications: pain   Procedure Details  Location: shoulder - bilateral subacromial bursa  Preparation: Patient was prepped and draped in the usual sterile fashion  Needle size: 22 G  Ultrasound guidance: no  Approach: posterolateral    Medications (Right): 7 mL lidocaine 1 %; 6 mg betamethasone acetate-betamethasone sodium phosphate 6 (3-3) mg/mLMedications (Left): 7 mL lidocaine 1 %; 6 mg betamethasone acetate-betamethasone sodium phosphate 6 (3-3) mg/mL   Patient tolerance: patient tolerated the procedure well with no immediate complications  Dressing:  Sterile dressing applied          Scribe Attestation    I,:   Andrew Tsang am acting as a scribe while in the presence of the attending physician :        I,:   Gi Delgado MD personally performed the services described in this documentation    as scribed in my presence :

## 2019-07-18 NOTE — LETTER
July 18, 2019     Patient: Tj Crowder   YOB: 1960   Date of Visit: 7/18/2019       To Whom it May Concern:    Mikey Mendieta is under my professional care  She was seen in my office on 7/18/2019  She has medical excuse for missed time at work 7/19/19, 7/21/19  If you have any questions or concerns, please don't hesitate to call           Sincerely,          Deidre Mcclellan MD        CC: No Recipients

## 2019-07-18 NOTE — TELEPHONE ENCOUNTER
Caller: patient  Callback# [phone]  Dr Michael Griffin        Patient was just seen in office 07/18/19 was told medication will be sent to pharmacy  She isn't sure the name of it but she went no medication was sent please advise thanks

## 2019-08-06 ENCOUNTER — OFFICE VISIT (OUTPATIENT)
Dept: FAMILY MEDICINE CLINIC | Facility: CLINIC | Age: 59
End: 2019-08-06
Payer: COMMERCIAL

## 2019-08-06 VITALS
HEIGHT: 64 IN | WEIGHT: 151.5 LBS | DIASTOLIC BLOOD PRESSURE: 72 MMHG | TEMPERATURE: 98.8 F | HEART RATE: 90 BPM | BODY MASS INDEX: 25.86 KG/M2 | OXYGEN SATURATION: 97 % | SYSTOLIC BLOOD PRESSURE: 110 MMHG

## 2019-08-06 DIAGNOSIS — Z12.39 BREAST CANCER SCREENING: ICD-10-CM

## 2019-08-06 DIAGNOSIS — G89.29 CHRONIC PAIN IN RIGHT SHOULDER: ICD-10-CM

## 2019-08-06 DIAGNOSIS — M25.50 ARTHRALGIA OF MULTIPLE JOINTS: Primary | ICD-10-CM

## 2019-08-06 DIAGNOSIS — M25.511 CHRONIC PAIN IN RIGHT SHOULDER: ICD-10-CM

## 2019-08-06 PROBLEM — M25.512 ACUTE PAIN OF LEFT SHOULDER: Status: RESOLVED | Noted: 2019-03-08 | Resolved: 2019-08-06

## 2019-08-06 PROCEDURE — 36415 COLL VENOUS BLD VENIPUNCTURE: CPT | Performed by: FAMILY MEDICINE

## 2019-08-06 PROCEDURE — 99214 OFFICE O/P EST MOD 30 MIN: CPT | Performed by: FAMILY MEDICINE

## 2019-08-06 RX ORDER — TRAMADOL HYDROCHLORIDE 50 MG/1
50-100 TABLET ORAL EVERY 8 HOURS PRN
Qty: 60 TABLET | Refills: 0 | Status: SHIPPED | OUTPATIENT
Start: 2019-08-06 | End: 2019-08-08

## 2019-08-06 RX ORDER — TIZANIDINE 4 MG/1
4 TABLET ORAL
Qty: 30 TABLET | Refills: 0 | Status: SHIPPED | OUTPATIENT
Start: 2019-08-06 | End: 2019-08-08

## 2019-08-06 NOTE — PATIENT INSTRUCTIONS
Fibromyalgia   WHAT YOU NEED TO KNOW:   Fibromyalgia is a long-term condition that causes pain and tender points throughout your body  Fibromyalgia can start at any age and is more common in women than in men  DISCHARGE INSTRUCTIONS:   Medicines:   · Acetaminophen and ibuprofen: These medicines decrease pain  They are available without a doctor's order  Ask your healthcare provider which medicine is right for you  Ask how much to take and how often to take it  Follow directions  These medicines can cause stomach bleeding if not taken correctly  Ibuprofen can cause kidney damage  Acetaminophen can cause liver damage  · Pain medicine: You may be given a prescription medicine to decrease pain  Do not wait until the pain is severe before you take this medicine  · Muscle relaxers  help decrease pain and muscle spasms  · Antidepressants: These help decrease depression, pain, and fatigue  · Antiseizure medicine: This is used to reduce fibromyalgia pain  · Take your medicine as directed  Contact your healthcare provider if you think your medicine is not helping or if you have side effects  Tell him of her if you are allergic to any medicine  Keep a list of the medicines, vitamins, and herbs you take  Include the amounts, and when and why you take them  Bring the list or the pill bottles to follow-up visits  Carry your medicine list with you in case of an emergency  Follow up with your healthcare provider or pain specialist as directed:  Write down your questions so you remember to ask them during your visits  Manage your symptoms:   · Keep a pain diary:  Record your symptoms and what activity caused them  This may also help you track pain cycles and show a pattern to your symptoms  · Exercise:  Ask your healthcare provider about the best exercise plan for you  Exercise and other strength-training activities may decrease pain and sleep problems      · Set good sleep habits:  Do not nap during the day  Go to bed at the same time each night  Make sure your bedroom is dark, quiet, and comfortable  Do not stay in bed if you cannot sleep  Get up and do something relaxing until you are sleepy  Do not drink caffeine or alcohol right before you go to bed  These can make it difficult for you to sleep  Limit other liquids to help decrease your need to urinate in the night  Contact your healthcare provider or pain specialist if:   · Your pain increases, even after you take pain medicine  · You have difficulty sleeping  · You have questions or concerns about your condition or care  Return to the emergency department if:   · You are depressed and feel you cannot cope with your condition  © 2017 [employer] Information is for End User's use only and may not be sold, redistributed or otherwise used for commercial purposes  All illustrations and images included in CareNotes® are the copyrighted property of [employer] , [email or web address]  or Jaime Pickard  The above information is an  only  It is not intended as medical advice for individual conditions or treatments  Talk to your doctor, nurse or pharmacist before following any medical regimen to see if it is safe and effective for you

## 2019-08-06 NOTE — ASSESSMENT & PLAN NOTE
The patient has complaints of joint pain in multiple joints but no obvious swelling  The pain in her shoulders is explained by findings on MRI  She is wondering about the possibility of fibromyalgia which could certainly be a possibility though with findings in her shoulders I do not necessarily think that fibromyalgia is causing this pain  We discussed the generally fibromyalgia as a diagnosis comes up with completely negative testing including blood work and imaging  That being said, it is certainly possible for some of her other complaints  I am going to repeat her rheumatologic lab work given that has been over a year since it was done  She has an appointment with Rheumatology in November but says she can't wait that long with how uncomfortable she has  After her labs come back I will reach out to the rheumatologist to see if there is any chance of getting her in sooner  In the meantime, I am going to give her tramadol and tizanidine to use at nighttime  I advised she used her naproxen during the day

## 2019-08-06 NOTE — PROGRESS NOTES
Subjective:   Chief Complaint   Patient presents with    Muscle Pain     PT IS HERE FOR ON-GOING  JOINT AND MUSCLE PAIN SINCE JANUARY  PT UNABLE TO GET INTO [facility]  UNTIL NOVEMBER  Patient ID: Toñito Cook is a 61 y o  female      The pt is here today complaining of generalized muscle, bone and joint pain  This is been going on for months, she says since at least November  Looking back, though, this is something she has complained about for years  She had a full rheumatologic panel including RIGO and rheumatoid factor with ESR and CRP in May of 2018  Everything was completely normal/negative    Her shoulders are a huge part of her problem  She does have significant tendinitis and problems with both shoulders, on MRI she had significant inflammation in both  She did see Dr John Garcia in July who advised that she see Rheumatology  He did give her a steroid injection in both shoulders which helped for a short amount of time but has not lasted  Knees hurt  Neck hurts  Sometimes her knees swell  Sometimes she feels the right side of her neck feels swollen  Has pain in her wrists    Can't sleep at night because it hurts to roll over in bed because she is in severe pain all of the time  Can't get out of the tub, has to turn around and get onto her hands and knees    Ortho put her on naproxen  Has been on various NSAIDs  All help a little for a week but then don't make any difference    Generalized fatigue  "can't go on like this"        The following portions of the patient's history were reviewed and updated as appropriate: allergies, current medications, past family history, past medical history, past social history, past surgical history and problem list     Review of Systems      Objective:  Vitals:    08/06/19 0931   BP: 110/72   Pulse: 90   Temp: 98 8 °F (37 1 °C)   SpO2: 97%   Weight: 68 7 kg (151 lb 8 oz)   Height: 5' 4" (1 626 m)      Physical Exam   Constitutional: She is oriented to person, place, and time  She appears well-developed and well-nourished  No distress  Musculoskeletal: Normal range of motion  She exhibits no edema  Neurological: She is alert and oriented to person, place, and time  No cranial nerve deficit  Coordination normal    Skin: Skin is warm and dry  No rash noted  She is not diaphoretic  No erythema  Psychiatric: She has a normal mood and affect  Her behavior is normal  Judgment and thought content normal          Assessment/Plan:    Arthralgia of multiple joints  The patient has complaints of joint pain in multiple joints but no obvious swelling  The pain in her shoulders is explained by findings on MRI  She is wondering about the possibility of fibromyalgia which could certainly be a possibility though with findings in her shoulders I do not necessarily think that fibromyalgia is causing this pain  We discussed the generally fibromyalgia as a diagnosis comes up with completely negative testing including blood work and imaging  That being said, it is certainly possible for some of her other complaints  I am going to repeat her rheumatologic lab work given that has been over a year since it was done  She has an appointment with Rheumatology in November but says she can't wait that long with how uncomfortable she has  After her labs come back I will reach out to the rheumatologist to see if there is any chance of getting her in sooner  In the meantime, I am going to give her tramadol and tizanidine to use at nighttime  I advised she used her naproxen during the day  Diagnoses and all orders for this visit:    Arthralgia of multiple joints  -     tiZANidine (ZANAFLEX) 4 mg tablet; Take 1 tablet (4 mg total) by mouth daily at bedtime as needed for muscle spasms  -     traMADol (ULTRAM) 50 mg tablet;  Take 1-2 tablets ( mg total) by mouth every 8 (eight) hours as needed for severe pain  -     RIGO w/Reflex if Positive  -     C-reactive protein  -     Rheumatoid Arthritis Factor  -     Uric acid    Chronic pain in right shoulder  -     traMADol (ULTRAM) 50 mg tablet;  Take 1-2 tablets ( mg total) by mouth every 8 (eight) hours as needed for severe pain    Breast cancer screening  -     Mammo screening bilateral w 3d & cad; Future

## 2019-08-07 LAB
ANA SER QL: NEGATIVE
CRP SERPL-MCNC: 81 MG/L (ref 0–10)
RHEUMATOID FACT SERPL-ACNC: <10 IU/ML (ref 0–13.9)
URATE SERPL-MCNC: 4.3 MG/DL (ref 2.5–7.1)

## 2019-08-08 ENCOUNTER — APPOINTMENT (OUTPATIENT)
Dept: RADIOLOGY | Facility: CLINIC | Age: 59
End: 2019-08-08
Payer: COMMERCIAL

## 2019-08-08 ENCOUNTER — OFFICE VISIT (OUTPATIENT)
Dept: RHEUMATOLOGY | Facility: CLINIC | Age: 59
End: 2019-08-08
Payer: COMMERCIAL

## 2019-08-08 VITALS
DIASTOLIC BLOOD PRESSURE: 85 MMHG | WEIGHT: 150.8 LBS | HEIGHT: 64 IN | HEART RATE: 84 BPM | BODY MASS INDEX: 25.74 KG/M2 | SYSTOLIC BLOOD PRESSURE: 129 MMHG

## 2019-08-08 DIAGNOSIS — M75.51 SUBDELTOID BURSITIS OF RIGHT SHOULDER JOINT: ICD-10-CM

## 2019-08-08 DIAGNOSIS — Z79.899 HIGH RISK MEDICATION USE: ICD-10-CM

## 2019-08-08 DIAGNOSIS — R09.1 PLEURISY: ICD-10-CM

## 2019-08-08 DIAGNOSIS — M25.50 DIFFUSE ARTHRALGIA: ICD-10-CM

## 2019-08-08 DIAGNOSIS — M19.90 INFLAMMATORY ARTHRITIS: Primary | ICD-10-CM

## 2019-08-08 DIAGNOSIS — M17.11 PRIMARY OSTEOARTHRITIS OF RIGHT KNEE: ICD-10-CM

## 2019-08-08 DIAGNOSIS — M19.90 INFLAMMATORY ARTHRITIS: ICD-10-CM

## 2019-08-08 PROCEDURE — 73630 X-RAY EXAM OF FOOT: CPT

## 2019-08-08 PROCEDURE — 71046 X-RAY EXAM CHEST 2 VIEWS: CPT

## 2019-08-08 PROCEDURE — 73130 X-RAY EXAM OF HAND: CPT

## 2019-08-08 PROCEDURE — 99245 OFF/OP CONSLTJ NEW/EST HI 55: CPT | Performed by: INTERNAL MEDICINE

## 2019-08-08 RX ORDER — PREDNISONE 20 MG/1
TABLET ORAL
Qty: 40 TABLET | Refills: 0 | Status: SHIPPED | OUTPATIENT
Start: 2019-08-08 | End: 2019-08-27 | Stop reason: SDUPTHER

## 2019-08-08 NOTE — PROGRESS NOTES
Assessment and Plan:   Ms Lianet Pedraza is a 28-year-old  female with history significant for mild right knee osteoarthritis, right knee meniscal tear status post surgery and right shoulder rotator cuff tears with subdeltoid/subacromial bursitis, who presents for further evaluation of diffuse joint pains and an elevated C reactive protein of 81  She is referred by Dr Esme Barker Jamal presents today for further evaluation of diffuse arthralgias which have been gradually progressive over the past 1 year  She does also describe significant stiffness affecting multiple joints which symptom wise appears to be concerning for an underlying inflammatory arthritis  Based on her physical examination there is significant synovitis noted at multiple joints as described below, with boutonniere like deformities  Given the symptoms she has been experiencing, physical examination findings, significant elevation in her inflammation markers, and significant response noted to oral steroids, I suspect her overall presentation is consistent with rheumatoid arthritis  To further evaluate I would like to check an anti CCP antibody as well to assess for seropositivity  Baseline x-rays of her hands and feet will be obtained to assess for changes consistent with erosive arthropathy  - I would like to see her back in the office in 2 weeks to review the results of the labs and x-rays, and determine further treatment at that time  In the interim I will prescribe her a course of prednisone to start at 40 mg daily for 1 week, then decrease to 20 mg daily until she follows up with me in the office  At the next office visit we will discuss definitive treatment, and gradually try to taper off the steroids  I advised her in the interim to discontinue use of the tramadol, NSAIDs as well as muscle relaxants  - Follow-up in 2 weeks        Plan:  Diagnoses and all orders for this visit:    Inflammatory arthritis  -     CBC and differential; Future  -     Comprehensive metabolic panel; Future  -     Sedimentation rate, automated; Future  -     Cyclic citrul peptide antibody, IgG; Future  -     Sjogren's Antibodies; Future  -     XR hand 3+ vw right; Future  -     XR hand 3+ vw left; Future  -     XR foot 3+ vw right; Future  -     XR foot 3+ vw left; Future  -     predniSONE 20 mg tablet; Take 2 tablets once daily x 7 days, then 1 tablet daily until you see me  Diffuse arthralgia    Subdeltoid bursitis of right shoulder joint    Primary osteoarthritis of right knee    High risk medication use  -     Hepatitis B surface antigen; Future  -     Hepatitis C antibody; Future    Pleurisy  -     XR chest pa & lateral; Future      I have personally reviewed pertinent films in PACS of the MRI right shoulder which shows partial-thickness rotator cuff tears, as well as subacromial/subdeltoid bursitis  The MRI of her right knee showed a medial meniscus tear with mild patellofemoral compartment osteoarthritis, as well as a moderate joint effusion with prominent synovitis  Activities as tolerated    Diet: low carb/low fat, more greens/vegetables, adequate hydration  Exercise: try to maintain a low impact exercise regimen as much as possible  Walk for 30 minutes a day for at least 3 days a week    Encouraged to maintain good sleep hygiene  Continue other medications as prescribed by PCP and other specialists        RTC in 2 weeks  HPI  Ms Jesse Fitzgerald is a 41-year-old  female with history significant for mild right knee osteoarthritis, right knee meniscal tear status post surgery and right shoulder rotator cuff tears with subdeltoid/subacromial bursitis, who presents for further evaluation of diffuse joint pains and an elevated C reactive protein of 81  She is referred by Dr Aleja Gauthier  Patient states in November 2017 she had an accidental injury resulting in a concussion, and states overall she has not felt well since then    She is able to recall more diffuse joint pains starting in December 2018, and states they have been more prominent as well as progressive since then  She experiences her symptoms on a daily basis in a mostly constant manner  She states it is significantly interfering with her activities of daily living, as well as with her sleeping habits  She describes pain most prominently affecting her bilateral shoulders, bilateral wrists, to a lesser degree affecting her bilateral hands, low back region, hips and knees  She does not really experience joint pains affecting her elbows, ankles or feet  She has subjectively only noticed swelling affecting her knees  She experiences morning stiffness which affects her diffusely and persists throughout the day  She states any sort of activity aggravates her symptoms, but she does not necessarily obtain relief with resting  At the onset of her symptoms in February/March 2019 she was prescribed a 10 day course of prednisone starting at 40 mg daily, which she states significantly helped her  She was then represcribed a course of prednisone of the same duration in June 2019 which also helped her  She states the symptoms recur quickly after cessation of the steroids  She was recently also prescribed tramadol and tizanidine for the pain, but this has not really been helping her  She has also tried over-the-counter ibuprofen, Advil, Aleve and naproxen without significant benefit of her symptoms  She denies any recent fevers, chills, night sweats, unintentional weight loss or infections prior to the onset of her joint complaints  She denies significant dry eyes, inflammatory eye disease, dry mouth, skin rashes, psoriasis, mouth/nose ulcers, shortness of breath, abdominal pain, vomiting, diarrhea, blood in stools, inflammatory bowel disease or family history of rheumatoid arthritis/autoimmune disease      She was seen by her primary care physician for the ongoing complaints and had recent testing done which showed an elevated C reactive protein of 81  RIGO screen, rheumatoid factor, uric acid and Lyme antibody profile were unremarkable  In view of the ongoing right shoulder pain she recently had an MRI of her right shoulder done which showed partial-thickness rotator cuff tears as well as subacromial/subdeltoid bursitis  She has been following up with Orthopedics for this, and it is not thought that the rotator cuff tears should correspond to the symptoms she has been experiencing  She also received bilateral intra-articular cortisone shoulder injections which only provided her with temporary relief  She was advised to follow-up with Rheumatology first, and if her symptoms are not to improve then there may be consideration for an arthroscopic procedure  The following portions of the patient's history were reviewed and updated as appropriate: allergies, current medications, past family history, past medical history, past social history, past surgical history and problem list       Review of Systems  Constitutional: Negative for weight change, fevers, chills, night sweats, fatigue  ENT/Mouth: Negative for hearing changes, ear pain, nasal congestion, sinus pain, hoarseness, sore throat, rhinorrhea, swallowing difficulty  Eyes: Negative for pain, redness, discharge, vision changes  Cardiovascular: Negative for SOB, palpitations  Positive for chest pain  Respiratory: Negative for cough, sputum, wheezing, dyspnea  Gastrointestinal: Negative for vomiting, diarrhea, constipation, pain, heartburn  Positive for nausea  Genitourinary: Negative for dysuria, urinary frequency, hematuria  Musculoskeletal: As per HPI  Skin: Negative for skin rash, color changes  Neuro: Negative for weakness, numbness, tingling, loss of consciousness  Psych: Negative for depression  Positive for anxiety  Heme/Lymph: Negative for easy bruising, bleeding, lymphadenopathy          Past Medical History: Diagnosis Date    Concussion     Hyperlipidemia     Tendinitis        Past Surgical History:   Procedure Laterality Date     SECTION      FOOT SURGERY Left 2007    HEMORROIDECTOMY      KNEE ARTHROSCOPY Right 2018    KNEE SURGERY      AR KNEE SCOPE,MED/LAT MENISECTOMY Right 2018    Procedure: ARTHROSCOPY KNEE, PARTIAL MEDIAL MENISCECTOMY: ABRASION CHONDROPLASTY;  Surgeon: Maik Combs MD;  [facility];  Service: Orthopedics    TUBAL LIGATION         Social History     Socioeconomic History    Marital status:      Spouse name: Not on file    Number of children: Not on file    Years of education: Not on file    Highest education level: Not on file   Occupational History    Not on file   Social Needs    Financial resource strain: Not on file    Food insecurity:     Worry: Not on file     Inability: Not on file    Transportation needs:     Medical: Not on file     Non-medical: Not on file   Tobacco Use    Smoking status: Former Smoker     Packs/day: 0 33     Years: 5 00     Pack years: 1 65     Last attempt to quit:      Years since quittin 6    Smokeless tobacco: Never Used   Substance and Sexual Activity    Alcohol use: Yes     Comment: social    Drug use: No    Sexual activity: Not on file   Lifestyle    Physical activity:     Days per week: Not on file     Minutes per session: Not on file    Stress: Not on file   Relationships    Social connections:     Talks on phone: Not on file     Gets together: Not on file     Attends Worship service: Not on file     Active member of club or organization: Not on file     Attends meetings of clubs or organizations: Not on file     Relationship status: Not on file    Intimate partner violence:     Fear of current or ex partner: Not on file     Emotionally abused: Not on file     Physically abused: Not on file     Forced sexual activity: Not on file   Other Topics Concern    Not on file   Social History Narrative    Not on file       Family History   Problem Relation Age of Onset    Coronary artery disease Mother     Other Mother         CABG    Hiatal hernia Father     Stroke Maternal Grandmother     Hyperlipidemia Family     Migraines Family     Thyroid disease Family     No Known Problems Sister     No Known Problems Brother     No Known Problems Son     No Known Problems Daughter     No Known Problems Paternal Grandmother     No Known Problems Paternal Grandfather     No Known Problems Maternal Aunt     No Known Problems Maternal Uncle     No Known Problems Paternal Aunt     No Known Problems Paternal Uncle     No Known Problems Cousin        No Known Allergies      Current Outpatient Medications:     Glucosamine-Chondroitin (GLUCOSAMINE CHONDR COMPLEX PO), Take by mouth daily  , Disp: , Rfl:     pravastatin (PRAVACHOL) 40 mg tablet, TAKE 1 TABLET BY MOUTH AT BEDTIME, Disp: 30 tablet, Rfl: 3    predniSONE 20 mg tablet, Take 2 tablets once daily x 7 days, then 1 tablet daily until you see me , Disp: 40 tablet, Rfl: 0      Objective:    Vitals:    08/08/19 1155   BP: 129/85   Pulse: 84   Weight: 68 4 kg (150 lb 12 8 oz)   Height: 5' 4" (1 626 m)       Physical Exam  General: Well appearing, well nourished, in no distress  Oriented x 3, normal mood and affect  Ambulating without difficulty  Skin: Good turgor, no rash, unusual bruising or prominent lesions  Hair: Normal texture and distribution  Nails: Normal color, no deformities  HEENT:  Head: Normocephalic, atraumatic  Eyes: Conjunctiva clear, sclera non-icteric, EOM intact  Nose: No external lesions, mucosa non-inflamed  Mouth: Mucous membranes moist, no mucosal lesions  Heart: Regular rate and rhythm, no murmur or gallop  Lungs: Clear to auscultation, no crackles or wheezing  Extremities: No amputations or deformities, cyanosis, edema    Musculoskeletal:   Hands - there is no significant tenderness to palpation of her bilateral DIPs, PIPs or MCPs  She does have fullness appreciated at her bilateral 2nd and 3rd PIPs, most prominent at the 3rd digits  Her MCPs are unremarkable in terms of soft tissue swelling  She is able to make full fists bilaterally  She does have mild boutonniere like deformities present at her right hand 3rd and 4th digits, as well as at her left hand 4th digit  She states this may be gradually changing over time  There is no ulnar deviation noted  Wrists - there is bilateral wrist soft tissue swelling noted, without significant tenderness  She has discomfort on full flexion bilaterally  There is no restriction in range of motion  Elbows - unremarkable  Shoulders - there is left shoulder soft tissue swelling present anteriorly  There is no significant tenderness noted bilaterally  She has limitation in abduction to 90° with her right shoulder on active range of motion, and to approximately 40° abduction with active range of motion of her left shoulder  Hips - unremarkable  Knees - there are bilateral moderate effusions present, more prominent on the left side  There is no tenderness present to palpation  She has near full range of motion bilaterally  Ankles and feet - unremarkable with negative MTP squeeze test bilaterally  Negative fibromyalgia tender points  Neurologic: Alert and oriented  No focal neurological deficits appreciated  Psychiatric: Normal mood and affect  JASON Mo    Rheumatology

## 2019-08-13 LAB
ALBUMIN SERPL-MCNC: 4.3 G/DL (ref 3.5–5.5)
ALBUMIN/GLOB SERPL: 2 {RATIO} (ref 1.2–2.2)
ALP SERPL-CCNC: 133 IU/L (ref 39–117)
ALT SERPL-CCNC: 30 IU/L (ref 0–32)
AST SERPL-CCNC: 9 IU/L (ref 0–40)
BASOPHILS # BLD AUTO: 0 X10E3/UL (ref 0–0.2)
BASOPHILS NFR BLD AUTO: 1 %
BILIRUB SERPL-MCNC: <0.2 MG/DL (ref 0–1.2)
BUN SERPL-MCNC: 18 MG/DL (ref 6–24)
BUN/CREAT SERPL: 27 (ref 9–23)
CALCIUM SERPL-MCNC: 9.7 MG/DL (ref 8.7–10.2)
CCP IGA+IGG SERPL IA-ACNC: <1 UNITS (ref 0–19)
CHLORIDE SERPL-SCNC: 104 MMOL/L (ref 96–106)
CO2 SERPL-SCNC: 24 MMOL/L (ref 20–29)
CREAT SERPL-MCNC: 0.66 MG/DL (ref 0.57–1)
ENA SS-A AB SER-ACNC: 0.4 AI (ref 0–0.9)
ENA SS-B AB SER-ACNC: <0.2 AI (ref 0–0.9)
EOSINOPHIL # BLD AUTO: 0.1 X10E3/UL (ref 0–0.4)
EOSINOPHIL NFR BLD AUTO: 1 %
ERYTHROCYTE [DISTWIDTH] IN BLOOD BY AUTOMATED COUNT: 14.3 % (ref 12.3–15.4)
ERYTHROCYTE [SEDIMENTATION RATE] IN BLOOD BY WESTERGREN METHOD: 16 MM/HR (ref 0–40)
GLOBULIN SER-MCNC: 2.2 G/DL (ref 1.5–4.5)
GLUCOSE SERPL-MCNC: 85 MG/DL (ref 65–99)
HBV SURFACE AG SERPL QL IA: NEGATIVE
HCT VFR BLD AUTO: 35.8 % (ref 34–46.6)
HCV AB S/CO SERPL IA: <0.1 S/CO RATIO (ref 0–0.9)
HGB BLD-MCNC: 11.8 G/DL (ref 11.1–15.9)
IMM GRANULOCYTES # BLD: 0 X10E3/UL (ref 0–0.1)
IMM GRANULOCYTES NFR BLD: 1 %
LYMPHOCYTES # BLD AUTO: 1.2 X10E3/UL (ref 0.7–3.1)
LYMPHOCYTES NFR BLD AUTO: 21 %
MCH RBC QN AUTO: 29.6 PG (ref 26.6–33)
MCHC RBC AUTO-ENTMCNC: 33 G/DL (ref 31.5–35.7)
MCV RBC AUTO: 90 FL (ref 79–97)
MONOCYTES # BLD AUTO: 0.5 X10E3/UL (ref 0.1–0.9)
MONOCYTES NFR BLD AUTO: 9 %
NEUTROPHILS # BLD AUTO: 4 X10E3/UL (ref 1.4–7)
NEUTROPHILS NFR BLD AUTO: 67 %
PLATELET # BLD AUTO: 409 X10E3/UL (ref 150–450)
POTASSIUM SERPL-SCNC: 4.2 MMOL/L (ref 3.5–5.2)
PROT SERPL-MCNC: 6.5 G/DL (ref 6–8.5)
RBC # BLD AUTO: 3.99 X10E6/UL (ref 3.77–5.28)
SL AMB EGFR AFRICAN AMERICAN: 112 ML/MIN/1.73
SL AMB EGFR NON AFRICAN AMERICAN: 97 ML/MIN/1.73
SODIUM SERPL-SCNC: 143 MMOL/L (ref 134–144)
WBC # BLD AUTO: 5.8 X10E3/UL (ref 3.4–10.8)

## 2019-08-15 ENCOUNTER — TELEPHONE (OUTPATIENT)
Dept: OBGYN CLINIC | Facility: CLINIC | Age: 59
End: 2019-08-15

## 2019-08-15 NOTE — TELEPHONE ENCOUNTER
lmom advising results with office call back number   ----- Message from Damian Godwin MD sent at 8/14/2019  2:16 PM EDT -----  Please let patient know there was evidence of inflammation consistent with rheumatoid arthritis on her x-rays  We will discuss specific treatment at the follow-up visit  Thanks

## 2019-08-27 ENCOUNTER — TELEPHONE (OUTPATIENT)
Dept: RHEUMATOLOGY | Facility: CLINIC | Age: 59
End: 2019-08-27

## 2019-08-27 ENCOUNTER — OFFICE VISIT (OUTPATIENT)
Dept: RHEUMATOLOGY | Facility: CLINIC | Age: 59
End: 2019-08-27
Payer: COMMERCIAL

## 2019-08-27 VITALS
DIASTOLIC BLOOD PRESSURE: 78 MMHG | BODY MASS INDEX: 26.22 KG/M2 | WEIGHT: 153.6 LBS | HEIGHT: 64 IN | SYSTOLIC BLOOD PRESSURE: 122 MMHG | HEART RATE: 66 BPM

## 2019-08-27 DIAGNOSIS — Z79.899 HIGH RISK MEDICATION USE: ICD-10-CM

## 2019-08-27 DIAGNOSIS — M75.51 SUBDELTOID BURSITIS OF RIGHT SHOULDER JOINT: ICD-10-CM

## 2019-08-27 DIAGNOSIS — M17.11 PRIMARY OSTEOARTHRITIS OF RIGHT KNEE: ICD-10-CM

## 2019-08-27 DIAGNOSIS — M06.00 SERONEGATIVE RHEUMATOID ARTHRITIS (HCC): Primary | ICD-10-CM

## 2019-08-27 DIAGNOSIS — M19.90 INFLAMMATORY ARTHRITIS: ICD-10-CM

## 2019-08-27 DIAGNOSIS — Z79.52 CURRENT USE OF STEROID MEDICATION: ICD-10-CM

## 2019-08-27 PROCEDURE — 99214 OFFICE O/P EST MOD 30 MIN: CPT | Performed by: INTERNAL MEDICINE

## 2019-08-27 RX ORDER — PREDNISONE 10 MG/1
10 TABLET ORAL DAILY
Qty: 60 TABLET | Refills: 2 | Status: SHIPPED | OUTPATIENT
Start: 2019-08-27 | End: 2019-11-27 | Stop reason: SDUPTHER

## 2019-08-27 RX ORDER — NAPROXEN 500 MG/1
TABLET ORAL
COMMUNITY
Start: 2019-08-13 | End: 2019-09-04 | Stop reason: CLARIF

## 2019-08-27 RX ORDER — FOLIC ACID 1 MG/1
1 TABLET ORAL DAILY
Qty: 30 TABLET | Refills: 5 | Status: SHIPPED | OUTPATIENT
Start: 2019-08-27 | End: 2019-10-18

## 2019-08-27 NOTE — PROGRESS NOTES
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)  - Flavio Izaguirre presents today for follow-up of an inflammatory arthritis with symptoms of diffuse arthralgias which have been gradually progressive over the past 1 year  At her initial office visit there was evidence of significant synovitis noted at multiple joints, with boutonniere like deformities  I did start her on a course of oral steroids which has significantly improved the joint pains/swelling/stiffness that she was experiencing, although she does continue to have evidence of mild to moderate synovitis at multiple joints as described below  - We did review her additional serologies which were unrevealing, but her x-rays did show concerns for erosive arthropathy at the right hand DIP joints  This would be an unusual distribution for rheumatoid arthritis, and I question the possibility of psoriatic arthritis as a cause of her symptoms, as opposed to seronegative rheumatoid arthritis  She denies a history of psoriasis  - At this time I recommend we initiate DMARD therapy, and recommend starting her on methotrexate at 4 tablets weekly with folic acid 1 mg daily  I reviewed the side effects of methotrexate including but not limited to, risk of infections, GI upset, mucositis and need for bone marrow/liver/kidney monitoring  She is aware not to combine methotrexate with alcohol due to concerns for hepatotoxicity  I requested she repeat a CBC and CMP in 4 weeks after initiation of methotrexate    If she is tolerating the medication well at that time I will plan to increase the dose to 6 tablets weekly  - In the interim she can continue to take prednisone daily, but I advised her to try decreasing the dose to 10 mg daily  She can stay on this dose until her follow-up with me  If she is to experience a flare of symptoms she can increase the dose to a maximum of 20 mg daily  I also advised her to call me for any questions or guidance on the steroid taper  - I will see her back in the office in 3 months, but advised her to call with any concerns or flare-up in symptoms  Plan:  Diagnoses and all orders for this visit:    Seronegative rheumatoid arthritis ([address] )  -     methotrexate (RHEUMATREX) 2 5 MG tablet; Take 4 tablets (10 mg total) by mouth once a week  -     folic acid (FOLVITE) 1 mg tablet; Take 1 tablet (1 mg total) by mouth daily    Primary osteoarthritis of right knee    Subdeltoid bursitis of right shoulder joint    Current use of steroid medication    Inflammatory arthritis  -     predniSONE 10 mg tablet; Take 1 tablet (10 mg total) by mouth daily    High risk medication use  -     Comprehensive metabolic panel; Future  -     CBC and differential; Future      I have personally reviewed pertinent films in PACS which shows concern for erosive changes at the right hand DIP joints  Activities as tolerated    Diet: low carb/low fat, more greens/vegetables, adequate hydration  Exercise: try to maintain a low impact exercise regimen as much as possible  Walk for 30 minutes a day for at least 3 days a week    Encouraged to maintain good sleep hygiene  Continue other medications as prescribed by PCP and other specialists        RTC in 3 months         HPI    INITIAL VISIT NOTE:  Ms Nikki Gallardo is a 59-year-old  female with history significant for mild right knee osteoarthritis, right knee meniscal tear status post surgery and right shoulder rotator cuff tears with subdeltoid/subacromial bursitis, who presents for further evaluation of diffuse joint pains and an elevated C reactive protein of 81  She is referred by Dr Cheryl Yoder      Patient states in November 2017 she had an accidental injury resulting in a concussion, and states overall she has not felt well since then  She is able to recall more diffuse joint pains starting in December 2018, and states they have been more prominent as well as progressive since then  She experiences her symptoms on a daily basis in a mostly constant manner  She states it is significantly interfering with her activities of daily living, as well as with her sleeping habits  She describes pain most prominently affecting her bilateral shoulders, bilateral wrists, to a lesser degree affecting her bilateral hands, low back region, hips and knees  She does not really experience joint pains affecting her elbows, ankles or feet  She has subjectively only noticed swelling affecting her knees  She experiences morning stiffness which affects her diffusely and persists throughout the day  She states any sort of activity aggravates her symptoms, but she does not necessarily obtain relief with resting  At the onset of her symptoms in February/March 2019 she was prescribed a 10 day course of prednisone starting at 40 mg daily, which she states significantly helped her  She was then represcribed a course of prednisone of the same duration in June 2019 which also helped her  She states the symptoms recur quickly after cessation of the steroids  She was recently also prescribed tramadol and tizanidine for the pain, but this has not really been helping her  She has also tried over-the-counter ibuprofen, Advil, Aleve and naproxen without significant benefit of her symptoms      She denies any recent fevers, chills, night sweats, unintentional weight loss or infections prior to the onset of her joint complaints    She denies significant dry eyes, inflammatory eye disease, dry mouth, skin rashes, psoriasis, mouth/nose ulcers, shortness of breath, abdominal pain, vomiting, diarrhea, blood in stools, inflammatory bowel disease or family history of rheumatoid arthritis/autoimmune disease      She was seen by her primary care physician for the ongoing complaints and had recent testing done which showed an elevated C reactive protein of 81  RIGO screen, rheumatoid factor, uric acid and Lyme antibody profile were unremarkable  In view of the ongoing right shoulder pain she recently had an MRI of her right shoulder done which showed partial-thickness rotator cuff tears as well as subacromial/subdeltoid bursitis  She has been following up with Orthopedics for this, and it is not thought that the rotator cuff tears should correspond to the symptoms she has been experiencing  She also received bilateral intra-articular cortisone shoulder injections which only provided her with temporary relief  She was advised to follow-up with Rheumatology first, and if her symptoms are not to improve then there may be consideration for an arthroscopic procedure        8/27/2019:  Patient presents for a follow-up today  We reviewed her recently done labs which showed an unremarkable CBC, CMP, ESR, anti CCP antibody, Sjogren's antibodies and chronic hepatitis panel  X-ray of her right hand showed periarticular soft tissue swelling of the 2nd through 5th PIP joints, as well as subtle juxta-articular erosions of the 2nd through 5th DIP joints  X-ray of her left hand and bilateral feet did not show any signs of inflammatory arthritis  Following the prior office visit patient had started prednisone 40 mg daily for 7 days, and is currently on prednisone 20 mg daily  She states being on the steroids has significantly improved her symptoms, and she is currently denying any joint pains, swelling or morning stiffness  She is feeling well today and denies any complaints  Of note she denies a history of psoriasis or skin rashes  She denies a family history of psoriasis      The following portions of the patient's history were reviewed and updated as appropriate: allergies, current medications, past family history, past medical history, past social history, past surgical history and problem list       Review of Systems  Constitutional: Negative for weight change, fevers, chills, night sweats, fatigue  ENT/Mouth: Negative for hearing changes, ear pain, nasal congestion, sinus pain, hoarseness, sore throat, rhinorrhea, swallowing difficulty  Eyes: Negative for pain, redness, discharge, vision changes  Cardiovascular: Negative for chest pain, SOB, palpitations  Respiratory: Negative for cough, sputum, wheezing, dyspnea  Gastrointestinal: Negative for nausea, vomiting, diarrhea, constipation, pain, heartburn  Genitourinary: Negative for dysuria, urinary frequency, hematuria  Musculoskeletal: As per HPI  Skin: Negative for skin rash, color changes  Neuro: Negative for weakness, numbness, tingling, loss of consciousness  Psych: Negative for anxiety, depression  Heme/Lymph: Negative for easy bruising, bleeding, lymphadenopathy          Past Medical History:   Diagnosis Date    Concussion     Hyperlipidemia     Tendinitis        Past Surgical History:   Procedure Laterality Date     SECTION      FOOT SURGERY Left     HEMORROIDECTOMY      KNEE ARTHROSCOPY Right 2018    KNEE SURGERY      [place] KNEE SCOPE,MED/LAT MENISECTOMY Right 2018    Procedure: ARTHROSCOPY KNEE, PARTIAL MEDIAL MENISCECTOMY: ABRASION CHONDROPLASTY;  Surgeon: Destini Mike MD;  Location: [facility] [place];  Service: Orthopedics    TUBAL LIGATION         Social History     Socioeconomic History    Marital status:      Spouse name: Not on file    Number of children: Not on file    Years of education: Not on file    Highest education level: Not on file   Occupational History    Not on file   Social Needs    Financial resource strain: Not on file    Food insecurity:     Worry: Not on file Inability: Not on file    Transportation needs:     Medical: Not on file     Non-medical: Not on file   Tobacco Use    Smoking status: Former Smoker     Packs/day: 0 33     Years: 5 00     Pack years: 1 65     Last attempt to quit:      Years since quittin 6    Smokeless tobacco: Never Used   Substance and Sexual Activity    Alcohol use: Yes     Comment: social    Drug use: No    Sexual activity: Not on file   Lifestyle    Physical activity:     Days per week: Not on file     Minutes per session: Not on file    Stress: Not on file   Relationships    Social connections:     Talks on phone: Not on file     Gets together: Not on file     Attends Gnosticist service: Not on file     Active member of club or organization: Not on file     Attends meetings of clubs or organizations: Not on file     Relationship status: Not on file    Intimate partner violence:     Fear of current or ex partner: Not on file     Emotionally abused: Not on file     Physically abused: Not on file     Forced sexual activity: Not on file   Other Topics Concern    Not on file   Social History Narrative    Not on file       Family History   Problem Relation Age of Onset    Coronary artery disease Mother     Other Mother         CABG    Hiatal hernia Father     Stroke Maternal Grandmother     Hyperlipidemia Family     Migraines Family     Thyroid disease Family     No Known Problems Sister     No Known Problems Brother     No Known Problems Son     No Known Problems Daughter     No Known Problems Paternal Grandmother     No Known Problems Paternal Grandfather     No Known Problems Maternal Aunt     No Known Problems Maternal Uncle     No Known Problems Paternal Aunt     No Known Problems Paternal Uncle     No Known Problems Cousin        No Known Allergies      Current Outpatient Medications:     pravastatin (PRAVACHOL) 40 mg tablet, TAKE 1 TABLET BY MOUTH AT BEDTIME, Disp: 30 tablet, Rfl: 3    predniSONE 10 mg tablet, Take 1 tablet (10 mg total) by mouth daily, Disp: 60 tablet, Rfl: 2    folic acid (FOLVITE) 1 mg tablet, Take 1 tablet (1 mg total) by mouth daily, Disp: 30 tablet, Rfl: 5    Glucosamine-Chondroitin (GLUCOSAMINE CHONDR COMPLEX PO), Take by mouth daily  , Disp: , Rfl:     methotrexate (RHEUMATREX) 2 5 MG tablet, Take 4 tablets (10 mg total) by mouth once a week, Disp: 16 tablet, Rfl: 2    naproxen (NAPROSYN) 500 mg tablet, , Disp: , Rfl:       Objective:    Vitals:    08/27/19 1354   BP: 122/78   BP Location: Right arm   Patient Position: Sitting   Cuff Size: Adult   Pulse: 66   Weight: 69 7 kg (153 lb 9 6 oz)   Height: 5' 4" (1 626 m)       Physical Exam  General: Well appearing, well nourished, in no distress  Oriented x 3, normal mood and affect  Ambulating without difficulty  Skin: Good turgor, no rash, unusual bruising or prominent lesions  No psoriasis  Hair: Normal texture and distribution  Nails: Normal color, no deformities  No nail pitting  HEENT:  Head: Normocephalic, atraumatic  Eyes: Conjunctiva clear, sclera non-icteric, EOM intact  Nose: No external lesions, mucosa non-inflamed  Mouth: Mucous membranes moist, no mucosal lesions  Neck: Supple, thyroid non-enlarged and non-tender  No lymphadenopathy  Extremities: No amputations or deformities, cyanosis, edema  Musculoskeletal:   Hands - she still appears to have fullness appreciated of her bilateral PIP joints, but there is no tenderness to palpation of her DIPs, PIPs or MCPs  She is able to make full fists bilaterally  She does have mild boutonniere like deformities present at her right hand 3rd and 4th digits, as well as at her left hand 4th digit  There is no ulnar deviation noted  Wrists - she still has bilateral wrist soft tissue swelling noted without tenderness or restriction in range of motion  Elbows - unremarkable  Shoulders - there is left shoulder soft tissue swelling present anteriorly    There is no significant tenderness noted bilaterally  She has limitation in abduction to 90° with the right shoulder on active range of motion, and to approximately 40° abduction with active range of motion of her left shoulder  Hips - unremarkable  Knees - the effusions have resolved and there is no tenderness noted  Ankles and feet - unremarkable with negative MTP squeeze test bilaterally  Neurologic: Alert and oriented  No focal neurological deficits appreciated  Psychiatric: Normal mood and affect  JASON Gonzalez    Rheumatology

## 2019-08-27 NOTE — PATIENT INSTRUCTIONS
Please have labs done in 4 weeks after starting methotrexate  Methotrexate (By mouth)   Methotrexate (meth-oh-TREX-ate)  Treats several kinds of cancer, including cancer of the blood, bone, lung, breast, head, or neck  Also treats rheumatoid arthritis and psoriasis (a skin disease)  Brand Name(s): Trexall   There may be other brand names for this medicine  When This Medicine Should Not Be Used: You should not use this medicine if you have had an allergic reaction to methotrexate or if you are breastfeeding  You should not use this medicine for arthritis or psoriasis if you are pregnant, or if you have liver disease or certain problems with your blood or immune system  Make sure your doctor knows if you drink alcohol of any kind on a regular basis  How to Use This Medicine:   Tablet  · Medicines used to treat cancer are very strong and can have many side effects  Before receiving this medicine, make sure you understand all the risks and benefits  It is important for you to work closely with your doctor during your treatment  · Your doctor will tell you how much medicine to use  Do not use more than directed  The correct schedule for this medicine is different for arthritis, psoriasis, and different kinds of cancer  For example, people who have arthritis or psoriasis often take this medicine only once a week  · Make sure you understand your personal dosing schedule  Ask your doctor and pharmacist if you are not sure when or how often to take your medicine  · If you take the medicine only once a week, it is best to take it on the same day each week  · Write down on a calendar or piece of paper when you are supposed to use your medicine  Keep the calendar or paper where you can see it  After you take your dose of medicine, cross off that date on your calendar or paper  This will help you remember if you took the medicine or if you still need to take it   If you need other ideas to help you keep track of your schedule, ask your pharmacist or other healthcare provider  If a dose is missed:   · Take a dose as soon as you remember  If it is almost time for your next dose, wait until then and take a regular dose  Do not take extra medicine to make up for a missed dose  · If you use this medicine only once a week and you miss a dose or forget to use your medicine, skip the missed dose and use your medicine as soon as possible  Return to your regular schedule the following week  Do not use extra medicine to make up for a missed dose  How to Store and Dispose of This Medicine:   · Store the medicine in a closed container at room temperature, away from heat, moisture, and direct light  · Ask your pharmacist, doctor, or health caregiver about the best way to dispose of any outdated medicine or medicine no longer needed  · Keep all medicine out of the reach of children  Never share your medicine with anyone  Drugs and Foods to Avoid:   Ask your doctor or pharmacist before using any other medicine, including over-the-counter medicines, vitamins, and herbal products  · There are many drugs that can interact with methotrexate  Some of these drugs are aspirin, phenytoin (Dilantin®), theophylline (John-Dur®), antibiotics (such as penicillin, tetracycline, Bactrim®, Septra®), and vitamin supplements that contain folic acid  Make sure your doctor knows about ALL other medicines you are using  · If you have bone cancer, ask your doctor if you need to avoid using pain or arthritis medicine (such as aspirin, diclofenac, ibuprofen, indomethacin, Advil®, Aleve®, Bextra®, Celebrex®) or steroid medicines (such as prednisone)  · If you have arthritis, make sure your doctor knows about all other medicines you are using to treat arthritis (such as aspirin, gold, ibuprofen, prednisone, sulfasalazine, Azulfidine®, Celebrex®, Plaquenil®)    · Make sure your doctor knows about any cancer treatments you are using, including cisplatin ([address]) or radiation  · Tell your doctor if you have used methotrexate before for any reason  · Talk to your doctor before getting flu shots or other vaccines while you are receiving methotrexate  Vaccines may not work as well while you are using this medicine  Warnings While Using This Medicine:   · Using this medicine while you are pregnant can harm your unborn baby  The medicine may also cause birth defects if the father is using it when his sexual partner becomes pregnant  Use an effective form of birth control during treatment with methotrexate  For a man, continue birth control for at least 3 months after stopping treatment  For a woman, continue birth control until you have had two menstrual periods after stopping treatment  If a pregnancy occurs while you are using this medicine, tell your doctor right away  · Make sure your doctor knows if you have kidney disease, liver disease, a stomach ulcer, colitis, diabetes, hepatitis, HIV or AIDS, problems with your immune system, or any kind of problem with your blood (such as anemia)  Tell your doctor if you get an infection of any kind  · This medicine may make your skin more sensitive to sunlight  Wear sunscreen  Do not use sunlamps or tanning beds  Tell your doctor if you have increased skin redness or other problems  · You may get infections more easily while you are using this medicine  Stay away from people with colds, flu, or other infections  Wash your hands often  · Your doctor will need to check your progress at regular visits while you are using this medicine  You may need to have blood tests or other kinds of medical tests  Be sure to keep all appointments  · Call your doctor right away if you think you have taken too much of this medicine    Possible Side Effects While Using This Medicine:   Call your doctor right away if you notice any of these side effects:  · Allergic reaction: Itching or hives, swelling in your face or hands, swelling or tingling in your mouth or throat, chest tightness, trouble breathing  · Blistering, peeling, red skin rash  · Cough, fever, chest pain, trouble breathing, blue lips or fingers  · Eyes or skin turn yellow, or dark-colored urine or pale stools  · Fever  · Nausea, vomiting, diarrhea, loss of appetite, stomach pain  · Sores or white patches on your lips, mouth, or throat  · Unusual bleeding, bruising, or weakness  If you notice these less serious side effects, talk with your doctor:   · Hair loss, headache, dizziness  If you notice other side effects that you think are caused by this medicine, tell your doctor  Call your doctor for medical advice about side effects  You may report side effects to FDA at 4-091-FDA-1884  © 2017 [address] Information is for End User's use only and may not be sold, redistributed or otherwise used for commercial purposes  The above information is an  only  It is not intended as medical advice for individual conditions or treatments  Talk to your doctor, nurse or pharmacist before following any medical regimen to see if it is safe and effective for you

## 2019-08-27 NOTE — TELEPHONE ENCOUNTER
DK UJVYLWO       [facility] pharmacy in [facility] called in wanting to verify medication  They would like to know if methotrexate (RHEUMATREX) 2 5 MG isok to fill? Due to patient having naproxen on her chart as well   Please advise, thank you

## 2019-09-04 ENCOUNTER — OFFICE VISIT (OUTPATIENT)
Dept: FAMILY MEDICINE CLINIC | Facility: CLINIC | Age: 59
End: 2019-09-04
Payer: COMMERCIAL

## 2019-09-04 VITALS
BODY MASS INDEX: 24.07 KG/M2 | HEART RATE: 85 BPM | HEIGHT: 64 IN | OXYGEN SATURATION: 97 % | WEIGHT: 141 LBS | SYSTOLIC BLOOD PRESSURE: 114 MMHG | DIASTOLIC BLOOD PRESSURE: 68 MMHG | TEMPERATURE: 97.4 F

## 2019-09-04 DIAGNOSIS — L40.50 PSORIATIC ARTHRITIS (HCC): ICD-10-CM

## 2019-09-04 DIAGNOSIS — Z01.419 ENCOUNTER FOR GYNECOLOGICAL EXAMINATION WITHOUT ABNORMAL FINDING: Primary | ICD-10-CM

## 2019-09-04 DIAGNOSIS — Z12.4 SCREENING FOR CERVICAL CANCER: ICD-10-CM

## 2019-09-04 DIAGNOSIS — M06.00 SERONEGATIVE RHEUMATOID ARTHRITIS (HCC): ICD-10-CM

## 2019-09-04 PROCEDURE — 99396 PREV VISIT EST AGE 40-64: CPT | Performed by: FAMILY MEDICINE

## 2019-09-04 NOTE — PROGRESS NOTES
Gregroia Phillips is a 61 y o  female here for a routine gynecologic exam       Current complaints:   none     Gynecologic History  No LMP recorded (lmp unknown)  Patient is postmenopausal   Contraception: none  Last Pap: years ago  Results were: normal  Last mammogram: 11/2017  Results were: normal  Last colonoscopy: 11/2017  Results were: normal    The patient is postmenopausal  She had an ablation many years ago because her  When not stop  She has not had a period in many years  She is not having any hot flashes or other menopausal symptoms    The following portions of the patient's history were reviewed and updated as appropriate: allergies, current medications, past family history, past medical history, past social history, past surgical history and problem list     Review of Systems   Constitutional: Negative for unexpected weight change  Genitourinary: Negative for difficulty urinating, dyspareunia, dysuria, frequency, menstrual problem, pelvic pain, vaginal bleeding, vaginal discharge and vaginal pain  Skin: Negative for rash  Objective    Vitals:    09/04/19 1442   BP: 114/68   Pulse: 85   Temp: (!) 97 4 °F (36 3 °C)   SpO2: 97%   Weight: 64 kg (141 lb)   Height: 5' 4" (1 626 m)       Physical Exam   Constitutional: She appears well-developed and well-nourished  Pulmonary/Chest: Effort normal    Genitourinary: Vagina normal and uterus normal  Pelvic exam was performed with patient supine  There is no rash, tenderness or lesion on the right labia  There is no rash, tenderness or lesion on the left labia  Cervix exhibits no motion tenderness, no discharge and no friability  Right adnexum displays no mass, no tenderness and no fullness  Left adnexum displays no mass, no tenderness and no fullness  Assessment     Healthy female exam       Plan     Education reviewed: self breast exams  Mammogram ordered  Follow up in: 5 Years if the Pap is normal in HPV negative  Seronegative rheumatoid arthritis ([facility] [address] )  The patient is now seen Rheumatology and has been officially diagnosed with rheumatoid/psoriatic arthritis  She is on prednisone which she is now weaning off of to get on methotrexate  She is taking her folic acid  She will continue follow-up with her rheumatologist   She is feeling considerably better

## 2019-09-04 NOTE — ASSESSMENT & PLAN NOTE
The patient is now seen Rheumatology and has been officially diagnosed with rheumatoid/psoriatic arthritis  She is on prednisone which she is now weaning off of to get on methotrexate  She is taking her folic acid  She will continue follow-up with her rheumatologist   She is feeling considerably better

## 2019-09-06 LAB
CYTOLOGIST CVX/VAG CYTO: NORMAL
DX ICD CODE: NORMAL
HPV I/H RISK 1 DNA CVX QL PROBE+SIG AMP: NEGATIVE
OTHER STN SPEC: NORMAL
PATH REPORT.FINAL DX SPEC: NORMAL
SL AMB NOTE:: NORMAL
SL AMB SPECIMEN ADEQUACY: NORMAL

## 2019-09-30 ENCOUNTER — TELEPHONE (OUTPATIENT)
Dept: OBGYN CLINIC | Facility: HOSPITAL | Age: 59
End: 2019-09-30

## 2019-09-30 NOTE — TELEPHONE ENCOUNTER
Please check with her where the rash is located and what it looks like  She can skip 1 week of the methotrexate to see if the rash subsides  Please have her call me back after this, as we will decide if she should continue the methotrexate or change to another medication for the arthritis  Thanks

## 2019-09-30 NOTE — TELEPHONE ENCOUNTER
Caller: patient   Call back number:   Patient's doctor: Dr Teresa Brunson    Patient started Methotrexate 4 weeks ago  She started with a rash last week that started on her right forearm  She has not taken anything else that is new and has not used any new products like body wash or laundry detergent  She is concerned it is from the methotrexate

## 2019-10-01 NOTE — TELEPHONE ENCOUNTER
Patient returning phone call  # [phone]    Patient said the rash is on her right forearm and looks like hives  Please advise

## 2019-10-10 NOTE — TELEPHONE ENCOUNTER
Called patient and recommended she retry methotrexate 1 more time, as I am unsure if the rash was definitively related to the medication  She is willing to do this  I advised her to let me know if the rash recurs, at which time we will stop the methotrexate and start an alternate DMARD

## 2019-10-10 NOTE — TELEPHONE ENCOUNTER
Patient is calling back  Her rash has cleared up  She is asking if she can get started on another medication?

## 2019-10-18 ENCOUNTER — TELEPHONE (OUTPATIENT)
Dept: OBGYN CLINIC | Facility: HOSPITAL | Age: 59
End: 2019-10-18

## 2019-10-18 DIAGNOSIS — M06.00 SERONEGATIVE RHEUMATOID ARTHRITIS (HCC): Primary | ICD-10-CM

## 2019-10-18 RX ORDER — LEFLUNOMIDE 20 MG/1
20 TABLET ORAL DAILY
Qty: 30 TABLET | Refills: 2 | Status: SHIPPED | OUTPATIENT
Start: 2019-10-18 | End: 2020-01-20 | Stop reason: SDUPTHER

## 2019-10-18 NOTE — TELEPHONE ENCOUNTER
Spoke with patient and she is willing to take the new medication, can we please send it to [facility](in chart)   I advised her to start the new medication on Monday (as per our conversation)  Thank you

## 2019-10-18 NOTE — TELEPHONE ENCOUNTER
Please advise her to discontinue the methotrexate  We will start her on a medication called leflunomide which will be 20 mg once daily  It has a similar side effect profile to the methotrexate as we discussed before  If she is willing to start this medication please let me know and I will send it to her pharmacy  Thanks

## 2019-10-18 NOTE — TELEPHONE ENCOUNTER
Caller: patient   Call back number:   Patient's doctor: Dr Cristi Garcia     Patient started Methotrexate again a week ago  She states she has a rash again  She is asking what to do   Please advise

## 2019-11-27 ENCOUNTER — OFFICE VISIT (OUTPATIENT)
Dept: RHEUMATOLOGY | Facility: CLINIC | Age: 59
End: 2019-11-27
Payer: COMMERCIAL

## 2019-11-27 VITALS
DIASTOLIC BLOOD PRESSURE: 84 MMHG | SYSTOLIC BLOOD PRESSURE: 132 MMHG | BODY MASS INDEX: 26.5 KG/M2 | HEART RATE: 72 BPM | WEIGHT: 154.4 LBS

## 2019-11-27 DIAGNOSIS — L40.50 PSORIATIC ARTHRITIS (HCC): Primary | ICD-10-CM

## 2019-11-27 DIAGNOSIS — Z79.899 HIGH RISK MEDICATION USE: ICD-10-CM

## 2019-11-27 DIAGNOSIS — M17.11 PRIMARY OSTEOARTHRITIS OF RIGHT KNEE: ICD-10-CM

## 2019-11-27 DIAGNOSIS — Z79.52 CURRENT CHRONIC USE OF SYSTEMIC STEROIDS: ICD-10-CM

## 2019-11-27 DIAGNOSIS — M19.90 INFLAMMATORY ARTHRITIS: ICD-10-CM

## 2019-11-27 PROCEDURE — 99214 OFFICE O/P EST MOD 30 MIN: CPT | Performed by: INTERNAL MEDICINE

## 2019-11-27 RX ORDER — PREDNISONE 1 MG/1
5 TABLET ORAL DAILY
Qty: 30 TABLET | Refills: 1 | Status: SHIPPED | OUTPATIENT
Start: 2019-11-27 | End: 2020-03-03 | Stop reason: SDUPTHER

## 2019-11-27 NOTE — PROGRESS NOTES
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  Previously tried methotrexate but developed a skin rash as an adverse effect)  - Espinoza presents today for follow-up of psoriatic arthritis which appears to be well controlled with the use of leflunomide 20 mg once daily and prednisone 10 mg once daily  She did attempt a steroid taper, but states upon discontinuing it she noticed slight worsening of joint pains which prompted her to restart it  She reports her main arthralgias affect her bilateral shoulders, but upon review of her shoulder x-rays and MRI she is found to have osteoarthritis, bursitis as well as rotator cuff tears  I discussed with her that most likely the ongoing shoulder pain is related to these non inflammatory conditions, and I would not expect a significant improvement with either the prednisone or leflunomide  I would also not like her to continue using prednisone solely for the shoulder pain  If it persists she should reestablish with Orthopedics   - For the psoriatic arthritis I would like her to continue leflunomide 20 mg once daily, and at this time we will attempt decreasing the prednisone to 5 mg once daily  She can stay on this dose until her follow-up visit with me  She is due for high risk medication lab monitoring today  - Of note I question if the skin rash that appeared on her right upper extremity, and is minimally present on her left hand could be related to psoriasis  As this is healing we will continue to monitor for now    I requested she make me aware if she has any recurrences of skin rash  - I will see her back in the office in 3 months, but advised her to call with any concerns or flare-up in symptoms  Plan:  Diagnoses and all orders for this visit:    Psoriatic arthritis ([address] )    High risk medication use  -     CBC and differential; Future  -     Comprehensive metabolic panel; Future    Primary osteoarthritis of right knee    Current chronic use of systemic steroids    Inflammatory arthritis  -     predniSONE 5 mg tablet; Take 1 tablet (5 mg total) by mouth daily    Other orders  -     Discontinue: methotrexate 2 5 mg tablet; TAKE 4 TABLETS BY MOUTH ONE TIME PER WEEK      Activities as tolerated    Diet: low carb/low fat, more greens/vegetables, adequate hydration  Exercise: try to maintain a low impact exercise regimen as much as possible  Walk for 30 minutes a day for at least 3 days a week    Encouraged to maintain good sleep hygiene  Continue other medications as prescribed by PCP and other specialists        RTC in 3 months          HPI    INITIAL VISIT NOTE:  Ms Pedroza Heads a 63-year-old  female with history significant for mild right knee osteoarthritis, right knee meniscal tear status post surgery and right shoulder rotator cuff tears with subdeltoid/subacromial bursitis, who presents for further evaluation of diffuse joint pains and an elevated C reactive protein of 81   She is referred by Dr Mayda Silverman      Patient states in November 2017 she had an accidental injury resulting in a concussion, and states overall she has not felt well since then  Conchis Reyes is able to recall more diffuse joint pains starting in December 2018, and states they have been more prominent as well as progressive since then   She experiences her symptoms on a daily basis in a mostly constant manner   She states it is significantly interfering with her activities of daily living, as well as with her sleeping habits   She describes pain most prominently affecting her bilateral shoulders, bilateral wrists, to a lesser degree affecting her bilateral hands, low back region, hips and knees   She does not really experience joint pains affecting her elbows, ankles or feet   She has subjectively only noticed swelling affecting her knees   She experiences morning stiffness which affects her diffusely and persists throughout the day   She states any sort of activity aggravates her symptoms, but she does not necessarily obtain relief with resting   At the onset of her symptoms in February/March 2019 she was prescribed a 10 day course of prednisone starting at 40 mg daily, which she states significantly helped her  Torey Tamez was then represcribed a course of prednisone of the same duration in June 2019 which also helped her  Torey Tamez states the symptoms recur quickly after cessation of the steroids   She was recently also prescribed tramadol and tizanidine for the pain, but this has not really been helping her  [place] has also tried over-the-counter ibuprofen, Advil, Aleve and naproxen without significant benefit of her symptoms      She denies any recent fevers, chills, night sweats, unintentional weight loss or infections prior to the onset of her joint complaints   She denies significant dry eyes, inflammatory eye disease, dry mouth, skin rashes, psoriasis, mouth/nose ulcers, shortness of breath, abdominal pain, vomiting, diarrhea, blood in stools, inflammatory bowel disease or family history of rheumatoid arthritis/autoimmune disease      She was seen by her primary care physician for the ongoing complaints and had recent testing done which showed an elevated C reactive protein of 81   RIGO screen, rheumatoid factor, uric acid and Lyme antibody profile were unremarkable   In view of the ongoing right shoulder pain she recently had an MRI of her right shoulder done which showed partial-thickness rotator cuff tears as well as subacromial/subdeltoid bursitis   She has been following up with Orthopedics for this, and it is not thought that the rotator cuff tears should correspond to the symptoms she has been experiencing  Nidia Ho also received bilateral intra-articular cortisone shoulder injections which only provided her with temporary relief   She was advised to follow-up with Rheumatology first, and if her symptoms are not to improve then there may be consideration for an arthroscopic procedure         8/27/2019:  Patient presents for a follow-up today  We reviewed her recently done labs which showed an unremarkable CBC, CMP, ESR, anti CCP antibody, Sjogren's antibodies and chronic hepatitis panel  X-ray of her right hand showed periarticular soft tissue swelling of the 2nd through 5th PIP joints, as well as subtle juxta-articular erosions of the 2nd through 5th DIP joints  X-ray of her left hand and bilateral feet did not show any signs of inflammatory arthritis      Following the prior office visit patient had started prednisone 40 mg daily for 7 days, and is currently on prednisone 20 mg daily  She states being on the steroids has significantly improved her symptoms, and she is currently denying any joint pains, swelling or morning stiffness  She is feeling well today and denies any complaints  Of note she denies a history of psoriasis or skin rashes  She denies a family history of psoriasis  11/27/2019:  Patient presents for a follow-up of psoriatic arthritis  She is currently on leflunomide 20 mg once daily and prednisone 10 mg once daily  At the last office visit I had started her on methotrexate but after taking a few doses she called the office with an outbreak of a skin rash affecting her right upper extremity  I requested she try the medication once more to ensure this was related to a side effect of the methotrexate, and she states that the rash persisted and worsened    In view of this we discontinued the methotrexate entirely and I switched her to leflunomide 20 mg once daily which she has been on for approximately 1 month now  She has been tolerating the medication well without any concerns for side effects or infections  She reports overall her joint pains have significantly improved  She did try to taper down on the prednisone to 5 mg once daily and then discontinue it, but states upon discontinuing her joint pains recurred  In view of this she restarted prednisone at 5 mg once daily, but over the past few days further increased to 10 mg once daily due to increased activities causing worsening joint pains  She is willing to try and taper down on the prednisone at this time  She reports pain that she usually experiences will affect her shoulders and knees  She denies any other significant areas of joint pains or persistent swelling  On occasion she will notice swelling of her knees  She states that the morning stiffness has mostly resolved as well  She denies any other history of skin rash or psoriasis  The following portions of the patient's history were reviewed and updated as appropriate: allergies, current medications, past family history, past medical history, past social history, past surgical history and problem list       Review of Systems  Constitutional: Negative for weight change, fevers, chills, night sweats  Positive for fatigue  ENT/Mouth: Negative for hearing changes, ear pain, nasal congestion, sinus pain, hoarseness, sore throat, rhinorrhea, swallowing difficulty  Eyes: Negative for pain, redness, discharge, vision changes  Cardiovascular: Negative for chest pain, SOB, palpitations  Respiratory: Negative for cough, sputum, wheezing, dyspnea  Gastrointestinal: Negative for nausea, vomiting, diarrhea, constipation, pain, heartburn  Genitourinary: Negative for dysuria, urinary frequency, hematuria  Musculoskeletal: As per HPI  Skin: Negative for skin rash, color changes  Neuro: Negative for weakness, numbness, tingling, loss of consciousness     Psych: Negative for anxiety, depression  Heme/Lymph: Negative for easy bruising, bleeding, lymphadenopathy          Past Medical History:   Diagnosis Date    Concussion     Hyperlipidemia     Psoriatic arthritis ([facility] ) 2019    Psoriatic arthritis ([facility] )     Seronegative rheumatoid arthritis ([facility] ) 2019    Tendinitis        Past Surgical History:   Procedure Laterality Date     SECTION      FOOT SURGERY Left 2007    HEMORROIDECTOMY      KNEE ARTHROSCOPY Right 2018    KNEE SURGERY      ND KNEE SCOPE,MED/LAT MENISECTOMY Right 2018    Procedure: ARTHROSCOPY KNEE, PARTIAL MEDIAL MENISCECTOMY: ABRASION CHONDROPLASTY;  Surgeon: Ginna Buckley MD;  Location: [facility] OR;  Service: Orthopedics    TUBAL LIGATION         Social History     Socioeconomic History    Marital status:      Spouse name: Not on file    Number of children: Not on file    Years of education: Not on file    Highest education level: Not on file   Occupational History    Not on file   Social Needs    Financial resource strain: Not on file    Food insecurity:     Worry: Not on file     Inability: Not on file    Transportation needs:     Medical: Not on file     Non-medical: Not on file   Tobacco Use    Smoking status: Former Smoker     Packs/day: 0 33     Years: 5 00     Pack years: 1 65     Last attempt to quit:      Years since quittin 9    Smokeless tobacco: Never Used   Substance and Sexual Activity    Alcohol use: Yes     Comment: social    Drug use: No    Sexual activity: Not on file   Lifestyle    Physical activity:     Days per week: Not on file     Minutes per session: Not on file    Stress: Not on file   Relationships    Social connections:     Talks on phone: Not on file     Gets together: Not on file     Attends Yarsani service: Not on file     Active member of club or organization: Not on file     Attends meetings of clubs or organizations: Not on file     Relationship status: Not on file    Intimate partner violence:     Fear of current or ex partner: Not on file     Emotionally abused: Not on file     Physically abused: Not on file     Forced sexual activity: Not on file   Other Topics Concern    Not on file   Social History Narrative    Not on file       Family History   Problem Relation Age of Onset    Coronary artery disease Mother     Other Mother         CABG    Hiatal hernia Father     Stroke Maternal Grandmother     Hyperlipidemia Family     Migraines Family     Thyroid disease Family     No Known Problems Sister     No Known Problems Brother     No Known Problems Son     No Known Problems Daughter     No Known Problems Paternal Grandmother     No Known Problems Paternal Grandfather     No Known Problems Maternal Aunt     No Known Problems Maternal Uncle     No Known Problems Paternal Aunt     No Known Problems Paternal Uncle     No Known Problems Cousin        No Known Allergies      Current Outpatient Medications:     Glucosamine-Chondroitin (GLUCOSAMINE CHONDR COMPLEX PO), Take by mouth daily  , Disp: , Rfl:     leflunomide (ARAVA) 20 MG tablet, Take 1 tablet (20 mg total) by mouth daily, Disp: 30 tablet, Rfl: 2    pravastatin (PRAVACHOL) 40 mg tablet, TAKE 1 TABLET BY MOUTH AT BEDTIME, Disp: 30 tablet, Rfl: 3    predniSONE 5 mg tablet, Take 1 tablet (5 mg total) by mouth daily, Disp: 30 tablet, Rfl: 1      Objective:    Vitals:    11/27/19 1052   BP: 132/84   Pulse: 72   Weight: 70 kg (154 lb 6 4 oz)       Physical Exam  General: Well appearing, well nourished, in no distress  Oriented x 3, normal mood and affect  Ambulating without difficulty  Skin: Good turgor, no unusual bruising or prominent lesions  She has a healing, dry/erythematous well-defined rash on her right upper extremity  She has an area of dryness present on her left hand  Hair: Normal texture and distribution  Nails: Normal color, no deformities  No pitting noted    HEENT:  Head: Normocephalic, atraumatic  Eyes: Conjunctiva clear, sclera non-icteric, EOM intact  Nose: No external lesions, mucosa non-inflamed  Mouth: Mucous membranes moist, no mucosal lesions  Extremities: No amputations or deformities, cyanosis, edema  Musculoskeletal:   Hands - there still appears to be mild fullness present at her bilateral PIP joints, but there is no significant soft tissue swelling noted  There is no tenderness noted of her bilateral DIPs, PIPs or MCPs  She is able to make full fists bilaterally  There are mild boutonniere like deformities present at her right hand 3rd and 4th digits, as well as at her left hand 4th digit  There is no ulnar deviation noted  Wrists - there is no soft tissue swelling or tenderness noted bilaterally  Elbows - unremarkable  Shoulders - there is no soft tissue swelling or tenderness noted bilaterally  She has full range of motion bilaterally but with discomfort noted  Hips - unremarkable  Knees, ankles and feet - unremarkable  Neurologic: Alert and oriented  No focal neurological deficits appreciated  Psychiatric: Normal mood and affect  JASON Villagran    Rheumatology

## 2020-01-20 ENCOUNTER — TELEPHONE (OUTPATIENT)
Dept: OBGYN CLINIC | Facility: HOSPITAL | Age: 60
End: 2020-01-20

## 2020-01-20 DIAGNOSIS — M06.00 SERONEGATIVE RHEUMATOID ARTHRITIS (HCC): ICD-10-CM

## 2020-01-20 RX ORDER — LEFLUNOMIDE 20 MG/1
20 TABLET ORAL DAILY
Qty: 30 TABLET | Refills: 2 | Status: SHIPPED | OUTPATIENT
Start: 2020-01-20 | End: 2020-03-03 | Stop reason: SDUPTHER

## 2020-01-20 NOTE — TELEPHONE ENCOUNTER
Pt contacted Call Center requested refill of their medication  Medication Name:  Sendy Lauren     Dosage of Med:  20 mg    Frequency of Med:  1 pill once daily     Remaining Medication:  2 pills    Pharmacy and Location:  [facility]    Pt   Preferred Callback Phone Number:  [phone]

## 2020-01-20 NOTE — TELEPHONE ENCOUNTER
Please mail her the CBC and CMP slips as she has this done at [facility]   Please advise her to have this done as she has not had labs done since August   Her medication was refilled  Thanks

## 2020-01-23 NOTE — TELEPHONE ENCOUNTER
Patient is calling back in wanting to know if her medication has been sent over to her pharmacy on file, she is going to contact the pharmacy to see if it is ready for her to pick it up

## 2020-02-17 ENCOUNTER — APPOINTMENT (EMERGENCY)
Dept: RADIOLOGY | Facility: HOSPITAL | Age: 60
End: 2020-02-17
Payer: COMMERCIAL

## 2020-02-17 ENCOUNTER — HOSPITAL ENCOUNTER (EMERGENCY)
Facility: HOSPITAL | Age: 60
Discharge: HOME | End: 2020-02-17
Attending: EMERGENCY MEDICINE | Admitting: EMERGENCY MEDICINE
Payer: COMMERCIAL

## 2020-02-17 ENCOUNTER — TELEPHONE (OUTPATIENT)
Dept: PHYSICAL THERAPY | Facility: OTHER | Age: 60
End: 2020-02-17

## 2020-02-17 VITALS
HEART RATE: 88 BPM | BODY MASS INDEX: 25.27 KG/M2 | DIASTOLIC BLOOD PRESSURE: 83 MMHG | RESPIRATION RATE: 18 BRPM | WEIGHT: 148 LBS | TEMPERATURE: 98.5 F | OXYGEN SATURATION: 99 % | HEIGHT: 64 IN | SYSTOLIC BLOOD PRESSURE: 130 MMHG

## 2020-02-17 DIAGNOSIS — M43.10 ANTEROLISTHESIS: ICD-10-CM

## 2020-02-17 DIAGNOSIS — M54.50 LOW BACK PAIN: Primary | ICD-10-CM

## 2020-02-17 DIAGNOSIS — M51.36 DEGENERATIVE DISC DISEASE, LUMBAR: ICD-10-CM

## 2020-02-17 PROCEDURE — 96372 THER/PROPH/DIAG INJ SC/IM: CPT

## 2020-02-17 PROCEDURE — 99284 EMERGENCY DEPT VISIT MOD MDM: CPT | Performed by: EMERGENCY MEDICINE

## 2020-02-17 PROCEDURE — 99283 EMERGENCY DEPT VISIT LOW MDM: CPT

## 2020-02-17 PROCEDURE — 72100 X-RAY EXAM L-S SPINE 2/3 VWS: CPT

## 2020-02-17 RX ORDER — KETOROLAC TROMETHAMINE 30 MG/ML
15 INJECTION, SOLUTION INTRAMUSCULAR; INTRAVENOUS ONCE
Status: COMPLETED | OUTPATIENT
Start: 2020-02-17 | End: 2020-02-17

## 2020-02-17 RX ADMIN — KETOROLAC TROMETHAMINE 15 MG: 30 INJECTION, SOLUTION INTRAMUSCULAR; INTRAVENOUS at 10:30

## 2020-02-17 NOTE — TELEPHONE ENCOUNTER
Message left for Pt to call us back  Our ph # and hours of business provided  Waiting for return call from Pt  This was our first attempt at calling this Pt

## 2020-02-17 NOTE — ED PROVIDER NOTES
History  Chief Complaint   Patient presents with    Back Pain     lower back pain  Chiropractor states its the S1 joint and she started having sciatic pain a month ago  Patient states she fell 3 months ago and this pain has persisted  59-year-old female presents for evaluation of chronic left-sided low back pain  Patient reports having a mechanical fall 6 months ago and has been having low back pain ever since  Reports taking Tylenol Motrin yesterday with mild relief  Pain is worse with specific positions  Patient denies any recent trauma, unexplained weight loss, numbness or tingling, bowel or bladder incontinence, weakness, fever, IVDA, steroid use or known history of cancer  Patient is postmenopausal             Prior to Admission Medications   Prescriptions Last Dose Informant Patient Reported? Taking?    Glucosamine-Chondroitin (GLUCOSAMINE CHONDR COMPLEX PO) Past Week at Unknown time Self Yes Yes   Sig: Take by mouth daily     leflunomide (ARAVA) 20 MG tablet 2020 at Unknown time  No Yes   Sig: Take 1 tablet (20 mg total) by mouth daily   pravastatin (PRAVACHOL) 40 mg tablet Past Week at Unknown time Self No Yes   Sig: TAKE 1 TABLET BY MOUTH AT BEDTIME   predniSONE 5 mg tablet Past Week at Unknown time  No Yes   Sig: Take 1 tablet (5 mg total) by mouth daily      Facility-Administered Medications: None       Past Medical History:   Diagnosis Date    Concussion     Hyperlipidemia     [facility]atic arthritis [facility] [address] ) 2019    [facility]iatic arthritis ([facility] [address] )     Seronegative rheumatoid arthritis ([facility] [address] ) 2019    Tendinitis        Past Surgical History:   Procedure Laterality Date     SECTION      FOOT SURGERY Left     HEMORROIDECTOMY      KNEE ARTHROSCOPY Right 2018    KNEE SURGERY      MI KNEE SCOPE,MED/LAT MENISECTOMY Right 2018    Procedure: ARTHROSCOPY KNEE, PARTIAL MEDIAL MENISCECTOMY: ABRASION CHONDROPLASTY;  Surgeon: Lucia Vasquez MD;  Location: [facility]; Service: Orthopedics    TUBAL LIGATION         Family History   Problem Relation Age of Onset    Coronary artery disease Mother     Other Mother         CABG    Hiatal hernia Father     Stroke Maternal Grandmother     Hyperlipidemia Family     Migraines Family     Thyroid disease Family     No Known Problems Sister     No Known Problems Brother     No Known Problems Son     No Known Problems Daughter     No Known Problems Paternal Grandmother     No Known Problems Paternal Grandfather     No Known Problems Maternal Aunt     No Known Problems Maternal Uncle     No Known Problems Paternal Aunt     No Known Problems Paternal Uncle     No Known Problems Cousin      I have reviewed and agree with the history as documented  Social History     Tobacco Use    Smoking status: Former Smoker     Packs/day: 0 33     Years: 5 00     Pack years: 1 65     Last attempt to quit:      Years since quittin 1    Smokeless tobacco: Never Used   Substance Use Topics    Alcohol use: Yes     Comment: social    Drug use: No       Review of Systems   Constitutional: Negative for chills, diaphoresis and fever  HENT: Negative for congestion and rhinorrhea  Eyes: Negative for pain and visual disturbance  Respiratory: Negative for cough, shortness of breath and wheezing  Cardiovascular: Negative for chest pain and leg swelling  Gastrointestinal: Negative for abdominal pain, diarrhea, nausea and vomiting  Genitourinary: Negative for difficulty urinating, dysuria, frequency and urgency  Musculoskeletal: Positive for back pain  Negative for neck pain  Skin: Negative for color change and rash  Neurological: Negative for syncope, numbness and headaches  All other systems reviewed and are negative  Physical Exam  Physical Exam   Constitutional: She is oriented to person, place, and time  She appears well-developed and well-nourished  HENT:   Head: Normocephalic and atraumatic     Eyes: Conjunctivae and EOM are normal    Neck: Normal range of motion  Neck supple  Cardiovascular: Normal rate and regular rhythm  Pulmonary/Chest: Effort normal and breath sounds normal  No respiratory distress  She has no wheezes  She has no rales  Abdominal: Soft  Bowel sounds are normal  There is no tenderness  There is no guarding  Musculoskeletal: Normal range of motion  She exhibits tenderness  She exhibits no edema  Tender to palpation and and left SI joint  Negative straight leg raise  No evidence of saddle anesthesia  Neurological: She is alert and oriented to person, place, and time  No cranial nerve deficit  Skin: Skin is warm  No erythema  Psychiatric: She has a normal mood and affect  Her behavior is normal    Nursing note and vitals reviewed  Vital Signs  ED Triage Vitals   Temperature Pulse Respirations Blood Pressure SpO2   02/17/20 1111 02/17/20 0956 02/17/20 0956 02/17/20 0956 02/17/20 0956   98 5 °F (36 9 °C) 88 18 130/83 99 %      Temp src Heart Rate Source Patient Position - Orthostatic VS BP Location FiO2 (%)   -- -- 02/17/20 0956 02/17/20 0956 --     Sitting Right arm       Pain Score       02/17/20 0956       8           Vitals:    02/17/20 0956   BP: 130/83   Pulse: 88   Patient Position - Orthostatic VS: Sitting         Visual Acuity      ED Medications  Medications   ketorolac (TORADOL) injection 15 mg (15 mg Intramuscular Given 2/17/20 1030)       Diagnostic Studies  Results Reviewed     None                 XR spine lumbar 2 or 3 views injury   Final Result by Beryl Friedman DO (02/17 1111)      No acute osseous abnormality  Degenerative changes as described  Anterolisthesis L4-5 measures 6 mm           Workstation performed: [id number]                    Procedures  Procedures         ED Course                               MDM  Number of Diagnoses or Management Options  Anterolisthesis:   Degenerative disc disease, lumbar:   Low back pain:   Diagnosis management comments: 63-year-old female presenting with acute on chronic back pain  Obtain a lumbar x-ray given history of old trauma  Comprehensive Spine referral   Symptom control  Disposition  Final diagnoses:   Low back pain   Anterolisthesis   Degenerative disc disease, lumbar     Time reflects when diagnosis was documented in both MDM as applicable and the Disposition within this note     Time User Action Codes Description Comment    2/17/2020 11:12 AM Blinda Nanas Add [M54 5] Low back pain     2/17/2020 11:13 AM Blinda Nanas Add [M43 10] Anterolisthesis     2/17/2020 11:13 AM Blinda Nanas Add [M51 36] Degenerative disc disease, lumbar       ED Disposition     ED Disposition Condition Date/Time Comment    Discharge Stable Mon Feb 17, 2020 11:12 AM Eunice Herrera discharge to home/self care              Follow-up Information     Follow up With Specialties Details Why Contact Info Additional Information    [facility] Comprehensive Spine Program Physical Therapy   [phone]    Ajay Hodges MD Family Medicine   [place] [address]  [phone]        [address] Emergency Department Emergency Medicine  If symptoms worsen [address] [address]  [phone]  ED, [address] [place] [address], [facility] [address]          Discharge Medication List as of 2/17/2020 11:14 AM      CONTINUE these medications which have NOT CHANGED    Details   Glucosamine-Chondroitin (GLUCOSAMINE CHONDR COMPLEX PO) Take by mouth daily  , Historical Med      leflunomide (ARAVA) 20 MG tablet Take 1 tablet (20 mg total) by mouth daily, Starting Mon 1/20/2020, Normal      pravastatin (PRAVACHOL) 40 mg tablet TAKE 1 TABLET BY MOUTH AT BEDTIME, Normal      predniSONE 5 mg tablet Take 1 tablet (5 mg total) by mouth daily, Starting Wed 11/27/2019, Normal               PDMP Review     None          ED Provider  Electronically Signed by           Gui Joya,   02/17/20 6507

## 2020-02-18 ENCOUNTER — NURSE TRIAGE (OUTPATIENT)
Dept: PHYSICAL THERAPY | Facility: OTHER | Age: 60
End: 2020-02-18

## 2020-02-18 DIAGNOSIS — M54.42 ACUTE LEFT-SIDED LOW BACK PAIN WITH LEFT-SIDED SCIATICA: Primary | ICD-10-CM

## 2020-02-18 NOTE — TELEPHONE ENCOUNTER
Additional Information   Negative: Is this related to a work injury?  Negative: Is this related to an MVA?  Negative: Are you currently recieving homecare services?  Negative: Is the patient experiencing blood in sputum?  Negative: Is the patient experiencing acute drop foot or paralysis?  Negative: Is the patient experiencing urine retention?  Negative: Does the patient have a fever?  Negative: Has the patient had unexplained weight loss?  Negative: Has the patient experienced major trauma? (fall from height, high speed collision, direct blow to spine) and is also experiencing nausea, light-headedness, or loss of consciousness?  Negative: Is this a chronic condition? Background - Initial Assessment  Clinical complaint: Acute low back, left sided, SI joint pain, radiates down the left leg to her calf  Pt states that she has had this for 3 months, but increased in pain over the last month  Pt had an oblation of her neck by Dr Porsha Ramirez, and has had an improvement with that neck pain  Pt is taking Advil, and is using ice/heat  Pt has a history of DDD with the lumbar area, and has seen Rheumatologist  Pt did have a fall 6 months ago  Date of onset: 1 month  Frequency of pain: constant  Quality of pain: aching and shooting down her left leg  Protocols used: [facility] COMPREHENSIVE SPINE PROGRAM PROTOCOL    This nurse did review in detail the comp spine program and what we can provide for the pt for their back pain  Pt is agreeable to being triaged by this nurse and would like to have physical therapy  Referrals were placed to the following:  Physical Therapy at the [facility]      site  Pt was given the ph # to that office  Behavioral Health referral was sent and Pt is aware that they will be receiving a phone call from that office  No further questions voiced at this time and Pt did state understanding of the referral that was placed

## 2020-02-20 LAB
ALBUMIN SERPL-MCNC: 4.3 G/DL (ref 3.8–4.9)
ALBUMIN/GLOB SERPL: 1.8 {RATIO} (ref 1.2–2.2)
ALP SERPL-CCNC: 102 IU/L (ref 39–117)
ALT SERPL-CCNC: 25 IU/L (ref 0–32)
AST SERPL-CCNC: 20 IU/L (ref 0–40)
BASOPHILS # BLD AUTO: 0 X10E3/UL (ref 0–0.2)
BASOPHILS NFR BLD AUTO: 0 %
BILIRUB SERPL-MCNC: 0.3 MG/DL (ref 0–1.2)
BUN SERPL-MCNC: 15 MG/DL (ref 6–24)
BUN/CREAT SERPL: 22 (ref 9–23)
CALCIUM SERPL-MCNC: 9.6 MG/DL (ref 8.7–10.2)
CHLORIDE SERPL-SCNC: 103 MMOL/L (ref 96–106)
CO2 SERPL-SCNC: 25 MMOL/L (ref 20–29)
CREAT SERPL-MCNC: 0.69 MG/DL (ref 0.57–1)
EOSINOPHIL # BLD AUTO: 0.1 X10E3/UL (ref 0–0.4)
EOSINOPHIL NFR BLD AUTO: 3 %
ERYTHROCYTE [DISTWIDTH] IN BLOOD BY AUTOMATED COUNT: 13.7 % (ref 11.7–15.4)
GLOBULIN SER-MCNC: 2.4 G/DL (ref 1.5–4.5)
GLUCOSE SERPL-MCNC: 95 MG/DL (ref 65–99)
HCT VFR BLD AUTO: 37.2 % (ref 34–46.6)
HGB BLD-MCNC: 12.1 G/DL (ref 11.1–15.9)
IMM GRANULOCYTES # BLD: 0 X10E3/UL (ref 0–0.1)
IMM GRANULOCYTES NFR BLD: 0 %
LYMPHOCYTES # BLD AUTO: 0.5 X10E3/UL (ref 0.7–3.1)
LYMPHOCYTES NFR BLD AUTO: 9 %
MCH RBC QN AUTO: 29.5 PG (ref 26.6–33)
MCHC RBC AUTO-ENTMCNC: 32.5 G/DL (ref 31.5–35.7)
MCV RBC AUTO: 91 FL (ref 79–97)
MONOCYTES # BLD AUTO: 0.6 X10E3/UL (ref 0.1–0.9)
MONOCYTES NFR BLD AUTO: 11 %
NEUTROPHILS # BLD AUTO: 4 X10E3/UL (ref 1.4–7)
NEUTROPHILS NFR BLD AUTO: 77 %
PLATELET # BLD AUTO: 318 X10E3/UL (ref 150–450)
POTASSIUM SERPL-SCNC: 4.2 MMOL/L (ref 3.5–5.2)
PROT SERPL-MCNC: 6.7 G/DL (ref 6–8.5)
RBC # BLD AUTO: 4.1 X10E6/UL (ref 3.77–5.28)
SL AMB EGFR AFRICAN AMERICAN: 110 ML/MIN/1.73
SL AMB EGFR NON AFRICAN AMERICAN: 96 ML/MIN/1.73
SODIUM SERPL-SCNC: 142 MMOL/L (ref 134–144)
WBC # BLD AUTO: 5.2 X10E3/UL (ref 3.4–10.8)

## 2020-02-21 ENCOUNTER — EVALUATION (OUTPATIENT)
Dept: PHYSICAL THERAPY | Facility: CLINIC | Age: 60
End: 2020-02-21
Payer: COMMERCIAL

## 2020-02-21 VITALS — DIASTOLIC BLOOD PRESSURE: 84 MMHG | TEMPERATURE: 98.3 F | SYSTOLIC BLOOD PRESSURE: 130 MMHG

## 2020-02-21 DIAGNOSIS — M54.42 ACUTE LEFT-SIDED LOW BACK PAIN WITH LEFT-SIDED SCIATICA: ICD-10-CM

## 2020-02-21 PROCEDURE — 97161 PT EVAL LOW COMPLEX 20 MIN: CPT | Performed by: PHYSICAL THERAPIST

## 2020-02-21 PROCEDURE — 97140 MANUAL THERAPY 1/> REGIONS: CPT | Performed by: PHYSICAL THERAPIST

## 2020-02-21 NOTE — PROGRESS NOTES
PT Evaluation     Today's date: 2020  Patient name: Sally Noriega  : 1960  MRN: [id number]  Referring provider: Caterina Pickard PT  Dx:   Encounter Diagnosis     ICD-10-CM    1  Acute left-sided low back pain with left-sided sciatica M54 42 Ambulatory referral to PT spine                  Assessment  Assessment details: Sally Noriega is a 61 y o  female presenting to outpatient physical therapy at [place] [address] with complaints of L LB and L LE pain  She is having the most difficulty when getting up from sitting due to pain  Patient was referred to PT through [place] [address]  She would benefit from skilled PT services with focus on manual treatment to address her hypomobility issues, stretching and core / L LE strengthening in order to return to all normal activities and reach maximum level of function  No further referral appears necessary at this time based upon examination results  Primary movement impairment diagnosis of hypomobility resulting in pathoanatomical symptoms of sacral dysfunction likely due to a fall 5 months ago  Impairments include:    1  Pain in endrange lumbar flexion and extension - addressing with mobs and flexibility  2  Decreased L LE and core strength - addressing with HEP strengthening  3  Limited flexibility - addressing with HEP  4  Poor postural awareness - addressing with education    Impairments: abnormal gait, abnormal or restricted ROM, activity intolerance, impaired balance, impaired physical strength, lacks appropriate home exercise program and pain with function  Barriers to therapy: None  Understanding of Dx/Px/POC: excellent   Prognosis: good    Goals  ST  Independent with HEP in 2 weeks  2  Increase lumbar AROM to WNL all motions in 3 weeks     LT  Achieve FOTO score of 63/100 in 4 weeks   2  Able to exercise normally without LBP or L LE pain in 4 weeks  3  Strength L LE and abdominals = 5/5 in 4 weeks  4    Good sacral mobility in 4 weeks  5  No tenderness L sacral base in 4 weeks    Plan  Patient would benefit from: skilled PT  Planned modality interventions: cryotherapy, TENS and thermotherapy: hydrocollator packs  Planned therapy interventions: abdominal trunk stabilization, ADL retraining, balance/weight bearing training, body mechanics training, flexibility, functional ROM exercises, home exercise program, joint mobilization, manual therapy, neuromuscular re-education, postural training, strengthening, stretching, therapeutic activities and therapeutic exercise  Frequency: 2x week  Duration in weeks: 4  Plan of Care beginning date: 2/21/2020  Plan of Care expiration date: 3/22/2020  Treatment plan discussed with: patient        Subjective Evaluation    History of Present Illness  Mechanism of injury: Pt reports falling from a small ladder onto her L LB in early September 2019 that resolved for a while, but then 3 months ago L lateral thigh and L upper lateral leg pain, plus continued LBP  Worse in the early AM   Saw a chiropractor 5x for adjustments without pain relief  Has RA  Unable to exercise normally at the gym due to to LBP  Working as a  full duty  Sit to stand increases pain  Recurrent probem    Quality of life: good    Pain  Current pain rating: 3  At best pain rating: 3  At worst pain rating: 10  Quality: dull ache  Relieving factors: heat  Progression: no change    Social Support  Steps to enter house: yes  Stairs in house: yes   Lives in: multiple-level home  Lives with: significant other    Employment status: working    Diagnostic Tests  X-ray: abnormal (6 mm anterolisthesis L4-5  Mild loss of disc height at L5-S1 compatible with degenerative disc disease     Facet degenerative changes are seen L3-4 through L5-S1 )  Treatments  Previous treatment: chiropractic  Patient Goals  Patient goals for therapy: decreased pain, increased motion, increased strength and return to sport/leisure activities          Objective     Concurrent Complaints  Negative for night pain, disturbed sleep and bladder dysfunction    Postural Observations  Seated posture: good  Standing posture: good  Correction of posture: has no consistent effect        Palpation   Left   No palpable tenderness to the erector spinae  Right   No palpable tenderness to the erector spinae  Tenderness     Lumbar Spine  Tenderness in the facet joint (L S1-2)  No tenderness in the spinous process  Left Hip   No tenderness in the PSIS  Right Hip   No tenderness in the PSIS  Neurological Testing     Sensation     Lumbar   Left   Intact: light touch    Right   Intact: light touch    Reflexes   Left   Patellar (L4): normal (2+)    Right   Patellar (L4): normal (2+)    Active Range of Motion     Lumbar   Flexion:  Restriction level: moderate  Extension:  with pain Restriction level: moderate  Left lateral flexion:  with pain Restriction level: moderate  Right lateral flexion:  Restriction level: moderate  Left rotation:  Restriction level: moderate  Right rotation:  Restriction level: moderate    Joint Play     Hypomobile: S1     Pain: S1     Strength/Myotome Testing     Lumbar     Right   Normal strength    Left Hip   Planes of Motion   Flexion: 4+  Extension: 4-  Abduction: 4+  Adduction: 5  Internal rotation: 5    Left Knee   Flexion: 5  Extension: 5    Left Ankle/Foot   Dorsiflexion: 5  Plantar flexion: 5    Additional Strength Details  Abdominals = 4-/5  Tests     Lumbar   Positive SIJ compression  Negative prone instability   Left   Negative passive SLR and slump test      Right   Negative passive SLR and slump test      Ambulation     Observational Gait   Gait: within functional limits   Graphical documentation        Daily Treatment Diary     Dx:    1   Acute left-sided low back pain with left-sided sciatica      POC EXPIRES On:  3/22/20  PRECAUTIONS:  None  CO-MORBIDITES:  Hx of concussion  PERSONAL FACTORS: None    Manual  2/21            Trigger point massage L SI jt 4'            Sacral rocking 4'                                                       Exercise Diary  2/21            Crossed leg stretch to R 10" 5            Supine piriformis stretch L 10" 5            Supine PPT                                                                                                                                                                                                                                              Modalities

## 2020-02-24 ENCOUNTER — OFFICE VISIT (OUTPATIENT)
Dept: PHYSICAL THERAPY | Facility: CLINIC | Age: 60
End: 2020-02-24
Payer: COMMERCIAL

## 2020-02-24 DIAGNOSIS — M54.42 ACUTE LEFT-SIDED LOW BACK PAIN WITH LEFT-SIDED SCIATICA: Primary | ICD-10-CM

## 2020-02-24 PROCEDURE — 97112 NEUROMUSCULAR REEDUCATION: CPT | Performed by: PHYSICAL THERAPIST

## 2020-02-24 PROCEDURE — 97140 MANUAL THERAPY 1/> REGIONS: CPT | Performed by: PHYSICAL THERAPIST

## 2020-02-24 NOTE — PROGRESS NOTES
Daily Note     Today's date: 2020  Patient name: Mary Melgar  : 1960  MRN: [id number]  Referring provider: Bladimir Medina, PT  Dx:   Encounter Diagnosis     ICD-10-CM    1  Acute left-sided low back pain with left-sided sciatica M54 42                   Subjective:  Pt reports being a little sore, but maybe not as tight as last week  Did a lot at home this weekend and is planning to go to the gym today after PT  Objective:  See treatment diary below      Assessment:  Pt presented to outpatient physical therapy at [place] [address] with complaints of L LB and L LE pain  She is having the most difficulty when getting up from sitting due to pain  Patient was referred to PT through [place] [address]  She will continue to benefit from skilled PT services with focus on manual treatment to address her hypomobility issues, stretching and core / L LE strengthening in order to return to all normal activities and reach maximum level of function  Mod L sacral pain with R hip ext in prone, but did well with all other new exercises  Better sacral mobility and less L sacral tightness with manual tx  Plan:   Add t-band to clamshells next session, standing t-band hip ext and abd            POC EXPIRES On:  3/22/20  PRECAUTIONS:  None  CO-MORBIDITES:  Hx of concussion  PERSONAL FACTORS:  None     Manual                     Trigger point massage L SI jt 4'  4'                   Sacral rocking 4'  4'                                                                                                 Exercise Diary                     Crossed leg stretch to R 10" 5 10" 10                   Supine piriformis stretch L 10" 5 10" 10                   Supine PPT    5" 20                    Bike    L1 7'                    Clamshells in R S/L   20   t-band                  Prone hip ext L/R   15 ea                                                                                                                                                                                                                                                                                                                                                                          Modalities

## 2020-02-26 ENCOUNTER — TELEPHONE (OUTPATIENT)
Dept: RHEUMATOLOGY | Facility: CLINIC | Age: 60
End: 2020-02-26

## 2020-02-28 ENCOUNTER — OFFICE VISIT (OUTPATIENT)
Dept: PHYSICAL THERAPY | Facility: CLINIC | Age: 60
End: 2020-02-28
Payer: COMMERCIAL

## 2020-02-28 DIAGNOSIS — M54.42 ACUTE LEFT-SIDED LOW BACK PAIN WITH LEFT-SIDED SCIATICA: Primary | ICD-10-CM

## 2020-02-28 PROCEDURE — 97112 NEUROMUSCULAR REEDUCATION: CPT | Performed by: PHYSICAL THERAPIST

## 2020-02-28 PROCEDURE — 97140 MANUAL THERAPY 1/> REGIONS: CPT | Performed by: PHYSICAL THERAPIST

## 2020-02-28 PROCEDURE — 97110 THERAPEUTIC EXERCISES: CPT | Performed by: PHYSICAL THERAPIST

## 2020-02-28 NOTE — PROGRESS NOTES
Daily Note     Today's date: 2020  Patient name: Eunice Herrera  : 1960  MRN: [id number]  Referring provider: Jonathan Peterson, PT  Dx:   Encounter Diagnosis     ICD-10-CM    1  Acute left-sided low back pain with left-sided sciatica M54 42                   Subjective:  Pt reports feeling a little better, but L calf is tight  Using a heating pad seems to help  Did well at the gym after last PT session except for ab crunch machine was a little painful  Objective:  See treatment diary below      Assessment:  Pt presented to outpatient physical therapy at [facility] with complaints of L LB and L LE pain  She is having the most difficulty when getting up from sitting due to pain  Patient was referred to PT through [place] [address]  She will continue to benefit from skilled PT services with focus on manual treatment to address her hypomobility issues, stretching and core / L LE strengthening in order to return to all normal activities and reach maximum level of function  Sacral mobility is improving with each session with less tightness post manual tx  Pt had min increase in LB and L LE pain with t-band hip PREs, but eliminated with slight trunk flexion  Plan: Add bird-dogs          POC EXPIRES On:  3/22/20  PRECAUTIONS:  None  CO-MORBIDITES:  Hx of concussion  PERSONAL FACTORS:  None     Manual                   Trigger point massage L SI jt 4'  4'  4'                 Sacral rocking 4'  4'  4'                                                                                               Exercise Diary                   Crossed leg stretch to R 10" 5 10" 10  10" 10                 Supine piriformis stretch L 10" 5 10" 10  10" 10                 Supine PPT    5" 20  5" 20                  Bike    L1 7'  L1 8'                  Clamshells in R S/L with t-band   20 no band Blue 20                  Prone hip ext L/R   15 ea   HEP                  Wedge L calf stretch      20" 5                  Standing t-band hip abd + ext L/R      Blue 15 ea                  Bird-dogs     NV                                                                                                                                                                                                                                                                                                Modalities

## 2020-03-02 ENCOUNTER — OFFICE VISIT (OUTPATIENT)
Dept: PHYSICAL THERAPY | Facility: CLINIC | Age: 60
End: 2020-03-02
Payer: COMMERCIAL

## 2020-03-02 DIAGNOSIS — M54.42 ACUTE LEFT-SIDED LOW BACK PAIN WITH LEFT-SIDED SCIATICA: Primary | ICD-10-CM

## 2020-03-02 PROCEDURE — 97112 NEUROMUSCULAR REEDUCATION: CPT | Performed by: PHYSICAL THERAPIST

## 2020-03-02 PROCEDURE — 97110 THERAPEUTIC EXERCISES: CPT | Performed by: PHYSICAL THERAPIST

## 2020-03-02 PROCEDURE — 97140 MANUAL THERAPY 1/> REGIONS: CPT | Performed by: PHYSICAL THERAPIST

## 2020-03-02 NOTE — PROGRESS NOTES
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  Previously tried methotrexate but developed a skin rash as an adverse effect)  - Shante presents today for follow-up of psoriatic arthritis which appears to be well controlled with the use of leflunomide 20 mg once daily  She has managed to successfully taper off the steroids, but will still use low doses of prednisone 5-10 mg daily as needed when she is to experience a flare up mostly muscle aches which seems to be related to her occupation  I advised her to try and minimize the steroid use as much as possible if she is using it for these indications  She can continue to take Tylenol and ibuprofen as needed  - For now we will continue with the leflunomide 20 mg once daily  I requested she repeat her labs 1 week prior to our follow-up visit  # Low back pain  - This could be related to the mild degenerative arthritis seen on the x-ray of her lumbar spine  I advised her to complete the course of physical therapy, and if her symptoms are to persist that she can follow up with her primary care physician for consideration of an MRI of her lumbar spine, or possibly a referral to Pain Management  - I will see her back in the office in 3 months, but advised her to call with any concerns or flare-up in symptoms        Plan:  Diagnoses and all orders for this visit:    Psoriatic arthropathy ([address] )    High risk medication use  -     CBC and differential; Future  -     Comprehensive metabolic panel;  Future    Primary osteoarthritis of right knee    Incomplete rotator cuff tear or rupture of right shoulder, not specified as traumatic    History of recent steroid use    Inflammatory arthritis  -     predniSONE 5 mg tablet; Take 5-10 mg daily as needed  Seronegative rheumatoid arthritis (HCC)  -     leflunomide (ARAVA) 20 MG tablet; Take 1 tablet (20 mg total) by mouth daily      Activities as tolerated    Diet: low carb/low fat, more greens/vegetables, adequate hydration  Exercise: try to maintain a low impact exercise regimen as much as possible  Walk for 30 minutes a day for at least 3 days a week    Encouraged to maintain good sleep hygiene  Continue other medications as prescribed by PCP and other specialists        RTC in 3 months          HPI    INITIAL VISIT NOTE:  Ms Teodoro Reyes a 49-year-old  female with history significant for mild right knee osteoarthritis, right knee meniscal tear status post surgery and right shoulder rotator cuff tears with subdeltoid/subacromial bursitis, who presents for further evaluation of diffuse joint pains and an elevated C reactive protein of 81   She is referred by Dr Jj Hidalgo      Patient states in November 2017 she had an accidental injury resulting in a concussion, and states overall she has not felt well since then  Radu Lopez is able to recall more diffuse joint pains starting in December 2018, and states they have been more prominent as well as progressive since then   She experiences her symptoms on a daily basis in a mostly constant manner   She states it is significantly interfering with her activities of daily living, as well as with her sleeping habits   She describes pain most prominently affecting her bilateral shoulders, bilateral wrists, to a lesser degree affecting her bilateral hands, low back region, hips and knees   She does not really experience joint pains affecting her elbows, ankles or feet   She has subjectively only noticed swelling affecting her knees   She experiences morning stiffness which affects her diffusely and persists throughout the day   She states any sort of activity aggravates her symptoms, but she does not necessarily obtain relief with resting   At the onset of her symptoms in February/March 2019 she was prescribed a 10 day course of prednisone starting at 40 mg daily, which she states significantly helped her  Olga Lidia Morales was then represcribed a course of prednisone of the same duration in June 2019 which also helped her  Olga Lidia Morales states the symptoms recur quickly after cessation of the steroids   She was recently also prescribed tramadol and tizanidine for the pain, but this has not really been helping her  Olga Lidia Morales has also tried over-the-counter ibuprofen, Advil, Aleve and naproxen without significant benefit of her symptoms      She denies any recent fevers, chills, night sweats, unintentional weight loss or infections prior to the onset of her joint complaints   She denies significant dry eyes, inflammatory eye disease, dry mouth, skin rashes, psoriasis, mouth/nose ulcers, shortness of breath, abdominal pain, vomiting, diarrhea, blood in stools, inflammatory bowel disease or family history of rheumatoid arthritis/autoimmune disease      She was seen by her primary care physician for the ongoing complaints and had recent testing done which showed an elevated C reactive protein of 81   RIGO screen, rheumatoid factor, uric acid and Lyme antibody profile were unremarkable   In view of the ongoing right shoulder pain she recently had an MRI of her right shoulder done which showed partial-thickness rotator cuff tears as well as subacromial/subdeltoid bursitis   She has been following up with Orthopedics for this, and it is not thought that the rotator cuff tears should correspond to the symptoms she has been experiencing  Olga Lidia Morales also received bilateral intra-articular cortisone shoulder injections which only provided her with temporary relief   She was advised to follow-up with Rheumatology first, and if her symptoms are not to improve then there may be consideration for an arthroscopic procedure         8/27/2019:  Patient presents for a follow-up today  Ezra Garcia reviewed her recently done labs which showed an unremarkable CBC, CMP, ESR, anti CCP antibody, Sjogren's antibodies and chronic hepatitis panel   X-ray of her right hand showed periarticular soft tissue swelling of the 2nd through 5th PIP joints, as well as subtle juxta-articular erosions of the 2nd through 5th DIP joints   X-ray of her left hand and bilateral feet did not show any signs of inflammatory arthritis      Following the prior office visit patient had started prednisone 40 mg daily for 7 days, and is currently on prednisone 20 mg daily   She states being on the steroids has significantly improved her symptoms, and she is currently denying any joint pains, swelling or morning stiffness   She is feeling well today and denies any complaints   Of note she denies a history of psoriasis or skin rashes   She denies a family history of psoriasis         11/27/2019:  Patient presents for a follow-up of psoriatic arthritis  She is currently on leflunomide 20 mg once daily and prednisone 10 mg once daily      At the last office visit I had started her on methotrexate but after taking a few doses she called the office with an outbreak of a skin rash affecting her right upper extremity  I requested she try the medication once more to ensure this was related to a side effect of the methotrexate, and she states that the rash persisted and worsened  In view of this we discontinued the methotrexate entirely and I switched her to leflunomide 20 mg once daily which she has been on for approximately 1 month now  She has been tolerating the medication well without any concerns for side effects or infections      She reports overall her joint pains have significantly improved    She did try to taper down on the prednisone to 5 mg once daily and then discontinue it, but states upon discontinuing her joint pains recurred  In view of this she restarted prednisone at 5 mg once daily, but over the past few days further increased to 10 mg once daily due to increased activities causing worsening joint pains  She is willing to try and taper down on the prednisone at this time  She reports pain that she usually experiences will affect her shoulders and knees  She denies any other significant areas of joint pains or persistent swelling  On occasion she will notice swelling of her knees  She states that the morning stiffness has mostly resolved as well      She denies any other history of skin rash or psoriasis  3/3/2020:  Patient presents for a follow-up of psoriatic arthritis  She is currently on leflunomide 20 mg once daily  I reviewed her recently done CBC and CMP which were unremarkable  Patient reports overall in terms of the arthritis she has been doing well and denies any significant peripheral joint pains, swelling or morning stiffness  For the past 3-4 months she has been dealing with left lower back pain and was seen in the emergency room for this  On occasion the pain will radiate down her leg  An x-ray of her lumbar spine was done which showed mild degenerative arthritis at two levels  She is following up with her primary care physician for this and completing a course of physical therapy  She is unsure if the PT has really helped her so far  She has been taking ibuprofen on a daily basis to take the edge off her symptoms  She also reports 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), she may develop achiness in her upper arm, chest and back region  Typically when this happens she may take prednisone 5-10 mg as needed which will help her  She uses the prednisone on a very infrequent basis  She denies a history of skin rash or psoriasis  She has been tolerating the leflunomide well without any concerns for side effects or infections      The following portions of the patient's history were reviewed and updated as appropriate: allergies, current medications, past family history, past medical history, past social history, past surgical history and problem list       Review of Systems  Constitutional: Negative for weight change, fevers, chills, night sweats, fatigue  ENT/Mouth: Negative for hearing changes, ear pain, nasal congestion, sinus pain, hoarseness, sore throat, rhinorrhea, swallowing difficulty  Eyes: Negative for pain, redness, discharge, vision changes  Cardiovascular: Negative for chest pain, SOB, palpitations  Respiratory: Negative for cough, sputum, wheezing, dyspnea  Gastrointestinal: Negative for nausea, vomiting, diarrhea, constipation, pain, heartburn  Genitourinary: Negative for dysuria, urinary frequency, hematuria  Musculoskeletal: As per HPI  Skin: Negative for skin rash, color changes  Neuro: Negative for weakness, numbness, tingling, loss of consciousness  Psych: Negative for anxiety, depression  Heme/Lymph: Negative for easy bruising, bleeding, lymphadenopathy          Past Medical History:   Diagnosis Date    Concussion     Hyperlipidemia     Psoriatic arthritis ([facility] ) 2019    Psoriatic arthritis ([facility] )     Seronegative rheumatoid arthritis ([facility] ) 2019    Tendinitis        Past Surgical History:   Procedure Laterality Date     SECTION      FOOT SURGERY Left     HEMORROIDECTOMY      KNEE ARTHROSCOPY Right 2018    KNEE SURGERY      MT KNEE SCOPE,MED/LAT MENISECTOMY Right 2018    Procedure: ARTHROSCOPY KNEE, PARTIAL MEDIAL MENISCECTOMY: ABRASION CHONDROPLASTY;  Surgeon: Ira Dove MD;  Location: [facility] OR;  Service: Orthopedics    TUBAL LIGATION         Social History     Socioeconomic History    Marital status:      Spouse name: Not on file    Number of children: Not on file    Years of education: Not on file    Highest education level: Not on file   Occupational History    Not on file   Social Needs    Financial resource strain: Not on file    Food insecurity:     Worry: Not on file     Inability: Not on file    Transportation needs:     Medical: Not on file     Non-medical: Not on file   Tobacco Use    Smoking status: Former Smoker     Packs/day: 0 33     Years: 5 00     Pack years: 1 65     Last attempt to quit:      Years since quittin 1    Smokeless tobacco: Never Used   Substance and Sexual Activity    Alcohol use: Yes     Comment: social    Drug use: No    Sexual activity: Not on file   Lifestyle    Physical activity:     Days per week: Not on file     Minutes per session: Not on file    Stress: Not on file   Relationships    Social connections:     Talks on phone: Not on file     Gets together: Not on file     Attends Taoism service: Not on file     Active member of club or organization: Not on file     Attends meetings of clubs or organizations: Not on file     Relationship status: Not on file    Intimate partner violence:     Fear of current or ex partner: Not on file     Emotionally abused: Not on file     Physically abused: Not on file     Forced sexual activity: Not on file   Other Topics Concern    Not on file   Social History Narrative    Not on file       Family History   Problem Relation Age of Onset    Coronary artery disease Mother     Other Mother         CABG    Hiatal hernia Father     Stroke Maternal Grandmother     Hyperlipidemia Family     Migraines Family     Thyroid disease Family     No Known Problems Sister     No Known Problems Brother     No Known Problems Son     No Known Problems Daughter     No Known Problems Paternal Grandmother     No Known Problems Paternal Grandfather     No Known Problems Maternal Aunt     No Known Problems Maternal Uncle     No Known Problems Paternal Aunt     No Known Problems Paternal Uncle     No Known Problems Cousin        Allergies   Allergen Reactions    Methotrexate Hives Current Outpatient Medications:     Glucosamine-Chondroitin (GLUCOSAMINE CHONDR COMPLEX PO), Take by mouth daily  , Disp: , Rfl:     leflunomide (ARAVA) 20 MG tablet, Take 1 tablet (20 mg total) by mouth daily, Disp: 30 tablet, Rfl: 2    pravastatin (PRAVACHOL) 40 mg tablet, TAKE 1 TABLET BY MOUTH AT BEDTIME, Disp: 30 tablet, Rfl: 3    predniSONE 5 mg tablet, Take 5-10 mg daily as needed  , Disp: 30 tablet, Rfl: 1      Objective:    Vitals:    03/03/20 1139   BP: 128/80   Pulse: 69   Weight: 68 9 kg (151 lb 12 8 oz)   Height: 5' 4" (1 626 m)       Physical Exam  General: Well appearing, well nourished, in no distress  Oriented x 3, normal mood and affect  Ambulating without difficulty  Skin: Good turgor, no rash, unusual bruising or prominent lesions  Hair: Normal texture and distribution  Nails: Normal color, no deformities  HEENT:  Head: Normocephalic, atraumatic  Eyes: Conjunctiva clear, sclera non-icteric, EOM intact  Nose: No external lesions, mucosa non-inflamed  Mouth: Mucous membranes moist, no mucosal lesions  Neck: Supple  Extremities: No amputations or deformities, cyanosis, edema  Musculoskeletal:  There is no peripheral joint soft tissue swelling or tenderness noted  She has mild myofascial tenderness present in her upper chest and upper back region  Neurologic: Alert and oriented  No focal neurological deficits appreciated  Psychiatric: Normal mood and affect  JASON Lunsford    Rheumatology

## 2020-03-02 NOTE — PROGRESS NOTES
Daily Note     Today's date: 3/2/2020  Patient name: Maury Rich  : 1960  MRN: [id number]  Referring provider: Susan Ma PT  Dx:   Encounter Diagnosis     ICD-10-CM    1  Acute left-sided low back pain with left-sided sciatica M54 [id number]                   Subjective:  Pt reports feeling sore after being very busy at work yesterday  Objective:  See treatment diary below      Assessment:  Pt presented to outpatient physical therapy at [place] [address] with complaints of L LB and L LE pain  She is having the most difficulty when getting up from sitting due to pain  Patient was referred to PT through [address] [place] [address]  She will continue to benefit from skilled PT services with focus on manual treatment to address her hypomobility issues, stretching and core / L LE strengthening in order to return to all normal activities and reach maximum level of function  Sacral mobility is improving with each session with very little tightness today  Pt's tolerance to standing at work is improving  Still min less pain with slight trunk flexion  Plan:  Last scheduled visit on 3/9/20  Pt away 3/10/19 x 1 week  Continue to add core strength           POC EXPIRES On:  3/22/20  PRECAUTIONS:  None  CO-MORBIDITES:  Hx of concussion  PERSONAL FACTORS:  None     Manual    32               Trigger point massage L SI jt 4'  4'  4'  4'               Sacral rocking 4'  4'  4'  4'                                                                                             Exercise Diary    3/2               Crossed leg stretch to R 10" 5 10" 10  10" 10  10" 10               Supine piriformis stretch L 10" 5 10" 10  10" 10  10" 10               Supine PPT    5" 20  5" 20  5" 20                Bike    L1 7'  L1 8'  L1 8'                Clamshells in R S/L with t-band   20 no band Blue 20  Blue 20                Prone hip ext L/R   15 ea   HEP                  Wedge L calf stretch      20" 5  20" 5                Standing t-band hip abd + ext L/R      Blue 15 ea Blue 20 ea                Bird-dogs     NV   10 ea                                                                                                                                                                                                                                                                                             Modalities

## 2020-03-03 ENCOUNTER — OFFICE VISIT (OUTPATIENT)
Dept: RHEUMATOLOGY | Facility: CLINIC | Age: 60
End: 2020-03-03
Payer: COMMERCIAL

## 2020-03-03 VITALS
SYSTOLIC BLOOD PRESSURE: 128 MMHG | BODY MASS INDEX: 25.92 KG/M2 | HEIGHT: 64 IN | WEIGHT: 151.8 LBS | DIASTOLIC BLOOD PRESSURE: 80 MMHG | HEART RATE: 69 BPM

## 2020-03-03 DIAGNOSIS — M17.11 PRIMARY OSTEOARTHRITIS OF RIGHT KNEE: ICD-10-CM

## 2020-03-03 DIAGNOSIS — M19.90 INFLAMMATORY ARTHRITIS: ICD-10-CM

## 2020-03-03 DIAGNOSIS — Z79.899 HIGH RISK MEDICATION USE: ICD-10-CM

## 2020-03-03 DIAGNOSIS — M75.111 INCOMPLETE ROTATOR CUFF TEAR OR RUPTURE OF RIGHT SHOULDER, NOT SPECIFIED AS TRAUMATIC: ICD-10-CM

## 2020-03-03 DIAGNOSIS — Z92.241 HISTORY OF RECENT STEROID USE: ICD-10-CM

## 2020-03-03 DIAGNOSIS — L40.50 PSORIATIC ARTHROPATHY (HCC): Primary | ICD-10-CM

## 2020-03-03 DIAGNOSIS — M06.00 SERONEGATIVE RHEUMATOID ARTHRITIS (HCC): ICD-10-CM

## 2020-03-03 PROCEDURE — 1036F TOBACCO NON-USER: CPT | Performed by: INTERNAL MEDICINE

## 2020-03-03 PROCEDURE — 99214 OFFICE O/P EST MOD 30 MIN: CPT | Performed by: INTERNAL MEDICINE

## 2020-03-03 PROCEDURE — 3008F BODY MASS INDEX DOCD: CPT | Performed by: INTERNAL MEDICINE

## 2020-03-03 RX ORDER — LEFLUNOMIDE 20 MG/1
20 TABLET ORAL DAILY
Qty: 30 TABLET | Refills: 2 | Status: SHIPPED | OUTPATIENT
Start: 2020-03-03 | End: 2020-06-02 | Stop reason: SDUPTHER

## 2020-03-03 RX ORDER — PREDNISONE 1 MG/1
TABLET ORAL
Qty: 30 TABLET | Refills: 1 | Status: SHIPPED | OUTPATIENT
Start: 2020-03-03 | End: 2020-04-16 | Stop reason: SDUPTHER

## 2020-03-06 ENCOUNTER — TELEPHONE (OUTPATIENT)
Dept: FAMILY MEDICINE CLINIC | Facility: CLINIC | Age: 60
End: 2020-03-06

## 2020-03-06 NOTE — TELEPHONE ENCOUNTER
Patient needs to go to orthopaedics  She is [employer], she is [facility]

## 2020-03-07 ENCOUNTER — TELEPHONE (OUTPATIENT)
Dept: FAMILY MEDICINE CLINIC | Facility: CLINIC | Age: 60
End: 2020-03-07

## 2020-03-07 DIAGNOSIS — M54.40 LOW BACK PAIN WITH SCIATICA, SCIATICA LATERALITY UNSPECIFIED, UNSPECIFIED BACK PAIN LATERALITY, UNSPECIFIED CHRONICITY: Primary | ICD-10-CM

## 2020-03-07 NOTE — TELEPHONE ENCOUNTER
Pt is having left side sciatica pain down her left leg  Pt has appt on 3/18/2020  Pt has [facility] first [id number]

## 2020-03-07 NOTE — TELEPHONE ENCOUNTER
Miri called requesting a [facility] first referral for pt for QT pain and spine Dr Carlos Ardon office,     NPI - [id number]  DX - [id number]  Procedure code - 35515  DOS - 3/16/2020    Please fax rx to [phone]

## 2020-03-09 ENCOUNTER — OFFICE VISIT (OUTPATIENT)
Dept: PHYSICAL THERAPY | Facility: CLINIC | Age: 60
End: 2020-03-09
Payer: COMMERCIAL

## 2020-03-09 DIAGNOSIS — M54.42 ACUTE LEFT-SIDED LOW BACK PAIN WITH LEFT-SIDED SCIATICA: Primary | ICD-10-CM

## 2020-03-09 PROCEDURE — 97112 NEUROMUSCULAR REEDUCATION: CPT | Performed by: PHYSICAL THERAPIST

## 2020-03-09 PROCEDURE — 97110 THERAPEUTIC EXERCISES: CPT | Performed by: PHYSICAL THERAPIST

## 2020-03-09 PROCEDURE — 97140 MANUAL THERAPY 1/> REGIONS: CPT | Performed by: PHYSICAL THERAPIST

## 2020-03-09 NOTE — PROGRESS NOTES
Daily Note     Today's date: 3/9/2020  Patient name: Fabian Miller  : 1960  MRN: [id number]  Referring provider: Tena Adkins PT  Dx:   Encounter Diagnosis     ICD-10-CM    1  Acute left-sided low back pain with left-sided sciatica M54 42      Addendum for note that auto froze on 3/9/20:  Pt's pain level = 1/10 - 4/10  Abdominal strength = 4/5  PT Discharge    Today's date: 3/9/2020  Patient name: Fabian Miller  : 1960  MRN: [id number]  Referring provider: Tena Adkins PT  Dx:   Encounter Diagnosis     ICD-10-CM    1  Acute left-sided low back pain with left-sided sciatica M54 42                   Assessment  Assessment details: Fabian Miller is a 61 y o  Female who presented to outpatient physical therapy at [place] [address] with complaints of L LB and L LE pain  She was having the most difficulty when getting up from sitting due to pain  Patient was referred to PT through [place] [id number]  She has progressed well and has no pain when sitting, but continues to have some L lateral thigh pain with walking and standing  A consult with pain management may be beneficial    Barriers to therapy: None  Understanding of Dx/Px/POC: excellent   Prognosis: good    Goals  ST  Independent with HEP in 2 weeks - Met  2  Increase lumbar AROM to WNL all motions in 3 weeks - Met     LT  Achieve FOTO score of 63/100 in 4 weeks - Mostly Met  2  Able to exercise normally without LBP or L LE pain in 4 weeks - Partially Met  3  Strength L LE and abdominals = 5/5 in 4 weeks - Partially Met  4  Good sacral mobility in 4 weeks - Met  5    No tenderness L sacral base in 4 weeks - Mostly Met    Plan  Treatment plan discussed with: patient        Subjective Evaluation    History of Present Illness  Mechanism of injury: Pt reports falling from a small ladder onto her L LB in early 2019 that resolved for a while, but then 3 months ago L lateral thigh and L upper lateral leg pain, plus continued LBP  Worse in the early AM   Saw a chiropractor 5x for adjustments without pain relief  Has RA  Unable to exercise normally at the gym due to to LBP  Working as a  full duty  Sit to stand increases pain  Recurrent probem    Quality of life: good    Pain  Current pain rating: 3  At best pain rating: 3  At worst pain rating: 10  Quality: dull ache  Relieving factors: heat  Progression: no change    Social Support  Steps to enter house: yes  Stairs in house: yes   Lives in: multiple-level home  Lives with: significant other    Employment status: working    Diagnostic Tests  X-ray: abnormal (6 mm anterolisthesis L4-5  Mild loss of disc height at L5-S1 compatible with degenerative disc disease  Facet degenerative changes are seen L3-4 through L5-S1 )  Treatments  Previous treatment: chiropractic  Patient Goals  Patient goals for therapy: decreased pain, increased motion, increased strength and return to sport/leisure activities          Objective     Concurrent Complaints  Negative for night pain, disturbed sleep and bladder dysfunction    Postural Observations  Seated posture: good  Standing posture: good  Correction of posture: has no consistent effect        Palpation   Left   No palpable tenderness to the erector spinae  Right   No palpable tenderness to the erector spinae  Tenderness     Lumbar Spine  Tenderness in the facet joint (L S1-2)  No tenderness in the spinous process  Left Hip   No tenderness in the PSIS  Right Hip   No tenderness in the PSIS       Neurological Testing     Sensation     Lumbar   Left   Intact: light touch    Right   Intact: light touch    Reflexes   Left   Patellar (L4): normal (2+)    Right   Patellar (L4): normal (2+)    Active Range of Motion     Lumbar   Flexion:  Restriction level: moderate  Extension:  with pain Restriction level: moderate  Left lateral flexion:  with pain Restriction level: moderate  Right lateral flexion:  Restriction level: moderate  Left rotation:  Restriction level: moderate  Right rotation:  Restriction level: moderate    Joint Play     Hypomobile: S1     Pain: S1     Strength/Myotome Testing     Lumbar     Right   Normal strength    Left Hip   Planes of Motion   Flexion: 4+  Extension: 4-  Abduction: 4+  Adduction: 5  Internal rotation: 5    Left Knee   Flexion: 5  Extension: 5    Left Ankle/Foot   Dorsiflexion: 5  Plantar flexion: 5    Additional Strength Details  Abdominals = 4-/5  Tests     Lumbar   Positive SIJ compression  Negative prone instability   Left   Negative passive SLR and slump test      Right   Negative passive SLR and slump test      Ambulation     Observational Gait   Gait: within functional limits   Graphical documentation        Daily Treatment Diary     Dx:    1   Acute left-sided low back pain with left-sided sciatica      POC EXPIRES On:  3/22/20  PRECAUTIONS:  None  CO-MORBIDITES:  Hx of concussion  PERSONAL FACTORS:  None     Manual  2/21  2/24  2/28  3/2  3/9             Trigger point massage L SI jt 4'  4'  4'  4'  4'             Sacral rocking 4'  4'  4'  4'  4'                                                                                           Exercise Diary  2/21  2/24  2/28  3/2  3/9             Crossed leg stretch to R 10" 5 10" 10  10" 10  10" 10  10" 10             Supine piriformis stretch L 10" 5 10" 10  10" 10  10" 10  10" 10             Supine PPT    5" 20  5" 20  5" 20  5" 20              Bike    L1 7'  L1 8'  L1 8'  L1 8'              Clamshells in R S/L with t-band   20 no band Blue 20  Blue 20  Blue 20              Prone hip ext L/R   15 ea   HEP                  Wedge L calf stretch      20" 5  20" 5  20" 5              Standing t-band hip abd + ext L/R      Blue 15 ea Blue 20 ea  Blue 20 ea              Bird-dogs     NV   10 ea  10 ea                                                                                                                                                                                                                                                                                           Modalities

## 2020-03-18 ENCOUNTER — OFFICE VISIT (OUTPATIENT)
Dept: PAIN MEDICINE | Facility: CLINIC | Age: 60
End: 2020-03-18
Payer: COMMERCIAL

## 2020-03-18 ENCOUNTER — TELEPHONE (OUTPATIENT)
Dept: PAIN MEDICINE | Facility: CLINIC | Age: 60
End: 2020-03-18

## 2020-03-18 VITALS
DIASTOLIC BLOOD PRESSURE: 76 MMHG | BODY MASS INDEX: 25.78 KG/M2 | SYSTOLIC BLOOD PRESSURE: 128 MMHG | HEIGHT: 64 IN | WEIGHT: 151 LBS | HEART RATE: 72 BPM

## 2020-03-18 DIAGNOSIS — M54.50 CHRONIC LEFT-SIDED LOW BACK PAIN WITHOUT SCIATICA: Primary | ICD-10-CM

## 2020-03-18 DIAGNOSIS — G89.29 CHRONIC LEFT-SIDED LOW BACK PAIN WITHOUT SCIATICA: Primary | ICD-10-CM

## 2020-03-18 DIAGNOSIS — M54.40 LOW BACK PAIN WITH SCIATICA, SCIATICA LATERALITY UNSPECIFIED, UNSPECIFIED BACK PAIN LATERALITY, UNSPECIFIED CHRONICITY: ICD-10-CM

## 2020-03-18 DIAGNOSIS — M53.3 SACROILIAC JOINT DYSFUNCTION OF LEFT SIDE: ICD-10-CM

## 2020-03-18 DIAGNOSIS — M79.18 MYOFASCIAL PAIN SYNDROME: ICD-10-CM

## 2020-03-18 DIAGNOSIS — M54.16 LUMBAR RADICULOPATHY: ICD-10-CM

## 2020-03-18 DIAGNOSIS — M47.816 LUMBAR SPONDYLOSIS: ICD-10-CM

## 2020-03-18 DIAGNOSIS — M46.1 SACROILIITIS (HCC): ICD-10-CM

## 2020-03-18 PROCEDURE — 3008F BODY MASS INDEX DOCD: CPT | Performed by: INTERNAL MEDICINE

## 2020-03-18 PROCEDURE — 1036F TOBACCO NON-USER: CPT | Performed by: NURSE PRACTITIONER

## 2020-03-18 PROCEDURE — 99214 OFFICE O/P EST MOD 30 MIN: CPT | Performed by: NURSE PRACTITIONER

## 2020-03-18 RX ORDER — TIZANIDINE 2 MG/1
TABLET ORAL
Qty: 45 TABLET | Refills: 1 | Status: SHIPPED | OUTPATIENT
Start: 2020-03-18 | End: 2021-01-26

## 2020-03-18 RX ORDER — DICLOFENAC SODIUM 25 MG/1
TABLET, DELAYED RELEASE ORAL
Qty: 90 TABLET | Refills: 1 | Status: SHIPPED | OUTPATIENT
Start: 2020-03-18 | End: 2020-11-10

## 2020-03-18 NOTE — PATIENT INSTRUCTIONS
Tizanidine (By mouth)   Tizanidine (kmy-SFC-a-opal)  Treats muscle spasticity  Brand Name(s): Comfort Pac w/tiZANidine, Zanaflex, Zanaflex Capsule   There may be other brand names for this medicine  When This Medicine Should Not Be Used: This medicine is not right for everyone  Do not use if you had an allergic reaction to tizanidine  How to Use This Medicine:   Capsule, Tablet  · Take your medicine as directed  Your dose may need to be changed several times to find what works best for you  · You may take this medicine with or without food, but always take it the same way every time  Tizanidine works differently depending on whether you take it on an empty stomach or a full stomach  Talk to your doctor if you have any questions about this  · Missed dose: Take a dose as soon as you remember  If it is almost time for your next dose, wait until then and take a regular dose  Do not take extra medicine to make up for a missed dose  · Store the medicine in a closed container at room temperature, away from heat, moisture, and direct light  Drugs and Foods to Avoid:   Ask your doctor or pharmacist before using any other medicine, including over-the-counter medicines, vitamins, and herbal products  · Do not use this medicine together with ciprofloxacin or fluvoxamine  · Some foods and medicines can affect how tizanidine works  Tell your doctor if you are using any of the following:  ¨ Acyclovir, baclofen, cimetidine, famotidine, ticlopidine, verapamil, zileuton  ¨ Birth control pills, blood pressure medicine, medicine for heart rhythm problems (such as amiodarone, mexiletine, propafenone), or medicine to treat an infection (such as levofloxacin, moxifloxacin)  · Do not drink alcohol while you are using this medicine  · Tell your doctor if you use anything else that makes you sleepy  Some examples are allergy medicine, narcotic pain medicine, and alcohol    Warnings While Using This Medicine:   · Tell your doctor if you are pregnant or breastfeeding, or if you have kidney disease or liver disease  · This medicine may cause the following problems:  ¨ Low blood pressure  ¨ Liver damage  · This medicine may make you dizzy or drowsy  Do not drive or do anything else that could be dangerous until you know how this medicine affects you  Stand or sit up slowly if you are dizzy  · Do not stop using this medicine suddenly  Your doctor will need to slowly decrease your dose before you stop it completely  · Your doctor will do lab tests at regular visits to check on the effects of this medicine  Keep all appointments  · Keep all medicine out of the reach of children  Never share your medicine with anyone  Possible Side Effects While Using This Medicine:   Call your doctor right away if you notice any of these side effects:  · Allergic reaction: Itching or hives, swelling in your face or hands, swelling or tingling in your mouth or throat, chest tightness, trouble breathing  · Dark urine or pale stools, nausea, vomiting, loss of appetite, stomach pain, yellow skin or eyes  · Lightheadedness, dizziness, or fainting  · Seeing or hearing things that are not really there  · Slow heartbeat  If you notice these less serious side effects, talk with your doctor:   · Dry mouth  · Drowsiness or sleepiness  · Weakness  If you notice other side effects that you think are caused by this medicine, tell your doctor  Call your doctor for medical advice about side effects  You may report side effects to FDA at 6-811-FDA-1206  © 2017 [address] [employer] Information is for End User's use only and may not be sold, redistributed or otherwise used for commercial purposes  The above information is an  only  It is not intended as medical advice for individual conditions or treatments  Talk to your doctor, nurse or pharmacist before following any medical regimen to see if it is safe and effective for you        Diclofenac (By mouth)   Diclofenac (dye-KLOE-fen-ak)  Treats pain  Also treats migraines  This medicine is an NSAID  Brand Name(s): Cambia, Cataflam, DermaSilkRx Anodynexa Anton, DermaSilkRx [employer], DermacinRx Gerardo, Cumeira, NuDiclo TabPAK, PrevidolRx Analgesic Anton, PrevidolRx Plus Analgesic Anton, Voltaren, Zipsor, Zorvolex   There may be other brand names for this medicine  When This Medicine Should Not Be Used: This medicine is not right for everyone  Do not use it if you had an allergic reaction to diclofenac, aspirin, or another NSAID  How to Use This Medicine:   Capsule, Liquid, Tablet, Coated Tablet, Long Acting Tablet  · Your doctor will tell you how much medicine to use  Do not use more than directed  · This medicine should come with a Medication Guide  Ask your pharmacist for a copy if you do not have one  · Oral solution: Mix the packet contents with 1 to 2 ounces (30 to 60 mL) of water  Do not use any liquid other than water for mixing the medicine  Mix well and drink it immediately on an empty stomach  · Missed dose: Take a dose as soon as you remember  If it is almost time for your next dose, wait until then and take a regular dose  Do not take extra medicine to make up for a missed dose  · Store the medicine in a closed container at room temperature, away from heat, moisture, and direct light     Drugs and Foods to Avoid:   Ask your doctor or pharmacist before using any other medicine, including over-the-counter medicines, vitamins, and herbal products  · Do not use any other NSAID unless your doctor says it is okay  Some other NSAIDs are aspirin, diflunisal, ibuprofen, naproxen, or salsalate  · Some medicines and foods can affect how diclofenac works   Tell your doctor if you are also using any of the following:  ¨ Cyclosporine, digoxin, lithium, methotrexate, pemetrexed  ¨ Blood pressure medicine  ¨ Blood thinner (such as warfarin)  ¨ Diuretic (water pill)  ¨ Medicine to treat depression  ¨ Steroid medicine  Warnings While Using This Medicine:   · Tell your doctor if you are pregnant or breastfeeding  Do not use this medicine during the later part of a pregnancy, unless your doctor tells you to  · Tell your doctor if you have kidney disease, liver disease, asthma, heart failure, high blood pressure, or heart or blood vessel problems, or a history of stomach ulcers or bleeding problems  Tell your doctor if you have phenylketonuria (PKU)  Also tell your doctor if you drink alcohol  · This medicine may cause the following problems:  ¨ Increased risk of heart attack, heart failure, or stroke  ¨ Bleeding in your stomach or intestines  ¨ Liver problems  ¨ Serious skin reactions  · Your headaches may become worse if you use a headache medicine for 10 or more days per month  Write down how often your headaches occur and how often you use this medicine  · Your doctor will do lab tests at regular visits to check on the effects of this medicine  Keep all appointments  · Keep all medicine out of the reach of children  Never share your medicine with anyone    Possible Side Effects While Using This Medicine:   Call your doctor right away if you notice any of these side effects:  · Allergic reaction: Itching or hives, swelling in your face or hands, swelling or tingling in your mouth or throat, chest tightness, trouble breathing  · Blistering, peeling, or red skin rash  · Bloody or black, tarry stools, severe stomach pain, vomiting blood or material that looks like coffee grounds  · Change in how much or how often you urinate  · Chest pain that may spread to your arms, jaw, back, or neck, trouble breathing, unusual sweating, faintness  · Dark urine or pale stools, nausea, vomiting, loss of appetite, stomach pain, yellow skin or eyes  · Numbness or weakness in your arm or leg, or on one side of your body, pain in your lower leg, sudden or severe headache, or problems with vision, speech, or walking  · Rapid weight gain, swelling in your hands, ankles, or feet  If you notice these less serious side effects, talk with your doctor:   · Constipation or diarrhea  · Mild headache  If you notice other side effects that you think are caused by this medicine, tell your doctor  Call your doctor for medical advice about side effects  You may report side effects to FDA at 0-434-FDA-2258  © 2017 [address] [employer] Information is for End User's use only and may not be sold, redistributed or otherwise used for commercial purposes  The above information is an  only  It is not intended as medical advice for individual conditions or treatments  Talk to your doctor, nurse or pharmacist before following any medical regimen to see if it is safe and effective for you

## 2020-03-18 NOTE — PROGRESS NOTES
Assessment:  1  Chronic left-sided low back pain without sciatica    2  Low back pain with sciatica, sciatica laterality unspecified, unspecified back pain laterality, unspecified chronicity    3  Lumbar radiculopathy    4  Myofascial pain syndrome    5  Lumbar spondylosis    6  Sacroiliitis ([address] )    7  Sacroiliac joint dysfunction of left side        Plan:  While the patient was in the office today, I did have a thorough conversation with the patient regarding her current pain symptoms and treatment plan options  I explained the patient although I do feel there is definitely significant inflammatory component and she may benefit from a left sacroiliac joint injection, however, for now, because of the COVID-19 virus, we are holding off any non essential or not emergent injections or procedures at this time  In the meantime I feel that she would benefit from a stronger anti-inflammatory such as diclofenac 25 mg t i d  I advised the patient that she should take the medication with food and that she should not take any other oral NSAIDs except for acetaminophen or Tylenol while on the diclofenac  She is to call our office if she experiences any GI upset or distress  The patient was agreeable and verbalized an understanding  I also explained to the patient that I feel there is definitely a significant myofascial component to the pain symptoms and for now I would like her to start her on a muscle relaxer such as tizanidine 2 mg, 1-2 pills at bedtime to try to help with the pain and spasms at nighttime and to help her sleep better  I advised the patient that if they experience any side effects or issues with the changes in their medication regiment, they should give our office a call to discuss  I also advised the patient not to drive or operate machinery until they see how the changes in the medication regimen affects them  The patient was agreeable and verbalized an understanding           I would like her to continue with the physical therapy and home exercise program for the next 6 weeks and see how she does  At her next office visit we will re-evaluate her pain symptoms and discuss the possibility of a left sacroiliac joint injection at that time  The patient was agreeable and verbalized an understanding  The patient will follow-up in 6 weeks for medication prescription refill and reevaluation  The patient was advised to contact the office should their symptoms worsen in the interim  The patient was agreeable and verbalized an understanding  History of Present Illness: The patient is a 61 y o  female last seen on 4/15/2019 who presents for a follow up office visit in regards to left-sided low back pain with radiculopathy secondary to spondylosis, myofascial pain, and sacroiliitis with sacroiliac joint dysfunction on the left  The patient currently reports that since her last office visit as she was seen almost a year ago for her cervical pain, she has been dealing with some left-sided low back, SI joint, and leg pain since approximately September, but it was not until December in January that the pain started to slowly start to kick up and now it is at a point that it is very uncomfortable  She denies any recent or significant trauma or injury as well as any signs or symptoms of cauda equina syndrome  She reports that over the past few months he has tried and failed 6 weeks of physical therapy, ice, heat, home exercise program, stretching, and even chiropractic treatment without any significant relief or improvement  She uses Aleve b i d  With minimal to moderate relief at times  She presents today for re-evaluation and to discuss her treatment plan options  I have personally reviewed and/or updated the patient's past medical history, past surgical history, family history, social history, current medications, allergies, and vital signs today         Review of Systems:    Review of Systems Respiratory: Negative for shortness of breath  Cardiovascular: Negative for chest pain  Gastrointestinal: Negative for constipation, diarrhea, nausea and vomiting  Musculoskeletal: Positive for gait problem  Negative for arthralgias, joint swelling and myalgias  Skin: Negative for rash  Neurological: Negative for dizziness, seizures and weakness  All other systems reviewed and are negative          Past Medical History:   Diagnosis Date    Chronic left-sided low back pain without sciatica 3/18/2020    Concussion     Hyperlipidemia     P[facility]atic arthritis ([facility] ) 2019    Psoriatic arthritis ([place] [address] )     Seronegative rheumatoid arthritis ([facility] ) 2019    Tendinitis        Past Surgical History:   Procedure Laterality Date     SECTION      FOOT SURGERY Left 2007    HEMORROIDECTOMY      KNEE ARTHROSCOPY Right 2018    KNEE SURGERY      ND KNEE SCOPE,MED/LAT MENISECTOMY Right 2018    Procedure: ARTHROSCOPY KNEE, PARTIAL MEDIAL MENISCECTOMY: ABRASION CHONDROPLASTY;  Surgeon: Jose Wren MD;  [facility];  Service: Orthopedics    TUBAL LIGATION         Family History   Problem Relation Age of Onset    Coronary artery disease Mother     Other Mother         CABG    Hiatal hernia Father     Stroke Maternal Grandmother     Hyperlipidemia Family     Migraines Family     Thyroid disease Family     No Known Problems Sister     No Known Problems Brother     No Known Problems Son     No Known Problems Daughter     No Known Problems Paternal Grandmother     No Known Problems Paternal Grandfather     No Known Problems Maternal Aunt     No Known Problems Maternal Uncle     No Known Problems Paternal Aunt     No Known Problems Paternal Uncle     No Known Problems Cousin        Social History     Occupational History    Not on file   Tobacco Use    Smoking status: Former Smoker     Packs/day: 0 33     Years: 5 00     Pack years: 1 65     Last attempt to quit: 1984     Years since quittin 2    Smokeless tobacco: Never Used   Substance and Sexual Activity    Alcohol use: Yes     Comment: social    Drug use: No    Sexual activity: Not on file         Current Outpatient Medications:     Glucosamine-Chondroitin (GLUCOSAMINE CHONDR COMPLEX PO), Take by mouth daily  , Disp: , Rfl:     leflunomide (ARAVA) 20 MG tablet, Take 1 tablet (20 mg total) by mouth daily, Disp: 30 tablet, Rfl: 2    pravastatin (PRAVACHOL) 40 mg tablet, TAKE 1 TABLET BY MOUTH AT BEDTIME, Disp: 30 tablet, Rfl: 3    predniSONE 5 mg tablet, Take 5-10 mg daily as needed  , Disp: 30 tablet, Rfl: 1    diclofenac (VOLTAREN) 25 MG EC tablet, Take 1 PO TID with Food , Disp: 90 tablet, Rfl: 1    tiZANidine (ZANAFLEX) 2 mg tablet, Take 1-2 HS PRN for pain/spasms  , Disp: 45 tablet, Rfl: 1    Allergies   Allergen Reactions    Methotrexate Hives       Physical Exam:    /76 (BP Location: Left arm, Patient Position: Sitting, Cuff Size: Standard)   Pulse 72   Ht 5' 4" (1 626 m)   Wt 68 5 kg (151 lb)   LMP  (LMP Unknown)   BMI 25 92 kg/m²     Constitutional:normal, well developed, well nourished, alert, in no distress and non-toxic and no overt pain behavior  Eyes:anicteric  HEENT:grossly intact  Neck:supple, symmetric, trachea midline and no masses   Pulmonary:even and unlabored  Cardiovascular:No edema or pitting edema present  Skin:Normal without rashes or lesions and well hydrated  Psychiatric:Mood and affect appropriate  Neurologic:Cranial Nerves II-XII grossly intact  Musculoskeletal:The patient's gait is slightly antalgic, but steady without the use of any assistive devices  Lumbar Spine Exam    Appearance:  Normal lordosis  Palpation/Tenderness:  left lumbar paraspinal tenderness  left sacroiliac joint tenderness  Sensory:  no sensory deficits noted  Range of Motion:  Flexion:   Moderately limited  with pain  Extension:  Minimally limited  without pain  Rotation - Left:  Minimally limited  with pain  Rotation - Right:  Minimally limited  without pain  Motor Strength:  Left hip flexion:  5/5  Left hip extension:  5/5  Right hip flexion:  5/5  Right hip extension:  5/5  Left knee flexion:  5/5  Left knee extension:  5/5  Right knee flexion:  5/5  Right knee extension:  5/5  Left foot dorsiflexion:  5/5  Left foot plantar flexion:  5/5  Right foot dorsiflexion:  5/5  Right foot plantar flexion:  5/5  Reflexes:  Left Patellar:  1+   Right Patellar:  1+   Left Achilles:  1+   Right Achilles:  1+   Special Tests:  Left Straight Leg Test:  positive  Right Straight Leg Test:  negative  Left Logan's Maneuver:  positive  Right Logan's Maneuver:  negative  Left Gaenslen's Test:  positive  Right Gaenslen's Test:  negative    Imaging    XRAY LUMBAR SPINE 3/18/2020 @ [facility]  INDICATION:   trauma  COMPARISON:  None  VIEWS:  XR SPINE LUMBAR 2 OR 3 VIEWS INJURY     FINDINGS:     There are 5 non rib bearing lumbar vertebral bodies       There is no evidence of acute fracture or destructive osseous lesion      6 mm anterolisthesis L4-5       Mild loss of disc height at L5-S1 compatible with degenerative disc disease  Facet degenerative changes are seen L3-4 through L5-S1      The pedicles appear intact      Soft tissues are unremarkable      IMPRESSION:     No acute osseous abnormality        Degenerative changes as described    Anterolisthesis L4-5 measures 6 mm

## 2020-04-16 ENCOUNTER — TELEPHONE (OUTPATIENT)
Dept: OBGYN CLINIC | Facility: MEDICAL CENTER | Age: 60
End: 2020-04-16

## 2020-04-16 DIAGNOSIS — M19.90 INFLAMMATORY ARTHRITIS: ICD-10-CM

## 2020-04-16 RX ORDER — PREDNISONE 1 MG/1
5 TABLET ORAL DAILY PRN
Qty: 30 TABLET | Refills: 1 | Status: SHIPPED | OUTPATIENT
Start: 2020-04-16 | End: 2020-06-02 | Stop reason: SDUPTHER

## 2020-04-27 DIAGNOSIS — G89.29 CHRONIC LEFT-SIDED LOW BACK PAIN WITHOUT SCIATICA: ICD-10-CM

## 2020-04-27 DIAGNOSIS — M54.50 CHRONIC LEFT-SIDED LOW BACK PAIN WITHOUT SCIATICA: ICD-10-CM

## 2020-04-27 DIAGNOSIS — M79.18 MYOFASCIAL PAIN SYNDROME: ICD-10-CM

## 2020-04-27 DIAGNOSIS — M46.1 SACROILIITIS (HCC): ICD-10-CM

## 2020-04-27 RX ORDER — TIZANIDINE 2 MG/1
TABLET ORAL
Qty: 45 TABLET | Refills: 1 | OUTPATIENT
Start: 2020-04-27

## 2020-04-28 PROBLEM — G89.4 CHRONIC PAIN SYNDROME: Status: ACTIVE | Noted: 2020-04-28

## 2020-05-05 DIAGNOSIS — M54.50 CHRONIC LEFT-SIDED LOW BACK PAIN WITHOUT SCIATICA: ICD-10-CM

## 2020-05-05 DIAGNOSIS — M79.18 MYOFASCIAL PAIN SYNDROME: ICD-10-CM

## 2020-05-05 DIAGNOSIS — M46.1 SACROILIITIS (HCC): ICD-10-CM

## 2020-05-05 DIAGNOSIS — G89.29 CHRONIC LEFT-SIDED LOW BACK PAIN WITHOUT SCIATICA: ICD-10-CM

## 2020-05-05 RX ORDER — DICLOFENAC SODIUM 25 MG/1
TABLET, DELAYED RELEASE ORAL
Qty: 90 TABLET | Refills: 1 | OUTPATIENT
Start: 2020-05-05

## 2020-05-24 DIAGNOSIS — G89.29 CHRONIC LEFT-SIDED LOW BACK PAIN WITHOUT SCIATICA: ICD-10-CM

## 2020-05-24 DIAGNOSIS — M79.18 MYOFASCIAL PAIN SYNDROME: ICD-10-CM

## 2020-05-24 DIAGNOSIS — M46.1 SACROILIITIS (HCC): ICD-10-CM

## 2020-05-24 DIAGNOSIS — M54.50 CHRONIC LEFT-SIDED LOW BACK PAIN WITHOUT SCIATICA: ICD-10-CM

## 2020-05-26 RX ORDER — DICLOFENAC SODIUM 25 MG/1
TABLET, DELAYED RELEASE ORAL
Qty: 90 TABLET | Refills: 1 | OUTPATIENT
Start: 2020-05-26

## 2020-06-02 ENCOUNTER — TELEPHONE (OUTPATIENT)
Dept: RHEUMATOLOGY | Facility: CLINIC | Age: 60
End: 2020-06-02

## 2020-06-02 ENCOUNTER — TELEMEDICINE (OUTPATIENT)
Dept: RHEUMATOLOGY | Facility: CLINIC | Age: 60
End: 2020-06-02
Payer: COMMERCIAL

## 2020-06-02 DIAGNOSIS — M19.90 INFLAMMATORY ARTHRITIS: ICD-10-CM

## 2020-06-02 DIAGNOSIS — M15.0 PRIMARY GENERALIZED (OSTEO)ARTHRITIS: ICD-10-CM

## 2020-06-02 DIAGNOSIS — L40.50 PSORIATIC ARTHROPATHY (HCC): Primary | ICD-10-CM

## 2020-06-02 DIAGNOSIS — Z79.899 HIGH RISK MEDICATION USE: ICD-10-CM

## 2020-06-02 DIAGNOSIS — M06.00 SERONEGATIVE RHEUMATOID ARTHRITIS (HCC): ICD-10-CM

## 2020-06-02 DIAGNOSIS — Z79.52 CURRENT CHRONIC USE OF SYSTEMIC STEROIDS: ICD-10-CM

## 2020-06-02 PROCEDURE — 99214 OFFICE O/P EST MOD 30 MIN: CPT | Performed by: INTERNAL MEDICINE

## 2020-06-02 RX ORDER — LEFLUNOMIDE 20 MG/1
20 TABLET ORAL DAILY
Qty: 30 TABLET | Refills: 2 | Status: SHIPPED | OUTPATIENT
Start: 2020-06-02 | End: 2020-11-10 | Stop reason: SDUPTHER

## 2020-06-02 RX ORDER — PREDNISONE 1 MG/1
10 TABLET ORAL DAILY PRN
Qty: 60 TABLET | Refills: 2 | Status: SHIPPED | OUTPATIENT
Start: 2020-06-02 | End: 2020-10-04

## 2020-07-02 ENCOUNTER — TELEPHONE (OUTPATIENT)
Dept: PSYCHIATRY | Facility: CLINIC | Age: 60
End: 2020-07-02

## 2020-08-30 ENCOUNTER — TELEPHONE (OUTPATIENT)
Dept: RHEUMATOLOGY | Facility: CLINIC | Age: 60
End: 2020-08-30

## 2020-09-23 NOTE — PROGRESS NOTES
Daily Note     Today's date: 2018  Patient name: Tj Crowder  : 1960  MRN: [id number]  Referring provider: Matilda Smith MD  Dx:   Encounter Diagnosis     ICD-10-CM    1  Strain of right knee, subsequent encounter S86 [id number]                   Subjective: Pt reports she has increased knee pain post the weekend due to working a lot on her feet and gardening  Objective: See treatment diary below      Assessment: Tolerated treatment well  Patient w/ tightness in lat quad and ITB  Plan: Continue per plan of care  Progress treatment as tolerated      Dx: R knee ITB tendonitis  EPOC: 18  CO-MORBIDITIES: concussion  PERSONAL FACTORS: wants to be able to stand with less pain, walk, and go up spiral staircase      Daily Treatment Diary     Manual             R knee GISTM/ tiger tail ITB JK JK           MFR R lateral knee JK JK                                      10' 10'               Exercise Diary             bike 6' 6'           HS stretch 1' 1'           ITB stretch 1' 1'           bridges 10x10" 10x10"           Quad sets 10x10" 10x10"           clamshells 10            SLR 10 10           DLS: adductor ball squee 10x10" 10x10"           DLS; bent leg fall out 10 10                                                                                                                                                              Modalities Complex Repair And Single Advancement Flap Text: The defect edges were debeveled with a #15 scalpel blade.  The primary defect was closed partially with a complex linear closure.  Given the location of the remaining defect, shape of the defect and the proximity to free margins a single advancement flap was deemed most appropriate for complete closure of the defect.  Using a sterile surgical marker, an appropriate advancement flap was drawn incorporating the defect and placing the expected incisions within the relaxed skin tension lines where possible.    The area thus outlined was incised deep to adipose tissue with a #15 scalpel blade.  The skin margins were undermined to an appropriate distance in all directions utilizing iris scissors.

## 2020-10-04 DIAGNOSIS — M19.90 INFLAMMATORY ARTHRITIS: ICD-10-CM

## 2020-10-04 RX ORDER — PREDNISONE 1 MG/1
10 TABLET ORAL DAILY PRN
Qty: 60 TABLET | Refills: 2 | Status: SHIPPED | OUTPATIENT
Start: 2020-10-04 | End: 2020-11-10

## 2020-11-04 LAB
ALBUMIN SERPL-MCNC: 4.6 G/DL (ref 3.8–4.9)
ALBUMIN/GLOB SERPL: 2.1 {RATIO} (ref 1.2–2.2)
ALP SERPL-CCNC: 93 IU/L (ref 39–117)
ALT SERPL-CCNC: 19 IU/L (ref 0–32)
AST SERPL-CCNC: 14 IU/L (ref 0–40)
BASOPHILS # BLD AUTO: 0 X10E3/UL (ref 0–0.2)
BASOPHILS NFR BLD AUTO: 1 %
BILIRUB SERPL-MCNC: 0.2 MG/DL (ref 0–1.2)
BUN SERPL-MCNC: 19 MG/DL (ref 8–27)
BUN/CREAT SERPL: 28 (ref 12–28)
CALCIUM SERPL-MCNC: 9.9 MG/DL (ref 8.7–10.3)
CHLORIDE SERPL-SCNC: 100 MMOL/L (ref 96–106)
CO2 SERPL-SCNC: 26 MMOL/L (ref 20–29)
CREAT SERPL-MCNC: 0.67 MG/DL (ref 0.57–1)
EOSINOPHIL # BLD AUTO: 0.1 X10E3/UL (ref 0–0.4)
EOSINOPHIL NFR BLD AUTO: 2 %
ERYTHROCYTE [DISTWIDTH] IN BLOOD BY AUTOMATED COUNT: 12.7 % (ref 11.7–15.4)
GLOBULIN SER-MCNC: 2.2 G/DL (ref 1.5–4.5)
GLUCOSE SERPL-MCNC: 82 MG/DL (ref 65–99)
HCT VFR BLD AUTO: 38.9 % (ref 34–46.6)
HGB BLD-MCNC: 12.8 G/DL (ref 11.1–15.9)
IMM GRANULOCYTES # BLD: 0 X10E3/UL (ref 0–0.1)
IMM GRANULOCYTES NFR BLD: 0 %
LYMPHOCYTES # BLD AUTO: 0.7 X10E3/UL (ref 0.7–3.1)
LYMPHOCYTES NFR BLD AUTO: 15 %
MCH RBC QN AUTO: 29.2 PG (ref 26.6–33)
MCHC RBC AUTO-ENTMCNC: 32.9 G/DL (ref 31.5–35.7)
MCV RBC AUTO: 89 FL (ref 79–97)
MONOCYTES # BLD AUTO: 0.5 X10E3/UL (ref 0.1–0.9)
MONOCYTES NFR BLD AUTO: 10 %
NEUTROPHILS # BLD AUTO: 3.6 X10E3/UL (ref 1.4–7)
NEUTROPHILS NFR BLD AUTO: 72 %
PLATELET # BLD AUTO: 361 X10E3/UL (ref 150–450)
POTASSIUM SERPL-SCNC: 3.9 MMOL/L (ref 3.5–5.2)
PROT SERPL-MCNC: 6.8 G/DL (ref 6–8.5)
RBC # BLD AUTO: 4.39 X10E6/UL (ref 3.77–5.28)
SL AMB EGFR AFRICAN AMERICAN: 110 ML/MIN/1.73
SL AMB EGFR NON AFRICAN AMERICAN: 96 ML/MIN/1.73
SODIUM SERPL-SCNC: 141 MMOL/L (ref 134–144)
WBC # BLD AUTO: 4.9 X10E3/UL (ref 3.4–10.8)

## 2020-11-10 ENCOUNTER — HOSPITAL ENCOUNTER (OUTPATIENT)
Dept: RADIOLOGY | Facility: HOSPITAL | Age: 60
Discharge: HOME | End: 2020-11-10
Payer: COMMERCIAL

## 2020-11-10 ENCOUNTER — OFFICE VISIT (OUTPATIENT)
Dept: RHEUMATOLOGY | Facility: CLINIC | Age: 60
End: 2020-11-10
Payer: COMMERCIAL

## 2020-11-10 VITALS
HEIGHT: 64 IN | TEMPERATURE: 98.4 F | BODY MASS INDEX: 25.78 KG/M2 | DIASTOLIC BLOOD PRESSURE: 74 MMHG | HEART RATE: 70 BPM | WEIGHT: 151 LBS | SYSTOLIC BLOOD PRESSURE: 112 MMHG

## 2020-11-10 DIAGNOSIS — L40.50 PSORIATIC ARTHROPATHY (HCC): Primary | ICD-10-CM

## 2020-11-10 DIAGNOSIS — M15.0 PRIMARY GENERALIZED (OSTEO)ARTHRITIS: ICD-10-CM

## 2020-11-10 DIAGNOSIS — Z79.52 CURRENT CHRONIC USE OF SYSTEMIC STEROIDS: ICD-10-CM

## 2020-11-10 DIAGNOSIS — M06.00 SERONEGATIVE RHEUMATOID ARTHRITIS (HCC): ICD-10-CM

## 2020-11-10 DIAGNOSIS — M17.12 ARTHRITIS OF LEFT KNEE: ICD-10-CM

## 2020-11-10 DIAGNOSIS — Z79.899 HIGH RISK MEDICATION USE: ICD-10-CM

## 2020-11-10 PROCEDURE — 73562 X-RAY EXAM OF KNEE 3: CPT

## 2020-11-10 PROCEDURE — 99214 OFFICE O/P EST MOD 30 MIN: CPT | Performed by: INTERNAL MEDICINE

## 2020-11-10 PROCEDURE — 1036F TOBACCO NON-USER: CPT | Performed by: INTERNAL MEDICINE

## 2020-11-10 PROCEDURE — 3008F BODY MASS INDEX DOCD: CPT | Performed by: INTERNAL MEDICINE

## 2020-11-10 RX ORDER — LEFLUNOMIDE 20 MG/1
TABLET ORAL
Qty: 30 TABLET | Refills: 2 | Status: SHIPPED | OUTPATIENT
Start: 2020-11-10 | End: 2021-03-10 | Stop reason: SDUPTHER

## 2020-11-10 RX ORDER — IBUPROFEN 600 MG/1
600 TABLET ORAL EVERY 8 HOURS PRN
Qty: 30 TABLET | Refills: 0 | Status: SHIPPED | OUTPATIENT
Start: 2020-11-10 | End: 2021-01-26

## 2021-01-26 ENCOUNTER — OFFICE VISIT (OUTPATIENT)
Dept: FAMILY MEDICINE CLINIC | Facility: CLINIC | Age: 61
End: 2021-01-26
Payer: COMMERCIAL

## 2021-01-26 VITALS
TEMPERATURE: 97.3 F | DIASTOLIC BLOOD PRESSURE: 84 MMHG | BODY MASS INDEX: 25.48 KG/M2 | HEIGHT: 64 IN | SYSTOLIC BLOOD PRESSURE: 122 MMHG | WEIGHT: 149.25 LBS | OXYGEN SATURATION: 98 % | HEART RATE: 77 BPM

## 2021-01-26 DIAGNOSIS — Z00.00 ANNUAL PHYSICAL EXAM: Primary | ICD-10-CM

## 2021-01-26 DIAGNOSIS — Z13.220 SCREENING FOR LIPID DISORDERS: ICD-10-CM

## 2021-01-26 DIAGNOSIS — R53.83 FATIGUE, UNSPECIFIED TYPE: ICD-10-CM

## 2021-01-26 DIAGNOSIS — Z12.31 ENCOUNTER FOR SCREENING MAMMOGRAM FOR MALIGNANT NEOPLASM OF BREAST: ICD-10-CM

## 2021-01-26 DIAGNOSIS — Z13.0 SCREENING, ANEMIA, DEFICIENCY, IRON: ICD-10-CM

## 2021-01-26 DIAGNOSIS — M25.511 CHRONIC RIGHT SHOULDER PAIN: ICD-10-CM

## 2021-01-26 DIAGNOSIS — G89.29 CHRONIC RIGHT SHOULDER PAIN: ICD-10-CM

## 2021-01-26 DIAGNOSIS — Z13.29 SCREENING FOR ENDOCRINE DISORDER: ICD-10-CM

## 2021-01-26 DIAGNOSIS — E78.2 MIXED HYPERLIPIDEMIA: ICD-10-CM

## 2021-01-26 PROCEDURE — 1036F TOBACCO NON-USER: CPT | Performed by: FAMILY MEDICINE

## 2021-01-26 PROCEDURE — 3725F SCREEN DEPRESSION PERFORMED: CPT | Performed by: FAMILY MEDICINE

## 2021-01-26 PROCEDURE — 99396 PREV VISIT EST AGE 40-64: CPT | Performed by: FAMILY MEDICINE

## 2021-01-26 PROCEDURE — 99214 OFFICE O/P EST MOD 30 MIN: CPT | Performed by: FAMILY MEDICINE

## 2021-01-26 PROCEDURE — 3008F BODY MASS INDEX DOCD: CPT | Performed by: FAMILY MEDICINE

## 2021-01-26 NOTE — PROGRESS NOTES
[id number]    NAME: Khushi Lyle  AGE: 61 y o  SEX: female  : 1960     DATE: 2021     Assessment and Plan:     Problem List Items Addressed This Visit        Other    Hyperlipidemia    Relevant Orders    Lipid panel      Other Visit Diagnoses     Annual physical exam    -  Primary    Fatigue, unspecified type        Relevant Orders    TSH, 3rd generation with Free T4 reflex    Iron    Acute pain of right shoulder        Relevant Orders    Ambulatory referral to Orthopedic Surgery    Encounter for screening mammogram for malignant neoplasm of breast        Relevant Orders    Mammo screening bilateral w 3d & cad    Screening for lipid disorders        Screening for endocrine disorder        Screening, anemia, deficiency, iron        Relevant Orders    CBC and differential    Comprehensive metabolic panel    Iron          Immunizations and preventive care screenings were discussed with patient today  Appropriate education was printed on patient's after visit summary  Counseling:  Dental Health: discussed importance of regular tooth brushing, flossing, and dental visits  · Exercise: the importance of regular exercise/physical activity was discussed  Recommend exercise 3-5 times per week for at least 30 minutes  Return in 1 year (on 2022)  Chief Complaint:     Chief Complaint   Patient presents with    Low Iron     Pt donated blood last Tuesday and they told her that her iron has been low  Pt was very tired last Monday and Tuesday  Pt now feels ok  Pt due for FBW and mammo  Mammo script given  History of Present Illness:     Adult Annual Physical   Patient here for a comprehensive physical exam  The patient reports no problems  Diet and Physical Activity  · Diet/Nutrition: well balanced diet  · Exercise: moderate cardiovascular exercise        Depression Screening  PHQ-9 Depression Screening PHQ-9:   Frequency of the following problems over the past two weeks:      Little interest or pleasure in doing things: 0 - not at all  Feeling down, depressed, or hopeless: 0 - not at all  PHQ-2 Score: 0       General Health  · Sleep: sleeps poorly  · Hearing: normal - bilateral   · Vision: most recent eye exam >1 year ago and wears glasses  · Dental: no dental visits for >1 year  Review of Systems:     Review of Systems   Constitutional: Negative for chills, fever and unexpected weight change  HENT: Negative for congestion, ear pain, hearing loss, postnasal drip, rhinorrhea and sore throat  Eyes: Negative for visual disturbance  Respiratory: Negative for cough and shortness of breath  Cardiovascular: Negative for chest pain  Gastrointestinal: Negative for abdominal pain, constipation and diarrhea  Genitourinary: Negative for difficulty urinating  Musculoskeletal: Positive for arthralgias (right shoulder)  Negative for gait problem  Skin: Negative for rash  Neurological: Negative for light-headedness and headaches  Psychiatric/Behavioral: Negative for dysphoric mood and sleep disturbance  The patient is not nervous/anxious         Past Medical History:     Past Medical History:   Diagnosis Date    Chronic left-sided low back pain without sciatica 3/18/2020    Concussion     Hyperlipidemia     [facility]soriatic arthritis [facility] [address] ) 2019    Psoriatic arthritis ([facility] [address] [facility]     Seronegative rheumatoid arthritis ([facility] [address] ) 2019    Tendinitis       Past Surgical History:     Past Surgical History:   Procedure Laterality Date     SECTION      FOOT SURGERY Left     HEMORROIDECTOMY      KNEE ARTHROSCOPY Right 2018    KNEE SURGERY      [place] KNEE SCOPE,MED/LAT MENISECTOMY Right 2018    Procedure: ARTHROSCOPY KNEE, PARTIAL MEDIAL MENISCECTOMY: ABRASION CHONDROPLASTY;  Surgeon: Andre Bronson MD;  Location: [facility];  Service: Orthopedics    TUBAL LIGATION        Social History:     E-Cigarette/Vaping    E-Cigarette Use Never User      E-Cigarette/Vaping Substances    Nicotine No     THC No     CBD No     Flavoring No     Other No     Unknown No      Social History     Socioeconomic History    Marital status:      Spouse name: None    Number of children: None    Years of education: None    Highest education level: None   Occupational History    None   Social Needs    Financial resource strain: None    Food insecurity     Worry: None     Inability: None    Transportation needs     Medical: None     Non-medical: None   Tobacco Use    Smoking status: Former Smoker     Packs/day: 0 33     Years: 5 00     Pack years: 1 65     Quit date:      Years since quittin 0    Smokeless tobacco: Never Used   Substance and Sexual Activity    Alcohol use: Yes     Frequency: 2-4 times a month     Drinks per session: 1 or 2     Binge frequency: Never     Comment: social    Drug use: No    Sexual activity: None   Lifestyle    Physical activity     Days per week: None     Minutes per session: None    Stress: None   Relationships    Social connections     Talks on phone: None     Gets together: None     Attends Confucianist service: None     Active member of club or organization: None     Attends meetings of clubs or organizations: None     Relationship status: None    Intimate partner violence     Fear of current or ex partner: None     Emotionally abused: None     Physically abused: None     Forced sexual activity: None   Other Topics Concern    None   Social History Narrative    None      Family History:     Family History   Problem Relation Age of Onset    Coronary artery disease Mother     Other Mother         CABG    Hiatal hernia Father     Stroke Maternal Grandmother     Hyperlipidemia Family     Migraines Family     Thyroid disease Family     No Known Problems Sister     No Known Problems Brother     No Known Problems Son     No Known Problems Daughter     No Known Problems Paternal Grandmother     No Known Problems Paternal Grandfather     No Known Problems Maternal Aunt     No Known Problems Maternal Uncle     No Known Problems Paternal Aunt     No Known Problems Paternal Uncle     No Known Problems Cousin       Current Medications:     Current Outpatient Medications   Medication Sig Dispense Refill    Glucosamine-Chondroitin (GLUCOSAMINE CHONDR COMPLEX PO) Take by mouth daily        leflunomide (ARAVA) 20 MG tablet TAKE 1 TABLET BY MOUTH EVERY DAY 30 tablet 2    pravastatin (PRAVACHOL) 40 mg tablet TAKE 1 TABLET BY MOUTH AT BEDTIME 30 tablet 3     No current facility-administered medications for this visit  Allergies: Allergies   Allergen Reactions    Methotrexate Hives      Physical Exam:     /84 (BP Location: Left arm, Patient Position: Sitting, Cuff Size: Standard)   Pulse 77   Temp (!) 97 3 °F (36 3 °C) (Tympanic)   Ht 5' 4" (1 626 m)   Wt 67 7 kg (149 lb 4 oz)   LMP  (LMP Unknown)   SpO2 98%   BMI 25 62 kg/m²     Physical Exam  Constitutional:       Appearance: She is well-developed  HENT:      Head: Normocephalic and atraumatic  Right Ear: Tympanic membrane, ear canal and external ear normal       Left Ear: Tympanic membrane, ear canal and external ear normal    Eyes:      Conjunctiva/sclera: Conjunctivae normal    Neck:      Musculoskeletal: Normal range of motion and neck supple  Cardiovascular:      Rate and Rhythm: Normal rate and regular rhythm  Heart sounds: Normal heart sounds  No murmur  Pulmonary:      Effort: Pulmonary effort is normal  No respiratory distress  Breath sounds: Normal breath sounds  Abdominal:      General: Bowel sounds are normal  There is no distension  Palpations: Abdomen is soft  Tenderness: There is no abdominal tenderness  Skin:     General: Skin is warm and dry  Findings: No rash  Neurological:      General: No focal deficit present  Mental Status: She is alert and oriented to person, place, and time  Psychiatric:         Mood and Affect: Mood normal          Behavior: Behavior normal          Thought Content:  Thought content normal          Judgment: Judgment normal           MD Bill Jose

## 2021-01-26 NOTE — PROGRESS NOTES
BMI Counseling: There is no height or weight on file to calculate BMI  The BMI is above normal  Exercise recommendations include exercising 3-5 times per week  Subjective:   Chief Complaint   Patient presents with    Low Iron     Pt donated blood last Tuesday and they told her that her iron has been low  Pt was very tired last Monday and Tuesday  Pt now feels ok  Pt due for FBW and mammo  Mammo script given  Patient ID: Erick Hogue is a 61 y o  female  The patient is here today because she tried to donate blood a few weeks ago and was told that her iron was low so she was not able to donate  She did feel pretty tired around that time but this has resolved  She has not had any bleeding from anywhere she is aware of  No blood in her stool  She had labs done in November with a normal hemoglobin by her rheumatologist  She otherwise is feeling well  Her only other complaint is that her right shoulder still hurts  This has been going on for a very long time  She is a hairdresser, 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  She has decreased range of motion  She does not have any radiation into the fingers      The following portions of the patient's history were reviewed and updated as appropriate: allergies, current medications, past family history, past medical history, past social history, past surgical history and problem list     Review of Systems      Objective:  Vitals:    01/26/21 1507   BP: 122/84   BP Location: Left arm   Patient Position: Sitting   Cuff Size: Standard   Pulse: 77   Temp: (!) 97 3 °F (36 3 °C)   TempSrc: Tympanic   SpO2: 98%   Weight: 67 7 kg (149 lb 4 oz)   Height: 5' 4" (1 626 m)      Physical Exam  Constitutional:       General: She is not in acute distress  Appearance: Normal appearance  She is not ill-appearing  Musculoskeletal:      Right shoulder: She exhibits decreased range of motion  She exhibits no tenderness, no swelling and normal strength     Skin:     General: Skin is warm and dry  Findings: No erythema or rash  Neurological:      General: No focal deficit present  Mental Status: She is alert and oriented to person, place, and time  Cranial Nerves: No cranial nerve deficit  Gait: Gait normal    Psychiatric:         Mood and Affect: Mood normal          Behavior: Behavior normal          Thought Content: Thought content normal          Judgment: Judgment normal          Diabetic Foot Exam      Assessment/Plan:    Hyperlipidemia  The patient's lipids were elevated in 2016 but normal in 2019  We will recheck now  She is not on any medication  I am going to order a full panel of labs that include an iron and CBC given her recent refusal at the blood donation center  I am also referring her to Orthopedics for her chronic shoulder pain  She really would like to get an injection in the shoulder  Diagnoses and all orders for this visit:      Fatigue, unspecified type  -     TSH, 3rd generation with Free T4 reflex; Future  -     Iron; Future  -     TSH, 3rd generation with Free T4 reflex  -     Iron    Chronic right shoulder pain  -     Ambulatory referral to Orthopedic Surgery; Future    Mixed hyperlipidemia  -     Lipid panel;  Future  -     Lipid panel    Encounter for screening mammogram for malignant neoplasm of breast  -     Mammo screening bilateral w 3d & cad; Future

## 2021-01-26 NOTE — PATIENT INSTRUCTIONS

## 2021-01-26 NOTE — ASSESSMENT & PLAN NOTE
The patient's lipids were elevated in 2016 but normal in 2019  We will recheck now  She is not on any medication

## 2021-02-15 LAB
ALBUMIN SERPL-MCNC: 4.6 G/DL (ref 3.8–4.9)
ALBUMIN/GLOB SERPL: 2 {RATIO} (ref 1.2–2.2)
ALP SERPL-CCNC: 92 IU/L (ref 39–117)
ALT SERPL-CCNC: 19 IU/L (ref 0–32)
AST SERPL-CCNC: 20 IU/L (ref 0–40)
BASOPHILS # BLD AUTO: 0.1 X10E3/UL (ref 0–0.2)
BASOPHILS NFR BLD AUTO: 1 %
BILIRUB SERPL-MCNC: 0.3 MG/DL (ref 0–1.2)
BUN SERPL-MCNC: 12 MG/DL (ref 8–27)
BUN/CREAT SERPL: 16 (ref 12–28)
CALCIUM SERPL-MCNC: 10 MG/DL (ref 8.7–10.3)
CHLORIDE SERPL-SCNC: 100 MMOL/L (ref 96–106)
CO2 SERPL-SCNC: 26 MMOL/L (ref 20–29)
CREAT SERPL-MCNC: 0.73 MG/DL (ref 0.57–1)
EOSINOPHIL # BLD AUTO: 0.1 X10E3/UL (ref 0–0.4)
EOSINOPHIL NFR BLD AUTO: 2 %
ERYTHROCYTE [DISTWIDTH] IN BLOOD BY AUTOMATED COUNT: 13.2 % (ref 11.7–15.4)
GLOBULIN SER-MCNC: 2.3 G/DL (ref 1.5–4.5)
GLUCOSE SERPL-MCNC: 95 MG/DL (ref 65–99)
HCT VFR BLD AUTO: 38.7 % (ref 34–46.6)
HGB BLD-MCNC: 13 G/DL (ref 11.1–15.9)
IMM GRANULOCYTES # BLD: 0 X10E3/UL (ref 0–0.1)
IMM GRANULOCYTES NFR BLD: 0 %
LYMPHOCYTES # BLD AUTO: 0.7 X10E3/UL (ref 0.7–3.1)
LYMPHOCYTES NFR BLD AUTO: 12 %
MCH RBC QN AUTO: 29.9 PG (ref 26.6–33)
MCHC RBC AUTO-ENTMCNC: 33.6 G/DL (ref 31.5–35.7)
MCV RBC AUTO: 89 FL (ref 79–97)
MONOCYTES # BLD AUTO: 0.6 X10E3/UL (ref 0.1–0.9)
MONOCYTES NFR BLD AUTO: 9 %
NEUTROPHILS # BLD AUTO: 4.6 X10E3/UL (ref 1.4–7)
NEUTROPHILS NFR BLD AUTO: 76 %
PLATELET # BLD AUTO: 348 X10E3/UL (ref 150–450)
POTASSIUM SERPL-SCNC: 4 MMOL/L (ref 3.5–5.2)
PROT SERPL-MCNC: 6.9 G/DL (ref 6–8.5)
RBC # BLD AUTO: 4.35 X10E6/UL (ref 3.77–5.28)
SL AMB EGFR AFRICAN AMERICAN: 104 ML/MIN/1.73
SL AMB EGFR NON AFRICAN AMERICAN: 90 ML/MIN/1.73
SODIUM SERPL-SCNC: 141 MMOL/L (ref 134–144)
WBC # BLD AUTO: 6.1 X10E3/UL (ref 3.4–10.8)

## 2021-02-16 LAB
ALBUMIN SERPL-MCNC: 4.6 G/DL (ref 3.8–4.9)
ALBUMIN/GLOB SERPL: 1.8 {RATIO} (ref 1.2–2.2)
ALP SERPL-CCNC: 92 IU/L (ref 39–117)
ALT SERPL-CCNC: 21 IU/L (ref 0–32)
AST SERPL-CCNC: 20 IU/L (ref 0–40)
BASOPHILS # BLD AUTO: 0.1 X10E3/UL (ref 0–0.2)
BASOPHILS NFR BLD AUTO: 1 %
BILIRUB SERPL-MCNC: 0.3 MG/DL (ref 0–1.2)
BUN SERPL-MCNC: 13 MG/DL (ref 8–27)
BUN/CREAT SERPL: 19 (ref 12–28)
CALCIUM SERPL-MCNC: 10.1 MG/DL (ref 8.7–10.3)
CHLORIDE SERPL-SCNC: 101 MMOL/L (ref 96–106)
CHOLEST SERPL-MCNC: 243 MG/DL (ref 100–199)
CHOLEST/HDLC SERPL: 2.8 RATIO (ref 0–4.4)
CO2 SERPL-SCNC: 26 MMOL/L (ref 20–29)
CREAT SERPL-MCNC: 0.68 MG/DL (ref 0.57–1)
EOSINOPHIL # BLD AUTO: 0.1 X10E3/UL (ref 0–0.4)
EOSINOPHIL NFR BLD AUTO: 2 %
ERYTHROCYTE [DISTWIDTH] IN BLOOD BY AUTOMATED COUNT: 13.2 % (ref 11.7–15.4)
GLOBULIN SER-MCNC: 2.5 G/DL (ref 1.5–4.5)
GLUCOSE SERPL-MCNC: 94 MG/DL (ref 65–99)
HCT VFR BLD AUTO: 39.2 % (ref 34–46.6)
HDLC SERPL-MCNC: 88 MG/DL
HGB BLD-MCNC: 13 G/DL (ref 11.1–15.9)
IMM GRANULOCYTES # BLD: 0 X10E3/UL (ref 0–0.1)
IMM GRANULOCYTES NFR BLD: 0 %
IRON SERPL-MCNC: 90 UG/DL (ref 27–159)
LDLC SERPL CALC-MCNC: 122 MG/DL (ref 0–99)
LYMPHOCYTES # BLD AUTO: 0.7 X10E3/UL (ref 0.7–3.1)
LYMPHOCYTES NFR BLD AUTO: 13 %
MCH RBC QN AUTO: 29.9 PG (ref 26.6–33)
MCHC RBC AUTO-ENTMCNC: 33.2 G/DL (ref 31.5–35.7)
MCV RBC AUTO: 90 FL (ref 79–97)
MONOCYTES # BLD AUTO: 0.6 X10E3/UL (ref 0.1–0.9)
MONOCYTES NFR BLD AUTO: 9 %
NEUTROPHILS # BLD AUTO: 4.4 X10E3/UL (ref 1.4–7)
NEUTROPHILS NFR BLD AUTO: 75 %
PLATELET # BLD AUTO: 346 X10E3/UL (ref 150–450)
POTASSIUM SERPL-SCNC: 4 MMOL/L (ref 3.5–5.2)
PROT SERPL-MCNC: 7.1 G/DL (ref 6–8.5)
RBC # BLD AUTO: 4.35 X10E6/UL (ref 3.77–5.28)
SL AMB EGFR AFRICAN AMERICAN: 110 ML/MIN/1.73
SL AMB EGFR NON AFRICAN AMERICAN: 95 ML/MIN/1.73
SL AMB VLDL CHOLESTEROL CALC: 33 MG/DL (ref 5–40)
SODIUM SERPL-SCNC: 141 MMOL/L (ref 134–144)
TRIGL SERPL-MCNC: 192 MG/DL (ref 0–149)
TSH SERPL DL<=0.005 MIU/L-ACNC: 1.82 UIU/ML (ref 0.45–4.5)
WBC # BLD AUTO: 5.9 X10E3/UL (ref 3.4–10.8)

## 2021-02-17 ENCOUNTER — CONSULT (OUTPATIENT)
Dept: OBGYN CLINIC | Facility: CLINIC | Age: 61
End: 2021-02-17
Payer: COMMERCIAL

## 2021-02-17 ENCOUNTER — TELEPHONE (OUTPATIENT)
Dept: PAIN MEDICINE | Facility: CLINIC | Age: 61
End: 2021-02-17

## 2021-02-17 VITALS
HEIGHT: 64 IN | TEMPERATURE: 97.8 F | WEIGHT: 149 LBS | SYSTOLIC BLOOD PRESSURE: 122 MMHG | DIASTOLIC BLOOD PRESSURE: 80 MMHG | BODY MASS INDEX: 25.44 KG/M2

## 2021-02-17 DIAGNOSIS — G89.29 CHRONIC RIGHT SHOULDER PAIN: ICD-10-CM

## 2021-02-17 DIAGNOSIS — M17.12 PRIMARY OSTEOARTHRITIS OF LEFT KNEE: ICD-10-CM

## 2021-02-17 DIAGNOSIS — M54.12 RADICULOPATHY, CERVICAL REGION: ICD-10-CM

## 2021-02-17 DIAGNOSIS — M75.31 CALCIFIC TENDINITIS OF RIGHT SHOULDER: Primary | ICD-10-CM

## 2021-02-17 DIAGNOSIS — M25.511 CHRONIC RIGHT SHOULDER PAIN: ICD-10-CM

## 2021-02-17 PROCEDURE — 20610 DRAIN/INJ JOINT/BURSA W/O US: CPT | Performed by: ORTHOPAEDIC SURGERY

## 2021-02-17 PROCEDURE — 99214 OFFICE O/P EST MOD 30 MIN: CPT | Performed by: ORTHOPAEDIC SURGERY

## 2021-02-17 RX ORDER — METHYLPREDNISOLONE ACETATE 40 MG/ML
1 INJECTION, SUSPENSION INTRA-ARTICULAR; INTRALESIONAL; INTRAMUSCULAR; SOFT TISSUE
Status: COMPLETED | OUTPATIENT
Start: 2021-02-17 | End: 2021-02-17

## 2021-02-17 RX ORDER — PREDNISONE 1 MG/1
TABLET ORAL
COMMUNITY
Start: 2020-12-17 | End: 2021-03-10

## 2021-02-17 RX ORDER — LIDOCAINE HYDROCHLORIDE 10 MG/ML
3 INJECTION, SOLUTION INFILTRATION; PERINEURAL
Status: COMPLETED | OUTPATIENT
Start: 2021-02-17 | End: 2021-02-17

## 2021-02-17 RX ADMIN — METHYLPREDNISOLONE ACETATE 1 ML: 40 INJECTION, SUSPENSION INTRA-ARTICULAR; INTRALESIONAL; INTRAMUSCULAR; SOFT TISSUE at 09:36

## 2021-02-17 RX ADMIN — METHYLPREDNISOLONE ACETATE 1 ML: 40 INJECTION, SUSPENSION INTRA-ARTICULAR; INTRALESIONAL; INTRAMUSCULAR; SOFT TISSUE at 09:30

## 2021-02-17 RX ADMIN — LIDOCAINE HYDROCHLORIDE 3 ML: 10 INJECTION, SOLUTION INFILTRATION; PERINEURAL at 09:36

## 2021-02-17 RX ADMIN — LIDOCAINE HYDROCHLORIDE 3 ML: 10 INJECTION, SOLUTION INFILTRATION; PERINEURAL at 09:30

## 2021-02-17 NOTE — TELEPHONE ENCOUNTER
LMOM FOR PATIENT TO RETURN OUR CALL TO SCHEDULE CONSULT WITH DR Albino Levi FOR SHOULDER PAIN/   REFERRING:  Krystian Wang
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
## 2021-02-19 NOTE — TELEPHONE ENCOUNTER
LMOM FOR PATIENT TO RETURN OUR CALL TO SCHEDULE CONSULT WITH DR Edelmira Dee FOR SHOULDER PAIN/   REFERRING:  Bello Ronquillo

## 2021-03-08 ENCOUNTER — OFFICE VISIT (OUTPATIENT)
Dept: URGENT CARE | Facility: CLINIC | Age: 61
End: 2021-03-08
Payer: COMMERCIAL

## 2021-03-08 ENCOUNTER — APPOINTMENT (OUTPATIENT)
Dept: RADIOLOGY | Facility: CLINIC | Age: 61
End: 2021-03-08
Payer: COMMERCIAL

## 2021-03-08 VITALS
OXYGEN SATURATION: 97 % | BODY MASS INDEX: 25.61 KG/M2 | SYSTOLIC BLOOD PRESSURE: 154 MMHG | HEART RATE: 86 BPM | TEMPERATURE: 98 F | RESPIRATION RATE: 18 BRPM | DIASTOLIC BLOOD PRESSURE: 72 MMHG | HEIGHT: 64 IN | WEIGHT: 150 LBS

## 2021-03-08 DIAGNOSIS — M79.641 HAND PAIN, RIGHT: ICD-10-CM

## 2021-03-08 DIAGNOSIS — M79.641 HAND PAIN, RIGHT: Primary | ICD-10-CM

## 2021-03-08 PROCEDURE — 73130 X-RAY EXAM OF HAND: CPT

## 2021-03-08 PROCEDURE — G0382 LEV 3 HOSP TYPE B ED VISIT: HCPCS | Performed by: PHYSICIAN ASSISTANT

## 2021-03-08 PROCEDURE — 99283 EMERGENCY DEPT VISIT LOW MDM: CPT | Performed by: PHYSICIAN ASSISTANT

## 2021-03-08 NOTE — PROGRESS NOTES
NAME: Herbert Gomez is a 61 y o  female  : 1960    MRN: [id number]      Assessment and Plan   Hand pain, right [M79 641]  1  Hand pain, right  XR hand 3+ vw right       X-ray right hand: no acute fractures visualized    Discussed with patient likely a sprain  No point tenderness on exam and x-rays appear without fracture  Will f/u on final read  Will have her keep using ice and alternate with heat and keep taking tylenol  If no improvement in 3-4 days f/u with PCP- if anything changes or worsens f/u sooner  She acknowledges  Patient Instructions   Patient Instructions   Alternate ice and heat to the area  Tylenol   Rest  If no improvement in 3-4 days f/u with PCP   If anything changes or worsens f/u sooner     Proceed to ER if symptoms worsen  Chief Complaint     Chief Complaint   Patient presents with    Hand Injury     slipped on ice and landed on right hand         History of Present Illness    Patient with history of RA presents complaining of right hand pain x1 day  Reports slipped and fell on ice yesterday landing on her right hand  Reports she has been having pain to the palm and dorsal aspect of the hand and wrist since  Has tried taking Tylenol with minimal improvement  Denies any change the numbness and tingling in her fingertips as she has a history of this  Denies any weakness in the hand  Reports  Minimal pain at rest and is worse with certain movements  Has also applied some ice  She is right-hand dominant  Review of Systems   Review of Systems   Constitutional: Negative for chills and fever     Musculoskeletal:         Right hand pain         Current Medications       Current Outpatient Medications:     Glucosamine-Chondroitin (GLUCOSAMINE CHONDR COMPLEX PO), Take by mouth daily  , Disp: , Rfl:     leflunomide (ARAVA) 20 MG tablet, TAKE 1 TABLET BY MOUTH EVERY DAY, Disp: 30 tablet, Rfl: 2    pravastatin (PRAVACHOL) 40 mg tablet, TAKE 1 TABLET BY MOUTH AT BEDTIME, Disp: 30 tablet, Rfl: 3    predniSONE 5 mg tablet, TAKE 2 TABLETS (10 MG TOTAL) BY MOUTH DAILY AS NEEDED (JOINT PAIN), Disp: , Rfl:     Current Allergies     Allergies as of 2021 - Reviewed 2021   Allergen Reaction Noted    Methotrexate Hives 2020              Past Medical History:   Diagnosis Date    Chronic left-sided low back pain without sciatica 3/18/2020    Concussion     Hyperlipidemia     [facility] ([facility] ) 2019    [facility]     Seronegative rheumatoid arthritis ([facility] ) 2019    Tendinitis        Past Surgical History:   Procedure Laterality Date     SECTION      FOOT SURGERY Left 2007    HEMORROIDECTOMY      KNEE ARTHROSCOPY Right 2018    KNEE SURGERY      ID KNEE SCOPE,MED/LAT MENISECTOMY Right 2018    Procedure: ARTHROSCOPY KNEE, PARTIAL MEDIAL MENISCECTOMY: ABRASION CHONDROPLASTY;  Surgeon: Jose Wren MD;  Location: [facility] OR;  Service: Orthopedics    TUBAL LIGATION         Family History   Problem Relation Age of Onset    Coronary artery disease Mother     Other Mother         CABG    Hiatal hernia Father     Stroke Maternal Grandmother     Hyperlipidemia Family     Migraines Family     Thyroid disease Family     No Known Problems Sister     No Known Problems Brother     No Known Problems Son     No Known Problems Daughter     No Known Problems Paternal Grandmother     No Known Problems Paternal Grandfather     No Known Problems Maternal Aunt     No Known Problems Maternal Uncle     No Known Problems Paternal Aunt     No Known Problems Paternal Uncle     No Known Problems Cousin          Medications have been verified      The following portions of the patient's history were reviewed and updated as appropriate: allergies, current medications, past family history, past medical history, past social history, past surgical history and problem list     Objective   /72   Pulse 86   Temp 98 °F (36 7 °C) Resp 18   Ht 5' 4" (1 626 m)   Wt 68 kg (150 lb)   LMP  (LMP Unknown)   SpO2 97%   BMI 25 75 kg/m²      Physical Exam     Physical Exam  Vitals signs and nursing note reviewed  Constitutional:       General: She is not in acute distress  Appearance: Normal appearance  She is not ill-appearing, toxic-appearing or diaphoretic  Musculoskeletal:      Comments: Right hand: No erythema, edema, ecchymosis or abrasions  Tender to palpation throughout the entire dorsal aspect of the hand in the carpals and metacarpals and at the distal radius and ulna  No snuffbox tenderness  Also tender at the thenar eminence and into the flexor tendons of the palm  No one area of point tenderness  Full AROM of wrist and fingers in all directions with pain worst on full extension of fingers  Full strength against resistance with pain on gripping  5/5  strength with pain  Radial pulse 2 +  Sensation intact throughout entire hand  Capillary refill at the fingertips less than 2 seconds  Neurological:      Mental Status: She is alert

## 2021-03-08 NOTE — PATIENT INSTRUCTIONS
Alternate ice and heat to the area  Tylenol   Rest  If no improvement in 3-4 days f/u with PCP   If anything changes or worsens f/u sooner

## 2021-03-09 NOTE — PROGRESS NOTES
Assessment and Plan:   Ms Jeannie Lundborg a 42-year-old  female with history significant for psoriatic arthritis, mild right knee osteoarthritis, right knee meniscal tear status post surgery and right shoulder rotator cuff tears with subdeltoid/subacromial bursitis, who presents for follow-up   She is currently on leflunomide 20 mg once daily         Rheumatic summary:  1) Diagnosed with PsA (subtle erosive changes on hand XRs) in 8/2019 - started on MTX, but d/c'ed in 9/2019 due to side effects of skin rash  Started on LEF 10/2019-present  Manages symptoms with prednisone 5-10 mg daily PRN  2) Lumbar DJD         # Psoriatic arthritis  - Shante presents today for follow-up of psoriatic arthritis which appears to be well controlled with the use of leflunomide 20 mg once daily  Overall she has been doing very well and there does not appear to be any symptoms related to a flare-up of the psoriatic arthritis  We will continue with the leflunomide at 20 mg once daily and she will repeat high risk medication lab monitoring prior to the follow-up visit  Of note she was able to successfully discontinue the steroids and she manages joint pains with Tylenol as needed  - In terms of the recent right wrist sprain I advised her to continue with conservative measures and resting  She also reports numbness in the lateral aspect of her right hand which may be secondary to carpal tunnel syndrome  I advised her to utilize a brace at night but if her symptoms progress we can obtain an EMG/NCS study  Plan:  Diagnoses and all orders for this visit:    Psoriatic arthritis ([facility] [address] )  -     leflunomide (ARAVA) 20 MG tablet; Take 1 tablet (20 mg total) by mouth daily    High risk medication use  -     CBC and differential; Future  -     Comprehensive metabolic panel; Future  -     CBC and differential  -     Comprehensive metabolic panel    Primary generalized (osteo)arthritis      Activities as tolerated     Exercise: try to maintain a low impact exercise regimen as much as possible  Walk for 30 minutes a day for at least 3 days a week  Continue other medications as prescribed by PCP and other specialists  RTC in 4 months          HPI    INITIAL VISIT NOTE:  Ms Bonner Horse a 54-year-old  female with history significant for mild right knee osteoarthritis, right knee meniscal tear status post surgery and right shoulder rotator cuff tears with subdeltoid/subacromial bursitis, who presents for further evaluation of diffuse joint pains and an elevated C reactive protein of 81   She is referred by Dr Uriel Castro      Patient states in November 2017 she had an accidental injury resulting in a concussion, and states overall she has not felt well since then  Bienvenido Ayala is able to recall more diffuse joint pains starting in December 2018, and states they have been more prominent as well as progressive since then   She experiences her symptoms on a daily basis in a mostly constant manner   She states it is significantly interfering with her activities of daily living, as well as with her sleeping habits   She describes pain most prominently affecting her bilateral shoulders, bilateral wrists, to a lesser degree affecting her bilateral hands, low back region, hips and knees   She does not really experience joint pains affecting her elbows, ankles or feet   She has subjectively only noticed swelling affecting her knees   She experiences morning stiffness which affects her diffusely and persists throughout the day   She states any sort of activity aggravates her symptoms, but she does not necessarily obtain relief with resting   At the onset of her symptoms in February/March 2019 she was prescribed a 10 day course of prednisone starting at 40 mg daily, which she states significantly helped her  Will Lucy was then represcribed a course of prednisone of the same duration in June 2019 which also helped her  Will Lucy states the symptoms recur quickly after cessation of the steroids   She was recently also prescribed tramadol and tizanidine for the pain, but this has not really been helping her  Will Lucy has also tried over-the-counter ibuprofen, Advil, Aleve and naproxen without significant benefit of her symptoms      She denies any recent fevers, chills, night sweats, unintentional weight loss or infections prior to the onset of her joint complaints   She denies significant dry eyes, inflammatory eye disease, dry mouth, skin rashes, psoriasis, mouth/nose ulcers, shortness of breath, abdominal pain, vomiting, diarrhea, blood in stools, inflammatory bowel disease or family history of rheumatoid arthritis/autoimmune disease      She was seen by her primary care physician for the ongoing complaints and had recent testing done which showed an elevated C reactive protein of 81   RIGO screen, rheumatoid factor, uric acid and Lyme antibody profile were unremarkable   In view of the ongoing right shoulder pain she recently had an MRI of her right shoulder done which showed partial-thickness rotator cuff tears as well as subacromial/subdeltoid bursitis   She has been following up with Orthopedics for this, and it is not thought that the rotator cuff tears should correspond to the symptoms she has been experiencing  Will Lucy also received bilateral intra-articular cortisone shoulder injections which only provided her with temporary relief   She was advised to follow-up with Rheumatology first, and if her symptoms are not to improve then there may be consideration for an arthroscopic procedure         8/27/2019:  Patient presents for a follow-up today  Yamel Child reviewed her recently done labs which showed an unremarkable CBC, CMP, ESR, anti CCP antibody, Sjogren's antibodies and chronic hepatitis panel   X-ray of her right hand showed periarticular soft tissue swelling of the 2nd through 5th PIP joints, as well as subtle juxta-articular erosions of the 2nd through 5th DIP joints   X-ray of her left hand and bilateral feet did not show any signs of inflammatory arthritis      Following the prior office visit patient had started prednisone 40 mg daily for 7 days, and is currently on prednisone 20 mg daily   She states being on the steroids has significantly improved her symptoms, and she is currently denying any joint pains, swelling or morning stiffness   She is feeling well today and denies any complaints   Of note she denies a history of psoriasis or skin rashes   She denies a family history of psoriasis         11/27/2019:  Patient presents for a follow-up of psoriatic arthritis  Yandy Berg is currently on leflunomide 20 mg once daily and prednisone 10 mg once daily      At the last office visit I had started her on methotrexate but after taking a few doses she called the office with an outbreak of a skin rash affecting her right upper extremity   I requested she try the medication once more to ensure this was related to a side effect of the methotrexate, and she states that the rash persisted and worsened   In view of this we discontinued the methotrexate entirely and I switched her to leflunomide 20 mg once daily which she has been on for approximately 1 month now  Yandy Berg has been tolerating the medication well without any concerns for side effects or infections      She reports overall her joint pains have significantly improved   She did try to taper down on the prednisone to 5 mg once daily and then discontinue it, but states upon discontinuing her joint pains recurred   In view of this she restarted prednisone at 5 mg once daily, but over the past few days further increased to 10 mg once daily due to increased activities causing worsening joint pains   She is willing to try and taper down on the prednisone at this time   She reports pain that she usually experiences will affect her shoulders and knees   She denies any other significant areas of joint pains or persistent swelling   On occasion she will notice swelling of her knees   She states that the morning stiffness has mostly resolved as well      She denies any other history of skin rash or psoriasis         3/3/2020:  Patient presents for a follow-up of psoriatic arthritis  Sima Antonio is currently on leflunomide 20 mg once daily   I reviewed her recently done CBC and CMP which were unremarkable      Patient reports overall in terms of the arthritis she has been doing well and denies any significant peripheral joint pains, swelling or morning stiffness   For the past 3-4 months she has been dealing with left lower back pain and was seen in the emergency room for this   On occasion the pain will radiate down her leg   An x-ray of her lumbar spine was done which showed mild degenerative arthritis at two levels   She is following up with her primary care physician for this and completing a course of physical therapy  Sima Antonio is unsure if the PT has really helped her so far  Sima Antonio has been taking ibuprofen on a daily basis to take the edge off her symptoms      She also reports 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), she may develop achiness in her upper arm, chest and back region   Typically when this happens she may take prednisone 5-10 mg as needed which will help her  Sima Antonio uses the prednisone on a very infrequent basis      She denies a history of skin rash or psoriasis   She has been tolerating the leflunomide well without any concerns for side effects or infections         6/2/2020:  Patient presents for a follow-up of psoriatic arthritis  Sima Antonio is currently on leflunomide 20 mg once daily and prednisone 5-10 mg daily as needed      Patient reports in April she was experiencing a flare-up diffuse hives, for which she increased the prednisone to 10 mg once daily and ran out of her script early  Sima Antonio has been out of the prednisone for the past few weeks   She reports without the steroids she does notice a change in her joint pains, which will primarily affect her shoulders, neck and knees   She has noticed intermittent swelling affecting her knees   She does not really experience any morning stiffness, but will have a sensation of diffuse achiness as well as fatigue   She is not taking any over-the-counter pain medications   She reports for maintenance therapy she is mostly taking the leflunomide 20 mg once daily as well as prednisone 5 mg once daily, and this seems to control her symptoms      She has been tolerating her medications well without any concerns for side effects or infections         11/9/2020:  Patient presents for a follow-up of psoriatic arthritis  She is currently on leflunomide 20 mg once daily and prednisone 5-10 mg once daily as needed  I reviewed her recently done CBC and CMP which were normal      Patient reports she has overall been doing well and this is the best that she has felt  She was able to start exercising at the gym again and thinks that this has helped with her shoulder pain  Over the past few weeks she has noticed pain and swelling affecting her left knee but cannot recall any injuries  Other than the left knee pain she is denying any other joint pains and has not noticed joint swelling  She is no longer experiencing morning stiffness  She still takes the prednisone as needed but thinks she is now able to taper off it completely  She will also take Tylenol as needed      She has been tolerating the leflunomide well without any concerns for side effects or infections  She has not had any psoriasis  3/10/2021:  Patient presents for a follow-up of psoriatic arthritis  She is currently on leflunomide 20 mg once daily  I reviewed her recently done CBC and CMP which were normal       She reports since the last office visit she was able to discontinue the steroids without a flare-up, and manages her joint pains with Tylenol as needed    She was experiencing pain affecting her left knee and right shoulder, but she was seen by Orthopedics and received intra-articular cortisone injections which helped  Last week she did have an accidental fall on the ice and fell on her right hand  She was seen in urgent care and had an x-ray done which did not show any fractures  She was advised that she probably sprained her right wrist and is continuing with conservative measures  She has been unable to rest her wrist as due to her occupation as a  she is constantly using her hands  Recently she has also noticed numbness affecting the lateral fingers on her right hand mostly at night, but at certain times during the day as well while she is working  She denies any other joint pains, joint swelling or morning stiffness  She has been tolerating the leflunomide well without any concerns for side effects or infections  She has not had any psoriasis  The following portions of the patient's history were reviewed and updated as appropriate: allergies, current medications, past family history, past medical history, past social history, past surgical history and problem list       Review of Systems  Constitutional: Negative for weight change, fevers, chills, night sweats, fatigue  ENT/Mouth: Negative for hearing changes, ear pain, nasal congestion, sinus pain, hoarseness, sore throat, rhinorrhea, swallowing difficulty  Eyes: Negative for pain, redness, discharge, vision changes  Cardiovascular: Negative for chest pain, SOB, palpitations  Respiratory: Negative for cough, sputum, wheezing, dyspnea  Gastrointestinal: Negative for nausea, vomiting, diarrhea, constipation, pain, heartburn  Genitourinary: Negative for dysuria, urinary frequency, hematuria  Musculoskeletal: As per HPI  Skin: Negative for skin rash, color changes  Neuro: Negative for weakness, numbness, tingling, loss of consciousness  Psych: Negative for anxiety, depression  Heme/Lymph: Negative for easy bruising, bleeding, lymphadenopathy  Past Medical History:   Diagnosis Date    Chronic left-sided low back pain without sciatica 3/18/2020    Concussion     Hyperlipidemia     Psoriatic arthritis ([facility] [address] ) 2019    [facility]     Seronegative rheumatoid arthritis ([facility] ) 2019    Tendinitis        Past Surgical History:   Procedure Laterality Date     SECTION      FOOT SURGERY Left 2007    HEMORROIDECTOMY      KNEE ARTHROSCOPY Right 2018    KNEE SURGERY      GA KNEE SCOPE,MED/LAT MENISECTOMY Right 2018    Procedure: ARTHROSCOPY KNEE, PARTIAL MEDIAL MENISCECTOMY: ABRASION CHONDROPLASTY;  Surgeon: Kush Mcdermott MD;  Location: [facility] OR;  Service: Orthopedics    TUBAL LIGATION         Social History     Socioeconomic History    Marital status:      Spouse name: Not on file    Number of children: Not on file    Years of education: Not on file    Highest education level: Not on file   Occupational History    Not on file   Social Needs    Financial resource strain: Not on file    Food insecurity     Worry: Not on file     Inability: Not on file    Transportation needs     Medical: Not on file     Non-medical: Not on file   Tobacco Use    Smoking status: Former Smoker     Packs/day: 0 33     Years: 5 00     Pack years: 1 65     Quit date:      Years since quittin 2    Smokeless tobacco: Never Used   Substance and Sexual Activity    Alcohol use: Yes     Frequency: 2-4 times a month     Drinks per session: 1 or 2     Binge frequency: Never     Comment: social    Drug use: No    Sexual activity: Not on file   Lifestyle    Physical activity     Days per week: Not on file     Minutes per session: Not on file    Stress: Not on file   Relationships    Social connections     Talks on phone: Not on file     Gets together: Not on file     Attends Congregational service: Not on file     Active member of club or organization: Not on file     Attends meetings of clubs or organizations: Not on file     Relationship status: Not on file    Intimate partner violence     Fear of current or ex partner: Not on file     Emotionally abused: Not on file     Physically abused: Not on file     Forced sexual activity: Not on file   Other Topics Concern    Not on file   Social History Narrative    Not on file       Family History   Problem Relation Age of Onset    Coronary artery disease Mother     Other Mother         CABG    Hiatal hernia Father     Stroke Maternal Grandmother     Hyperlipidemia Family     Migraines Family     Thyroid disease Family     No Known Problems Sister     No Known Problems Brother     No Known Problems Son     No Known Problems Daughter     No Known Problems Paternal Grandmother     No Known Problems Paternal Grandfather     No Known Problems Maternal Aunt     No Known Problems Maternal Uncle     No Known Problems Paternal Aunt     No Known Problems Paternal Uncle     No Known Problems Cousin        Allergies   Allergen Reactions    Methotrexate Hives       Current Outpatient Medications:     Glucosamine-Chondroitin (GLUCOSAMINE CHONDR COMPLEX PO), Take by mouth daily  , Disp: , Rfl:     leflunomide (ARAVA) 20 MG tablet, Take 1 tablet (20 mg total) by mouth daily, Disp: 90 tablet, Rfl: 1    pravastatin (PRAVACHOL) 40 mg tablet, TAKE 1 TABLET BY MOUTH AT BEDTIME, Disp: 30 tablet, Rfl: 3      Objective:    Vitals:    03/10/21 0827   BP: 132/90   Pulse: 67       Physical Exam  General: Well appearing, well nourished, in no distress  Oriented x 3, normal mood and affect  Ambulating without difficulty  Skin: Good turgor, no rash, unusual bruising or prominent lesions  Hair: Normal texture and distribution  Nails: Normal color, no deformities  HEENT:  Head: Normocephalic, atraumatic  Eyes: Conjunctiva clear, sclera non-icteric, EOM intact  Extremities: No amputations or deformities, cyanosis, edema    Musculoskeletal:   Hands -  Mild soft tissue swelling at the left hand 3rd PIP joint without tenderness  Otherwise her bilateral PIP and MCP joints are unremarkable  She does have osteoarthritic changes noted  Wrists - there is mild fullness at the right wrist with slight limitation in full extension and flexion  No tenderness  The left wrist is unremarkable  Elbows and shoulders - unremarkable  Knees, ankles and feet - unremarkable  Neurologic: Alert and oriented  No focal neurological deficits appreciated  Psychiatric: Normal mood and affect  JASON Hicks    Rheumatology

## 2021-03-10 ENCOUNTER — OFFICE VISIT (OUTPATIENT)
Dept: RHEUMATOLOGY | Facility: CLINIC | Age: 61
End: 2021-03-10
Payer: COMMERCIAL

## 2021-03-10 VITALS — SYSTOLIC BLOOD PRESSURE: 132 MMHG | HEART RATE: 67 BPM | DIASTOLIC BLOOD PRESSURE: 90 MMHG

## 2021-03-10 DIAGNOSIS — Z23 ENCOUNTER FOR IMMUNIZATION: ICD-10-CM

## 2021-03-10 DIAGNOSIS — M15.0 PRIMARY GENERALIZED (OSTEO)ARTHRITIS: ICD-10-CM

## 2021-03-10 DIAGNOSIS — L40.50 PSORIATIC ARTHRITIS (HCC): Primary | ICD-10-CM

## 2021-03-10 DIAGNOSIS — Z79.899 HIGH RISK MEDICATION USE: ICD-10-CM

## 2021-03-10 PROCEDURE — 99214 OFFICE O/P EST MOD 30 MIN: CPT | Performed by: INTERNAL MEDICINE

## 2021-03-10 RX ORDER — LEFLUNOMIDE 20 MG/1
20 TABLET ORAL DAILY
Qty: 90 TABLET | Refills: 1 | Status: SHIPPED | OUTPATIENT
Start: 2021-03-10 | End: 2021-09-15

## 2021-03-23 ENCOUNTER — OFFICE VISIT (OUTPATIENT)
Dept: FAMILY MEDICINE CLINIC | Facility: CLINIC | Age: 61
End: 2021-03-23
Payer: COMMERCIAL

## 2021-03-23 VITALS
WEIGHT: 145.5 LBS | SYSTOLIC BLOOD PRESSURE: 122 MMHG | HEART RATE: 70 BPM | DIASTOLIC BLOOD PRESSURE: 86 MMHG | TEMPERATURE: 99 F | HEIGHT: 64 IN | OXYGEN SATURATION: 97 % | BODY MASS INDEX: 24.84 KG/M2

## 2021-03-23 DIAGNOSIS — G56.01 CARPAL TUNNEL SYNDROME OF RIGHT WRIST: ICD-10-CM

## 2021-03-23 DIAGNOSIS — S63.501A SPRAIN OF RIGHT WRIST, INITIAL ENCOUNTER: Primary | ICD-10-CM

## 2021-03-23 PROCEDURE — 99214 OFFICE O/P EST MOD 30 MIN: CPT | Performed by: FAMILY MEDICINE

## 2021-03-23 PROCEDURE — 3008F BODY MASS INDEX DOCD: CPT | Performed by: FAMILY MEDICINE

## 2021-03-23 PROCEDURE — 1036F TOBACCO NON-USER: CPT | Performed by: FAMILY MEDICINE

## 2021-03-23 RX ORDER — ACETAMINOPHEN 325 MG/1
650 TABLET ORAL EVERY 6 HOURS PRN
COMMUNITY
End: 2021-10-21 | Stop reason: HOSPADM

## 2021-03-23 RX ORDER — TRAMADOL HYDROCHLORIDE 50 MG/1
50 TABLET ORAL EVERY 6 HOURS PRN
Qty: 30 TABLET | Refills: 0 | Status: SHIPPED | OUTPATIENT
Start: 2021-03-23 | End: 2021-07-20

## 2021-03-23 RX ORDER — TRAMADOL HYDROCHLORIDE 50 MG/1
50 TABLET ORAL EVERY 6 HOURS PRN
Qty: 30 TABLET | Refills: 0 | Status: SHIPPED | OUTPATIENT
Start: 2021-03-23 | End: 2021-03-23

## 2021-03-23 NOTE — PROGRESS NOTES
Subjective:   Chief Complaint   Patient presents with    Fall     Pt slipped on the ice and tried to catch herself with her hands  Pt did have x-ray and everything was fine  [facility]  Fall happened on 3/6  They told her it was sprained  Pt has been fine during the day, but it throbs at night  She has been icing it  Pt has mammo script at home  FBW complete 2/15/21  Patient ID: Liset Gutierrez is a 61 y o  female  The pt is here today because of right wrist pain  On 3/6 she fell on black ice in her driveway  She landed onto her outstretched right hand   She went to urgent care 2 days later  Negative xray     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, 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, but unfortunately she needs to keep working  She does have the next few days off and plans to rest the wrist  Bad in the morning, can't make a fist, hurts  Making it through the day, throbs at night, can't sleep  Has used ice, heat, tylenol  Nothing really helps    Has numbness in her fingers which is chronic   She saw ortho - they ordered a cervical spine MRI which she has scheduled for tomorrow  She does have carpal tunnel splints, she wears them at night, she is unable to wear them while she works      The following portions of the patient's history were reviewed and updated as appropriate: allergies, current medications, past family history, past medical history, past social history, past surgical history and problem list     Review of Systems          Objective:  Vitals:    03/23/21 0936   BP: 122/86   BP Location: Left arm   Patient Position: Sitting   Cuff Size: Standard   Pulse: 70   Temp: 99 °F (37 2 °C)   TempSrc: Tympanic   SpO2: 97%   Weight: 66 kg (145 lb 8 oz)   Height: 5' 4" (1 626 m)      Physical Exam  Constitutional:       General: She is not in acute distress  Appearance: Normal appearance  She is not ill-appearing     Musculoskeletal:      Right wrist: She exhibits decreased range of motion and tenderness (over the thenar area when she pushes on it)  She exhibits no bony tenderness and no swelling  Skin:     General: Skin is warm and dry  Findings: No erythema or rash  Neurological:      General: No focal deficit present  Mental Status: She is alert and oriented to person, place, and time  Cranial Nerves: No cranial nerve deficit  Gait: Gait normal    Psychiatric:         Mood and Affect: Mood normal          Behavior: Behavior normal          Thought Content: Thought content normal          Judgment: Judgment normal            Assessment/Plan:    No problem-specific Assessment & Plan notes found for this encounter  the patient has a combination of pain likely from the wrist sprain and her carpal tunnel  I am referring her to physical therapy for further evaluation and care  I am going to give her tramadol as needed for pain especially at night she has been unable to sleep  If things are worsening we will consider EMG and/ or MRI of the wrist depending on what physical therapy findings  I did encourage that she rest as much as possible and wear her splints as much as she can  If she is able to wear a splint while doing hair this is strongly recommended  She should definitely ice her wrist and hand after work  Diagnoses and all orders for this visit:    Sprain of right wrist, initial encounter  -     Ambulatory referral to Physical Therapy; Future  -     Discontinue: traMADol (ULTRAM) 50 mg tablet; Take 1 tablet (50 mg total) by mouth every 6 (six) hours as needed for moderate pain  -     traMADol (ULTRAM) 50 mg tablet; Take 1 tablet (50 mg total) by mouth every 6 (six) hours as needed for moderate pain    Carpal tunnel syndrome of right wrist  -     Ambulatory referral to Physical Therapy; Future    Other orders  -     acetaminophen (TYLENOL) 325 mg tablet; Take 650 mg by mouth every 6 (six) hours as needed for mild pain Taking twice a day

## 2021-03-24 ENCOUNTER — HOSPITAL ENCOUNTER (OUTPATIENT)
Dept: MRI IMAGING | Facility: HOSPITAL | Age: 61
Discharge: HOME | End: 2021-03-24
Attending: ORTHOPAEDIC SURGERY
Payer: COMMERCIAL

## 2021-03-24 DIAGNOSIS — M25.511 CHRONIC RIGHT SHOULDER PAIN: ICD-10-CM

## 2021-03-24 DIAGNOSIS — M54.12 RADICULOPATHY, CERVICAL REGION: ICD-10-CM

## 2021-03-24 DIAGNOSIS — M17.12 PRIMARY OSTEOARTHRITIS OF LEFT KNEE: ICD-10-CM

## 2021-03-24 DIAGNOSIS — G89.29 CHRONIC RIGHT SHOULDER PAIN: ICD-10-CM

## 2021-03-24 DIAGNOSIS — M75.31 CALCIFIC TENDINITIS OF RIGHT SHOULDER: ICD-10-CM

## 2021-03-24 PROCEDURE — 72141 MRI NECK SPINE W/O DYE: CPT

## 2021-03-24 PROCEDURE — G1004 CDSM NDSC: HCPCS

## 2021-03-30 ENCOUNTER — IMMUNIZATIONS (OUTPATIENT)
Dept: FAMILY MEDICINE CLINIC | Facility: HOSPITAL | Age: 61
End: 2021-03-30

## 2021-03-30 DIAGNOSIS — Z23 ENCOUNTER FOR IMMUNIZATION: Primary | ICD-10-CM

## 2021-03-30 PROCEDURE — 0001A SARS-COV-2 / COVID-19 MRNA VACCINE (PFIZER-BIONTECH) 30 MCG: CPT

## 2021-03-30 PROCEDURE — 91300 SARS-COV-2 / COVID-19 MRNA VACCINE (PFIZER-BIONTECH) 30 MCG: CPT

## 2021-04-02 ENCOUNTER — OFFICE VISIT (OUTPATIENT)
Dept: OBGYN CLINIC | Facility: CLINIC | Age: 61
End: 2021-04-02
Payer: COMMERCIAL

## 2021-04-02 VITALS
TEMPERATURE: 98.2 F | SYSTOLIC BLOOD PRESSURE: 122 MMHG | WEIGHT: 145 LBS | DIASTOLIC BLOOD PRESSURE: 82 MMHG | HEIGHT: 64 IN | BODY MASS INDEX: 24.75 KG/M2

## 2021-04-02 DIAGNOSIS — M75.31 CALCIFIC TENDINITIS OF RIGHT SHOULDER: ICD-10-CM

## 2021-04-02 DIAGNOSIS — G56.01 CARPAL TUNNEL SYNDROME OF RIGHT WRIST: Primary | ICD-10-CM

## 2021-04-02 DIAGNOSIS — M25.462 EFFUSION OF LEFT KNEE: ICD-10-CM

## 2021-04-02 DIAGNOSIS — M17.12 PRIMARY OSTEOARTHRITIS OF LEFT KNEE: ICD-10-CM

## 2021-04-02 PROCEDURE — 1036F TOBACCO NON-USER: CPT | Performed by: ORTHOPAEDIC SURGERY

## 2021-04-02 PROCEDURE — 99213 OFFICE O/P EST LOW 20 MIN: CPT | Performed by: ORTHOPAEDIC SURGERY

## 2021-04-02 PROCEDURE — 3008F BODY MASS INDEX DOCD: CPT | Performed by: ORTHOPAEDIC SURGERY

## 2021-04-02 NOTE — PROGRESS NOTES
Ortho Sports Medicine Shoulder Visit     Assesment:   right shoulder calcific tendinitis with improving symptoms from cortisone injection on 2/17/2021    Carpal tunnel syndrome of the right wrist    Left Knee, mild to moderate medial and patellofemoral compartment osteoarthritis with effusion due to recent fall    Plan:    Conservative treatment:    Ice to shoulder 1-2 times daily, for 20 minutes at a time  Home exercise program for shoulder, including ROM and strenthening  Instructions given to patient of what exercises to perform  Let pain guide return to activities  Will refer patient to Dr Anoop Vera for carpal tunnel syndrome on the right wrist   She also provided with a prescription for a cock-up wrist splint to wear mainly at night    As for her left knee, patient was instructed to continue to monitor symptoms  Ice and elevate as much as possible to decrease effusion  If effusion and symptoms persist in 4-6 weeks then instructed to call office to make appointment for repeat cortisone injection and possible aspiration  Imaging: All imaging from today was reviewed by myself and explained to the patient  Injection:    No Injection planned at this time  Surgery:     No surgery is recommended at this point, continue with conservative treatment plan as noted  History of Present Illness: The patient is returns for follow up of the left shoulder, calcific tendinitis, left knee osteoarthritis and numbness/tingling into her right hand and first 3 digits   Since the prior visit, She reports improvement in her right shoulder symptoms but states that she continues to have numbness and tingling about her hand and fingers  She also recently fell onto her right wrist   X Rays were taken revealing no fracture  She has been taking OTC anti inflammatories and ice prn for pain relief  She also notes aggravated left knee pain after the fall  Pain is improved by rest and ice       The patient denies weakness  The patient has tried rest, ice, NSAIDS, physical therapy and injection  I have reviewed the past medical, surgical, social and family history, medications and allergies as documented in the EMR  Review of systems: ROS is negative other than that noted in the HPI  Constitutional: Negative for fatigue and fever  Cardiovascular: Negative for chest pain  Pulmonary: negative for shortness of breath    PMH/PSH:  Past Medical History:   Diagnosis Date    Chronic left-sided low back pain without sciatica 3/18/2020    Concussion     Hyperlipidemia     P[facility]atic [facility] ([facility] ) 2019    [facility] ([facility] )     Seronegative rheumatoid arthritis ([facility] ) 2019    Tendinitis      Past Surgical History:   Procedure Laterality Date     SECTION      FOOT SURGERY Left     HEMORROIDECTOMY      KNEE ARTHROSCOPY Right 2018    KNEE SURGERY      GA KNEE SCOPE,MED/LAT MENISECTOMY Right 2018    Procedure: ARTHROSCOPY KNEE, PARTIAL MEDIAL MENISCECTOMY: ABRASION CHONDROPLASTY;  Surgeon: Naomi Prajapati MD;  Location: [facility];  Service: Orthopedics    TUBAL LIGATION          Physical Exam:    Blood pressure 122/82, temperature 98 2 °F (36 8 °C), height 5' 4" (1 626 m), weight 65 8 kg (145 lb), not currently breastfeeding  General/Constitutional: NAD, well developed, well nourished  HENT: Normocephalic, atraumatic  CV: Intact distal pulses, regular rate  Resp: No respiratory distress or labored breathing  Lymphatic: No lymphadenopathy palpated  Neuro: Alert and Oriented x 3, no focal deficits  Psych: Normal mood, normal affect, normal judgement, normal behavior  Skin: Warm, dry, no rashes, no erythema     Shoulder Exam (focused):     Shoulder focused exam:       RIGHT LEFT    Scapula Atrophy Negative Negative     Winging Negative Negative     Protraction Negative Negative    Rotator cuff SS 5/5 5/5     IS 5/ 5/5     SubS / 5    ROM  170     ER0 60 60     ER90 90 90     IR90 40 40     IRb T6 T6    TTP: AC Negative Negative     Biceps Negative Negative     Coracoid Negative Negative    Special Tests: O'Briens Negative Negative     Whelan-shear Negative Negative     Cross body Adduction Negative Negative     Speeds  Negative Negative     Florence's Negative Negative     Whipple Negative Negative       Neer Negative Negative     Villafana Negative Negative    Instability: Apprehension & relocation not tested not tested     Load & shift not tested not tested    Other: Crank Negative Negative               UE NV Exam: +2 Radial pulses bilaterally  Sensation intact to light touch C5-T1 bilaterally, Radial/median/ulnar nerve motor intact    Cervical ROM is full without pain  Numbness and tingling into the hand and fingers consistent with carpal tunnel syndrome  + Tinel's sign at the wrist  + Phalen's Test    Left Knee Exam: Skin is intact  No erythema or ecchymosis  Mild effusion  No tenderness to palpation  Stable to varus and valgus stress  Sensation intact distally  Imaging    MRI of the cervical spine were reviewed, which demonstrate mild degenerative changes without high grade canal or foraminal stenosis  I have reviewed the radiology report and agree with their impression      Scribe Attestation    I,:  Kristie Wolf am acting as a scribe while in the presence of the attending physician :       I,:  Geo Arnold DO personally performed the services described in this documentation    as scribed in my presence :

## 2021-04-06 ENCOUNTER — EVALUATION (OUTPATIENT)
Dept: OCCUPATIONAL THERAPY | Facility: CLINIC | Age: 61
End: 2021-04-06
Payer: COMMERCIAL

## 2021-04-06 DIAGNOSIS — G56.01 CARPAL TUNNEL SYNDROME OF RIGHT WRIST: ICD-10-CM

## 2021-04-06 DIAGNOSIS — S63.501A SPRAIN OF RIGHT WRIST, INITIAL ENCOUNTER: ICD-10-CM

## 2021-04-06 PROCEDURE — 97110 THERAPEUTIC EXERCISES: CPT | Performed by: OCCUPATIONAL THERAPIST

## 2021-04-06 PROCEDURE — 97165 OT EVAL LOW COMPLEX 30 MIN: CPT | Performed by: OCCUPATIONAL THERAPIST

## 2021-04-06 PROCEDURE — 97140 MANUAL THERAPY 1/> REGIONS: CPT | Performed by: OCCUPATIONAL THERAPIST

## 2021-04-06 NOTE — PROGRESS NOTES
OT Evaluation     Today's date: 2021  Patient name: Parker Campos  : 1960  MRN: [id number]  Referring provider: Jef Chavis MD  Dx:   Encounter Diagnosis     ICD-10-CM    1  Sprain of right wrist, initial encounter  S63 501A Ambulatory referral to Physical Therapy   2  Carpal tunnel syndrome of right wrist  G56 01 Ambulatory referral to Physical Therapy                  Assessment  Assessment details: Esha Lopez presents s/p fall 4 wks  She suffered a wrist sprain and flared up her carpal tunnel  She has decreased wrist ROM  She has weak  and pinch   She has normal 2 point disc  She c/o frequent parasthesias , austyn at night  She is limited with gripping , lifting , jars, scissors  She works a    She lives with fiancee who is able to assist with ADL      Impairments: abnormal or restricted ROM, impaired physical strength, lacks appropriate home exercise program and pain with function  Functional limitations: limited with gripping , lifting , jars , scissors  Symptom irritability: moderateUnderstanding of Dx/Px/POC: good   Prognosis: good    Goals  STG- 1 wk  1- I with HEP  2- compliant with nocturnal splinting  3- improve wrist 10 HART  4- improve worst pain  To 7/20    LTG- 6 wks  1- no pain/ parasthesias  2- improve wrist 40 HART  3- increase  strength = to L  4- I with ADL and work     Plan  Patient would benefit from: OT eval and skilled occupational therapy  Planned modality interventions: cryotherapy, ultrasound and thermotherapy: hydrocollator packs  Planned therapy interventions: activity modification, joint mobilization, manual therapy, massage, strengthening, stretching, therapeutic activities, therapeutic exercise, flexibility, functional ROM exercises, home exercise program, graded exercise and graded activity  Other planned therapy interventions: K tape  Frequency: 2x week  Duration in weeks: 6  Treatment plan discussed with: patient        Subjective Evaluation    History of Present Illness  Date of onset: 3/6/2021  Mechanism of injury: trauma  Mechanism of injury: Shante slipped and fell on black ice and landed on her r wrist  She was seen at urgent care  For xray  She then f/u with Dr Mateo Luther    Quality of life: good    Pain  Current pain ratin  At best pain ratin  At worst pain rating: 10  Location: R median dist  Quality: needle-like, radiating and burning  Relieving factors: ice and rest  Aggravating factors: lifting  Progression: no change    Social Support  Steps to enter house: yes  Stairs in house: yes   Lives in: multiple-level home  Lives with: spouse    Employment status: working  Hand dominance: ambidextrous      Diagnostic Tests  X-ray: normal  Treatments  No previous or current treatments  Patient Goals  Patient goals for therapy: decreased pain, increased strength, independence with ADLs/IADLs and return to sport/leisure activities  Patient goal: no pain/numbness R hand        Objective     Palpation     Additional Palpation Details  Tender R volar wrist - FCU , CMC thumb    Neurological Testing     Additional Neurological Details  2 point 4 mm R all    Active Range of Motion     Right Wrist   Wrist flexion: 38 degrees   Wrist extension: 40 degrees   Radial deviation: 15 degrees   Ulnar deviation: 25 degrees     Additional Active Range of Motion Details  Thumb and digit ROM WNL    Passive Range of Motion     Right Wrist   Wrist flexion: 50 degrees   Wrist extension: 55 degrees     Strength/Myotome Testing     Left Wrist/Hand      (2nd hand position)     Trial 1: 55    Thumb Strength  Key/Lateral Pinch     Trial 1: 11  Palmar/Three-Point Pinch     Trial 1: 10    Right Wrist/Hand      (2nd hand position)     Trial 1: 20    Thumb Strength   Key/Lateral Pinch     Trial 1: 8  Palmar/Three-Point Pinch     Trial 1: 7    Additional Strength Details  opp 5/5    Tests     Right Wrist/Hand   Positive intrinsic muscle tightness, Phalen's sign and Tinel's sign (medial nerve)       Swelling     Left Wrist/Hand   Circumference wrist: 16 2 cm    Right Wrist/Hand   Circumference wrist: 17 2 cm             Precautions: universal      Manuals 4/6            graston wrist /CT 5m            MOB wrist gd 3 3m            MFR  2m            Intrinsic streches 2m            HEP 4-6 x day             Behavior mod discussed 4m                                                                                          Ther Ex             A/PROM wrist  4m                                                                                                       Ther Activity                                       Gait Training                                       Modalities             Pulsed US CT 8m            CP 5m

## 2021-04-13 ENCOUNTER — OFFICE VISIT (OUTPATIENT)
Dept: OCCUPATIONAL THERAPY | Facility: CLINIC | Age: 61
End: 2021-04-13
Payer: COMMERCIAL

## 2021-04-13 DIAGNOSIS — S63.501A SPRAIN OF RIGHT WRIST, INITIAL ENCOUNTER: Primary | ICD-10-CM

## 2021-04-13 DIAGNOSIS — G56.01 CARPAL TUNNEL SYNDROME OF RIGHT WRIST: ICD-10-CM

## 2021-04-13 PROCEDURE — 97140 MANUAL THERAPY 1/> REGIONS: CPT | Performed by: OCCUPATIONAL THERAPIST

## 2021-04-13 PROCEDURE — 97110 THERAPEUTIC EXERCISES: CPT | Performed by: OCCUPATIONAL THERAPIST

## 2021-04-13 NOTE — PROGRESS NOTES
Daily Note     Today's date: 2021  Patient name: Scooby Gayle  : 1960  MRN: [id number]  Referring provider: Katie Smiley MD  Dx:   Encounter Diagnosis     ICD-10-CM    1  Sprain of right wrist, initial encounter  S63 501A    2  Carpal tunnel syndrome of right wrist  G56 01                   Subjective: I am doing ok      Objective: See treatment diary below      Assessment: Tolerated treatment well  Patient has improved pain post tx   Plan: Continue per plan of care        Precautions: universal      Manuals            graston wrist /CT 5m 3m           MOB wrist gd 3 3m 3m           MFR  2m 2m           Intrinsic streches 2m 2m           HEP 4-6 x day             Behavior mod discussed 4m                                                                                          Ther Ex             A/PROM wrist  4m 4m           Flex  ext   3x10                                                                                         Ther Activity                                       Gait Training                                       Modalities             Pulsed US CT 8m MH  10m           CP 5m 5m

## 2021-04-15 ENCOUNTER — OFFICE VISIT (OUTPATIENT)
Dept: OCCUPATIONAL THERAPY | Facility: CLINIC | Age: 61
End: 2021-04-15
Payer: COMMERCIAL

## 2021-04-15 DIAGNOSIS — S63.501A SPRAIN OF RIGHT WRIST, INITIAL ENCOUNTER: Primary | ICD-10-CM

## 2021-04-15 DIAGNOSIS — G56.01 CARPAL TUNNEL SYNDROME OF RIGHT WRIST: ICD-10-CM

## 2021-04-15 PROCEDURE — 97140 MANUAL THERAPY 1/> REGIONS: CPT | Performed by: OCCUPATIONAL THERAPIST

## 2021-04-15 PROCEDURE — 97110 THERAPEUTIC EXERCISES: CPT | Performed by: OCCUPATIONAL THERAPIST

## 2021-04-20 ENCOUNTER — OFFICE VISIT (OUTPATIENT)
Dept: OCCUPATIONAL THERAPY | Facility: CLINIC | Age: 61
End: 2021-04-20
Payer: COMMERCIAL

## 2021-04-20 DIAGNOSIS — S63.501A SPRAIN OF RIGHT WRIST, INITIAL ENCOUNTER: Primary | ICD-10-CM

## 2021-04-20 DIAGNOSIS — G56.01 CARPAL TUNNEL SYNDROME OF RIGHT WRIST: ICD-10-CM

## 2021-04-20 PROCEDURE — 97110 THERAPEUTIC EXERCISES: CPT | Performed by: OCCUPATIONAL THERAPIST

## 2021-04-20 PROCEDURE — 97140 MANUAL THERAPY 1/> REGIONS: CPT | Performed by: OCCUPATIONAL THERAPIST

## 2021-04-20 NOTE — PROGRESS NOTES
Daily Note     Today's date: 2021  Patient name: Rhina Naidu  : 1960  MRN: [id number]  Referring provider: Cassie Reeves MD  Dx:   Encounter Diagnosis     ICD-10-CM    1  Sprain of right wrist, initial encounter  S63 501A    2  Carpal tunnel syndrome of right wrist  G56 01                   Subjective: I am sore  I wake up at night   I worked a lot this weekend      Objective: See treatment diary below      Assessment: Tolerated treatment fair  Patient remains symptomatic   Proximal weakness ans neural tension contributing to symptoms   Plan: Continue per plan of care        Precautions: universal      Manuals 4/6 4/13 4/15 4/20         graston wrist /CT 5m 3m 3m 3m         MOB wrist gd 3/MWM 3m 3m 3m 5m         MFR  2m 2m 2m 2m         Intrinsic streches 2m 2m 2m 2m         HEP 4-6 x day             Behavior mod discussed 4m            Viigl MOB median    2m                                                                          Ther Ex             A/PROM wrist  4m 4m 4m 4m         Flex  ext   3x10           Wrist ROm w/cone   2m 2m         Wrist maze   10x          ER/IR/PRO    2# CUFF  3x10                                                Ther Activity                                       Gait Training                                       Modalities             Pulsed US CT 8m MH  10m MH   10m US   8m         CP 5m 5m 5m 5m

## 2021-04-21 ENCOUNTER — IMMUNIZATIONS (OUTPATIENT)
Dept: FAMILY MEDICINE CLINIC | Facility: HOSPITAL | Age: 61
End: 2021-04-21

## 2021-04-21 DIAGNOSIS — Z23 ENCOUNTER FOR IMMUNIZATION: Primary | ICD-10-CM

## 2021-04-21 PROCEDURE — 0002A SARS-COV-2 / COVID-19 MRNA VACCINE (PFIZER-BIONTECH) 30 MCG: CPT

## 2021-04-21 PROCEDURE — 91300 SARS-COV-2 / COVID-19 MRNA VACCINE (PFIZER-BIONTECH) 30 MCG: CPT

## 2021-04-22 ENCOUNTER — OFFICE VISIT (OUTPATIENT)
Dept: OCCUPATIONAL THERAPY | Facility: CLINIC | Age: 61
End: 2021-04-22
Payer: COMMERCIAL

## 2021-04-22 DIAGNOSIS — G56.01 CARPAL TUNNEL SYNDROME OF RIGHT WRIST: ICD-10-CM

## 2021-04-22 DIAGNOSIS — S63.501A SPRAIN OF RIGHT WRIST, INITIAL ENCOUNTER: Primary | ICD-10-CM

## 2021-04-22 PROCEDURE — 97110 THERAPEUTIC EXERCISES: CPT | Performed by: OCCUPATIONAL THERAPIST

## 2021-04-22 PROCEDURE — 97140 MANUAL THERAPY 1/> REGIONS: CPT | Performed by: OCCUPATIONAL THERAPIST

## 2021-04-22 NOTE — PROGRESS NOTES
Daily Note     Today's date: 2021  Patient name: Nancy Tariq  : 1960  MRN: [id number]  Referring provider: Sandra Hdz MD  Dx:   Encounter Diagnosis     ICD-10-CM    1  Sprain of right wrist, initial encounter  S63 501A    2  Carpal tunnel syndrome of right wrist  G56 01                   Subjective: I am a little better than Monday        Objective: See treatment diary below      Assessment: Tolerated treatment well  Patient is making slow progress   Work aggravates symptoms  Plan: Continue per plan of care        Precautions: universal      Manuals 4/6 4/13 4/15 4/20 4/22        graston wrist /CT 5m 3m 3m 3m 3m        MOB wrist gd 3/MWM 3m 3m 3m 5m 4m        MFR  2m 2m 2m 2m 2m        Intrinsic streches 2m 2m 2m 2m 2m        HEP 4-6 x day             Behavior mod discussed 4m            Vigil MOB median    2m                                                                          Ther Ex             A/PROM wrist  4m 4m 4m 4m 4m        Flex  ext   3x10           Wrist ROm w/cone   2m 2m 2m        Wrist maze   10x          ER/IR/PRO    2# CUFF  3x10 2#  Cuff  3x10                                               Ther Activity                                       Gait Training                                       Modalities             Pulsed US CT 8m MH  10m MH   10m US   8m US  8 m        CP 5m 5m 5m 5m 5m

## 2021-04-27 ENCOUNTER — APPOINTMENT (OUTPATIENT)
Dept: OCCUPATIONAL THERAPY | Facility: CLINIC | Age: 61
End: 2021-04-27
Payer: COMMERCIAL

## 2021-04-29 ENCOUNTER — OFFICE VISIT (OUTPATIENT)
Dept: OCCUPATIONAL THERAPY | Facility: CLINIC | Age: 61
End: 2021-04-29
Payer: COMMERCIAL

## 2021-04-29 DIAGNOSIS — G56.01 CARPAL TUNNEL SYNDROME OF RIGHT WRIST: ICD-10-CM

## 2021-04-29 DIAGNOSIS — S63.501A SPRAIN OF RIGHT WRIST, INITIAL ENCOUNTER: Primary | ICD-10-CM

## 2021-04-29 PROCEDURE — 97140 MANUAL THERAPY 1/> REGIONS: CPT | Performed by: OCCUPATIONAL THERAPIST

## 2021-04-29 PROCEDURE — 97110 THERAPEUTIC EXERCISES: CPT | Performed by: OCCUPATIONAL THERAPIST

## 2021-04-29 NOTE — PROGRESS NOTES
Daily Note     Today's date: 2021  Patient name: Genaro Rivero  : 1960  MRN: [id number]  Referring provider: Destinee Seaman MD  Dx:   Encounter Diagnosis     ICD-10-CM    1  Sprain of right wrist, initial encounter  S63 501A    2  Carpal tunnel syndrome of right wrist  G56 01                   Subjective: I am stiff       Objective: See treatment diary below      Assessment: Tolerated treatment well  Patient has continued pain and stiffness  Plan: Continue per plan of care        Precautions: universal      Manuals 4/6 4/13 4/15 4/20 4/22        graston wrist /CT 5m 3m 3m 3m 3m        MOB wrist gd 3/MWM 3m 3m 3m 5m 4m        MFR  2m 2m 2m 2m 2m        Intrinsic streches 2m 2m 2m 2m 2m        HEP 4-6 x day             Behavior mod discussed 4m            Vigil MOB median    2m                                                                          Ther Ex             A/PROM wrist  4m 4m 4m 4m 4m        Flex  ext   3x10           Wrist ROm w/cone   2m 2m 2m        Wrist maze   10x          ER/IR/PRO    2# CUFF  3x10 2#  Cuff  3x10        Wrist ext      1#  3x10        Ant shoulder raise      2#  Cuff  3x10                     Ther Activity                                       Gait Training                                       Modalities             Pulsed US CT 8m MH  10m MH   10m US   8m MH pre  10m        CP 5m 5m 5m 5m 5m

## 2021-05-04 ENCOUNTER — APPOINTMENT (OUTPATIENT)
Dept: OCCUPATIONAL THERAPY | Facility: CLINIC | Age: 61
End: 2021-05-04
Payer: COMMERCIAL

## 2021-05-06 ENCOUNTER — OFFICE VISIT (OUTPATIENT)
Dept: OCCUPATIONAL THERAPY | Facility: CLINIC | Age: 61
End: 2021-05-06
Payer: COMMERCIAL

## 2021-05-06 DIAGNOSIS — G56.01 CARPAL TUNNEL SYNDROME OF RIGHT WRIST: ICD-10-CM

## 2021-05-06 DIAGNOSIS — S63.501A SPRAIN OF RIGHT WRIST, INITIAL ENCOUNTER: Primary | ICD-10-CM

## 2021-05-06 PROCEDURE — 97140 MANUAL THERAPY 1/> REGIONS: CPT | Performed by: OCCUPATIONAL THERAPIST

## 2021-05-06 PROCEDURE — 97110 THERAPEUTIC EXERCISES: CPT | Performed by: OCCUPATIONAL THERAPIST

## 2021-05-06 NOTE — PROGRESS NOTES
Daily Note     Today's date: 2021  Patient name: Rhina Naidu  : 1960  MRN: [id number]  Referring provider: Cassie Reeves MD  Dx:   Encounter Diagnosis     ICD-10-CM    1  Sprain of right wrist, initial encounter  S63 501A    2  Carpal tunnel syndrome of right wrist  G56 01                   Subjective: I am sore - my wrist       Objective: See treatment diary below      Assessment: Tolerated treatment fair  Patient has pain with wrist ext   She has improved CTS symptoms  Plan: Continue per plan of care        Precautions: universal      Manuals 4/6 4/13 4/15 4/20 4/22 5/6       graston wrist /CT 5m 3m 3m 3m 3m 3m       MOB wrist gd 3/MWM 3m 3m 3m 5m 4m 4m       MFR  2m 2m 2m 2m 2m 2m       Intrinsic streches 2m 2m 2m 2m 2m 2m       HEP 4-6 x day             Behavior mod discussed 4m            Vigil MOB median    2m                                                                          Ther Ex             A/PROM wrist  4m 4m 4m 4m 4m 4m       Flex  ext   3x10           Wrist ROm w/cone   2m 2m 2m 2m       Wrist maze   10x          ER/IR/PRO    2# CUFF  3x10 2#  Cuff  3x10 2#  Cuff  3x10       Wrist ext      1#  3x10 1#  3x10       Ant shoulder raise      2#  Cuff  3x10 2#  Cuff  3x10       Flex        Yellow  3x10       Ther Activity                                       Gait Training                                       Modalities             Pulsed US CT 8m MH  10m MH   10m US   8m MH pre  10m MH  pre       CP 5m 5m 5m 5m 5m 5m

## 2021-05-11 ENCOUNTER — OFFICE VISIT (OUTPATIENT)
Dept: OCCUPATIONAL THERAPY | Facility: CLINIC | Age: 61
End: 2021-05-11
Payer: COMMERCIAL

## 2021-05-11 DIAGNOSIS — G56.01 CARPAL TUNNEL SYNDROME OF RIGHT WRIST: Primary | ICD-10-CM

## 2021-05-11 DIAGNOSIS — S63.501A SPRAIN OF RIGHT WRIST, INITIAL ENCOUNTER: ICD-10-CM

## 2021-05-11 PROCEDURE — 97140 MANUAL THERAPY 1/> REGIONS: CPT | Performed by: OCCUPATIONAL THERAPIST

## 2021-05-11 PROCEDURE — 97110 THERAPEUTIC EXERCISES: CPT | Performed by: OCCUPATIONAL THERAPIST

## 2021-05-11 NOTE — PROGRESS NOTES
Daily Note     Today's date: 2021  Patient name: Radha Lee  : 1960  MRN: [id number]  Referring provider: Raquel Cox MD  Dx:   Encounter Diagnosis     ICD-10-CM    1  Carpal tunnel syndrome of right wrist  G56 01    2  Sprain of right wrist, initial encounter  S63 501A                   Subjective: My hand still hurts all the time      Objective: See treatment diary below      Assessment: Tolerated treatment well  Patient has continued pain that gets worse with activity   Plan: Continue per plan of care        Precautions: universal      Manuals 4/6 4/13 4/15 4/20 4/22 5/6 5/11      graston wrist /CT 5m 3m 3m 3m 3m 3m 3m      MOB wrist gd 3/MWM 3m 3m 3m 5m 4m 4m 4m      MFR  2m 2m 2m 2m 2m 2m 2m      Intrinsic streches 2m 2m 2m 2m 2m 2m 2m      HEP 4-6 x day       Tape midcarpal      Behavior mod discussed 4m            Vigil MOB median    2m                                                                          Ther Ex             A/PROM wrist  4m 4m 4m 4m 4m 4m 4m      Flex  ext   3x10           Wrist ROm w/cone   2m 2m 2m 2m 2m      Wrist maze   10x          ER/IR/PRO    2# CUFF  3x10 2#  Cuff  3x10 2#  Cuff  3x10        Wrist ext      1#  3x10 1#  3x10 1# 3x10      Ant shoulder raise      2#  Cuff  3x10 2#  Cuff  3x10           Flex        Yellow  3x10 Yellow  3x10      Ther Activity                                       Gait Training                                       Modalities             Pulsed US CT 8m MH  10m MH   10m US   8m MH pre  10m MH  pre Mh pre        CP 5m 5m 5m 5m 5m 5m 5m

## 2021-05-13 ENCOUNTER — OFFICE VISIT (OUTPATIENT)
Dept: OCCUPATIONAL THERAPY | Facility: CLINIC | Age: 61
End: 2021-05-13
Payer: COMMERCIAL

## 2021-05-13 DIAGNOSIS — G56.01 CARPAL TUNNEL SYNDROME OF RIGHT WRIST: Primary | ICD-10-CM

## 2021-05-13 DIAGNOSIS — S63.501A SPRAIN OF RIGHT WRIST, INITIAL ENCOUNTER: ICD-10-CM

## 2021-05-13 PROCEDURE — 97140 MANUAL THERAPY 1/> REGIONS: CPT | Performed by: OCCUPATIONAL THERAPIST

## 2021-05-13 PROCEDURE — 97110 THERAPEUTIC EXERCISES: CPT | Performed by: OCCUPATIONAL THERAPIST

## 2021-05-13 NOTE — PROGRESS NOTES
Daily Note     Today's date: 2021  Patient name: Yoselyn Lamar  : 1960  MRN: [id number]  Referring provider: Katarina Luther MD  Dx:   Encounter Diagnosis     ICD-10-CM    1  Carpal tunnel syndrome of right wrist  G56 01    2  Sprain of right wrist, initial encounter  S63 501A                   Subjective: I am sore everywhere      Objective: See treatment diary below      Assessment: Tolerated treatment fair  Patient has continued wrist pain  Plan: Continue per plan of care        Precautions: universal; psoraitric arthritis    Manuals 4/6 4/13 4/15 4/20 4/22 5/6 5/11 5/13     graston wrist /CT 5m 3m 3m 3m 3m 3m 3m 3m     MOB wrist gd 3/MWM 3m 3m 3m 5m 4m 4m 4m 4m     MFR  2m 2m 2m 2m 2m 2m 2m 2m     Intrinsic streches 2m 2m 2m 2m 2m 2m 2m 2m     HEP 4-6 x day       Tape midcarpal      Behavior mod discussed 4m            Vigil MOB median    2m                                                                          Ther Ex             A/PROM wrist  4m 4m 4m 4m 4m 4m 4m 4m     Flex  ext   3x10           Wrist ROm w/cone   2m 2m 2m 2m 2m 2m     Wrist maze   10x          ER/IR/PRO    2# CUFF  3x10 2#  Cuff  3x10 2#  Cuff  3x10        Wrist ext      1#  3x10 1#  3x10 1# 3x10 1#  3x10     Ant shoulder raise      2#  Cuff  3x10 2#  Cuff  3x10           Flex        Yellow  3x10 Yellow  3x10 yellow  3x10     Ther Activity                                       Gait Training                                       Modalities             Pulsed US CT 8m MH  10m MH   10m US   8m MH pre  10m MH  pre Mh pre   MH  pre     CP 5m 5m 5m 5m 5m 5m 5m

## 2021-05-18 ENCOUNTER — OFFICE VISIT (OUTPATIENT)
Dept: OCCUPATIONAL THERAPY | Facility: CLINIC | Age: 61
End: 2021-05-18
Payer: COMMERCIAL

## 2021-05-18 DIAGNOSIS — G56.01 CARPAL TUNNEL SYNDROME OF RIGHT WRIST: Primary | ICD-10-CM

## 2021-05-18 DIAGNOSIS — S63.501A SPRAIN OF RIGHT WRIST, INITIAL ENCOUNTER: ICD-10-CM

## 2021-05-18 PROCEDURE — 97110 THERAPEUTIC EXERCISES: CPT | Performed by: OCCUPATIONAL THERAPIST

## 2021-05-18 PROCEDURE — 97140 MANUAL THERAPY 1/> REGIONS: CPT | Performed by: OCCUPATIONAL THERAPIST

## 2021-05-18 NOTE — PROGRESS NOTES
Daily Note     Today's date: 2021  Patient name: Yoselyn Lamar  : 1960  MRN: [id number]  Referring provider: Katarina Luther MD  Dx:   Encounter Diagnosis     ICD-10-CM    1  Carpal tunnel syndrome of right wrist  G56 01    2  Sprain of right wrist, initial encounter  S63 501A                   Subjective: I am still getting numbness      Objective: See treatment diary below      Assessment: Tolerated treatment well  Patient has improved pain but has continued numbness  Plan: Continue per plan of care        Precautions: universal; psoraitric arthritis    Manuals 4/6 4/13 4/15 4/20 4/22 5/6 5/11 5/13 5/18    graston wrist /CT 5m 3m 3m 3m 3m 3m 3m 3m 3m    MOB wrist gd 3/MWM 3m 3m 3m 5m 4m 4m 4m 4m 4m    MFR  2m 2m 2m 2m 2m 2m 2m 2m 2m    Intrinsic streches 2m 2m 2m 2m 2m 2m 2m 2m 2m    HEP 4-6 x day       Tape midcarpal      Behavior mod discussed 4m            Vigil MOB median    2m                                                                          Ther Ex             A/PROM wrist  4m 4m 4m 4m 4m 4m 4m 4m 4m    Flex  ext   3x10           Wrist ROm w/cone   2m 2m 2m 2m 2m 2m 2m    Wrist maze   10x          ER/IR/PRO    2# CUFF  3x10 2#  Cuff  3x10 2#  Cuff  3x10        Wrist ext      1#  3x10 1#  3x10 1# 3x10 1#  3x10 1#  3x10    Ant shoulder raise      2#  Cuff  3x10 2#  Cuff  3x10           Flex        Yellow  3x10 Yellow  3x10 yellow  3x10 yellow  3x10    Ther Activity                                       Gait Training                                       Modalities             Pulsed US CT 8m MH  10m MH   10m US   8m MH pre  10m MH  pre  pre     pre MH    CP 5m 5m 5m 5m 5m 5m 5m  5m

## 2021-05-20 ENCOUNTER — OFFICE VISIT (OUTPATIENT)
Dept: OCCUPATIONAL THERAPY | Facility: CLINIC | Age: 61
End: 2021-05-20
Payer: COMMERCIAL

## 2021-05-20 DIAGNOSIS — G56.01 CARPAL TUNNEL SYNDROME OF RIGHT WRIST: Primary | ICD-10-CM

## 2021-05-20 DIAGNOSIS — S63.501A SPRAIN OF RIGHT WRIST, INITIAL ENCOUNTER: ICD-10-CM

## 2021-05-20 PROCEDURE — 97110 THERAPEUTIC EXERCISES: CPT | Performed by: OCCUPATIONAL THERAPIST

## 2021-05-20 PROCEDURE — 97140 MANUAL THERAPY 1/> REGIONS: CPT | Performed by: OCCUPATIONAL THERAPIST

## 2021-05-20 NOTE — PROGRESS NOTES
Daily Note     Today's date: 2021  Patient name: Idalia Abernathy  : 1960  MRN: [id number]  Referring provider: Gold Tenorio MD  Dx:   Encounter Diagnosis     ICD-10-CM    1  Carpal tunnel syndrome of right wrist  G56 01    2  Sprain of right wrist, initial encounter  S63 501A                   Subjective: I am sore, no numbness is better      Objective: See treatment diary below      Assessment:       Assessment  Assessment details: Primus Aid presents s/p fall 4 wks  She suffered a wrist sprain and flared up her carpal tunnel  She has decreased wrist ROM  She has weak  and pinch   She has normal 2 point disc  She c/o frequent parasthesias , austyn at night  She is limited with gripping , lifting , jars, scissors  She works a    She lives with derick who is able to assist with ADL         2021- pt has improved ROm and pain levels  She has decreased parasthesias   Edema has improved  She has pain after increased workload      Impairments: abnormal or restricted ROM, impaired physical strength,   and pain with function  Functional limitations: limited with gripping , lifting , jars , pan with scissors  Symptom irritability: moderateUnderstanding of Dx/Px/POC: good   Prognosis: good    Goals  STG- 1 wk  1- I with HEP- met   2- compliant with nocturnal splinting- met  3- improve wrist 10 HART- met  4- improve worst pain  To 7/10    LTG- 6 wks  1- no pain/ parasthesias- partially met  2- improve wrist 40 HART- partially met  3- increase  strength = to L- not met  4- I with ADL and work - met    Plan  Patient would benefit from:   skilled occupational therapy  Planned modality interventions: cryotherapy, ultrasound and thermotherapy: hydrocollator packs  Planned therapy interventions: activity modification, joint mobilization, manual therapy, massage, strengthening, stretching, therapeutic activities, therapeutic exercise, flexibility, functional ROM exercises, home exercise program, graded exercise and graded activity  Other planned therapy interventions: K tape  Frequency: 2x week  Duration in weeks: 6  Treatment plan discussed with: patient        Subjective Evaluation    History of Present Illness  Date of onset: 3/6/2021  Mechanism of injury: trauma  Mechanism of injury: Shante slipped and fell on black ice and landed on her r wrist  She was seen at urgent care  For xray  She then f/u with Dr Jm Coe    Quality of life: good    Pain  Current pain ratin  At best pain ratin  At worst pain ratin, previous 10  Location: R median dist  Quality: needle-like, radiating and burning  Relieving factors: ice and rest  Aggravating factors: lifting  Progression: no change    Social Support  Steps to enter house: yes  Stairs in house: yes   Lives in: multiple-level home  Lives with: spouse    Employment status: working  Hand dominance: ambidextrous      Diagnostic Tests  X-ray: normal  Treatments  No previous or current treatments  Patient Goals  Patient goals for therapy: decreased pain, increased strength, independence with ADLs/IADLs and return to sport/leisure activities  Patient goal: no pain/numbness R hand        Objective     Palpation     Additional Palpation Details  Tender R volar wrist - FCU , CMC thumb    Neurological Testing     Additional Neurological Details  2 point 4 mm R all    Active Range of Motion     Right Wrist   Wrist flexion: 48 previous 38 degrees   Wrist extension: 50 previous 40 degrees   Radial deviation: 15 degrees   Ulnar deviation:28 previous 25 degrees     Additional Active Range of Motion Details  Thumb and digit ROM WNL            Strength/Myotome Testing     Left Wrist/Hand      (2nd hand position)     Trial 1: 55    Thumb Strength  Key/Lateral Pinch     Trial 1: 11  Palmar/Three-Point Pinch     Trial 1: 10    Right Wrist/Hand      (2nd hand position)     Trial 1: 22 previous 20    Thumb Strength   Key/Lateral Pinch     Trial 1:9 previous 8  Palmar/Three-Point Pinch     Trial 1: 7    Additional Strength Details  opp 5/5    Tests     Right Wrist/Hand   Resolved  intrinsic muscle tightness, positive Phalen's sign and Tinel's sign (medial nerve)  Swelling     Left Wrist/Hand   Circumference wrist: 16 2 cm    Right Wrist/Hand   Circumference wrist: 16 8 previous 17 2 cm              Plan: Continue per plan of care        Precautions: universal; psoraitric arthritis    Manuals 4/6 4/13 4/15 4/20 4/22 5/6 5/11 5/13 5/18 5/20   graston wrist /CT 5m 3m 3m 3m 3m 3m 3m 3m 3m 3m   MOB wrist gd 3/MWM 3m 3m 3m 5m 4m 4m 4m 4m 4m 4m   MFR  2m 2m 2m 2m 2m 2m 2m 2m 2m 2m   Intrinsic streches 2m 2m 2m 2m 2m 2m 2m 2m 2m 2m   HEP 4-6 x day       Tape midcarpal      Behavior mod discussed 4m            Vigil MOB median    2m                                                                          Ther Ex             A/PROM wrist  4m 4m 4m 4m 4m 4m 4m 4m 4m 4m   Flex  ext   3x10           Wrist ROm w/cone   2m 2m 2m 2m 2m 2m 2m 2m   Wrist maze   10x          ER/IR/PRO    2# CUFF  3x10 2#  Cuff  3x10 2#  Cuff  3x10        Wrist ext      1#  3x10 1#  3x10 1# 3x10 1#  3x10 1#  3x10 2#  3x10   Ant shoulder raise      2#  Cuff  3x10 2#  Cuff  3x10           Flex        Yellow  3x10 Yellow  3x10 yellow  3x10 yellow  3x10 Yellow  3x10   Ther Activity                                       Gait Training                                       Modalities             Pulsed US CT 8m MH  10m MH   10m US   8m MH pre  10m MH  pre Mh pre   MH  pre MH MH   CP 5m 5m 5m 5m 5m 5m 5m  5m 5m

## 2021-05-27 ENCOUNTER — OFFICE VISIT (OUTPATIENT)
Dept: OCCUPATIONAL THERAPY | Facility: CLINIC | Age: 61
End: 2021-05-27
Payer: COMMERCIAL

## 2021-05-27 DIAGNOSIS — S63.501A SPRAIN OF RIGHT WRIST, INITIAL ENCOUNTER: ICD-10-CM

## 2021-05-27 DIAGNOSIS — G56.01 CARPAL TUNNEL SYNDROME OF RIGHT WRIST: Primary | ICD-10-CM

## 2021-05-27 PROCEDURE — 97110 THERAPEUTIC EXERCISES: CPT | Performed by: OCCUPATIONAL THERAPIST

## 2021-05-27 PROCEDURE — 97140 MANUAL THERAPY 1/> REGIONS: CPT | Performed by: OCCUPATIONAL THERAPIST

## 2021-05-27 NOTE — PROGRESS NOTES
Daily Note     Today's date: 2021  Patient name: Jose Williamson  : 1960  MRN: [id number]  Referring provider: Pamela Nguyen MD  Dx:   Encounter Diagnosis     ICD-10-CM    1  Carpal tunnel syndrome of right wrist  G56 01    2  Sprain of right wrist, initial encounter  S63 501A                   Subjective: I always have some pain   Numbness is intermittent      Objective: See treatment diary below      Assessment: Tolerated treatment well  Patient remains symptomatic, worse with activity      Plan: Continue per plan of care        Precautions: universal; psoraitric arthritis        Manuals             graston wrist /CT 4m            MOB wrist gd 3 3m            MFR  2m            Intrinsic streches 2m            HEP 4-6 x day                                                                                                          Ther Ex             A/PROM wrist  4m            Flex  ext Yellow  3x10            Wrist PRE  2#  3x10            ER/IR PRO 2#  3x10                                                                Ther Activity                                       Gait Training                                       Modalities             MH wrist  8m            CP 5m                Manuals 4/6 4/13 4/15 4/20 4/22 5/6 5/11 5/13 5/18 5/20   graston wrist /CT 5m 3m 3m 3m 3m 3m 3m 3m 3m 3m   MOB wrist gd 3/MWM 3m 3m 3m 5m 4m 4m 4m 4m 4m 4m   MFR  2m 2m 2m 2m 2m 2m 2m 2m 2m 2m   Intrinsic streches 2m 2m 2m 2m 2m 2m 2m 2m 2m 2m   HEP 4-6 x day       Tape midcarpal      Behavior mod discussed 4m            Vigil MOB median    2m                                                                          Ther Ex             A/PROM wrist  4m 4m 4m 4m 4m 4m 4m 4m 4m 4m   Flex  ext   3x10           Wrist ROm w/cone   2m 2m 2m 2m 2m 2m 2m 2m   Wrist maze   10x          ER/IR/PRO    2# CUFF  3x10 2#  Cuff  3x10 2#  Cuff  3x10        Wrist ext      1#  3x10 1#  3x10 1# 3x10 1#  3x10 1#  3x10 2#  3x10 Ant shoulder raise      2#  Cuff  3x10 2#  Cuff  3x10           Flex        Yellow  3x10 Yellow  3x10 yellow  3x10 yellow  3x10 Yellow  3x10   Ther Activity                                       Gait Training                                       Modalities             Pulsed US CT 8m MH  10m MH   10m US   8m MH pre  10m MH  pre Mh pre   MH  pre MH MH   CP 5m 5m 5m 5m 5m 5m 5m  5m 5m

## 2021-06-07 ENCOUNTER — OFFICE VISIT (OUTPATIENT)
Dept: OBGYN CLINIC | Facility: CLINIC | Age: 61
End: 2021-06-07
Payer: COMMERCIAL

## 2021-06-07 VITALS
DIASTOLIC BLOOD PRESSURE: 80 MMHG | SYSTOLIC BLOOD PRESSURE: 122 MMHG | HEIGHT: 64 IN | BODY MASS INDEX: 25.27 KG/M2 | WEIGHT: 148 LBS

## 2021-06-07 DIAGNOSIS — G56.01 CARPAL TUNNEL SYNDROME ON RIGHT: ICD-10-CM

## 2021-06-07 DIAGNOSIS — S53.31XA GAMEKEEPER'S THUMB OF RIGHT HAND, INITIAL ENCOUNTER: Primary | ICD-10-CM

## 2021-06-07 PROCEDURE — 3008F BODY MASS INDEX DOCD: CPT | Performed by: ORTHOPAEDIC SURGERY

## 2021-06-07 PROCEDURE — 1036F TOBACCO NON-USER: CPT | Performed by: ORTHOPAEDIC SURGERY

## 2021-06-07 PROCEDURE — 99214 OFFICE O/P EST MOD 30 MIN: CPT | Performed by: ORTHOPAEDIC SURGERY

## 2021-06-07 NOTE — PROGRESS NOTES
ASSESSMENT/PLAN:    Assessment:   Right UCL laxity , possible tear -triggering of small finger, carpal tunnel right wrist, CMC arthritis of the right thumb    Plan: Will get MRI to further evaluate for UCL tear and see back to review and discuss further treatment options  Also get US to evaluate carpal tunnel  She may need surgery if UCL torn with possible trigger finger release of small finger, possible carpal tunnel release  Skier brace right during day hand based   Thumb spica at night forearm based   We will observe the [facility] arthritis at that time    Follow Up: After Testing      General Discussions:     Carpal Tunnel Syndrome: The anatomy and physiology of carpal tunnel syndrome was discussed with the patient today  Increase pressure localized under the transverse carpal ligament can cause pain, numbness, tingling, or dysesthesias within the median nerve distribution as well as feelings of fatigue, clumsiness, or awkwardness  These symptoms typically occur at night and worse in the morning upon waking  Eventually, untreated carpal tunnel syndrome can result in weakness and permanent loss of muscle within the thenar compartment of the hand  Treatment options were discussed with the patient  Conservative treatment includes nocturnal resting splints to keep the nerve in a neutral position, ergonomic changes within the work or home environment, activity modification, and tendon gliding exercises  Steroid injections within the carpal canal can help a majority of patients, however this is often self-limited in a majority of patients  Surgical intervention to divide the transverse carpal ligament typically results in a long-lasting relief of the patient's complaints, with the recurrence rate of less than 1%  Trigger FInger: The anatomy and physiology of trigger finger was discussed with the patient today in the office    Edema and increased contact pressure within the flexor tendons at the A1 pulley can cause pain, crepitation, and limitation of function  Treatment options include resting MP blocking splints to decrease edema, oral anti-inflammatory medications, home or formal therapy exercises, up to 2 steroid injections within the tendon sheath, or surgical release  While majority of patients do respond to conservative treatment, up to 20% may require surgical release        _____________________________________________________  CHIEF COMPLAINT:  No chief complaint on file  SUBJECTIVE:  Bernadette Gallegos is a 61 y o  female who presents for evaluation of her right wrist RHD  In March she had fall onto outstretched hand and since then pain in cotton aspect and thumb, primarily with use  She notes weakness, difficulty gripping and grasping  She did go to urgent care following injury and xray negative for fracture  4 weeks later she started hand therapy with [facility] but continues with pain primarily with activity  She is   She also has carpal tunnel but the symptoms she is currently experiencing are new since the fall  Denies triggering, locking, popping  She has numbness and tingling radial digits, current numbness, worse with work, night           PAST MEDICAL HISTORY:  Past Medical History:   Diagnosis Date    Chronic left-sided low back pain without sciatica 3/18/2020    Concussion     Hyperlipidemia     [facility] [address] [facility] 2019    Psoriatic arthritis ([facility] [address] [facility]     Seronegative rheumatoid arthritis ([facility] [address] ) 2019    Tendinitis        PAST SURGICAL HISTORY:  Past Surgical History:   Procedure Laterality Date     SECTION      FOOT SURGERY Left     HEMORROIDECTOMY      KNEE ARTHROSCOPY Right 2018    KNEE SURGERY      [place] KNEE SCOPE,MED/LAT MENISECTOMY Right 2018    Procedure: ARTHROSCOPY KNEE, PARTIAL MEDIAL MENISCECTOMY: ABRASION CHONDROPLASTY;  Surgeon: Naomi Prajapati MD;  Location: [facility] OR;  Service: [address] [place] [address] HISTORY:  Family History   Problem Relation Age of Onset    Coronary artery disease Mother     Other Mother         CABG    Hiatal hernia Father     Stroke Maternal Grandmother     Hyperlipidemia Family     Migraines Family     Thyroid disease Family     No Known Problems Sister     No Known Problems Brother     No Known Problems Son     No Known Problems Daughter     No Known Problems Paternal Grandmother     No Known Problems Paternal Grandfather     No Known Problems Maternal Aunt     No Known Problems Maternal Uncle     No Known Problems Paternal Aunt     No Known Problems Paternal Uncle     No Known Problems Cousin        SOCIAL HISTORY:  Social History     Tobacco Use    Smoking status: Former Smoker     Packs/day: 0 33     Years: 5 00     Pack years: 1 65     Quit date:      Years since quittin 4    Smokeless tobacco: Never Used   Substance Use Topics    Alcohol use: Yes     Frequency: 2-4 times a month     Drinks per session: 1 or 2     Binge frequency: Never     Comment: social    Drug use: No       MEDICATIONS:    Current Outpatient Medications:     acetaminophen (TYLENOL) 325 mg tablet, Take 650 mg by mouth every 6 (six) hours as needed for mild pain Taking twice a day , Disp: , Rfl:     Glucosamine-Chondroitin (GLUCOSAMINE CHONDR COMPLEX PO), Take by mouth daily  , Disp: , Rfl:     leflunomide (ARAVA) 20 MG tablet, Take 1 tablet (20 mg total) by mouth daily, Disp: 90 tablet, Rfl: 1    pravastatin (PRAVACHOL) 40 mg tablet, TAKE 1 TABLET BY MOUTH AT BEDTIME, Disp: 30 tablet, Rfl: 3    traMADol (ULTRAM) 50 mg tablet, Take 1 tablet (50 mg total) by mouth every 6 (six) hours as needed for moderate pain, Disp: 30 tablet, Rfl: 0    ALLERGIES:  Allergies   Allergen Reactions    Methotrexate Hives       REVIEW OF SYSTEMS:  Pertinent items are noted in HPI  A comprehensive review of systems was negative      LABS:  HgA1c: No results found for: HGBA1C  BMP:   Lab Results Component Value Date    GLUCOSE 89 09/28/2016    CALCIUM 9 9 05/30/2018     09/28/2016    K 4 0 02/15/2021    K 4 0 02/15/2021    CO2 26 02/15/2021    CO2 26 02/15/2021     02/15/2021     02/15/2021    BUN 12 02/15/2021    BUN 13 02/15/2021    CREATININE 0 73 02/15/2021    CREATININE 0 68 02/15/2021         _____________________________________________________  PHYSICAL EXAMINATION:  Vital signs: /80   Ht 5' 4" (1 626 m)   Wt 67 1 kg (148 lb)   LMP  (LMP Unknown)   BMI 25 40 kg/m²   General: well developed and well nourished, alert, oriented times 3 and appears comfortable  Psychiatric: Normal  HEENT: Trachea Midline, No torticollis  Cardiovascular: No discernable arrhythmia  Pulmonary: No wheezing or stridor  Abdomen: No rebound or guarding  Extremities: No peripheral edema  Skin: No masses, erythema, lacerations, fluctation, ulcerations  Neurovascular: Sensation Intact to the Median, Ulnar, Radial Nerve, Motor Intact to the Median, Ulnar, Radial Nerve and Pulses Intact    MUSCULOSKELETAL EXAMINATION:  Right wrist/hand  No erythema, no swelling,no ecchymosis  50% composite fist formation  Active lag 10 degree's PIP all fingers  Nodule with crepitation flexor tendon of small finger  UCL laxity with no firm endpoint at the thumb MCP joint, RCL intact    AIN full strength  APB 4/5  Positive Tinels at median nerve   No atrophy  CMC + tenderness, shuck and grind with repitation  _____________________________________________________  STUDIES REVIEWED:  XRAY right wrist/hand reviewed from March no fracture, dislocation, degenerative changes CMC and PIP and DIP joints       PROCEDURES PERFORMED:  Procedures        Scribe Attestation    I,:  Fabiano Parish am acting as a scribe while in the presence of the attending physician :       I,:  Natalie Kelly MD personally performed the services described in this documentation    as scribed in my presence :         Scribe Attestation    I,: Shiva Aguilar am acting as a scribe while in the presence of the attending physician :       I,:  Clementine Hammond MD personally performed the services described in this documentation    as scribed in my presence :

## 2021-06-08 ENCOUNTER — OFFICE VISIT (OUTPATIENT)
Dept: OCCUPATIONAL THERAPY | Facility: CLINIC | Age: 61
End: 2021-06-08
Payer: COMMERCIAL

## 2021-06-08 DIAGNOSIS — S63.501A SPRAIN OF RIGHT WRIST, INITIAL ENCOUNTER: ICD-10-CM

## 2021-06-08 DIAGNOSIS — G56.01 CARPAL TUNNEL SYNDROME OF RIGHT WRIST: Primary | ICD-10-CM

## 2021-06-08 PROCEDURE — 97760 ORTHOTIC MGMT&TRAING 1ST ENC: CPT | Performed by: OCCUPATIONAL THERAPIST

## 2021-06-08 NOTE — PROGRESS NOTES
Daily Note     Today's date: 2021  Patient name: Marcia Sotelo  : 1960  MRN: [id number]  Referring provider: Joseph Dove MD  Dx:   Encounter Diagnosis     ICD-10-CM    1  Carpal tunnel syndrome of right wrist  G56 01    2  Sprain of right wrist, initial encounter  S68 501A                   Subjective: Dr Lisseth Valdez thinks I tore a ligament in my thumb      Objective: See treatment diary below      Assessment:   Patient was fitted with HB TSS for day/activity and FB TSS for night  Plan: MRI scheduled , Possible UCL repair   Hold therapy    Precautions: universal; psoraitric arthritis        Manuals            graston wrist /CT 4m            MOB wrist gd 3 3m            MFR  2m            Intrinsic streches 2m            HEP 4-6 x day                            HB TSS and FB TSS  25m                                                                            Ther Ex             A/PROM wrist  4m            Flex  ext Yellow  3x10            Wrist PRE  2#  3x10            ER/IR PRO 2#  3x10                                                                Ther Activity                                       Gait Training                                       Modalities             MH wrist  8m            CP 5m                Manuals 4/6 4/13 4/15 4/20 4/22 5/6 5/11 5/13 5/18 5/20   graston wrist /CT 5m 3m 3m 3m 3m 3m 3m 3m 3m 3m   MOB wrist gd 3/MWM 3m 3m 3m 5m 4m 4m 4m 4m 4m 4m   MFR  2m 2m 2m 2m 2m 2m 2m 2m 2m 2m   Intrinsic streches 2m 2m 2m 2m 2m 2m 2m 2m 2m 2m   HEP 4-6 x day       Tape midcarpal      Behavior mod discussed 4m            Vigil MOB median    2m                                                                          Ther Ex             A/PROM wrist  4m 4m 4m 4m 4m 4m 4m 4m 4m 4m   Flex  ext   3x10           Wrist ROm w/cone   2m 2m 2m 2m 2m 2m 2m 2m   Wrist maze   10x          ER/IR/PRO    2# CUFF  3x10 2#  Cuff  3x10 2#  Cuff  3x10        Wrist ext      1#  3x10 1#  3x10 1# 3x10 1#  3x10 1#  3x10 2#  3x10   Ant shoulder raise      2#  Cuff  3x10 2#  Cuff  3x10           Flex        Yellow  3x10 Yellow  3x10 yellow  3x10 yellow  3x10 Yellow  3x10   Ther Activity                                       Gait Training                                       Modalities             Pulsed US CT 8m MH  10m MH   10m US   8m MH pre  10m MH  pre Mh pre   MH  pre MH MH   CP 5m 5m 5m 5m 5m 5m 5m  5m 5m

## 2021-06-15 ENCOUNTER — APPOINTMENT (OUTPATIENT)
Dept: OCCUPATIONAL THERAPY | Facility: CLINIC | Age: 61
End: 2021-06-15
Payer: COMMERCIAL

## 2021-06-17 ENCOUNTER — APPOINTMENT (OUTPATIENT)
Dept: OCCUPATIONAL THERAPY | Facility: CLINIC | Age: 61
End: 2021-06-17
Payer: COMMERCIAL

## 2021-06-24 ENCOUNTER — HOSPITAL ENCOUNTER (OUTPATIENT)
Dept: MRI IMAGING | Facility: HOSPITAL | Age: 61
Discharge: HOME | End: 2021-06-24
Attending: ORTHOPAEDIC SURGERY
Payer: COMMERCIAL

## 2021-06-24 DIAGNOSIS — S53.31XA GAMEKEEPER'S THUMB OF RIGHT HAND, INITIAL ENCOUNTER: ICD-10-CM

## 2021-06-24 PROCEDURE — G1004 CDSM NDSC: HCPCS

## 2021-06-24 PROCEDURE — 73218 MRI UPPER EXTREMITY W/O DYE: CPT

## 2021-06-29 ENCOUNTER — HOSPITAL ENCOUNTER (OUTPATIENT)
Dept: RADIOLOGY | Facility: HOSPITAL | Age: 61
Discharge: HOME | End: 2021-06-29
Attending: ORTHOPAEDIC SURGERY
Payer: COMMERCIAL

## 2021-06-29 DIAGNOSIS — G56.01 CARPAL TUNNEL SYNDROME ON RIGHT: ICD-10-CM

## 2021-06-29 PROCEDURE — 76882 US LMTD JT/FCL EVL NVASC XTR: CPT

## 2021-06-30 ENCOUNTER — TELEPHONE (OUTPATIENT)
Dept: OBGYN CLINIC | Facility: CLINIC | Age: 61
End: 2021-06-30

## 2021-06-30 NOTE — TELEPHONE ENCOUNTER
PA for this med initiated today by Giselle. PT   Pt came in asking for a letter for work due to having to wait a while for appt  She is a hairdresser and cannot work at present  Also asking how long she will have to wait for a surgery date if appt is July 29

## 2021-07-18 NOTE — PROGRESS NOTES
Assessment and Plan:   Ms Юлия Gentile a 61year-old  female with history significant for psoriatic arthritis, mild right knee osteoarthritis, right knee meniscal tear status post surgery and right shoulder rotator cuff tears with subdeltoid/subacromial bursitis, who presents for a follow-up   She is currently on leflunomide 20 mg once daily         Rheumatic summary:  1) Diagnosed with PsA (subtle erosive changes on hand XRs) in 8/2019 - started on MTX, but d/c'ed in 9/2019 due to side effects of skin rash  Started on LEF 10/2019-present  - 7/20/21: presenting with flare up x 1 month - prednisone taper x 15 days and continue LEF  If no sustained improvement will d/c LEF and start adalimumab  2) Co-morbidities: knee OA, right carpal tunnel syndrome, lumbar DJD         # Psoriatic arthritis  - Shante presents today for a follow-up of psoriatic arthritis which up until recently had been well controlled with leflunomide 20 mg once daily that she has been on since October 2019  Unfortunately over the past month she has noticed a flare-up in her symptoms with fatigue, body aches as well as specific joint pains and today is presenting with evidence of synovitis affecting multiple joints as described below  We discussed various treatment options given her ongoing flare-up and for now will proceed with a prednisone taper starting at 40 mg once daily gradually decreased over the next 2 weeks  If she returns to baseline following completion of the steroids she will continue with the leflunomide 20 mg once daily, but I advised her if her symptoms again flare I would recommend discontinuing the leflunomide and at that time stepping up to biologic DMARDs  We briefly discussed subcutaneous adalimumab 40 mg every 2 weeks as the next option and I reviewed the side effects with her including but not limited to risk for infections    She has a baseline hepatitis test and will obtain a baseline TB test   She is also due for her leflunomide related high risk medication lab monitoring today  - In the meanwhile she may continue with the Tylenol as needed  - I requested she also continue follow-up with orthopedics as I question if some of the right wrist pain and swelling may be secondary to the sprain she has been experiencing since the injury in March 2021  Of course this would not explain the widespread synovitis that I am appreciating and overall she is also presenting with a flare-up of the psoriatic arthritis        Plan:  Diagnoses and all orders for this visit:    Psoriatic arthritis ([address] )  -     predniSONE 10 mg tablet; Take 4 tabs once daily x 5 days, then decrease to 2 tabs once daily x 5 days, then decrease to 1 tab once daily x 5 days, then stop  -     XR wrist 3+ vw left; Future  -     XR wrist 3+ vw right; Future    High risk medication use  -     CBC and differential; Future  -     Comprehensive metabolic panel; Future    Primary generalized (osteo)arthritis    Screening-pulmonary TB  -     Quantiferon TB Gold Plus ([facility]); Future      Activities as tolerated  Exercise: try to maintain a low impact exercise regimen as much as possible  Walk for 30 minutes a day for at least 3 days a week  Continue other medications as prescribed by PCP and other specialists  RTC in 3 months with any physician          HPI    INITIAL VISIT NOTE:  Ms Christi Arreaga a 66-year-old  female with history significant for mild right knee osteoarthritis, right knee meniscal tear status post surgery and right shoulder rotator cuff tears with subdeltoid/subacromial bursitis, who presents for further evaluation of diffuse joint pains and an elevated C reactive protein of 81   She is referred by Dr Archana Patterson      Patient states in November 2017 she had an accidental injury resulting in a concussion, and states overall she has not felt well since then  Paige Bush is able to recall more diffuse joint pains starting in December 2018, and states they have been more prominent as well as progressive since then   She experiences her symptoms on a daily basis in a mostly constant manner   She states it is significantly interfering with her activities of daily living, as well as with her sleeping habits   She describes pain most prominently affecting her bilateral shoulders, bilateral wrists, to a lesser degree affecting her bilateral hands, low back region, hips and knees   She does not really experience joint pains affecting her elbows, ankles or feet   She has subjectively only noticed swelling affecting her knees   She experiences morning stiffness which affects her diffusely and persists throughout the day   She states any sort of activity aggravates her symptoms, but she does not necessarily obtain relief with resting   At the onset of her symptoms in February/March 2019 she was prescribed a 10 day course of prednisone starting at 40 mg daily, which she states significantly helped her  Mae Segundo was then represcribed a course of prednisone of the same duration in June 2019 which also helped her  Mae Segundo states the symptoms recur quickly after cessation of the steroids   She was recently also prescribed tramadol and tizanidine for the pain, but this has not really been helping her  aMe Segundo has also tried over-the-counter ibuprofen, Advil, Aleve and naproxen without significant benefit of her symptoms      She denies any recent fevers, chills, night sweats, unintentional weight loss or infections prior to the onset of her joint complaints   She denies significant dry eyes, inflammatory eye disease, dry mouth, skin rashes, psoriasis, mouth/nose ulcers, shortness of breath, abdominal pain, vomiting, diarrhea, blood in stools, inflammatory bowel disease or family history of rheumatoid arthritis/autoimmune disease      She was seen by her primary care physician for the ongoing complaints and had recent testing done which showed an elevated C reactive protein of 81   RIGO screen, rheumatoid factor, uric acid and Lyme antibody profile were unremarkable   In view of the ongoing right shoulder pain she recently had an MRI of her right shoulder done which showed partial-thickness rotator cuff tears as well as subacromial/subdeltoid bursitis   She has been following up with Orthopedics for this, and it is not thought that the rotator cuff tears should correspond to the symptoms she has been experiencing  Wyline Barrier also received bilateral intra-articular cortisone shoulder injections which only provided her with temporary relief   She was advised to follow-up with Rheumatology first, and if her symptoms are not to improve then there may be consideration for an arthroscopic procedure         8/27/2019:  Patient presents for a follow-up today  Merna Henderson reviewed her recently done labs which showed an unremarkable CBC, CMP, ESR, anti CCP antibody, Sjogren's antibodies and chronic hepatitis panel   X-ray of her right hand showed periarticular soft tissue swelling of the 2nd through 5th PIP joints, as well as subtle juxta-articular erosions of the 2nd through 5th DIP joints   X-ray of her left hand and bilateral feet did not show any signs of inflammatory arthritis      Following the prior office visit patient had started prednisone 40 mg daily for 7 days, and is currently on prednisone 20 mg daily   She states being on the steroids has significantly improved her symptoms, and she is currently denying any joint pains, swelling or morning stiffness   She is feeling well today and denies any complaints   Of note she denies a history of psoriasis or skin rashes   She denies a family history of psoriasis         11/27/2019:  Patient presents for a follow-up of psoriatic arthritis  Indira Krause is currently on leflunomide 20 mg once daily and prednisone 10 mg once daily      At the last office visit I had started her on methotrexate but after taking a few doses she called the office with an outbreak of a skin rash affecting her right upper extremity   I requested she try the medication once more to ensure this was related to a side effect of the methotrexate, and she states that the rash persisted and worsened   In view of this we discontinued the methotrexate entirely and I switched her to leflunomide 20 mg once daily which she has been on for approximately 1 month now  Mae Segundo has been tolerating the medication well without any concerns for side effects or infections      She reports overall her joint pains have significantly improved   She did try to taper down on the prednisone to 5 mg once daily and then discontinue it, but states upon discontinuing her joint pains recurred   In view of this she restarted prednisone at 5 mg once daily, but over the past few days further increased to 10 mg once daily due to increased activities causing worsening joint pains   She is willing to try and taper down on the prednisone at this time   She reports pain that she usually experiences will affect her shoulders and knees   She denies any other significant areas of joint pains or persistent swelling   On occasion she will notice swelling of her knees   She states that the morning stiffness has mostly resolved as well      She denies any other history of skin rash or psoriasis         3/3/2020:  Patient presents for a follow-up of psoriatic arthritis  Mae Segundo is currently on leflunomide 20 mg once daily   I reviewed her recently done CBC and CMP which were unremarkable      Patient reports overall in terms of the arthritis she has been doing well and denies any significant peripheral joint pains, swelling or morning stiffness   For the past 3-4 months she has been dealing with left lower back pain and was seen in the emergency room for this   On occasion the pain will radiate down her leg   An x-ray of her lumbar spine was done which showed mild degenerative arthritis at two levels   She is following up with her primary care physician for this and completing a course of physical therapy  Patricia Ludwig is unsure if the PT has really helped her so far  Patricia Ludwig has been taking ibuprofen on a daily basis to take the edge off her symptoms      She also reports 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), she may develop achiness in her upper arm, chest and back region   Typically when this happens she may take prednisone 5-10 mg as needed which will help her  Patricia Ludwig uses the prednisone on a very infrequent basis      She denies a history of skin rash or psoriasis   She has been tolerating the leflunomide well without any concerns for side effects or infections         6/2/2020:  Patient presents for a follow-up of psoriatic arthritis  Patricia Ludwig is currently on leflunomide 20 mg once daily and prednisone 5-10 mg daily as needed      Patient reports in April she was experiencing a flare-up diffuse hives, for which she increased the prednisone to 10 mg once daily and ran out of her script early  Patricia Ludwig has been out of the prednisone for the past few weeks   She reports without the steroids she does notice a change in her joint pains, which will primarily affect her shoulders, neck and knees   She has noticed intermittent swelling affecting her knees   She does not really experience any morning stiffness, but will have a sensation of diffuse achiness as well as fatigue   She is not taking any over-the-counter pain medications   She reports for maintenance therapy she is mostly taking the leflunomide 20 mg once daily as well as prednisone 5 mg once daily, and this seems to control her symptoms      She has been tolerating her medications well without any concerns for side effects or infections         11/9/2020:  Patient presents for a follow-up of psoriatic arthritis  Patricia Ludwig is currently on leflunomide 20 mg once daily and prednisone 5-10 mg once daily as needed   I reviewed her recently done CBC and CMP which were normal      Patient reports she has overall been doing well and this is the best that she has felt  Rahul Rosales was able to start exercising at the gym again and thinks that this has helped with her shoulder pain   Over the past few weeks she has noticed pain and swelling affecting her left knee but cannot recall any injuries   Other than the left knee pain she is denying any other joint pains and has not noticed joint swelling   She is no longer experiencing morning stiffness   She still takes the prednisone as needed but thinks she is now able to taper off it completely   She will also take Tylenol as needed      She has been tolerating the leflunomide well without any concerns for side effects or infections   She has not had any psoriasis         3/10/2021:  Patient presents for a follow-up of psoriatic arthritis  She is currently on leflunomide 20 mg once daily  I reviewed her recently done CBC and CMP which were normal        She reports since the last office visit she was able to discontinue the steroids without a flare-up, and manages her joint pains with Tylenol as needed  She was experiencing pain affecting her left knee and right shoulder, but she was seen by Orthopedics and received intra-articular cortisone injections which helped  Last week she did have an accidental fall on the ice and fell on her right hand  She was seen in urgent care and had an x-ray done which did not show any fractures  She was advised that she probably sprained her right wrist and is continuing with conservative measures  She has been unable to rest her wrist as due to her occupation as a  she is constantly using her hands  Recently she has also noticed numbness affecting the lateral fingers on her right hand mostly at night, but at certain times during the day as well while she is working      She denies any other joint pains, joint swelling or morning stiffness    She has been tolerating the leflunomide well without any concerns for side effects or infections   She has not had any psoriasis  7/20/2021:  Patient presents for a follow-up of psoriatic arthritis  She is currently on leflunomide 20 mg once daily  She has not had her recent high risk medication lab monitoring done  She reports since the last visit, especially in the past 1 month she has noticed a flare-up in her joint pains  She describes an overall body achiness associated with fatigue but also mentions specific joint pains involving her left knee and right ankle  She has noticed swelling to affect her right wrist, left knee and right ankle  She experiences morning stiffness which affects her diffusely and can take about an hour to improve  She takes over-the-counter Tylenol as needed which helps to some degree  She has previously been told not to take NSAIDs but is unsure the reason why  She has not been on any recent steroids  She has also been following up with [facility] for right hand and wrist pain which has persisted following a fall and sprain in March 2021  She did have an ultrasound of her right wrist done looking for carpal tunnel syndrome and this was suspicious for the condition  An MRI of her right thumb in June 2021 showed a partial tear of the ulnar collateral ligament as well as mild 1st MCP joint osteoarthritis  She has been tolerating the leflunomide well without any concerns for side effects or infections   She has not had any psoriasis  The following portions of the patient's history were reviewed and updated as appropriate: allergies, current medications, past family history, past medical history, past social history, past surgical history and problem list       Review of Systems  Constitutional: Negative for weight change, fevers, chills, night sweats  Positive for fatigue  ENT/Mouth: Negative for hearing changes, ear pain, nasal congestion, sinus pain, hoarseness, sore throat, rhinorrhea, swallowing difficulty     Eyes: Negative for pain, redness, discharge, vision changes  Cardiovascular: Negative for chest pain, SOB, palpitations  Respiratory: Negative for cough, sputum, wheezing, dyspnea  Gastrointestinal: Negative for nausea, vomiting, diarrhea, constipation, pain, heartburn  Genitourinary: Negative for dysuria, urinary frequency, hematuria  Musculoskeletal: As per HPI  Skin: Negative for skin rash, color changes  Neuro: Negative for weakness, numbness, tingling, loss of consciousness  Psych: Negative for anxiety, depression  Heme/Lymph: Negative for easy bruising, bleeding, lymphadenopathy          Past Medical History:   Diagnosis Date    Chronic left-sided low back pain without sciatica 3/18/2020    Concussion     Hyperlipidemia     Psoriatic arthritis ([facility] [address] ) 2019    [facility] arthritis ([facility] )     Seronegative rheumatoid arthritis ([facility] ) 2019    Tendinitis        Past Surgical History:   Procedure Laterality Date     SECTION      FOOT SURGERY Left     HEMORROIDECTOMY      KNEE ARTHROSCOPY Right 2018    KNEE SURGERY      RI KNEE SCOPE,MED/LAT MENISECTOMY Right 2018    Procedure: ARTHROSCOPY KNEE, PARTIAL MEDIAL MENISCECTOMY: ABRASION CHONDROPLASTY;  Surgeon: Margarita Stoner MD;  [facility];  Service: Orthopedics    TUBAL LIGATION         Social History     Socioeconomic History    Marital status:      Spouse name: Not on file    Number of children: Not on file    Years of education: Not on file    Highest education level: Not on file   Occupational History    Not on file   Tobacco Use    Smoking status: Former Smoker     Packs/day: 0 33     Years: 5 00     Pack years: 1 65     Quit date:      Years since quittin 5    Smokeless tobacco: Never Used   Vaping Use    Vaping Use: Never used   Substance and Sexual Activity    Alcohol use: Yes     Comment: social    Drug use: No    Sexual activity: Not on file   Other Topics Concern    Not on file   Social History Narrative    Not on file     Social Determinants of Health     Financial Resource Strain:     Difficulty of Paying Living Expenses:    Food Insecurity:     Worried About Running Out of Food in the Last Year:     [address] in the Last Year:    Transportation Needs:     Lack of Transportation (Medical):      Lack of Transportation (Non-Medical):    Physical Activity:     Days of Exercise per Week:     Minutes of Exercise per Session:    Stress:     Feeling of Stress :    Social Connections:     Frequency of Communication with Friends and Family:     Frequency of Social Gatherings with Friends and Family:     Attends Hoahaoism Services:     Active Member of Clubs or Organizations:     Attends Club or Organization Meetings:     Marital Status:    Intimate Partner Violence:     Fear of Current or Ex-Partner:     Emotionally Abused:     Physically Abused:     Sexually Abused:        Family History   Problem Relation Age of Onset    Coronary artery disease Mother     Other Mother         CABG    Hiatal hernia Father     Stroke Maternal Grandmother     Hyperlipidemia Family     Migraines Family     Thyroid disease Family     No Known Problems Sister     No Known Problems Brother     No Known Problems Son     No Known Problems Daughter     No Known Problems Paternal Grandmother     No Known Problems Paternal Grandfather     No Known Problems Maternal Aunt     No Known Problems Maternal Uncle     No Known Problems Paternal Aunt     No Known Problems Paternal Uncle     No Known Problems Cousin        Allergies   Allergen Reactions    Methotrexate Hives       Current Outpatient Medications:     acetaminophen (TYLENOL) 325 mg tablet, Take 650 mg by mouth every 6 (six) hours as needed for mild pain Taking twice a day , Disp: , Rfl:     Glucosamine-Chondroitin (GLUCOSAMINE CHONDR COMPLEX PO), Take by mouth daily  , Disp: , Rfl:     leflunomide (ARAVA) 20 MG tablet, Take 1 tablet (20 mg total) by mouth daily, Disp: 90 tablet, Rfl: 1    pravastatin (PRAVACHOL) 40 mg tablet, TAKE 1 TABLET BY MOUTH AT BEDTIME, Disp: 30 tablet, Rfl: 3    predniSONE 10 mg tablet, Take 4 tabs once daily x 5 days, then decrease to 2 tabs once daily x 5 days, then decrease to 1 tab once daily x 5 days, then stop , Disp: 50 tablet, Rfl: 0      Objective:    Vitals:    07/20/21 0840   BP: 128/84   Pulse: 82       Physical Exam  General: Well appearing, well nourished, in no distress  Oriented x 3, normal mood and affect  Ambulating without difficulty  Skin: Good turgor, no rash, unusual bruising or prominent lesions  Hair: Normal texture and distribution  Nails: Normal color, no deformities  HEENT:  Head: Normocephalic, atraumatic  Eyes: Conjunctiva clear, sclera non-icteric, EOM intact  Extremities: No amputations or deformities, cyanosis, edema  Musculoskeletal:   Hands - there is no tenderness or soft tissue swelling of her bilateral DIP, PIP or MCP joints  Wrists - there are chronic synovial changes noted at her bilateral wrists, more prominent on the right side  There appears to be soft tissue swelling bilaterally again more prominent at the right wrist   She has limitation with full flexion and extension at her right wrist along with tenderness  There is no restriction in range of motion or tenderness at the left wrist   Elbows and shoulders - unremarkable  Knees - there is soft tissue swelling without tenderness, warmth or erythema at the left knee  The right knee is unremarkable  Ankles - there is soft tissue swelling present at the right ankle without significant tenderness or restriction in range of motion  The left ankle is unremarkable  Feet - negative MTP squeeze test bilaterally  There is no enthesitis or dactylitis  Negative fibromyalgia tender points  Neurologic: Alert and oriented  No focal neurological deficits appreciated  Psychiatric: Normal mood and affect  JASON Alaniz    Rheumatology

## 2021-07-20 ENCOUNTER — HOSPITAL ENCOUNTER (OUTPATIENT)
Dept: RADIOLOGY | Facility: HOSPITAL | Age: 61
Discharge: HOME | End: 2021-07-20
Payer: COMMERCIAL

## 2021-07-20 ENCOUNTER — OFFICE VISIT (OUTPATIENT)
Dept: RHEUMATOLOGY | Facility: CLINIC | Age: 61
End: 2021-07-20
Payer: COMMERCIAL

## 2021-07-20 VITALS — SYSTOLIC BLOOD PRESSURE: 128 MMHG | DIASTOLIC BLOOD PRESSURE: 84 MMHG | HEART RATE: 82 BPM

## 2021-07-20 DIAGNOSIS — L40.50 PSORIATIC ARTHRITIS (HCC): ICD-10-CM

## 2021-07-20 DIAGNOSIS — Z11.1 SCREENING-PULMONARY TB: ICD-10-CM

## 2021-07-20 DIAGNOSIS — Z79.899 HIGH RISK MEDICATION USE: ICD-10-CM

## 2021-07-20 DIAGNOSIS — L40.50 PSORIATIC ARTHRITIS (HCC): Primary | ICD-10-CM

## 2021-07-20 DIAGNOSIS — M15.0 PRIMARY GENERALIZED (OSTEO)ARTHRITIS: ICD-10-CM

## 2021-07-20 PROCEDURE — 73110 X-RAY EXAM OF WRIST: CPT

## 2021-07-20 PROCEDURE — 99215 OFFICE O/P EST HI 40 MIN: CPT | Performed by: INTERNAL MEDICINE

## 2021-07-20 RX ORDER — PREDNISONE 10 MG/1
TABLET ORAL
Qty: 50 TABLET | Refills: 0 | Status: SHIPPED | OUTPATIENT
Start: 2021-07-20 | End: 2021-09-20 | Stop reason: SDUPTHER

## 2021-07-20 NOTE — PATIENT INSTRUCTIONS
Adalimumab (By injection)   Adalimumab ([id number]-RJB-[id number]-mab)  Treats arthritis, plaque psoriasis, ankylosing spondylitis, Crohn disease, ulcerative colitis, hidradenitis suppurativa, and uveitis  Brand Name(s): Humira, Humira Pediatric Crohn's Disease Starter Pack, Humira Pen, Humira Pen Psoriasis/Uveitis Starter Pack, Humira Pen Starter Pack   There may be other brand names for this medicine  When This Medicine Should Not Be Used: This medicine is not right for everyone  Do not use it if you had an allergic reaction to adalimumab  How to Use This Medicine:   Injectable  · Your doctor will prescribe your exact dose and tell you how often it should be given  This medicine is given as a shot under your skin  · A nurse or other health provider will give you this medicine  · You may be taught how to give your medicine at home  Make sure you understand all instructions before giving yourself an injection  Do not use more medicine or use it more often than your doctor tells you to  · You will be shown the body areas where this shot can be given  Use a different body area each time you give yourself a shot  Keep track of where you give each shot to make sure you rotate body areas  Do not inject into skin areas that are red, bruised, tender, or hard  If you have psoriasis, do not inject into a raised, thick, red, or scaly skin patch or into skin lesions  · This medicine should come with a Medication Guide  Ask your pharmacist for a copy if you do not have one  · Missed dose: Take a dose as soon as you remember  If it is almost time for your next dose, wait until then and take a regular dose  Do not take extra medicine to make up for a missed dose  · If you store this medicine at home, keep it in the refrigerator  Do not freeze  Protect the medicine from light  Keep your medicine and supplies in the original packages until you are ready to use them    Drugs and Foods to Avoid:   Ask your doctor or pharmacist before using any other medicine, including over-the-counter medicines, vitamins, and herbal products  · Some foods and medicines can affect how adalimumab works  Tell your doctor if you are using any of the following:   ? Abatacept, anakinra, azathioprine, cyclosporine, mercaptopurine, rituximab, theophylline  ? Blood thinner (including warfarin)  ? Medicine that weakens the immune system (including a steroid or cancer medicine)  · This medicine may interfere with vaccines  Ask your doctor before you get a flu shot or any other vaccines  Warnings While Using This Medicine:   · Tell your doctor if you are pregnant or breastfeeding, or if you have liver disease, COPD, heart failure, diabetes, psoriasis, multiple sclerosis, optic neuritis, problems with your immune system, or a history of cancer or Guillain-Barré syndrome  Tell your doctor if you have any type of infection (including hepatitis B or tuberculosis) or an infection that keeps coming back  · This medicine may cause the following problems:   ? Increased risk for infection  ? Increased risk of certain cancers, including lymphoma or leukemia  ? New or worsening heart failure  · Tell your doctor if you have a latex allergy  The needle cover of some syringes and pens contain latex and may cause allergic reactions  · You will need to have a skin test for tuberculosis (TB) before you start this medicine  Tell your doctor if you or anyone in your home has ever had a positive TB skin test or been exposed to TB  · This medicine may make you bleed, bruise, or get infections more easily  Take precautions to prevent illness and injury  Wash your hands often  · Your doctor will do lab tests at regular visits to check on the effects of this medicine  Keep all appointments  · Throw away used needles in a hard, closed container that the needles cannot poke through  Keep this container away from children and pets  · Keep all medicine out of the reach of children   Never share your medicine with anyone  Possible Side Effects While Using This Medicine:   Call your doctor right away if you notice any of these side effects:  · Allergic reaction: Itching or hives, swelling in your face or hands, swelling or tingling in your mouth or throat, chest tightness, trouble breathing  · Blistering, peeling, red skin rash, or red, scaly patches on the skin  · Change in how much or how often you urinate, painful urination  · Changes in vision  · Chest pain, uneven heartbeat, trouble breathing  · Cough, fever, chills, runny or stuffy nose, sore throat, body aches  · Dark urine or pale stools, nausea, vomiting, loss of appetite, stomach pain, yellow skin or eyes  · Numbness, tingling, or burning pain in your hands, arms, legs, or feet, joint pain  · Rapid weight gain, swelling in your hands, ankles, lower legs, or feet  · Sores or white patches on your lips, mouth, or throat  · Swollen glands in your neck, underarms, or groin  · Unusual bleeding, bruising, tiredness, weakness, or weight loss  If you notice these less serious side effects, talk with your doctor:   · Back pain  · Headache  · Redness, itching, bruising, bleeding, pain, or swelling where the shot was given  If you notice other side effects that you think are caused by this medicine, tell your doctor  Call your doctor for medical advice about side effects  You may report side effects to FDA at 6-221-FDA-1294  © Copyright [employer] 2021 Information is for End User's use only and may not be sold, redistributed or otherwise used for commercial purposes  The above information is an  only  It is not intended as medical advice for individual conditions or treatments  Talk to your doctor, nurse or pharmacist before following any medical regimen to see if it is safe and effective for you

## 2021-07-26 ENCOUNTER — OFFICE VISIT (OUTPATIENT)
Dept: OBGYN CLINIC | Facility: CLINIC | Age: 61
End: 2021-07-26
Payer: COMMERCIAL

## 2021-07-26 VITALS
BODY MASS INDEX: 24.75 KG/M2 | WEIGHT: 145 LBS | DIASTOLIC BLOOD PRESSURE: 80 MMHG | SYSTOLIC BLOOD PRESSURE: 110 MMHG | HEIGHT: 64 IN

## 2021-07-26 DIAGNOSIS — S53.31XA GAMEKEEPER'S THUMB OF RIGHT HAND, INITIAL ENCOUNTER: Primary | ICD-10-CM

## 2021-07-26 DIAGNOSIS — M18.11 ARTHRITIS OF CARPOMETACARPAL (CMC) JOINT OF RIGHT THUMB: ICD-10-CM

## 2021-07-26 DIAGNOSIS — M65.351 TRIGGER LITTLE FINGER OF RIGHT HAND: ICD-10-CM

## 2021-07-26 DIAGNOSIS — G56.01 CARPAL TUNNEL SYNDROME ON RIGHT: ICD-10-CM

## 2021-07-26 PROCEDURE — 3008F BODY MASS INDEX DOCD: CPT | Performed by: ORTHOPAEDIC SURGERY

## 2021-07-26 PROCEDURE — 1036F TOBACCO NON-USER: CPT | Performed by: ORTHOPAEDIC SURGERY

## 2021-07-26 PROCEDURE — 99214 OFFICE O/P EST MOD 30 MIN: CPT | Performed by: ORTHOPAEDIC SURGERY

## 2021-07-26 NOTE — PROGRESS NOTES
ASSESSMENT/PLAN:    Assessment:   Right UCL partial tear  triggering of small finger  carpal tunnel right wrist  CMC arthritis of the right thumb    Plan:   The patient would like to proceed with a R ECTR, Right thumb ulnar collateral ligament repair, right small trigger finger release  Follow Up: After Surgery    To Do Next Visit:    and Sutures out    General Discussions:       Operative Discussions:     Endoscopic Carpal Tunnel Release: The anatomy and physiology of carpal tunnel syndrome was discussed with the patient today  Increase pressure localized under the transverse carpal ligament can cause pain, numbness, tingling, or dysesthesias within the median nerve distribution as well as feelings of fatigue, clumsiness, or awkwardness  These symptoms typically occur at night and worse in the morning upon waking  Eventually, untreated carpal tunnel syndrome can result in weakness and permanent loss of muscle within the thenar compartment of the hand  Treatment options were discussed with the patient  Conservative treatment includes nocturnal resting splints to keep the nerve in a neutral position, ergonomic changes within the work or home environment, activity modification, and tendon gliding exercises  Steroid injections within the carpal canal can help a majority of patients, however this is often self-limited in a majority of patients  Surgical intervention to divide the transverse carpal ligament typically results in a long-lasting relief of the patient's complaints, with the recurrence rate of less than 1%  The patient has elected to undergo an endoscopic carpal tunnel release  The 2 incision technique was discussed with the patient, which results in approximately a two-week less recovery time, and less wound complications    In the postoperative period, light activities are allowed immediately, driving is allowed when narcotic medication has stopped, and the bandages may be removed and incision may get wet after 2 days  Heavy activities (lifting more than approximately 10 pounds) will be allowed after follow up appointment in 1-2 weeks  While the pain and discomfort in the hands generally improves rapidly, the numbness and tingling as well as the strength will slowly improve over weeks to months depending on the severity of the carpal tunnel syndrome  Pillar pain was discussed with the patient, which is typically a common but self-limiting condition  The risks of bleeding and infection from the surgery are less than 1%  Risk of recurrence is approximately 0 5%  The risks of nerve injury or nerve damage or damage to the blood vessels is approximately 1 in 1200  The patient has an understanding of the above mentioned discussion  The risks and benefits of the procedure were explained to the patient, which include, but are not limited to: Bleeding, infection, recurrence, pain, scar, damage to tendons, damage to nerves, and damage to blood vessels, failure to give desired results and complications related to anesthesia   These risks, along with alternative conservative treatment options, and postoperative protocols were voiced back and understood by the patient   All questions were answered to the patient's satisfaction   The patient agrees to comply with a standard postoperative protocol, and is willing to proceed   Education was provided via written and auditory forms   There were no barriers to learning  Written handouts regarding wound care, incision and scar care, and general preoperative information was provided to the patient   Prior to surgery, the patient may be requested to stop all anti-inflammatory medications   Prophylactic aspirin, Plavix, and Coumadin may be allowed to be continued   Medications including vitamin E , ginkgo, and fish oil are requested to be stopped approximately one week prior to surgery   Hypertensive medications and beta blockers, if taken, should be continued    Standard Consent: The risks and benefits of the procedure were explained to the patient, which include, but are not limited to: Bleeding, infection, recurrence, pain, scar, damage to tendons, damage to nerves, and damage to blood vessels, failure to give desired results and complications related to anesthesia  These risks, along with alternative conservative treatment options, and postoperative protocols were voiced back and understood by the patient  All questions were answered to the patient's satisfaction  The patient agrees to comply with a standard postoperative protocol, and is willing to proceed  Education was provided via written and auditory forms  There were no barriers to learning  Written handouts regarding wound care, incision and scar care, and general preoperative information was provided to the patient  Prior to surgery, the patient may be requested to stop all anti-inflammatory medications  Prophylactic aspirin, Plavix, and Coumadin may be allowed to be continued  Medications including vitamin E , ginkgo, and fish oil are requested to be stopped approximately one week prior to surgery  Hypertensive medications and beta blockers, if taken, should be continued  _____________________________________________________  CHIEF COMPLAINT:  Chief Complaint   Patient presents with    Right Hand - Follow-up         SUBJECTIVE:  Gris Martinez is a 64 y o  female who presents for follow up regarding  right thumb CMC arthritis, right thumb UCL injury, right small finger trigger finger and right carpal tunnel syndrome  Since last visit, Gris Martinez has tried activity modification without relief  Today there is Stiffness/LROM to the right wrist    She also reports pain to her right thumb that is constant moderate sharp and achy in nature  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 that she has not worn this care splint that was given to her at her last visit    She is here to review her MRI and ultrasound of her right upper extremity  She does wear a brace on her right wrist at night time for her carpal tunnel without relief of her symptoms     Radiation: Yes to the  wrist and thumb  Associated symptoms: No Complaints  Handedness: right  Work status:   [employer]    PAST MEDICAL HISTORY:  Past Medical History:   Diagnosis Date    Chronic left-sided low back pain without sciatica 3/18/2020    Concussion     Hyperlipidemia     P[facility]atic arthritis [facility] [address] ) 2019    [facility]soriatic arthritis ([facility] [address] [facility]     Seronegative rheumatoid arthritis ([facility] [address] ) 2019    Tendinitis        PAST SURGICAL HISTORY:  Past Surgical History:   Procedure Laterality Date     SECTION      FOOT SURGERY Left 2007    HEMORROIDECTOMY      KNEE ARTHROSCOPY Right 2018    KNEE SURGERY      MN KNEE SCOPE,MED/LAT MENISECTOMY Right 2018    Procedure: ARTHROSCOPY KNEE, PARTIAL MEDIAL MENISCECTOMY: ABRASION CHONDROPLASTY;  Surgeon: Belkys Lovelace MD;  [facility];  Service: Orthopedics    TUBAL LIGATION         FAMILY HISTORY:  Family History   Problem Relation Age of Onset    Coronary artery disease Mother     Other Mother         CABG    Hiatal hernia Father     Stroke Maternal Grandmother     Hyperlipidemia Family     Migraines Family     Thyroid disease Family     No Known Problems Sister     No Known Problems Brother     No Known Problems Son     No Known Problems Daughter     No Known Problems Paternal Grandmother     No Known Problems Paternal Grandfather     No Known Problems Maternal Aunt     No Known Problems Maternal Uncle     No Known Problems Paternal Aunt     No Known Problems Paternal Uncle     No Known Problems Cousin        SOCIAL HISTORY:  Social History     Tobacco Use    Smoking status: Former Smoker     Packs/day: 0 33     Years: 5 00     Pack years: 1 65     Quit date:      Years since quittin 5    Smokeless tobacco: Never Used   Vaping Use    Vaping Use: Never used   Substance Use Topics    Alcohol use: Yes     Comment: social    Drug use: No       MEDICATIONS:    Current Outpatient Medications:     acetaminophen (TYLENOL) 325 mg tablet, Take 650 mg by mouth every 6 (six) hours as needed for mild pain Taking twice a day , Disp: , Rfl:     Glucosamine-Chondroitin (GLUCOSAMINE CHONDR COMPLEX PO), Take by mouth daily  , Disp: , Rfl:     leflunomide (ARAVA) 20 MG tablet, Take 1 tablet (20 mg total) by mouth daily, Disp: 90 tablet, Rfl: 1    pravastatin (PRAVACHOL) 40 mg tablet, TAKE 1 TABLET BY MOUTH AT BEDTIME, Disp: 30 tablet, Rfl: 3    predniSONE 10 mg tablet, Take 4 tabs once daily x 5 days, then decrease to 2 tabs once daily x 5 days, then decrease to 1 tab once daily x 5 days, then stop , Disp: 50 tablet, Rfl: 0    ALLERGIES:  Allergies   Allergen Reactions    Methotrexate Hives       REVIEW OF SYSTEMS:  Pertinent items are noted in HPI      LABS:  HgA1c: No results found for: HGBA1C  BMP:   Lab Results   Component Value Date    GLUCOSE 89 09/28/2016    CALCIUM 9 9 05/30/2018     09/28/2016    K 4 0 02/15/2021    K 4 0 02/15/2021    CO2 26 02/15/2021    CO2 26 02/15/2021     02/15/2021     02/15/2021    BUN 12 02/15/2021    BUN 13 02/15/2021    CREATININE 0 73 02/15/2021    CREATININE 0 68 02/15/2021           _____________________________________________________  PHYSICAL EXAMINATION:  Vital signs: /80   Ht 5' 4" (1 626 m)   Wt 65 8 kg (145 lb)   LMP  (LMP Unknown)   BMI 24 89 kg/m²   General: well developed and well nourished, alert, oriented times 3 and appears comfortable  Psychiatric: Normal  HEENT: Trachea Midline, No torticollis  Cardiovascular: No discernable arrhythmia  Pulmonary: No wheezing or stridor  Abdomen: No rebound or guarding  Extremities: No peripheral edema  Skin: No masses, erythema, lacerations, fluctation, ulcerations  Neurovascular: Pulses Intact    MUSCULOSKELETAL EXAMINATION:  RIGHT SIDE:  ttp ulnar aspect of MP jt instability to thumb UCL, positive tinels to carpal tunnel  Small finger nodule with crepitation at the level of the A1 pulley, AIN 5/5, apb 5/5     _____________________________________________________  STUDIES REVIEWED:  Images were reviewed in PACS by Dr Adonay Taylor and demonstrate:  MRI of right thumb on 06/24/2021 demonstrates a partial UCL tear of her right  Thumb  ultrasound of right wrist on 06/29/2021 demonstrates carpal tunnel syndrome        PROCEDURES PERFORMED:  Procedures  No Procedures performed today   Scribe Attestation    I,:  Nancy Ortiz am acting as a scribe while in the presence of the attending physician :       I,:  Marlyn Alexis MD personally performed the services described in this documentation    as scribed in my presence :

## 2021-07-26 NOTE — H&P
ASSESSMENT/PLAN:    Assessment:   Right UCL partial tear  triggering of small finger  carpal tunnel right wrist  CMC arthritis of the right thumb    Plan:   The patient would like to proceed with a R ECTR, Right thumb ulnar collateral ligament repair, right small trigger finger release  Follow Up: After Surgery    To Do Next Visit:    and Sutures out    General Discussions:       Operative Discussions:     Endoscopic Carpal Tunnel Release: The anatomy and physiology of carpal tunnel syndrome was discussed with the patient today  Increase pressure localized under the transverse carpal ligament can cause pain, numbness, tingling, or dysesthesias within the median nerve distribution as well as feelings of fatigue, clumsiness, or awkwardness  These symptoms typically occur at night and worse in the morning upon waking  Eventually, untreated carpal tunnel syndrome can result in weakness and permanent loss of muscle within the thenar compartment of the hand  Treatment options were discussed with the patient  Conservative treatment includes nocturnal resting splints to keep the nerve in a neutral position, ergonomic changes within the work or home environment, activity modification, and tendon gliding exercises  Steroid injections within the carpal canal can help a majority of patients, however this is often self-limited in a majority of patients  Surgical intervention to divide the transverse carpal ligament typically results in a long-lasting relief of the patient's complaints, with the recurrence rate of less than 1%  The patient has elected to undergo an endoscopic carpal tunnel release  The 2 incision technique was discussed with the patient, which results in approximately a two-week less recovery time, and less wound complications    In the postoperative period, light activities are allowed immediately, driving is allowed when narcotic medication has stopped, and the bandages may be removed and incision may get wet after 2 days  Heavy activities (lifting more than approximately 10 pounds) will be allowed after follow up appointment in 1-2 weeks  While the pain and discomfort in the hands generally improves rapidly, the numbness and tingling as well as the strength will slowly improve over weeks to months depending on the severity of the carpal tunnel syndrome  Pillar pain was discussed with the patient, which is typically a common but self-limiting condition  The risks of bleeding and infection from the surgery are less than 1%  Risk of recurrence is approximately 0 5%  The risks of nerve injury or nerve damage or damage to the blood vessels is approximately 1 in 1200  The patient has an understanding of the above mentioned discussion  The risks and benefits of the procedure were explained to the patient, which include, but are not limited to: Bleeding, infection, recurrence, pain, scar, damage to tendons, damage to nerves, and damage to blood vessels, failure to give desired results and complications related to anesthesia   These risks, along with alternative conservative treatment options, and postoperative protocols were voiced back and understood by the patient   All questions were answered to the patient's satisfaction   The patient agrees to comply with a standard postoperative protocol, and is willing to proceed   Education was provided via written and auditory forms   There were no barriers to learning  Written handouts regarding wound care, incision and scar care, and general preoperative information was provided to the patient   Prior to surgery, the patient may be requested to stop all anti-inflammatory medications   Prophylactic aspirin, Plavix, and Coumadin may be allowed to be continued   Medications including vitamin E , ginkgo, and fish oil are requested to be stopped approximately one week prior to surgery   Hypertensive medications and beta blockers, if taken, should be continued    Standard Consent: The risks and benefits of the procedure were explained to the patient, which include, but are not limited to: Bleeding, infection, recurrence, pain, scar, damage to tendons, damage to nerves, and damage to blood vessels, failure to give desired results and complications related to anesthesia  These risks, along with alternative conservative treatment options, and postoperative protocols were voiced back and understood by the patient  All questions were answered to the patient's satisfaction  The patient agrees to comply with a standard postoperative protocol, and is willing to proceed  Education was provided via written and auditory forms  There were no barriers to learning  Written handouts regarding wound care, incision and scar care, and general preoperative information was provided to the patient  Prior to surgery, the patient may be requested to stop all anti-inflammatory medications  Prophylactic aspirin, Plavix, and Coumadin may be allowed to be continued  Medications including vitamin E , ginkgo, and fish oil are requested to be stopped approximately one week prior to surgery  Hypertensive medications and beta blockers, if taken, should be continued  _____________________________________________________  CHIEF COMPLAINT:  Chief Complaint   Patient presents with    Right Hand - Follow-up         SUBJECTIVE:  Tereza Lopez is a 64 y o  female who presents for follow up regarding  right thumb CMC arthritis, right thumb UCL injury, right small finger trigger finger and right carpal tunnel syndrome  Since last visit, Tereza Lopez has tried activity modification without relief  Today there is Stiffness/LROM to the right wrist    She also reports pain to her right thumb that is constant moderate sharp and achy in nature  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 that she has not worn this care splint that was given to her at her last visit    She is here to review her MRI and ultrasound of her right upper extremity  She does wear a brace on her right wrist at night time for her carpal tunnel without relief of her symptoms     Radiation: Yes to the  wrist and thumb  Associated symptoms: No Complaints  Handedness: right  Work status:   [employer]    PAST MEDICAL HISTORY:  Past Medical History:   Diagnosis Date    Chronic left-sided low back pain without sciatica 3/18/2020    Concussion     Hyperlipidemia     P[facility]atic arthritis ([facility] [address] ) 2019    [facility] arthritis ([facility] [address] [facility]     Seronegative rheumatoid arthritis ([facility] [address] ) 2019    Tendinitis        PAST SURGICAL HISTORY:  Past Surgical History:   Procedure Laterality Date     SECTION      FOOT SURGERY Left 2007    HEMORROIDECTOMY      KNEE ARTHROSCOPY Right 2018    KNEE SURGERY      FL KNEE SCOPE,MED/LAT MENISECTOMY Right 2018    Procedure: ARTHROSCOPY KNEE, PARTIAL MEDIAL MENISCECTOMY: ABRASION CHONDROPLASTY;  Surgeon: Nubia Aldridge MD;  Location: [facility] OR;  Service: Orthopedics    TUBAL LIGATION         FAMILY HISTORY:  Family History   Problem Relation Age of Onset    Coronary artery disease Mother     Other Mother         CABG    Hiatal hernia Father     Stroke Maternal Grandmother     Hyperlipidemia Family     Migraines Family     Thyroid disease Family     No Known Problems Sister     No Known Problems Brother     No Known Problems Son     No Known Problems Daughter     No Known Problems Paternal Grandmother     No Known Problems Paternal Grandfather     No Known Problems Maternal Aunt     No Known Problems Maternal Uncle     No Known Problems Paternal Aunt     No Known Problems Paternal Uncle     No Known Problems Cousin        SOCIAL HISTORY:  Social History     Tobacco Use    Smoking status: Former Smoker     Packs/day: 0 33     Years: 5 00     Pack years: 1 65     Quit date:      Years since quittin 5    Smokeless tobacco: Never Used   Vaping Use    Vaping Use: Never used   Substance Use Topics    Alcohol use: Yes     Comment: social    Drug use: No       MEDICATIONS:    Current Outpatient Medications:     acetaminophen (TYLENOL) 325 mg tablet, Take 650 mg by mouth every 6 (six) hours as needed for mild pain Taking twice a day , Disp: , Rfl:     Glucosamine-Chondroitin (GLUCOSAMINE CHONDR COMPLEX PO), Take by mouth daily  , Disp: , Rfl:     leflunomide (ARAVA) 20 MG tablet, Take 1 tablet (20 mg total) by mouth daily, Disp: 90 tablet, Rfl: 1    pravastatin (PRAVACHOL) 40 mg tablet, TAKE 1 TABLET BY MOUTH AT BEDTIME, Disp: 30 tablet, Rfl: 3    predniSONE 10 mg tablet, Take 4 tabs once daily x 5 days, then decrease to 2 tabs once daily x 5 days, then decrease to 1 tab once daily x 5 days, then stop , Disp: 50 tablet, Rfl: 0    ALLERGIES:  Allergies   Allergen Reactions    Methotrexate Hives       REVIEW OF SYSTEMS:  Pertinent items are noted in HPI      LABS:  HgA1c: No results found for: HGBA1C  BMP:   Lab Results   Component Value Date    GLUCOSE 89 09/28/2016    CALCIUM 9 9 05/30/2018     09/28/2016    K 4 0 02/15/2021    K 4 0 02/15/2021    CO2 26 02/15/2021    CO2 26 02/15/2021     02/15/2021     02/15/2021    BUN 12 02/15/2021    BUN 13 02/15/2021    CREATININE 0 73 02/15/2021    CREATININE 0 68 02/15/2021           _____________________________________________________  PHYSICAL EXAMINATION:  Vital signs: /80   Ht 5' 4" (1 626 m)   Wt 65 8 kg (145 lb)   LMP  (LMP Unknown)   BMI 24 89 kg/m²   General: well developed and well nourished, alert, oriented times 3 and appears comfortable  Psychiatric: Normal  HEENT: Trachea Midline, No torticollis  Cardiovascular: No discernable arrhythmia  Pulmonary: No wheezing or stridor  Abdomen: No rebound or guarding  Extremities: No peripheral edema  Skin: No masses, erythema, lacerations, fluctation, ulcerations  Neurovascular: Pulses Intact    MUSCULOSKELETAL EXAMINATION:  RIGHT SIDE:  ttp ulnar aspect of MP jt instability to thumb UCL, positive tinels to carpal tunnel  Small finger nodule with crepitation at the level of the A1 pulley, AIN 5/5, apb 5/5     _____________________________________________________  STUDIES REVIEWED:  Images were reviewed in PACS by Dr Shamar Veloz and demonstrate:  MRI of right thumb on 06/24/2021 demonstrates a partial UCL tear of her right  Thumb  ultrasound of right wrist on 06/29/2021 demonstrates carpal tunnel syndrome        PROCEDURES PERFORMED:  Procedures  No Procedures performed today   Scribe Attestation    I,:  Mel Caicedo am acting as a scribe while in the presence of the attending physician :       I,:  Marilu Chester MD personally performed the services described in this documentation    as scribed in my presence :

## 2021-09-09 ENCOUNTER — OFFICE VISIT (OUTPATIENT)
Dept: LAB | Facility: HOSPITAL | Age: 61
End: 2021-09-09
Attending: ORTHOPAEDIC SURGERY
Payer: COMMERCIAL

## 2021-09-09 DIAGNOSIS — Z01.812 PRE-OPERATIVE LABORATORY EXAMINATION: ICD-10-CM

## 2021-09-09 LAB
ATRIAL RATE: 78 BPM
P AXIS: 59 DEGREES
PR INTERVAL: 142 MS
QRS AXIS: 23 DEGREES
QRSD INTERVAL: 74 MS
QT INTERVAL: 364 MS
QTC INTERVAL: 414 MS
T WAVE AXIS: 7 DEGREES
VENTRICULAR RATE: 78 BPM

## 2021-09-09 PROCEDURE — 93005 ELECTROCARDIOGRAM TRACING: CPT

## 2021-09-09 PROCEDURE — 93010 ELECTROCARDIOGRAM REPORT: CPT | Performed by: INTERNAL MEDICINE

## 2021-09-15 ENCOUNTER — TELEPHONE (OUTPATIENT)
Dept: RHEUMATOLOGY | Facility: CLINIC | Age: 61
End: 2021-09-15

## 2021-09-15 ENCOUNTER — TELEPHONE (OUTPATIENT)
Dept: OBGYN CLINIC | Facility: HOSPITAL | Age: 61
End: 2021-09-15

## 2021-09-15 DIAGNOSIS — L40.50 PSORIATIC ARTHRITIS (HCC): ICD-10-CM

## 2021-09-15 NOTE — TELEPHONE ENCOUNTER
Please cancel her follow up with Dr Prashanth Negron on 10/19/21 and reschedule to January 2022, thanks

## 2021-09-15 NOTE — TELEPHONE ENCOUNTER
Please ask her to discontinue the leflunomide  Please start a prior authorization for subcutaneous Humira 40 mg every 2 weeks for psoriatic arthritis  She has previously been on methotrexate and leflunomide  She has a hepatitis panel in her chart  I had requested she get the TB test done but I do not see that it has been done yet, she needs to get this done as soon as possible  She has a follow-up appointment with Dr Tam Owens in October, but as we are starting a new medication this will take time to take affect  Please cancel the October follow-up and reschedule her to January  Thanks

## 2021-09-15 NOTE — TELEPHONE ENCOUNTER
Spoke with patient she will discontinue the leflunomide as instructed, she is aware im going to obtain a Prior Auth for Humira but that I do need her to have her TB test done ASAP, she is currently on vacation and will have this done on Friday when she returns

## 2021-09-15 NOTE — TELEPHONE ENCOUNTER
Patient is calling to let Dr Irma Griffith know that the leflunomide (ARAVA) 20 MG tablet [[id number]]     Is still not working  Patient states there was another medication that was discussed (adalimumab)    Please advise    Evelia Lugo

## 2021-09-19 LAB
ALBUMIN SERPL-MCNC: 4.3 G/DL (ref 3.8–4.8)
ALBUMIN/GLOB SERPL: 1.8 {RATIO} (ref 1.2–2.2)
ALP SERPL-CCNC: 134 IU/L (ref 44–121)
ALT SERPL-CCNC: 74 IU/L (ref 0–32)
AST SERPL-CCNC: 64 IU/L (ref 0–40)
BASOPHILS # BLD AUTO: 0.1 X10E3/UL (ref 0–0.2)
BASOPHILS NFR BLD AUTO: 1 %
BILIRUB SERPL-MCNC: <0.2 MG/DL (ref 0–1.2)
BUN SERPL-MCNC: 19 MG/DL (ref 8–27)
BUN/CREAT SERPL: 37 (ref 12–28)
CALCIUM SERPL-MCNC: 9.4 MG/DL (ref 8.7–10.3)
CHLORIDE SERPL-SCNC: 103 MMOL/L (ref 96–106)
CO2 SERPL-SCNC: 23 MMOL/L (ref 20–29)
CREAT SERPL-MCNC: 0.52 MG/DL (ref 0.57–1)
EOSINOPHIL # BLD AUTO: 0.2 X10E3/UL (ref 0–0.4)
EOSINOPHIL NFR BLD AUTO: 4 %
ERYTHROCYTE [DISTWIDTH] IN BLOOD BY AUTOMATED COUNT: 12.3 % (ref 11.7–15.4)
GAMMA INTERFERON BACKGROUND BLD IA-ACNC: 0.04 IU/ML
GLOBULIN SER-MCNC: 2.4 G/DL (ref 1.5–4.5)
GLUCOSE SERPL-MCNC: ABNORMAL MG/DL
HCT VFR BLD AUTO: 35.8 % (ref 34–46.6)
HGB BLD-MCNC: 12.1 G/DL (ref 11.1–15.9)
IMM GRANULOCYTES # BLD: 0 X10E3/UL (ref 0–0.1)
IMM GRANULOCYTES NFR BLD: 0 %
LYMPHOCYTES # BLD AUTO: 0.6 X10E3/UL (ref 0.7–3.1)
LYMPHOCYTES NFR BLD AUTO: 11 %
M TB IFN-G CD4+ T-CELLS BLD-ACNC: 0.03 IU/ML
M TB IFN-G CD4+ T-CELLS BLD-ACNC: 0.04 IU/ML
MCH RBC QN AUTO: 30 PG (ref 26.6–33)
MCHC RBC AUTO-ENTMCNC: 33.8 G/DL (ref 31.5–35.7)
MCV RBC AUTO: 89 FL (ref 79–97)
MITOGEN IGNF BLD-ACNC: 9.98 IU/ML
MONOCYTES # BLD AUTO: 0.6 X10E3/UL (ref 0.1–0.9)
MONOCYTES NFR BLD AUTO: 10 %
NEUTROPHILS # BLD AUTO: 4.3 X10E3/UL (ref 1.4–7)
NEUTROPHILS NFR BLD AUTO: 74 %
PLATELET # BLD AUTO: 383 X10E3/UL (ref 150–450)
POTASSIUM SERPL-SCNC: ABNORMAL MMOL/L
PROT SERPL-MCNC: 6.7 G/DL (ref 6–8.5)
QUANTIFERON INCUBATION COMMENT: NORMAL
QUANTIFERON-TB GOLD PLUS: NEGATIVE
RBC # BLD AUTO: 4.04 X10E6/UL (ref 3.77–5.28)
SERVICE CMNT-IMP: NORMAL
SL AMB EGFR AFRICAN AMERICAN: 119 ML/MIN/1.73
SL AMB EGFR NON AFRICAN AMERICAN: 103 ML/MIN/1.73
SODIUM SERPL-SCNC: 142 MMOL/L (ref 134–144)
WBC # BLD AUTO: 5.8 X10E3/UL (ref 3.4–10.8)

## 2021-09-20 ENCOUNTER — TELEPHONE (OUTPATIENT)
Dept: RHEUMATOLOGY | Facility: CLINIC | Age: 61
End: 2021-09-20

## 2021-09-20 RX ORDER — PREDNISONE 10 MG/1
TABLET ORAL
Qty: 50 TABLET | Refills: 0 | Status: SHIPPED | OUTPATIENT
Start: 2021-09-20 | End: 2021-10-15 | Stop reason: ALTCHOICE

## 2021-09-20 NOTE — TELEPHONE ENCOUNTER
Patient is asking what pain med she can take in the meantime while waiting for the Humira? She said Kari isn't helping  She mentioned a round of steroids?     CB # [id number]

## 2021-09-22 ENCOUNTER — DOCUMENTATION (OUTPATIENT)
Dept: RHEUMATOLOGY | Facility: CLINIC | Age: 61
End: 2021-09-22

## 2021-09-22 DIAGNOSIS — L40.50 PSORIATIC ARTHRITIS (HCC): Primary | ICD-10-CM

## 2021-09-22 RX ORDER — ADALIMUMAB 40MG/0.4ML
40 KIT SUBCUTANEOUS
Qty: 2 EACH | Refills: 5 | Status: SHIPPED | OUTPATIENT
Start: 2021-09-22 | End: 2022-02-01

## 2021-09-22 NOTE — TELEPHONE ENCOUNTER
Prior Tamea Rank has been approved from 09/20/2021 through 09/20/2021 for patients Humira please send RX to [facility] in her chart   thanks

## 2021-09-29 NOTE — TELEPHONE ENCOUNTER
Contacted pharmacy  They stated they reached out to her and while introducing themselves patient hung up the phone

## 2021-09-29 NOTE — TELEPHONE ENCOUNTER
Patient states she hasn't heard from anyone about delivering Humira for her after she received the auth for it  Patient would like to know what she can do about this      Call back # [phone]

## 2021-10-20 ENCOUNTER — ANESTHESIA EVENT (OUTPATIENT)
Dept: PERIOP | Facility: HOSPITAL | Age: 61
End: 2021-10-20
Payer: COMMERCIAL

## 2021-10-21 ENCOUNTER — ANESTHESIA (OUTPATIENT)
Dept: PERIOP | Facility: HOSPITAL | Age: 61
End: 2021-10-21
Payer: COMMERCIAL

## 2021-10-21 ENCOUNTER — HOSPITAL ENCOUNTER (OUTPATIENT)
Facility: HOSPITAL | Age: 61
Setting detail: OUTPATIENT SURGERY
Discharge: HOME | End: 2021-10-21
Attending: ORTHOPAEDIC SURGERY | Admitting: ORTHOPAEDIC SURGERY
Payer: COMMERCIAL

## 2021-10-21 VITALS
HEIGHT: 64 IN | DIASTOLIC BLOOD PRESSURE: 69 MMHG | HEART RATE: 58 BPM | BODY MASS INDEX: 24.65 KG/M2 | RESPIRATION RATE: 20 BRPM | TEMPERATURE: 97.8 F | OXYGEN SATURATION: 97 % | WEIGHT: 144.38 LBS | SYSTOLIC BLOOD PRESSURE: 118 MMHG

## 2021-10-21 DIAGNOSIS — G56.01 CARPAL TUNNEL SYNDROME OF RIGHT WRIST: Primary | ICD-10-CM

## 2021-10-21 PROBLEM — S63.641A GAMEKEEPER'S THUMB OF RIGHT HAND: Status: ACTIVE | Noted: 2021-10-21

## 2021-10-21 PROBLEM — S53.31XA GAMEKEEPER'S THUMB OF RIGHT HAND: Status: ACTIVE | Noted: 2021-10-21

## 2021-10-21 PROCEDURE — NC001 PR NO CHARGE: Performed by: ORTHOPAEDIC SURGERY

## 2021-10-21 PROCEDURE — 29848 WRIST ENDOSCOPY/SURGERY: CPT | Performed by: ORTHOPAEDIC SURGERY

## 2021-10-21 PROCEDURE — 26540 REPAIR HAND JOINT: CPT | Performed by: ORTHOPAEDIC SURGERY

## 2021-10-21 PROCEDURE — 26055 INCISE FINGER TENDON SHEATH: CPT | Performed by: ORTHOPAEDIC SURGERY

## 2021-10-21 PROCEDURE — C1763 CONN TISS, NON-HUMAN: HCPCS | Performed by: ORTHOPAEDIC SURGERY

## 2021-10-21 PROCEDURE — C1713 ANCHOR/SCREW BN/BN,TIS/BN: HCPCS | Performed by: ORTHOPAEDIC SURGERY

## 2021-10-21 DEVICE — DX SWIVELOCK SL, 3.5X8.5MM W/FORK EYELET
Type: IMPLANTABLE DEVICE | Status: FUNCTIONAL
Brand: ARTHREX®

## 2021-10-21 DEVICE — SUTURETAPE 1.3MM W/NEEDLE WHITE/BLUE: Type: IMPLANTABLE DEVICE | Status: FUNCTIONAL

## 2021-10-21 RX ORDER — KETOROLAC TROMETHAMINE 30 MG/ML
INJECTION, SOLUTION INTRAMUSCULAR; INTRAVENOUS AS NEEDED
Status: DISCONTINUED | OUTPATIENT
Start: 2021-10-21 | End: 2021-10-21

## 2021-10-21 RX ORDER — SODIUM CHLORIDE, SODIUM LACTATE, POTASSIUM CHLORIDE, CALCIUM CHLORIDE 600; 310; 30; 20 MG/100ML; MG/100ML; MG/100ML; MG/100ML
125 INJECTION, SOLUTION INTRAVENOUS CONTINUOUS
Status: DISCONTINUED | OUTPATIENT
Start: 2021-10-21 | End: 2021-10-21 | Stop reason: HOSPADM

## 2021-10-21 RX ORDER — FENTANYL CITRATE 50 UG/ML
INJECTION, SOLUTION INTRAMUSCULAR; INTRAVENOUS
Status: COMPLETED | OUTPATIENT
Start: 2021-10-21 | End: 2021-10-21

## 2021-10-21 RX ORDER — HYDROCODONE BITARTRATE AND ACETAMINOPHEN 5; 325 MG/1; MG/1
1 TABLET ORAL EVERY 6 HOURS PRN
Qty: 15 TABLET | Refills: 0 | Status: SHIPPED | OUTPATIENT
Start: 2021-10-21 | End: 2021-10-26

## 2021-10-21 RX ORDER — ONDANSETRON 2 MG/ML
INJECTION INTRAMUSCULAR; INTRAVENOUS AS NEEDED
Status: DISCONTINUED | OUTPATIENT
Start: 2021-10-21 | End: 2021-10-21

## 2021-10-21 RX ORDER — SODIUM CHLORIDE, SODIUM LACTATE, POTASSIUM CHLORIDE, CALCIUM CHLORIDE 600; 310; 30; 20 MG/100ML; MG/100ML; MG/100ML; MG/100ML
INJECTION, SOLUTION INTRAVENOUS CONTINUOUS PRN
Status: DISCONTINUED | OUTPATIENT
Start: 2021-10-21 | End: 2021-10-21

## 2021-10-21 RX ORDER — CEFAZOLIN SODIUM 1 G/50ML
1000 SOLUTION INTRAVENOUS ONCE
Status: COMPLETED | OUTPATIENT
Start: 2021-10-21 | End: 2021-10-21

## 2021-10-21 RX ORDER — LIDOCAINE HYDROCHLORIDE 10 MG/ML
INJECTION, SOLUTION EPIDURAL; INFILTRATION; INTRACAUDAL; PERINEURAL
Status: COMPLETED | OUTPATIENT
Start: 2021-10-21 | End: 2021-10-21

## 2021-10-21 RX ORDER — PROPOFOL 10 MG/ML
INJECTION, EMULSION INTRAVENOUS AS NEEDED
Status: DISCONTINUED | OUTPATIENT
Start: 2021-10-21 | End: 2021-10-21

## 2021-10-21 RX ORDER — LIDOCAINE HYDROCHLORIDE 10 MG/ML
0.5 INJECTION, SOLUTION EPIDURAL; INFILTRATION; INTRACAUDAL; PERINEURAL ONCE AS NEEDED
Status: DISCONTINUED | OUTPATIENT
Start: 2021-10-21 | End: 2021-10-21 | Stop reason: HOSPADM

## 2021-10-21 RX ORDER — NAPROXEN SODIUM 220 MG
220 TABLET ORAL 2 TIMES DAILY WITH MEALS
Qty: 20 TABLET | Refills: 0 | Status: SHIPPED | OUTPATIENT
Start: 2021-10-21

## 2021-10-21 RX ORDER — LIDOCAINE HYDROCHLORIDE 10 MG/ML
INJECTION, SOLUTION EPIDURAL; INFILTRATION; INTRACAUDAL; PERINEURAL AS NEEDED
Status: DISCONTINUED | OUTPATIENT
Start: 2021-10-21 | End: 2021-10-21

## 2021-10-21 RX ORDER — METOCLOPRAMIDE HYDROCHLORIDE 5 MG/ML
10 INJECTION INTRAMUSCULAR; INTRAVENOUS ONCE AS NEEDED
Status: CANCELLED | OUTPATIENT
Start: 2021-10-21

## 2021-10-21 RX ORDER — ROPIVACAINE HYDROCHLORIDE 5 MG/ML
INJECTION, SOLUTION EPIDURAL; INFILTRATION; PERINEURAL
Status: COMPLETED | OUTPATIENT
Start: 2021-10-21 | End: 2021-10-21

## 2021-10-21 RX ORDER — MAGNESIUM HYDROXIDE 1200 MG/15ML
LIQUID ORAL AS NEEDED
Status: DISCONTINUED | OUTPATIENT
Start: 2021-10-21 | End: 2021-10-21 | Stop reason: HOSPADM

## 2021-10-21 RX ORDER — SENNOSIDES 8.6 MG
650 CAPSULE ORAL EVERY 8 HOURS PRN
Qty: 30 TABLET | Refills: 0 | Status: SHIPPED | OUTPATIENT
Start: 2021-10-21 | End: 2021-12-20 | Stop reason: ALTCHOICE

## 2021-10-21 RX ORDER — ONDANSETRON 2 MG/ML
4 INJECTION INTRAMUSCULAR; INTRAVENOUS ONCE AS NEEDED
Status: CANCELLED | OUTPATIENT
Start: 2021-10-21

## 2021-10-21 RX ORDER — CEFAZOLIN SODIUM 1 G/50ML
SOLUTION INTRAVENOUS AS NEEDED
Status: DISCONTINUED | OUTPATIENT
Start: 2021-10-21 | End: 2021-10-21

## 2021-10-21 RX ORDER — DIPHENOXYLATE HYDROCHLORIDE AND ATROPINE SULFATE 2.5; .025 MG/1; MG/1
1 TABLET ORAL DAILY
COMMUNITY

## 2021-10-21 RX ORDER — FENTANYL CITRATE/PF 50 MCG/ML
25 SYRINGE (ML) INJECTION
Status: CANCELLED | OUTPATIENT
Start: 2021-10-21

## 2021-10-21 RX ORDER — PROPOFOL 10 MG/ML
INJECTION, EMULSION INTRAVENOUS CONTINUOUS PRN
Status: DISCONTINUED | OUTPATIENT
Start: 2021-10-21 | End: 2021-10-21

## 2021-10-21 RX ORDER — MIDAZOLAM HYDROCHLORIDE 2 MG/2ML
INJECTION, SOLUTION INTRAMUSCULAR; INTRAVENOUS
Status: COMPLETED | OUTPATIENT
Start: 2021-10-21 | End: 2021-10-21

## 2021-10-21 RX ADMIN — LIDOCAINE HYDROCHLORIDE 30 MG: 10 INJECTION, SOLUTION EPIDURAL; INFILTRATION; INTRACAUDAL; PERINEURAL at 09:09

## 2021-10-21 RX ADMIN — ROPIVACAINE HYDROCHLORIDE 30 ML: 5 INJECTION, SOLUTION EPIDURAL; INFILTRATION; PERINEURAL at 08:34

## 2021-10-21 RX ADMIN — ONDANSETRON 4 MG: 2 INJECTION INTRAMUSCULAR; INTRAVENOUS at 10:11

## 2021-10-21 RX ADMIN — PROPOFOL 60 MCG/KG/MIN: 10 INJECTION, EMULSION INTRAVENOUS at 10:00

## 2021-10-21 RX ADMIN — KETOROLAC TROMETHAMINE 15 MG: 30 INJECTION, SOLUTION INTRAMUSCULAR at 10:21

## 2021-10-21 RX ADMIN — MIDAZOLAM 2 MG: 1 INJECTION INTRAMUSCULAR; INTRAVENOUS at 08:34

## 2021-10-21 RX ADMIN — CEFAZOLIN SODIUM 1000 MG: 1 SOLUTION INTRAVENOUS at 09:19

## 2021-10-21 RX ADMIN — LIDOCAINE HYDROCHLORIDE 2 ML: 10 INJECTION, SOLUTION EPIDURAL; INFILTRATION; INTRACAUDAL; PERINEURAL at 08:34

## 2021-10-21 RX ADMIN — PROPOFOL 80 MCG/KG/MIN: 10 INJECTION, EMULSION INTRAVENOUS at 09:12

## 2021-10-21 RX ADMIN — FENTANYL CITRATE 25 MCG: 50 INJECTION, SOLUTION INTRAMUSCULAR; INTRAVENOUS at 10:14

## 2021-10-21 RX ADMIN — SODIUM CHLORIDE, SODIUM LACTATE, POTASSIUM CHLORIDE, AND CALCIUM CHLORIDE 125 ML/HR: .6; .31; .03; .02 INJECTION, SOLUTION INTRAVENOUS at 08:03

## 2021-10-21 RX ADMIN — FENTANYL CITRATE 25 MCG: 50 INJECTION, SOLUTION INTRAMUSCULAR; INTRAVENOUS at 09:16

## 2021-10-21 RX ADMIN — SODIUM CHLORIDE, SODIUM LACTATE, POTASSIUM CHLORIDE, AND CALCIUM CHLORIDE: .6; .31; .03; .02 INJECTION, SOLUTION INTRAVENOUS at 08:30

## 2021-10-21 RX ADMIN — PROPOFOL 30 MG: 10 INJECTION, EMULSION INTRAVENOUS at 09:27

## 2021-10-21 RX ADMIN — FENTANYL CITRATE 50 MCG: 50 INJECTION, SOLUTION INTRAMUSCULAR; INTRAVENOUS at 08:34

## 2021-11-01 ENCOUNTER — OFFICE VISIT (OUTPATIENT)
Dept: OCCUPATIONAL THERAPY | Facility: CLINIC | Age: 61
End: 2021-11-01
Payer: COMMERCIAL

## 2021-11-01 ENCOUNTER — OFFICE VISIT (OUTPATIENT)
Dept: OBGYN CLINIC | Facility: CLINIC | Age: 61
End: 2021-11-01

## 2021-11-01 VITALS
HEIGHT: 64 IN | WEIGHT: 145.2 LBS | SYSTOLIC BLOOD PRESSURE: 158 MMHG | BODY MASS INDEX: 24.79 KG/M2 | DIASTOLIC BLOOD PRESSURE: 84 MMHG

## 2021-11-01 DIAGNOSIS — S53.31XA GAMEKEEPER'S THUMB OF RIGHT HAND, INITIAL ENCOUNTER: ICD-10-CM

## 2021-11-01 DIAGNOSIS — G56.02 CARPAL TUNNEL SYNDROME OF LEFT WRIST: ICD-10-CM

## 2021-11-01 DIAGNOSIS — G56.01 CARPAL TUNNEL SYNDROME ON RIGHT: Primary | ICD-10-CM

## 2021-11-01 DIAGNOSIS — M65.351 TRIGGER LITTLE FINGER OF RIGHT HAND: ICD-10-CM

## 2021-11-01 PROCEDURE — 97760 ORTHOTIC MGMT&TRAING 1ST ENC: CPT | Performed by: OCCUPATIONAL THERAPIST

## 2021-11-01 PROCEDURE — 99024 POSTOP FOLLOW-UP VISIT: CPT | Performed by: ORTHOPAEDIC SURGERY

## 2021-11-09 ENCOUNTER — APPOINTMENT (OUTPATIENT)
Dept: OCCUPATIONAL THERAPY | Facility: CLINIC | Age: 61
End: 2021-11-09
Payer: COMMERCIAL

## 2021-11-11 ENCOUNTER — EVALUATION (OUTPATIENT)
Dept: OCCUPATIONAL THERAPY | Facility: CLINIC | Age: 61
End: 2021-11-11
Payer: COMMERCIAL

## 2021-11-11 DIAGNOSIS — G56.02 CARPAL TUNNEL SYNDROME OF LEFT WRIST: ICD-10-CM

## 2021-11-11 DIAGNOSIS — S53.31XA GAMEKEEPER'S THUMB OF RIGHT HAND, INITIAL ENCOUNTER: Primary | ICD-10-CM

## 2021-11-11 PROCEDURE — 97165 OT EVAL LOW COMPLEX 30 MIN: CPT | Performed by: OCCUPATIONAL THERAPIST

## 2021-11-12 ENCOUNTER — TELEPHONE (OUTPATIENT)
Dept: OBGYN CLINIC | Facility: HOSPITAL | Age: 61
End: 2021-11-12

## 2021-11-16 ENCOUNTER — APPOINTMENT (OUTPATIENT)
Dept: OCCUPATIONAL THERAPY | Facility: CLINIC | Age: 61
End: 2021-11-16
Payer: COMMERCIAL

## 2021-11-16 ENCOUNTER — OFFICE VISIT (OUTPATIENT)
Dept: OCCUPATIONAL THERAPY | Facility: CLINIC | Age: 61
End: 2021-11-16
Payer: COMMERCIAL

## 2021-11-16 DIAGNOSIS — G56.02 CARPAL TUNNEL SYNDROME OF LEFT WRIST: ICD-10-CM

## 2021-11-16 DIAGNOSIS — S53.31XA GAMEKEEPER'S THUMB OF RIGHT HAND, INITIAL ENCOUNTER: Primary | ICD-10-CM

## 2021-11-16 PROCEDURE — 97140 MANUAL THERAPY 1/> REGIONS: CPT | Performed by: OCCUPATIONAL THERAPIST

## 2021-11-16 PROCEDURE — 97110 THERAPEUTIC EXERCISES: CPT | Performed by: OCCUPATIONAL THERAPIST

## 2021-11-18 ENCOUNTER — OFFICE VISIT (OUTPATIENT)
Dept: OCCUPATIONAL THERAPY | Facility: CLINIC | Age: 61
End: 2021-11-18
Payer: COMMERCIAL

## 2021-11-18 DIAGNOSIS — S53.31XA GAMEKEEPER'S THUMB OF RIGHT HAND, INITIAL ENCOUNTER: Primary | ICD-10-CM

## 2021-11-18 DIAGNOSIS — G56.02 CARPAL TUNNEL SYNDROME OF LEFT WRIST: ICD-10-CM

## 2021-11-18 PROCEDURE — 97110 THERAPEUTIC EXERCISES: CPT | Performed by: OCCUPATIONAL THERAPIST

## 2021-11-18 PROCEDURE — 97140 MANUAL THERAPY 1/> REGIONS: CPT | Performed by: OCCUPATIONAL THERAPIST

## 2021-11-23 ENCOUNTER — OFFICE VISIT (OUTPATIENT)
Dept: OCCUPATIONAL THERAPY | Facility: CLINIC | Age: 61
End: 2021-11-23
Payer: COMMERCIAL

## 2021-11-23 DIAGNOSIS — G56.02 CARPAL TUNNEL SYNDROME OF LEFT WRIST: ICD-10-CM

## 2021-11-23 DIAGNOSIS — S53.31XA GAMEKEEPER'S THUMB OF RIGHT HAND, INITIAL ENCOUNTER: Primary | ICD-10-CM

## 2021-11-23 PROCEDURE — 97110 THERAPEUTIC EXERCISES: CPT | Performed by: OCCUPATIONAL THERAPIST

## 2021-11-23 PROCEDURE — 97140 MANUAL THERAPY 1/> REGIONS: CPT | Performed by: OCCUPATIONAL THERAPIST

## 2021-11-30 ENCOUNTER — OFFICE VISIT (OUTPATIENT)
Dept: OCCUPATIONAL THERAPY | Facility: CLINIC | Age: 61
End: 2021-11-30
Payer: COMMERCIAL

## 2021-11-30 DIAGNOSIS — G56.02 CARPAL TUNNEL SYNDROME OF LEFT WRIST: ICD-10-CM

## 2021-11-30 DIAGNOSIS — S53.31XA GAMEKEEPER'S THUMB OF RIGHT HAND, INITIAL ENCOUNTER: Primary | ICD-10-CM

## 2021-11-30 PROCEDURE — 97140 MANUAL THERAPY 1/> REGIONS: CPT | Performed by: OCCUPATIONAL THERAPIST

## 2021-11-30 PROCEDURE — 97110 THERAPEUTIC EXERCISES: CPT | Performed by: OCCUPATIONAL THERAPIST

## 2021-11-30 PROCEDURE — 97530 THERAPEUTIC ACTIVITIES: CPT | Performed by: OCCUPATIONAL THERAPIST

## 2021-12-02 ENCOUNTER — APPOINTMENT (OUTPATIENT)
Dept: OCCUPATIONAL THERAPY | Facility: CLINIC | Age: 61
End: 2021-12-02
Payer: COMMERCIAL

## 2021-12-07 ENCOUNTER — OFFICE VISIT (OUTPATIENT)
Dept: OCCUPATIONAL THERAPY | Facility: CLINIC | Age: 61
End: 2021-12-07
Payer: COMMERCIAL

## 2021-12-07 DIAGNOSIS — S53.31XA GAMEKEEPER'S THUMB OF RIGHT HAND, INITIAL ENCOUNTER: Primary | ICD-10-CM

## 2021-12-07 PROCEDURE — 97110 THERAPEUTIC EXERCISES: CPT | Performed by: OCCUPATIONAL THERAPIST

## 2021-12-07 PROCEDURE — 97140 MANUAL THERAPY 1/> REGIONS: CPT | Performed by: OCCUPATIONAL THERAPIST

## 2021-12-09 ENCOUNTER — OFFICE VISIT (OUTPATIENT)
Dept: OCCUPATIONAL THERAPY | Facility: CLINIC | Age: 61
End: 2021-12-09
Payer: COMMERCIAL

## 2021-12-09 DIAGNOSIS — S53.31XA GAMEKEEPER'S THUMB OF RIGHT HAND, INITIAL ENCOUNTER: Primary | ICD-10-CM

## 2021-12-09 DIAGNOSIS — G56.02 CARPAL TUNNEL SYNDROME OF LEFT WRIST: ICD-10-CM

## 2021-12-09 PROCEDURE — 97110 THERAPEUTIC EXERCISES: CPT | Performed by: OCCUPATIONAL THERAPIST

## 2021-12-09 PROCEDURE — 97140 MANUAL THERAPY 1/> REGIONS: CPT | Performed by: OCCUPATIONAL THERAPIST

## 2021-12-14 ENCOUNTER — OFFICE VISIT (OUTPATIENT)
Dept: OCCUPATIONAL THERAPY | Facility: CLINIC | Age: 61
End: 2021-12-14
Payer: COMMERCIAL

## 2021-12-14 DIAGNOSIS — S53.31XA GAMEKEEPER'S THUMB OF RIGHT HAND, INITIAL ENCOUNTER: Primary | ICD-10-CM

## 2021-12-14 DIAGNOSIS — G56.02 CARPAL TUNNEL SYNDROME OF LEFT WRIST: ICD-10-CM

## 2021-12-14 PROCEDURE — 97140 MANUAL THERAPY 1/> REGIONS: CPT | Performed by: OCCUPATIONAL THERAPIST

## 2021-12-14 PROCEDURE — 97110 THERAPEUTIC EXERCISES: CPT | Performed by: OCCUPATIONAL THERAPIST

## 2021-12-16 ENCOUNTER — OFFICE VISIT (OUTPATIENT)
Dept: OCCUPATIONAL THERAPY | Facility: CLINIC | Age: 61
End: 2021-12-16
Payer: COMMERCIAL

## 2021-12-16 DIAGNOSIS — S53.31XA GAMEKEEPER'S THUMB OF RIGHT HAND, INITIAL ENCOUNTER: Primary | ICD-10-CM

## 2021-12-16 DIAGNOSIS — G56.02 CARPAL TUNNEL SYNDROME OF LEFT WRIST: ICD-10-CM

## 2021-12-16 PROCEDURE — 97110 THERAPEUTIC EXERCISES: CPT | Performed by: OCCUPATIONAL THERAPIST

## 2021-12-16 PROCEDURE — 97140 MANUAL THERAPY 1/> REGIONS: CPT | Performed by: OCCUPATIONAL THERAPIST

## 2021-12-20 ENCOUNTER — OFFICE VISIT (OUTPATIENT)
Dept: OBGYN CLINIC | Facility: CLINIC | Age: 61
End: 2021-12-20
Payer: COMMERCIAL

## 2021-12-20 VITALS — DIASTOLIC BLOOD PRESSURE: 98 MMHG | WEIGHT: 145 LBS | SYSTOLIC BLOOD PRESSURE: 142 MMHG | BODY MASS INDEX: 24.89 KG/M2

## 2021-12-20 DIAGNOSIS — Z47.89 ENCOUNTER FOR OTHER ORTHOPEDIC AFTERCARE: Primary | ICD-10-CM

## 2021-12-20 DIAGNOSIS — G56.02 CARPAL TUNNEL SYNDROME ON LEFT: ICD-10-CM

## 2021-12-20 PROCEDURE — 20526 THER INJECTION CARP TUNNEL: CPT | Performed by: ORTHOPAEDIC SURGERY

## 2021-12-20 PROCEDURE — 99024 POSTOP FOLLOW-UP VISIT: CPT | Performed by: ORTHOPAEDIC SURGERY

## 2021-12-20 RX ADMIN — LIDOCAINE HYDROCHLORIDE 1 ML: 10 INJECTION, SOLUTION INFILTRATION; PERINEURAL at 16:14

## 2021-12-20 RX ADMIN — BETAMETHASONE SODIUM PHOSPHATE AND BETAMETHASONE ACETATE 3 MG: 3; 3 INJECTION, SUSPENSION INTRA-ARTICULAR; INTRALESIONAL; INTRAMUSCULAR; SOFT TISSUE at 16:14

## 2021-12-21 ENCOUNTER — OFFICE VISIT (OUTPATIENT)
Dept: OCCUPATIONAL THERAPY | Facility: CLINIC | Age: 61
End: 2021-12-21
Payer: COMMERCIAL

## 2021-12-21 DIAGNOSIS — S53.31XA GAMEKEEPER'S THUMB OF RIGHT HAND, INITIAL ENCOUNTER: Primary | ICD-10-CM

## 2021-12-21 DIAGNOSIS — G56.02 CARPAL TUNNEL SYNDROME OF LEFT WRIST: ICD-10-CM

## 2021-12-21 PROCEDURE — 97140 MANUAL THERAPY 1/> REGIONS: CPT | Performed by: OCCUPATIONAL THERAPIST

## 2021-12-21 PROCEDURE — 97110 THERAPEUTIC EXERCISES: CPT | Performed by: OCCUPATIONAL THERAPIST

## 2021-12-21 RX ORDER — BETAMETHASONE SODIUM PHOSPHATE AND BETAMETHASONE ACETATE 3; 3 MG/ML; MG/ML
3 INJECTION, SUSPENSION INTRA-ARTICULAR; INTRALESIONAL; INTRAMUSCULAR; SOFT TISSUE
Status: COMPLETED | OUTPATIENT
Start: 2021-12-20 | End: 2021-12-20

## 2021-12-21 RX ORDER — LIDOCAINE HYDROCHLORIDE 10 MG/ML
1 INJECTION, SOLUTION INFILTRATION; PERINEURAL
Status: COMPLETED | OUTPATIENT
Start: 2021-12-20 | End: 2021-12-20

## 2022-01-03 ENCOUNTER — APPOINTMENT (OUTPATIENT)
Dept: OCCUPATIONAL THERAPY | Facility: CLINIC | Age: 62
End: 2022-01-03
Payer: COMMERCIAL

## 2022-01-07 ENCOUNTER — TELEPHONE (OUTPATIENT)
Dept: OBGYN CLINIC | Facility: HOSPITAL | Age: 62
End: 2022-01-07

## 2022-01-07 DIAGNOSIS — L40.50 PSORIATIC ARTHRITIS (HCC): Primary | ICD-10-CM

## 2022-01-07 RX ORDER — PREDNISONE 1 MG/1
TABLET ORAL
Qty: 40 TABLET | Refills: 0 | Status: SHIPPED | OUTPATIENT
Start: 2022-01-07 | End: 2022-02-01

## 2022-01-07 NOTE — TELEPHONE ENCOUNTER
Patient is calling to request a round of prednisone due to a flare up      Patient is also asking if there are labs required prior to her follow up on 01/18/2022    Please advise    Pharmacy is [facility] on [address] [facility] in [facility]

## 2022-01-07 NOTE — TELEPHONE ENCOUNTER
Yes placed lab orders to be done prior to her visit within next few days and also sent in course of prednisone

## 2022-01-11 ENCOUNTER — OFFICE VISIT (OUTPATIENT)
Dept: OCCUPATIONAL THERAPY | Facility: CLINIC | Age: 62
End: 2022-01-11
Payer: COMMERCIAL

## 2022-01-11 DIAGNOSIS — S53.31XA GAMEKEEPER'S THUMB OF RIGHT HAND, INITIAL ENCOUNTER: Primary | ICD-10-CM

## 2022-01-11 DIAGNOSIS — G56.02 CARPAL TUNNEL SYNDROME OF LEFT WRIST: ICD-10-CM

## 2022-01-11 PROCEDURE — 97110 THERAPEUTIC EXERCISES: CPT | Performed by: OCCUPATIONAL THERAPIST

## 2022-01-11 PROCEDURE — 97140 MANUAL THERAPY 1/> REGIONS: CPT | Performed by: OCCUPATIONAL THERAPIST

## 2022-01-11 NOTE — PROGRESS NOTES
Daily Note     Today's date: 2022  Patient name: Isaiah Funez  : 1960  MRN: [id number]  Referring provider: Pinkey Canavan, MD  Dx:   Encounter Diagnosis     ICD-10-CM    1  Gamekeeper's thumb of right hand, initial encounter  S53  31XA    2  Carpal tunnel syndrome of left wrist  G56 02                   Subjective: I still have pain   I am in a psoriatic arthritis flare , I am really weak and stiff  Objective: See treatment diary below               Assessment  Assessment details: Erika Ram presents s/p R UCL repair with CTR and SF TFR  She was fitted with HB TSS last week  She has mild pain   She has decreased thumb ROM   She has improved parasthesias and 2 point   She is unable to use R for bottles , jars , heavy lifting , scissors  She is limited with ADL , meal prep , housekeeping  She has help from her   As needed  She is unable to work as a          Fidelia Chavez has continued B hand pain   She is  Tight thoroughout B hands   She has early dupuytren's in her R hand  She is weak B   She is limited with heavy activity      Impairments:  activity intolerance, impaired physical strength and pain with function  Functional limitations: limited with use of L for gripping   Symptom irritability: moderateUnderstanding of Dx/Px/POC: good   Prognosis: good    Goals  STG- 1 wk  1- I with HEP- met  2- opp to 5th tip- met    LTG- 6 wks  1- no pain- not met   2- R  and pinch = to L - met  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  4- I ADL- meal prep , housekeeping - met   Plan  Patient would benefit from:   skilled occupational therapy  Planned modality interventions: cryotherapy and thermotherapy: hydrocollator packs  Planned therapy interventions: ADL retraining, joint mobilization, manual therapy, massage, stretching, therapeutic activities, home exercise program, graded exercise and graded activity  Frequency: 2x week  Duration in weeks: 6  Plan of Care beginning date:    Treatment plan discussed with: patient        Subjective Evaluation    History of Present Illness  Date of surgery: 10/21/2021  Mechanism of injury: Pt s/p L UCL repair  With CTR, SF TFR   She has been wearing custom HB TSS  Quality of life: good    Pain  Current pain rating: 3  At best pain rating: 3  At worst pain ratin  Location: R thumb   Relieving factors: rest  Progression: improved    Social Support  Steps to enter house: yes  Stairs in house: yes   Lives in: multiple-level home  Lives with: spouse    Employment status: not working  Hand dominance: ambidextrous    Treatments  Previous treatment: immobilization  Patient Goals  Patient goals for therapy: decreased edema, decreased pain, increased motion, independence with ADLs/IADLs, increased strength, return to sport/leisure activities and return to work  Patient goal: regain use R hand         Objective     Observations     Additional Observation Details  Scar R ulnar thumb at MCP , CT x 2 , SF at [facility] - healed    dupuytrens nodule R middle , ring     Palpation     Additional Palpation Details  Incisional tenderness     Neurological Testing     Additional Neurological Details  Denies parsthesias     2 point 5mm     Active Range of Motion     Right Wrist   Wrist flexion: 42 degrees   Wrist extension: 45 degrees   Radial deviation: 10 degrees   Ulnar deviation: 25 degrees     Right Thumb   Flexion     MP: 45    DIP: 35  Extension     MP: 20    DIP: 10  Palmar Abduction    CMC: 40  Radial Abduction    CMC: 42  Opposition: opp to MF tip     Additional Active Range of Motion Details  Digit ROM  WNL     Strength/Myotome Testing           (2nd hand position)   R/L= 15/ 15    Thumb Strength  Ben[employer]z/Lateral Pinch    R/L: 127  Palmar/Three-Point Pinch    R/L=  7/5    Tests     Right Wrist/Hand   Negative intrinsic muscle tightness       Swelling     Left Wrist/Hand   Thumb     Distal: 6 2 cm  Circumference wrist: 17 cm    Right Wrist/Hand   Thumb Distal: 7 cm  Circumference wrist: 17 cm                Plan: Continue per plan of care        Precautions: DOS 10/21/21- UCL repair Lanette Levin  UCL  Thumb  repair  with Internal Brace   Mathasmukho      7-10 days post op  -custom HB Thumb Spica   -A/PROM thumb , wrist , digits   -edema control     2 weeks   -begin  strength   -begin scar management   -continue edema control     3 weeks  -begin tip/3 jaw/ lateral pinch   -continue ROM , edema and scar management     5-6 weeks  -unrestricted activity , return to work           Manuals 1//11              graston 3m            Scar retrograde  2m            MOB MCP gd2 3m            Retrograde  2m            HEP - ROM  Scar STM 5 x day                          desens - towel  2m                                                                             Ther Ex             A/PROM thumb 3m            A/PROM wrist  2m            powerweb  Blue  4x10            flexbar P/S Red  3x10                                                                Ther Activity             fxnl grasp  2 2# ball  3x10            Pinch pins  Green  4x10            Gait Training                                       Modalities             MH  8m            CP  6m                 Manuals 11/11 11/16 11/18 11/23 11/30 12/7 12/9 12/14 12/16 12/20   graston scar / L CT 3m 3m 3m 3m 3m 3m 3m 3m 3m 3m   Scar retrograde  2m 2m 2m 2m 2m 2m 2m 2m 2m 2m   MOB MCP gd2 3m 3m 3m 3m 3m 3m 3m 3m 3m 3m   Retrograde  2m 2m 2m 2m 2m 2m 2m 2m 2m 2m   HEP - ROM  Scar STM 5 x day                          desens - towel  2m                                                                             Ther Ex             A/PROM thumb 3m 3m 3m 3m 3m 2m 2m 2m 2m 2m   A/PROM wrist  2m 2m 2m 2m 2m 2m 2m 2m 2m 2m   Manipulation balls 2m 2m 2m 2m 2m 2m 2m 2m 2m 2m   Wrist e/f/rd  1#  3x10 1#  3x10 2#  3x10 #2 3x10 2#  3x10 2#  3x10 2#  3x10 2#  3x10 3#  3x10   powerweb      Red  3x10 Red 3x10 green  3x10 Green  3x10 Green  3x10 black  3x10 Black  5x10                                          Ther Activity             Pegs w/alt opp 40x 40x 40x 40x 40x 40x 40x 40x 40x 40   Pegs   40x 40x 40x 40x 40x       Pinch pins  Yellow 3x10 Red  3x10 Red  3x10 Red 3x10 Green  3x10 Green  3x10 Green  3x10 Green  3x10 Green  3x10   flexbar P/S        Red  3x10 Red  x10 Red  3x10                Modalities             MH  8m 8m 8m 8m 8m 8m 8m 8m 8m 8m   CP  6m 6m 6m 6m 6m 6m 6m 6m 6m

## 2022-01-12 ENCOUNTER — APPOINTMENT (OUTPATIENT)
Dept: OCCUPATIONAL THERAPY | Facility: CLINIC | Age: 62
End: 2022-01-12
Payer: COMMERCIAL

## 2022-01-13 ENCOUNTER — TELEPHONE (OUTPATIENT)
Dept: OBGYN CLINIC | Facility: HOSPITAL | Age: 62
End: 2022-01-13

## 2022-01-13 ENCOUNTER — APPOINTMENT (OUTPATIENT)
Dept: OCCUPATIONAL THERAPY | Facility: CLINIC | Age: 62
End: 2022-01-13
Payer: COMMERCIAL

## 2022-01-13 NOTE — TELEPHONE ENCOUNTER
Asking for [facility] in [place] [address] 
Implemented All Universal Safety Interventions:  [place] to call system. Call bell, personal items and telephone within reach. Instruct patient to call for assistance. Room bathroom lighting operational. Non-slip footwear when patient is off stretcher. Physically safe environment: no spills, clutter or unnecessary equipment. Stretcher in lowest position, wheels locked, appropriate side rails in place.

## 2022-01-14 LAB
ALBUMIN SERPL-MCNC: 4.7 G/DL (ref 3.8–4.8)
ALBUMIN/GLOB SERPL: 1.5 {RATIO} (ref 1.2–2.2)
ALP SERPL-CCNC: 96 IU/L (ref 44–121)
ALT SERPL-CCNC: 19 IU/L (ref 0–32)
AST SERPL-CCNC: 18 IU/L (ref 0–40)
BILIRUB SERPL-MCNC: 0.3 MG/DL (ref 0–1.2)
BUN SERPL-MCNC: 18 MG/DL (ref 8–27)
BUN/CREAT SERPL: 26 (ref 12–28)
CALCIUM SERPL-MCNC: 10.2 MG/DL (ref 8.7–10.3)
CHLORIDE SERPL-SCNC: 101 MMOL/L (ref 96–106)
CO2 SERPL-SCNC: 26 MMOL/L (ref 20–29)
CREAT SERPL-MCNC: 0.7 MG/DL (ref 0.57–1)
ERYTHROCYTE [DISTWIDTH] IN BLOOD BY AUTOMATED COUNT: 12.1 % (ref 11.7–15.4)
GLOBULIN SER-MCNC: 3.1 G/DL (ref 1.5–4.5)
GLUCOSE SERPL-MCNC: 102 MG/DL (ref 65–99)
HCT VFR BLD AUTO: 38.4 % (ref 34–46.6)
HGB BLD-MCNC: 12.8 G/DL (ref 11.1–15.9)
MCH RBC QN AUTO: 29.6 PG (ref 26.6–33)
MCHC RBC AUTO-ENTMCNC: 33.3 G/DL (ref 31.5–35.7)
MCV RBC AUTO: 89 FL (ref 79–97)
PLATELET # BLD AUTO: 447 X10E3/UL (ref 150–450)
POTASSIUM SERPL-SCNC: 4.4 MMOL/L (ref 3.5–5.2)
PROT SERPL-MCNC: 7.8 G/DL (ref 6–8.5)
RBC # BLD AUTO: 4.33 X10E6/UL (ref 3.77–5.28)
SL AMB EGFR AFRICAN AMERICAN: 108 ML/MIN/1.73
SL AMB EGFR NON AFRICAN AMERICAN: 94 ML/MIN/1.73
SODIUM SERPL-SCNC: 142 MMOL/L (ref 134–144)
WBC # BLD AUTO: 10.2 X10E3/UL (ref 3.4–10.8)

## 2022-01-18 ENCOUNTER — APPOINTMENT (OUTPATIENT)
Dept: OCCUPATIONAL THERAPY | Facility: CLINIC | Age: 62
End: 2022-01-18
Payer: COMMERCIAL

## 2022-01-20 ENCOUNTER — APPOINTMENT (OUTPATIENT)
Dept: OCCUPATIONAL THERAPY | Facility: CLINIC | Age: 62
End: 2022-01-20
Payer: COMMERCIAL

## 2022-01-25 ENCOUNTER — APPOINTMENT (OUTPATIENT)
Dept: OCCUPATIONAL THERAPY | Facility: CLINIC | Age: 62
End: 2022-01-25
Payer: COMMERCIAL

## 2022-01-27 ENCOUNTER — APPOINTMENT (OUTPATIENT)
Dept: OCCUPATIONAL THERAPY | Facility: CLINIC | Age: 62
End: 2022-01-27
Payer: COMMERCIAL

## 2022-02-01 ENCOUNTER — TELEPHONE (OUTPATIENT)
Dept: RHEUMATOLOGY | Facility: CLINIC | Age: 62
End: 2022-02-01

## 2022-02-01 ENCOUNTER — OFFICE VISIT (OUTPATIENT)
Dept: OCCUPATIONAL THERAPY | Facility: CLINIC | Age: 62
End: 2022-02-01
Payer: COMMERCIAL

## 2022-02-01 ENCOUNTER — OFFICE VISIT (OUTPATIENT)
Dept: RHEUMATOLOGY | Facility: CLINIC | Age: 62
End: 2022-02-01
Payer: COMMERCIAL

## 2022-02-01 VITALS — HEART RATE: 80 BPM | SYSTOLIC BLOOD PRESSURE: 148 MMHG | DIASTOLIC BLOOD PRESSURE: 98 MMHG

## 2022-02-01 DIAGNOSIS — S53.31XA GAMEKEEPER'S THUMB OF RIGHT HAND, INITIAL ENCOUNTER: Primary | ICD-10-CM

## 2022-02-01 DIAGNOSIS — Z79.899 LONG-TERM USE OF IMMUNOSUPPRESSANT MEDICATION: ICD-10-CM

## 2022-02-01 DIAGNOSIS — L40.50 PSORIATIC ARTHRITIS (HCC): Primary | ICD-10-CM

## 2022-02-01 DIAGNOSIS — Z92.241 HISTORY OF RECENT STEROID USE: ICD-10-CM

## 2022-02-01 DIAGNOSIS — G56.02 CARPAL TUNNEL SYNDROME OF LEFT WRIST: ICD-10-CM

## 2022-02-01 DIAGNOSIS — M15.0 PRIMARY GENERALIZED (OSTEO)ARTHRITIS: ICD-10-CM

## 2022-02-01 PROCEDURE — 97110 THERAPEUTIC EXERCISES: CPT | Performed by: OCCUPATIONAL THERAPIST

## 2022-02-01 PROCEDURE — 97140 MANUAL THERAPY 1/> REGIONS: CPT | Performed by: OCCUPATIONAL THERAPIST

## 2022-02-01 PROCEDURE — 99215 OFFICE O/P EST HI 40 MIN: CPT | Performed by: INTERNAL MEDICINE

## 2022-02-01 RX ORDER — PREDNISONE 10 MG/1
TABLET ORAL
Qty: 100 TABLET | Refills: 0 | Status: SHIPPED | OUTPATIENT
Start: 2022-02-01 | End: 2022-06-29 | Stop reason: SDUPTHER

## 2022-02-01 NOTE — TELEPHONE ENCOUNTER
Please start prior authorization for subcutaneous Orencia 125 mg once weekly for psoriatic arthritis  She had previously been on methotrexate, leflunomide and Humira  She has an up-to-date TB test in her chart, thanks

## 2022-02-01 NOTE — PROGRESS NOTES
Daily Note     Today's date: 2022  Patient name: Vikki Muir  : 1960  MRN: [id number]  Referring provider: Anca Corral MD  Dx:   Encounter Diagnosis     ICD-10-CM    1  Gamekeeper's thumb of right hand, initial encounter  S53  31XA    2  Carpal tunnel syndrome of left wrist  G56 02                   Subjective: I see the rheumatologist today  Objective: See treatment diary below       Assessment: Pt  Has continued edema and joint pain bilateral hands  Pt  To follow up with her Rheumatologist later today  Plan: Continue per plan of care        Precautions: DOS 10/21/21- UCL repair Rip Gip  UCL  Thumb  repair  with Internal Brace   [place]      7-10 days post op  -custom HB Thumb Spica   -A/PROM thumb , wrist , digits   -edema control     2 weeks   -begin  strength   -begin scar management   -continue edema control     3 weeks  -begin tip/3 jaw/ lateral pinch   -continue ROM , edema and scar management     5-6 weeks  -unrestricted activity , return to work           Manuals            graston 3m 3m           Scar retrograde  2m 2m           MOB MCP gd2 3m 3m           Retrograde  2m 2m           HEP - ROM  Scar STM 5 x day                          desens - towel  2m                                                                             Ther Ex             A/PROM thumb 3m 3m           A/PROM wrist  2m 2m           powerweb  Blue  4x10 Blue 4x10           flexbar P/S Red  3x10 Red 3x10                                                               Ther Activity             fxnl grasp  2 2# ball  3x10 2 2# 3x10           Pinch pins  Green  4x10 Green 4x10           Gait Training                                       Modalities             MH  8m 8m           CP  6m                 Manuals    graston scar / L CT 3m 3m 3m 3m 3m 3m 3m 3m 3m 3m   Scar retrograde  2m 2m 2m 2m 2m 2m 2m 2m 2m 2m   MOB MCP gd2 3m 3m 3m 3m 3m 3m 3m 3m 3m 3m   Retrograde  2m 2m 2m 2m 2m 2m 2m 2m 2m 2m   HEP - ROM  Scar STM 5 x day                          desens - towel  2m                                                                             Ther Ex             A/PROM thumb 3m 3m 3m 3m 3m 2m 2m 2m 2m 2m   A/PROM wrist  2m 2m 2m 2m 2m 2m 2m 2m 2m 2m   Manipulation balls 2m 2m 2m 2m 2m 2m 2m 2m 2m 2m   Wrist e/f/rd  1#  3x10 1#  3x10 2#  3x10 #2 3x10 2#  3x10 2#  3x10 2#  3x10 2#  3x10 3#  3x10   powerweb      Red  3x10 Red 3x10 green  3x10 Green  3x10 Green  3x10 black  3x10 Black  5x10                                          Ther Activity             Pegs w/alt opp 40x 40x 40x 40x 40x 40x 40x 40x 40x 40   Pegs   40x 40x 40x 40x 40x       Pinch pins  Yellow 3x10 Red  3x10 Red  3x10 Red 3x10 Green  3x10 Green  3x10 Green  3x10 Green  3x10 Green  3x10   flexbar P/S        Red  3x10 Red  x10 Red  3x10                Modalities             MH  8m 8m 8m 8m 8m 8m 8m 8m 8m 8m   CP  6m 6m 6m 6m 6m 6m 6m 6m 6m

## 2022-02-01 NOTE — PROGRESS NOTES
Assessment and Plan:   Ms Abdoulaye Cotton a 64year-old  female with history significant for psoriatic arthritis, mild right knee osteoarthritis, right knee meniscal tear status post surgery and right shoulder rotator cuff tears with subdeltoid/subacromial bursitis, who presents for a follow-up   She is currently on adalimumab 40 mg every 2 weeks that was started in October 2021         Rheumatic summary:  1) Diagnosed with PsA (subtle erosive changes on hand XRs) in 8/2019 - started on MTX, but d/c'ed in 9/2019 due to side effects of skin rash  Started on LEF 10/2019-present  - 7/20/21: presenting with flare up x 1 month - prednisone taper x 15 days and continue LEF  If no sustained improvement will d/c LEF and start adalimumab  - 2/1/22: adalimumab started in 10/2021 - ineffective, will d/c and start Orencia  Steroid taper  2) Co-morbidities: knee OA, right carpal tunnel syndrome, lumbar DJD         # Psoriatic arthritis  - Shante presents today for a follow-up of psoriatic arthritis which unfortunately has been flaring since June 2021  In view of this the leflunomide was discontinued and she was started on subcutaneous adalimumab 40 mg every 2 weeks in October 2021 but this has also not resulted in any benefit  She appears to have multiple joints with soft tissue swelling noted and in view of this I would like to transition her to an alternate DMARD  She will discontinue the adalimumab and be started on subcutaneous Orencia 125 mg once weekly  In the meanwhile I also offered her a steroid taper starting at 40 mg once daily with a decrease of 10 mg every 5 days  - If she continues to be resistant to treatment by the next follow-up visit I will consider obtaining advanced imaging studies of certain joints        Plan:  Diagnoses and all orders for this visit:    Psoriatic arthritis ([facility] [address] )  -     predniSONE 10 mg tablet;  Take 4 tabs once daily x 5 days, then 3 tabs once daily x 5 days, then 2 tabs once daily x 5 days, then 1 tab once daily x 5 days, then stop  Long-term use of immunosuppressant medication    History of recent steroid use    Primary generalized (osteo)arthritis      Activities as tolerated  Exercise: try to maintain a low impact exercise regimen as much as possible  Continue other medications as prescribed by PCP and other specialists  RTC in 3 months         HPI    INITIAL VISIT NOTE:  Ms Nehemiah Angulo a 51-year-old  female with history significant for mild right knee osteoarthritis, right knee meniscal tear status post surgery and right shoulder rotator cuff tears with subdeltoid/subacromial bursitis, who presents for further evaluation of diffuse joint pains and an elevated C reactive protein of 81   She is referred by Dr Tee      Patient states in November 2017 she had an accidental injury resulting in a concussion, and states overall she has not felt well since then  Trino Ayers is able to recall more diffuse joint pains starting in December 2018, and states they have been more prominent as well as progressive since then   She experiences her symptoms on a daily basis in a mostly constant manner   She states it is significantly interfering with her activities of daily living, as well as with her sleeping habits   She describes pain most prominently affecting her bilateral shoulders, bilateral wrists, to a lesser degree affecting her bilateral hands, low back region, hips and knees   She does not really experience joint pains affecting her elbows, ankles or feet   She has subjectively only noticed swelling affecting her knees   She experiences morning stiffness which affects her diffusely and persists throughout the day   She states any sort of activity aggravates her symptoms, but she does not necessarily obtain relief with resting   At the onset of her symptoms in February/March 2019 she was prescribed a 10 day course of prednisone starting at 40 mg daily, which she states significantly helped her  Liana Brown was then represcribed a course of prednisone of the same duration in June 2019 which also helped her  Liana Brown states the symptoms recur quickly after cessation of the steroids   She was recently also prescribed tramadol and tizanidine for the pain, but this has not really been helping her  Liana Brown has also tried over-the-counter ibuprofen, Advil, Aleve and naproxen without significant benefit of her symptoms      She denies any recent fevers, chills, night sweats, unintentional weight loss or infections prior to the onset of her joint complaints   She denies significant dry eyes, inflammatory eye disease, dry mouth, skin rashes, psoriasis, mouth/nose ulcers, shortness of breath, abdominal pain, vomiting, diarrhea, blood in stools, inflammatory bowel disease or family history of rheumatoid arthritis/autoimmune disease      She was seen by her primary care physician for the ongoing complaints and had recent testing done which showed an elevated C reactive protein of 81   RIGO screen, rheumatoid factor, uric acid and Lyme antibody profile were unremarkable   In view of the ongoing right shoulder pain she recently had an MRI of her right shoulder done which showed partial-thickness rotator cuff tears as well as subacromial/subdeltoid bursitis   She has been following up with Orthopedics for this, and it is not thought that the rotator cuff tears should correspond to the symptoms she has been experiencing  Liana Brown also received bilateral intra-articular cortisone shoulder injections which only provided her with temporary relief   She was advised to follow-up with Rheumatology first, and if her symptoms are not to improve then there may be consideration for an arthroscopic procedure         8/27/2019:  Patient presents for a follow-up today   We reviewed her recently done labs which showed an unremarkable CBC, CMP, ESR, anti CCP antibody, Sjogren's antibodies and chronic hepatitis panel  Ellen Berg of her right hand showed periarticular soft tissue swelling of the 2nd through 5th PIP joints, as well as subtle juxta-articular erosions of the 2nd through 5th DIP joints   X-ray of her left hand and bilateral feet did not show any signs of inflammatory arthritis      Following the prior office visit patient had started prednisone 40 mg daily for 7 days, and is currently on prednisone 20 mg daily   She states being on the steroids has significantly improved her symptoms, and she is currently denying any joint pains, swelling or morning stiffness   She is feeling well today and denies any complaints   Of note she denies a history of psoriasis or skin rashes   She denies a family history of psoriasis         11/27/2019:  Patient presents for a follow-up of psoriatic arthritis  Shani Caraballo is currently on leflunomide 20 mg once daily and prednisone 10 mg once daily      At the last office visit I had started her on methotrexate but after taking a few doses she called the office with an outbreak of a skin rash affecting her right upper extremity   I requested she try the medication once more to ensure this was related to a side effect of the methotrexate, and she states that the rash persisted and worsened   In view of this we discontinued the methotrexate entirely and I switched her to leflunomide 20 mg once daily which she has been on for approximately 1 month now  Shani Caraballo has been tolerating the medication well without any concerns for side effects or infections      She reports overall her joint pains have significantly improved   She did try to taper down on the prednisone to 5 mg once daily and then discontinue it, but states upon discontinuing her joint pains recurred   In view of this she restarted prednisone at 5 mg once daily, but over the past few days further increased to 10 mg once daily due to increased activities causing worsening joint pains   She is willing to try and taper down on the prednisone at this time   She reports pain that she usually experiences will affect her shoulders and knees   She denies any other significant areas of joint pains or persistent swelling   On occasion she will notice swelling of her knees   She states that the morning stiffness has mostly resolved as well      She denies any other history of skin rash or psoriasis         3/3/2020:  Patient presents for a follow-up of psoriatic arthritis  Liana Brown is currently on leflunomide 20 mg once daily   I reviewed her recently done CBC and CMP which were unremarkable      Patient reports overall in terms of the arthritis she has been doing well and denies any significant peripheral joint pains, swelling or morning stiffness   For the past 3-4 months she has been dealing with left lower back pain and was seen in the emergency room for this   On occasion the pain will radiate down her leg   An x-ray of her lumbar spine was done which showed mild degenerative arthritis at two levels   She is following up with her primary care physician for this and completing a course of physical therapy  Liana Brown is unsure if the PT has really helped her so far  Liana Brown has been taking ibuprofen on a daily basis to take the edge off her symptoms      She also reports 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), she may develop achiness in her upper arm, chest and back region   Typically when this happens she may take prednisone 5-10 mg as needed which will help her  Liana Brown uses the prednisone on a very infrequent basis      She denies a history of skin rash or psoriasis  Liana Brown has been tolerating the leflunomide well without any concerns for side effects or infections         6/2/2020:  Patient presents for a follow-up of psoriatic arthritis  Liana Brown is currently on leflunomide 20 mg once daily and prednisone 5-10 mg daily as needed      Patient reports in April she was experiencing a flare-up diffuse hives, for which she increased the prednisone to 10 mg once daily and ran out of her script early  Gertrude Alvarez has been out of the prednisone for the past few weeks   She reports without the steroids she does notice a change in her joint pains, which will primarily affect her shoulders, neck and knees   She has noticed intermittent swelling affecting her knees   She does not really experience any morning stiffness, but will have a sensation of diffuse achiness as well as fatigue   She is not taking any over-the-counter pain medications   She reports for maintenance therapy she is mostly taking the leflunomide 20 mg once daily as well as prednisone 5 mg once daily, and this seems to control her symptoms      She has been tolerating her medications well without any concerns for side effects or infections         11/9/2020:  Patient presents for a follow-up of psoriatic arthritis  Gertrude Alvarez is currently on leflunomide 20 mg once daily and prednisone 5-10 mg once daily as needed   I reviewed her recently done CBC and CMP which were normal      Patient reports she has overall been doing well and this is the best that she has felt  Gertrude Alvarez was able to start exercising at the gym again and thinks that this has helped with her shoulder pain   Over the past few weeks she has noticed pain and swelling affecting her left knee but cannot recall any injuries   Other than the left knee pain she is denying any other joint pains and has not noticed joint swelling   She is no longer experiencing morning stiffness   She still takes the prednisone as needed but thinks she is now able to taper off it completely   She will also take Tylenol as needed      She has been tolerating the leflunomide well without any concerns for side effects or infections   She has not had any psoriasis         3/10/2021:  Patient presents for a follow-up of psoriatic arthritis  Gertrude Alvarez is currently on leflunomide 20 mg once daily   I reviewed her recently done CBC and CMP which were normal        She reports since the last office visit she was able to discontinue the steroids without a flare-up, and manages her joint pains with Tylenol as needed   She was experiencing pain affecting her left knee and right shoulder, but she was seen by Orthopedics and received intra-articular cortisone injections which helped   Last week she did have an accidental fall on the ice and fell on her right hand   She was seen in urgent care and had an x-ray done which did not show any fractures   She was advised that she probably sprained her right wrist and is continuing with conservative measures   She has been unable to rest her wrist as due to her occupation as a  she is constantly using her hands   Recently she has also noticed numbness affecting the lateral fingers on her right hand mostly at night, but at certain times during the day as well while she is working      She denies any other joint pains, joint swelling or morning stiffness   She has been tolerating the leflunomide well without any concerns for side effects or infections   She has not had any psoriasis        7/20/2021:  Patient presents for a follow-up of psoriatic arthritis  She is currently on leflunomide 20 mg once daily  She has not had her recent high risk medication lab monitoring done        She reports since the last visit, especially in the past 1 month she has noticed a flare-up in her joint pains  She describes an overall body achiness associated with fatigue but also mentions specific joint pains involving her left knee and right ankle  She has noticed swelling to affect her right wrist, left knee and right ankle  She experiences morning stiffness which affects her diffusely and can take about an hour to improve  She takes over-the-counter Tylenol as needed which helps to some degree  She has previously been told not to take NSAIDs but is unsure the reason why    She has not been on any recent steroids      She has also been following up with [facility] for right hand and wrist pain which has persisted following a fall and sprain in March 2021  She did have an ultrasound of her right wrist done looking for carpal tunnel syndrome and this was suspicious for the condition  An MRI of her right thumb in June 2021 showed a partial tear of the ulnar collateral ligament as well as mild 1st MCP joint osteoarthritis      She has been tolerating the leflunomide well without any concerns for side effects or infections   She has not had any psoriasis  2/1/2022:  Patient presents for a follow-up of psoriatic arthritis  She is currently on subcutaneous adalimumab 40 mg every 2 weeks that was started in October 2021  Unfortunately there has been no improvement in her symptoms and apart from experiencing widespread body aches, she is also noticing significant pain in her hands, wrists and right ankle  She feels like her hands, wrists and right ankle have also been swollen  She experiences morning stiffness which affects her diffusely and can take a few hours to improve  She does take over-the-counter NSAIDs as needed but this does not really help  A few weeks ago she was prescribed a tapering course of prednisone starting at 20 mg once daily but she states that this did not really help her       The following portions of the patient's history were reviewed and updated as appropriate: allergies, current medications, past family history, past medical history, past social history, past surgical history and problem list       Review of Systems  Constitutional: Negative for weight change, fevers, chills, night sweats, fatigue  ENT/Mouth: Negative for hearing changes, ear pain, nasal congestion, sinus pain, hoarseness, sore throat, rhinorrhea, swallowing difficulty  Eyes: Negative for pain, redness, discharge, vision changes  Cardiovascular: Negative for chest pain, SOB, palpitations  Respiratory: Negative for cough, sputum, wheezing, dyspnea     Gastrointestinal: Negative for nausea, vomiting, diarrhea, constipation, pain, heartburn  Genitourinary: Negative for dysuria, urinary frequency, hematuria  Musculoskeletal: As per HPI  Skin: Negative for skin rash, color changes  Neuro: Negative for weakness, numbness, tingling, loss of consciousness  Psych: Negative for anxiety, depression  Heme/Lymph: Negative for easy bruising, bleeding, lymphadenopathy          Past Medical History:   Diagnosis Date    Chronic left-sided low back pain without sciatica 3/18/2020    Concussion     Hyperlipidemia     P[facility]atic [facility] ([facility] ) 2019    [facility]atic arthritis ([facility] )     Seronegative rheumatoid arthritis ([facility] ) 2019    Tendinitis        Past Surgical History:   Procedure Laterality Date     SECTION      FOOT SURGERY Left     HEMORROIDECTOMY      KNEE ARTHROSCOPY Right 2018    OK FIX COLAT LIG,I-P JT,GRAFT,EACH Right 10/21/2021    Procedure: right thumb Ulnar collateral ligament repair;  Surgeon: Ace Mcdermott MD;  Location: [facility];  Service: Orthopedics    OK INCISE FINGER TENDON SHEATH Right 10/21/2021    Procedure: Right small finger trigger finger release;  Surgeon: Ace Mcdermott MD;  Location: [facility];  Service: Orthopedics    OK KNEE SCOPE,MED/LAT MENISECTOMY Right 2018    Procedure: ARTHROSCOPY KNEE, PARTIAL MEDIAL MENISCECTOMY: ABRASION CHONDROPLASTY;  Surgeon: Rashid Sneed MD;  Location: [facility];  Service: Orthopedics    OK WRIST Gale Rave LIG Right 10/21/2021    Procedure: Right endoscopic carpal tunnel release;  Surgeon: Ace Mcdermott MD;  Location: [facility];  Service: Orthopedics    TUBAL LIGATION         Social History     Socioeconomic History    Marital status:      Spouse name: Not on file    Number of children: Not on file    Years of education: Not on file    Highest education level: Not on file   Occupational History    Not on file   Tobacco Use    Smoking status: Former Smoker     Packs/day: 0 33     Years: 5 00     Pack years: 1 65     Quit date:      Years since quittin 1    Smokeless tobacco: Never Used   Vaping Use    Vaping Use: Never used   Substance and Sexual Activity    Alcohol use: Yes     Comment: social    Drug use: No    Sexual activity: Not on file   Other Topics Concern    Not on file   Social History Narrative    Not on file     Social Determinants of Health     Financial Resource Strain: Not on file   Food Insecurity: Not on file   Transportation Needs: Not on file   Physical Activity: Not on file   Stress: Not on file   Social Connections: Not on file   Intimate Partner Violence: Not on file   Housing Stability: Not on file       Family History   Problem Relation Age of Onset    Coronary artery disease Mother     Other Mother         CABG    Hiatal hernia Father     Stroke Maternal Grandmother     Hyperlipidemia Family     Migraines Family     Thyroid disease Family     No Known Problems Sister     No Known Problems Brother     No Known Problems Son     No Known Problems Daughter     No Known Problems Paternal Grandmother     No Known Problems Paternal Grandfather     No Known Problems Maternal Aunt     No Known Problems Maternal Uncle     No Known Problems Paternal Aunt     No Known Problems Paternal Uncle     No Known Problems Cousin        Allergies   Allergen Reactions    Methotrexate Hives       Current Outpatient Medications:     Glucosamine-Chondroitin (GLUCOSAMINE CHONDR COMPLEX PO), Take by mouth daily  , Disp: , Rfl:     multivitamin (THERAGRAN) TABS, Take 1 tablet by mouth daily, Disp: , Rfl:     naproxen sodium (ALEVE) 220 MG tablet, Take 1 tablet (220 mg total) by mouth 2 (two) times a day with meals, Disp: 20 tablet, Rfl: 0    pravastatin (PRAVACHOL) 40 mg tablet, TAKE 1 TABLET BY MOUTH AT BEDTIME, Disp: 30 tablet, Rfl: 3    predniSONE 10 mg tablet, Take 4 tabs once daily x 5 days, then 3 tabs once daily x 5 days, then 2 tabs once daily x 5 days, then 1 tab once daily x 5 days, then stop , Disp: 100 tablet, Rfl: 0      Objective:    Vitals:    02/01/22 1252   BP: 148/98   Pulse: 80       Physical Exam  General: Well appearing, well nourished, in no distress  Oriented x 3, normal mood and affect  Ambulating without difficulty  Skin: Good turgor, no rash, unusual bruising or prominent lesions  Hair: Normal texture and distribution  Nails: Normal color, no deformities  HEENT:  Head: Normocephalic, atraumatic  Eyes: Conjunctiva clear, sclera non-icteric, EOM intact  Extremities: No amputations or deformities, cyanosis, edema  Musculoskeletal:   Hands - there are multiple PIP joints noted with mild soft tissue swelling  The DIP and MCP joints are unremarkable  Triggering of her digits noted  Wrists - there are chronic synovial changes noted at her bilateral wrists, more prominent on the right side  There appears to be soft tissue swelling bilaterally again more prominent at the right wrist   She has limitation with full flexion and extension at her right wrist along with tenderness  There is no restriction in range of motion or tenderness at the left wrist   Elbows and shoulders - unremarkable  Knees -  unremarkable  Ankles - there is soft tissue swelling present at the right ankle without significant tenderness or restriction in range of motion  The left ankle is unremarkable  Feet - negative MTP squeeze test bilaterally  There is no enthesitis or dactylitis  Neurologic: Alert and oriented  No focal neurological deficits appreciated  Psychiatric: Normal mood and affect  JASON John    Rheumatology

## 2022-02-03 ENCOUNTER — OFFICE VISIT (OUTPATIENT)
Dept: OCCUPATIONAL THERAPY | Facility: CLINIC | Age: 62
End: 2022-02-03
Payer: COMMERCIAL

## 2022-02-03 DIAGNOSIS — S53.31XA GAMEKEEPER'S THUMB OF RIGHT HAND, INITIAL ENCOUNTER: Primary | ICD-10-CM

## 2022-02-03 DIAGNOSIS — G56.02 CARPAL TUNNEL SYNDROME OF LEFT WRIST: ICD-10-CM

## 2022-02-03 PROCEDURE — 97530 THERAPEUTIC ACTIVITIES: CPT

## 2022-02-03 PROCEDURE — 97110 THERAPEUTIC EXERCISES: CPT

## 2022-02-03 PROCEDURE — 97140 MANUAL THERAPY 1/> REGIONS: CPT

## 2022-02-03 RX ORDER — ABATACEPT 125 MG/ML
125 INJECTION, SOLUTION SUBCUTANEOUS WEEKLY
Qty: 4 ML | Refills: 5 | Status: SHIPPED | OUTPATIENT
Start: 2022-02-03 | End: 2022-06-25 | Stop reason: SDUPTHER

## 2022-02-03 NOTE — TELEPHONE ENCOUNTER
Spoke with patient and let her know that if she does not hear from the specialty pharmacy to make sure to let me know

## 2022-02-14 ENCOUNTER — OFFICE VISIT (OUTPATIENT)
Dept: OBGYN CLINIC | Facility: CLINIC | Age: 62
End: 2022-02-14
Payer: COMMERCIAL

## 2022-02-14 VITALS
DIASTOLIC BLOOD PRESSURE: 72 MMHG | SYSTOLIC BLOOD PRESSURE: 122 MMHG | HEIGHT: 64 IN | WEIGHT: 145.6 LBS | BODY MASS INDEX: 24.86 KG/M2

## 2022-02-14 DIAGNOSIS — G56.02 CARPAL TUNNEL SYNDROME ON LEFT: Primary | ICD-10-CM

## 2022-02-14 PROCEDURE — 99213 OFFICE O/P EST LOW 20 MIN: CPT | Performed by: ORTHOPAEDIC SURGERY

## 2022-02-14 PROCEDURE — 3008F BODY MASS INDEX DOCD: CPT | Performed by: ORTHOPAEDIC SURGERY

## 2022-02-14 PROCEDURE — 1036F TOBACCO NON-USER: CPT | Performed by: ORTHOPAEDIC SURGERY

## 2022-02-14 NOTE — PROGRESS NOTES
ASSESSMENT/PLAN:    Assessment:   S/P Right endoscopic carpal tunnel release - Right, Right small finger trigger finger release - Right, and right thumb Ulnar collateral ligament repair - Right on 10/21/2021     Carpal tunnel syndrome of the left wrist    Plan:   Patient would like to try her new medications with her Rheumatologist and let her arthritis calm down as she is currently having a flare  Patient will monitor her numbness and tingling  We did discuss a L ECTR pending on her symptoms after starting her new rheumatology medications  We will see her back in 3 months  Follow Up:  3  month(s)    To Do Next Visit:       General Discussions:     Carpal Tunnel Syndrome: The anatomy and physiology of carpal tunnel syndrome was discussed with the patient today  Increase pressure localized under the transverse carpal ligament can cause pain, numbness, tingling, or dysesthesias within the median nerve distribution as well as feelings of fatigue, clumsiness, or awkwardness  These symptoms typically occur at night and worse in the morning upon waking  Eventually, untreated carpal tunnel syndrome can result in weakness and permanent loss of muscle within the thenar compartment of the hand  Treatment options were discussed with the patient  Conservative treatment includes nocturnal resting splints to keep the nerve in a neutral position, ergonomic changes within the work or home environment, activity modification, and tendon gliding exercises  Steroid injections within the carpal canal can help a majority of patients, however this is often self-limited in a majority of patients  Surgical intervention to divide the transverse carpal ligament typically results in a long-lasting relief of the patient's complaints, with the recurrence rate of less than 1%       Operative Discussions:       _____________________________________________________  CHIEF COMPLAINT:  Chief Complaint   Patient presents with    Right Hand - Follow-up     S/P Right endoscopic carpal tunnel release (Right Wrist)       Right small finger trigger finger release       right thumb Ulnar collateral ligament repair DOS 10/21/21     Left Wrist - Follow-up     Steroid injection given 21         SUBJECTIVE:  Dai Ayon is a 64 y o  female who presents for follow up regarding Carpal Tunnel Syndrome  left  Since last visit, Dai Ayon has tried steroid injections without relief  Today there is Numbness to the left index finger, long finger and thumb  Patient states that this numbness is constant  Patient also reports difficulties sewing and other fine motor movements  She reports stiffness/LROM to her hand  Patient is taking prednisone for the next 3 days  She states that this slightly helps her symptoms  Patient has a hx of psoriatic arthritis and treats with Rheumatology  She describes a positive flick sign     Radiation: Yes to the  hand  Associated symptoms: No Complaints    PAST MEDICAL HISTORY:  Past Medical History:   Diagnosis Date    Chronic left-sided low back pain without sciatica 3/18/2020    Concussion     Hyperlipidemia     [facility] [address] ) 2019    P[facility] [address] [facility]     Seronegative rheumatoid arthritis ([facility] [address] ) 2019    Tendinitis        PAST SURGICAL HISTORY:  Past Surgical History:   Procedure Laterality Date     SECTION      FOOT SURGERY Left     HEMORROIDECTOMY      KNEE ARTHROSCOPY Right 2018    PA FIX COLAT LIG,I-P JT,GRAFT,EACH Right 10/21/2021    Procedure: right thumb Ulnar collateral ligament repair;  Surgeon: Benigno Greco MD;  Location:  MAIN OR;  Service: Orthopedics    PA INCISE FINGER TENDON SHEATH Right 10/21/2021    Procedure: Right small finger trigger finger release;  Surgeon: Benigno Greco MD;  Location:  MAIN OR;  Service: Orthopedics    PA KNEE SCOPE,MED/LAT MENISECTOMY Right 2018    Procedure: ARTHROSCOPY KNEE, PARTIAL MEDIAL MENISCECTOMY: ABRASION CHONDROPLASTY;  Surgeon: Kylee Alvarenga MD;  [facility];  Service: Orthopedics    WA WRIST Lizette Brown LIG Right 10/21/2021    Procedure: Right endoscopic carpal tunnel release;  Surgeon: Stephany Ba MD;  Location:  MAIN OR;  Service: Orthopedics    TUBAL LIGATION         FAMILY HISTORY:  Family History   Problem Relation Age of Onset    Coronary artery disease Mother     Other Mother         CABG    Hiatal hernia Father     Stroke Maternal Grandmother     Hyperlipidemia Family     Migraines Family     Thyroid disease Family     No Known Problems Sister     No Known Problems Brother     No Known Problems Son     No Known Problems Daughter     No Known Problems Paternal Grandmother     No Known Problems Paternal Grandfather     No Known Problems Maternal Aunt     No Known Problems Maternal Uncle     No Known Problems Paternal Aunt     No Known Problems Paternal Uncle     No Known Problems Cousin        SOCIAL HISTORY:  Social History     Tobacco Use    Smoking status: Former Smoker     Packs/day: 0 33     Years: 5 00     Pack years: 1 65     Quit date:      Years since quittin 1    Smokeless tobacco: Never Used   Vaping Use    Vaping Use: Never used   Substance Use Topics    Alcohol use: Yes     Comment: social    Drug use: No       MEDICATIONS:    Current Outpatient Medications:     Abatacept (Orencia ClickJect) 512 MG/ML SOAJ, Inject 1 mL (125 mg total) under the skin once a week, Disp: 4 mL, Rfl: 5    Glucosamine-Chondroitin (GLUCOSAMINE CHONDR COMPLEX PO), Take by mouth daily  , Disp: , Rfl:     multivitamin (THERAGRAN) TABS, Take 1 tablet by mouth daily, Disp: , Rfl:     pravastatin (PRAVACHOL) 40 mg tablet, TAKE 1 TABLET BY MOUTH AT BEDTIME, Disp: 30 tablet, Rfl: 3    predniSONE 10 mg tablet, Take 4 tabs once daily x 5 days, then 3 tabs once daily x 5 days, then 2 tabs once daily x 5 days, then 1 tab once daily x 5 days, then stop , Disp: 100 tablet, Rfl: 0    naproxen sodium (ALEVE) 220 MG tablet, Take 1 tablet (220 mg total) by mouth 2 (two) times a day with meals, Disp: 20 tablet, Rfl: 0    ALLERGIES:  Allergies   Allergen Reactions    Methotrexate Hives       REVIEW OF SYSTEMS:  Pertinent items are noted in HPI  LABS:  HgA1c: No results found for: HGBA1C  BMP:   Lab Results   Component Value Date    GLUCOSE 89 09/28/2016    CALCIUM 9 9 05/30/2018     09/28/2016    K 4 4 01/13/2022    CO2 26 01/13/2022     01/13/2022    BUN 18 01/13/2022    CREATININE 0 70 01/13/2022           _____________________________________________________  PHYSICAL EXAMINATION:  Vital signs: /72   Ht 5' 4" (1 626 m)   Wt 66 kg (145 lb 9 6 oz)   LMP  (LMP Unknown)   BMI 24 99 kg/m²   General: well developed and well nourished, alert, oriented times 3 and appears comfortable  Psychiatric: Normal  HEENT: Trachea Midline, No torticollis  Cardiovascular: No discernable arrhythmia  Pulmonary: No wheezing or stridor  Abdomen: No rebound or guarding  Extremities: No peripheral edema  Skin: No masses, erythema, lacerations, fluctation, ulcerations  Neurovascular: Pulses Intact    MUSCULOSKELETAL EXAMINATION:  LEFT SIDE:  Positive tinels over carpal tunnel, AIN 5/5, can oppose to ring, apb 4/5, intrinsic 5/5, wrist flexion 5/5, wrist extension 5/5 full   _____________________________________________________  STUDIES REVIEWED:  Xray of left wrist on 7/20/2021 demonstrates no acute fractures or dislocation  US of left wrist on 7/2/2021 demonstrates left CTS       PROCEDURES PERFORMED:  Procedures  No Procedures performed today   Scribe Attestation    I,:  Maggy Bunch am acting as a scribe while in the presence of the attending physician :       I,:  Vijaya Barajas MD personally performed the services described in this documentation    as scribed in my presence :

## 2022-02-20 NOTE — PROGRESS NOTES
Daily Note     Today's date: 2/3/2022  Patient name: Latrice Moore  : 1960  MRN: [id number]  Referring provider: Aura Loera MD  Dx:   Encounter Diagnosis     ICD-10-CM    1  Gamekeeper's thumb of right hand, initial encounter  S53  31XA    2  Carpal tunnel syndrome of left wrist  G56 02                   Subjective: "It's a little better after I saw the rheumatologist  He started me back on prednisone"      Objective: See treatment diary below       Assessment: Pt  Tolerated session fairly without any complaints  Patient continues with edema and joint pain in bilateral hands  Patient reported some improvement in pain since MD visit  Plan: Continue per plan of care        Precautions: DOS 10/21/21- UCL repair Everlena Earthly  UCL  Thumb  repair  with Internal Brace   [place]      7-10 days post op  -custom HB Thumb Spica   -A/PROM thumb , wrist , digits   -edema control     2 weeks   -begin  strength   -begin scar management   -continue edema control     3 weeks  -begin tip/3 jaw/ lateral pinch   -continue ROM , edema and scar management     5-6 weeks  -unrestricted activity , return to work           Manuals    2 2/3          graston 3m 3m 3m          Scar retrograde  2m 2m 2m          MOB MCP gd2 3m 3m 3m          Retrograde  2m 2m 2m          HEP - ROM  Scar STM 5 x day                          desens - towel  2m                                                                             Ther Ex             A/PROM thumb 3m 3m 3m          A/PROM wrist  2m 2m 2m          powerweb  Blue  4x10 Blue 4x10 Blue  4x10          flexbar P/S Red  3x10 Red 3x10 Red  3x10                                                              Ther Activity             fxnl grasp  2 2# ball  3x10 2 2# 3x10 2 2#  3x10          Pinch pins  Green  4x10 Green 4x10 Green  4x10          Gait Training                                       Modalities             MH  8m 8m 8m          CP  6m  5m               Manuals 11/11 11/16 11/18 11/23 11/30 12/7 12/9 12/14 12/16 12/20   graston scar / L CT 3m 3m 3m 3m 3m 3m 3m 3m 3m 3m   Scar retrograde  2m 2m 2m 2m 2m 2m 2m 2m 2m 2m   MOB MCP gd2 3m 3m 3m 3m 3m 3m 3m 3m 3m 3m   Retrograde  2m 2m 2m 2m 2m 2m 2m 2m 2m 2m   HEP - ROM  Scar STM 5 x day                          desens - towel  2m                                                                             Ther Ex             A/PROM thumb 3m 3m 3m 3m 3m 2m 2m 2m 2m 2m   A/PROM wrist  2m 2m 2m 2m 2m 2m 2m 2m 2m 2m   Manipulation balls 2m 2m 2m 2m 2m 2m 2m 2m 2m 2m   Wrist e/f/rd  1#  3x10 1#  3x10 2#  3x10 #2 3x10 2#  3x10 2#  3x10 2#  3x10 2#  3x10 3#  3x10   powerweb      Red  3x10 Red 3x10 green  3x10 Green  3x10 Green  3x10 black  3x10 Black  5x10                                          Ther Activity             Pegs w/alt opp 40x 40x 40x 40x 40x 40x 40x 40x 40x 40   Pegs   40x 40x 40x 40x 40x       Pinch pins  Yellow 3x10 Red  3x10 Red  3x10 Red 3x10 Green  3x10 Green  3x10 Green  3x10 Green  3x10 Green  3x10   flexbar P/S        Red  3x10 Red  x10 Red  3x10                Modalities             MH  8m 8m 8m 8m 8m 8m 8m 8m 8m 8m   CP  6m 6m 6m 6m 6m 6m 6m 6m 6m Yes

## 2022-04-28 ENCOUNTER — OFFICE VISIT (OUTPATIENT)
Dept: FAMILY MEDICINE CLINIC | Facility: CLINIC | Age: 62
End: 2022-04-28
Payer: COMMERCIAL

## 2022-04-28 VITALS
TEMPERATURE: 98.2 F | BODY MASS INDEX: 23.9 KG/M2 | SYSTOLIC BLOOD PRESSURE: 122 MMHG | DIASTOLIC BLOOD PRESSURE: 70 MMHG | WEIGHT: 140 LBS | OXYGEN SATURATION: 98 % | HEART RATE: 70 BPM | HEIGHT: 64 IN

## 2022-04-28 DIAGNOSIS — B96.89 BACTERIAL SINUSITIS: Primary | ICD-10-CM

## 2022-04-28 DIAGNOSIS — J32.9 BACTERIAL SINUSITIS: Primary | ICD-10-CM

## 2022-04-28 PROCEDURE — 99213 OFFICE O/P EST LOW 20 MIN: CPT | Performed by: PHYSICIAN ASSISTANT

## 2022-04-28 RX ORDER — CEFUROXIME AXETIL 250 MG/1
250 TABLET ORAL 2 TIMES DAILY WITH MEALS
Qty: 20 TABLET | Refills: 0 | Status: SHIPPED | OUTPATIENT
Start: 2022-04-28 | End: 2022-05-08

## 2022-04-28 NOTE — PROGRESS NOTES
Assessment/Plan:       Problem List Items Addressed This Visit     None      Visit Diagnoses     Bacterial sinusitis    -  Primary    Relevant Medications    cefuroxime (CEFTIN) 250 mg tablet 1 tab  Twice a day  For 10 days, push fluids, and start vitamin D 4000 IU daily  Subjective:      Patient ID: Ymarquez Latif is a 64 y o  female  C/o congestion past 3 weeks, no energy, abdominal pain and cough  Tried dayquil, nyquil, cough syrup and theraflu otc with some relief, temporarily  Review of Systems   Constitutional: Positive for fatigue and fever  Negative for chills and diaphoresis  Had a fever a few days  HENT: Positive for congestion, rhinorrhea and sore throat  Negative for ear pain  Respiratory: Positive for cough and chest tightness  Negative for shortness of breath  Gastrointestinal: Positive for abdominal pain  Negative for nausea and vomiting  Neurological: Positive for dizziness, light-headedness and headaches  Objective:      /70   Pulse 70   Temp 98 2 °F (36 8 °C) (Tympanic)   Ht 5' 4" (1 626 m)   Wt 63 5 kg (140 lb)   LMP  (LMP Unknown)   SpO2 98%   BMI 24 03 kg/m²          Physical Exam  Vitals reviewed  Constitutional:       General: She is not in acute distress  Appearance: Normal appearance  She is not ill-appearing, toxic-appearing or diaphoretic  HENT:      Head: Normocephalic and atraumatic  Right Ear: Tympanic membrane, ear canal and external ear normal  There is no impacted cerumen  Left Ear: Tympanic membrane, ear canal and external ear normal  There is no impacted cerumen  Ears:      Comments: Congestion noted in middle ears  Nose: Congestion present  Mouth/Throat:      Mouth: Mucous membranes are dry  Pharynx: No oropharyngeal exudate or posterior oropharyngeal erythema  Eyes:      General: No scleral icterus  Right eye: No discharge  Left eye: No discharge  Extraocular Movements: Extraocular movements intact  Conjunctiva/sclera: Conjunctivae normal    Pulmonary:      Effort: Pulmonary effort is normal  No respiratory distress  Breath sounds: Normal breath sounds  No stridor  No wheezing or rhonchi  Musculoskeletal:         General: No swelling, tenderness, deformity or signs of injury  Normal range of motion  Right lower leg: No edema  Left lower leg: No edema  Neurological:      General: No focal deficit present  Mental Status: She is alert and oriented to person, place, and time

## 2022-05-16 ENCOUNTER — OFFICE VISIT (OUTPATIENT)
Dept: OBGYN CLINIC | Facility: CLINIC | Age: 62
End: 2022-05-16
Payer: COMMERCIAL

## 2022-05-16 VITALS
WEIGHT: 141.4 LBS | HEIGHT: 64 IN | BODY MASS INDEX: 24.14 KG/M2 | DIASTOLIC BLOOD PRESSURE: 76 MMHG | SYSTOLIC BLOOD PRESSURE: 134 MMHG

## 2022-05-16 DIAGNOSIS — G56.02 CARPAL TUNNEL SYNDROME OF LEFT WRIST: Primary | ICD-10-CM

## 2022-05-16 PROCEDURE — 3008F BODY MASS INDEX DOCD: CPT | Performed by: ORTHOPAEDIC SURGERY

## 2022-05-16 PROCEDURE — 99214 OFFICE O/P EST MOD 30 MIN: CPT | Performed by: ORTHOPAEDIC SURGERY

## 2022-05-16 PROCEDURE — 1036F TOBACCO NON-USER: CPT | Performed by: ORTHOPAEDIC SURGERY

## 2022-05-16 NOTE — PROGRESS NOTES
ASSESSMENT/PLAN:    Assessment:   Carpal Tunnel Syndrome  left    Plan:   US left wrist   Endoscopic Carpal Tunnel Release  left    Follow Up: One month prior to surgery to      To Do Next Visit:  Review US and sign consent for surgery    Operative Discussions:  Cubital Tunnel Release: The anatomy and physiology of cubital tunnel syndrome were discussed with the patient today in the office  Typically, increased elbow flexion activities decrease blood flow within the intraneural spaces, resulting in a feeling of numbness, tingling, weakness, or clumsiness within the hand and fingers  Occasionally, anatomic structures such as medial elbow osteophytes, the medial head of the triceps, were subluxing ulnar nerve may result in increased pressure or aggravation at the cubital tunnel  Typical signs and symptoms usually include numbness and tingling within the ring and small finger, weakness with , and weakness with pinch  Conservative treatment and includes nocturnal bracing to keep the elbow in a semi-extended position, activity modification, therapy, and avoiding excessive elbow flexion activities  A majority of patients typically respond to conservative treatment over a period of approximately 3-6 months  EMG/NCV testing of the ulnar nerve at the elbow is not as reliable as carpal tunnel syndrome  Surgical intervention in the form of in situ release of the ulnar nerve at the elbow or ulnar nerve transposition may be required in up to 20% of patients  The patient has elected to undergo an cubital tunnel release  The possibility of converting to an open procedure, or a subcutaneous or submuscular ulnar nerve transposition depending on the nerve stability was discussed with the patient  Typically, in the postoperative period, light activities are allowed immediately, driving is allowed when narcotic medications have stopped, and the incision may get wet after 5 days    Heavy activities will be allowed after follow up appointment in 1-2 weeks  Anti-inflammatory medications should be held for approximately 5 days postoperative  While the pain within the ring and small finger of the hands generally improves rapidly, the numbness and tingling, as well as the strength, will slowly improve over a period of weeks to months  Total recovery can take up to 18 months from the time of surgery  Numbness and tingling near the incision, or near the medial aspect of the forearm was discussed with the patient  The patient has an understanding of the above mentioned discussion  The risks and benefits of the procedure were explained to the patient, which include, but are not limited to: Bleeding, infection, recurrence, pain, scar, damage to tendons, damage to nerves, and damage to blood vessels, failure to give desired results and complications related to anesthesia   These risks, along with alternative conservative treatment options, and postoperative protocols were voiced back and understood by the patient   All questions were answered to the patient's satisfaction   The patient agrees to comply with a standard postoperative protocol, and is willing to proceed   Education was provided via written and auditory forms   There were no barriers to learning  Written handouts regarding wound care, incision and scar care, and general preoperative information was provided to the patient   Prior to surgery, the patient may be requested to stop all anti-inflammatory medications   Prophylactic aspirin, Plavix, and Coumadin may be allowed to be continued   Medications including vitamin E , ginkgo, and fish oil are requested to be stopped approximately one week prior to surgery   Hypertensive medications and beta blockers, if taken, should be continued      _____________________________________________________  CHIEF COMPLAINT:  Chief Complaint   Patient presents with    Left Wrist - Carpal Tunnel, Follow-up    Right Wrist - Follow-up SUBJECTIVE:  Yi Aldridge is a 64 y o  female who presents as an established patient for continued evaluation treatment of left carpal tunnel syndrome  Of note, patient has a history of rheumatoid arthritis and during her last visit was adjusting some medications to see if it would alleviate any of her carpal tunnel syndrome symptoms  She continues to have numbness and tingling to the median nerve distribution as well as weakness in the hand despite these medication changes  She endorses some clumsiness in the hand  None of the symptoms have responded to nighttime splinting  No other complaints      PAST MEDICAL HISTORY:  Past Medical History:   Diagnosis Date    Chronic left-sided low back pain without sciatica 3/18/2020    Concussion     Hyperlipidemia     [facility] [address] [facility] 2019    Seronegative rheumatoid arthritis ([facility] [address] ) 2019    Tendinitis        PAST SURGICAL HISTORY:  Past Surgical History:   Procedure Laterality Date     SECTION      FOOT SURGERY Left     HAND SURGERY Left 10/2021    HEMORROIDECTOMY      KNEE ARTHROSCOPY Right 2018    OR FIX COLAT LIG,I-P JT,GRAFT,EACH Right 10/21/2021    Procedure: right thumb Ulnar collateral ligament repair;  Surgeon: Magan Whitaker MD;  [facility];  Service: Orthopedics    OR INCISE FINGER TENDON SHEATH Right 10/21/2021    Procedure: Right small finger trigger finger release;  Surgeon: Magan Whitaker MD;  [facility];  Service: Orthopedics    OR KNEE SCOPE,MED/LAT MENISECTOMY Right 2018    Procedure: ARTHROSCOPY KNEE, PARTIAL MEDIAL MENISCECTOMY: ABRASION CHONDROPLASTY;  Surgeon: Dilan Chester MD;  [facility];  Service: Orthopedics    OR WRIST Sergio Cortés LIG Right 10/21/2021    Procedure: Right endoscopic carpal tunnel release;  Surgeon: Magan Whitaker MD;  [facility]: [facility];  Service: Orthopedics    TUBAL LIGATION         FAMILY HISTORY:  Family History   Problem Relation Age of Onset    Coronary artery disease Mother     Other Mother         CABG    Hiatal hernia Father     Stroke Maternal Grandmother     Hyperlipidemia Family     Migraines Family     Thyroid disease Family     No Known Problems Sister     No Known Problems Brother     No Known Problems Son     No Known Problems Daughter     No Known Problems Paternal Grandmother     No Known Problems Paternal Grandfather     No Known Problems Maternal Aunt     No Known Problems Maternal Uncle     No Known Problems Paternal Aunt     No Known Problems Paternal Uncle     No Known Problems Cousin        SOCIAL HISTORY:  Social History     Tobacco Use    Smoking status: Former Smoker     Packs/day: 0 33     Years: 5 00     Pack years: 1 65     Quit date:      Years since quittin 3    Smokeless tobacco: Never Used   Vaping Use    Vaping Use: Never used   Substance Use Topics    Alcohol use: Yes     Comment: social    Drug use: No       MEDICATIONS:    Current Outpatient Medications:     Abatacept (Orencia ClickJect) 262 MG/ML SOAJ, Inject 1 mL (125 mg total) under the skin once a week, Disp: 4 mL, Rfl: 5    Glucosamine-Chondroitin (GLUCOSAMINE CHONDR COMPLEX PO), Take by mouth daily  , Disp: , Rfl:     multivitamin (THERAGRAN) TABS, Take 1 tablet by mouth daily, Disp: , Rfl:     naproxen sodium (ALEVE) 220 MG tablet, Take 1 tablet (220 mg total) by mouth 2 (two) times a day with meals, Disp: 20 tablet, Rfl: 0    pravastatin (PRAVACHOL) 40 mg tablet, TAKE 1 TABLET BY MOUTH AT BEDTIME, Disp: 30 tablet, Rfl: 3    predniSONE 10 mg tablet, Take 4 tabs once daily x 5 days, then 3 tabs once daily x 5 days, then 2 tabs once daily x 5 days, then 1 tab once daily x 5 days, then stop , Disp: 100 tablet, Rfl: 0    ALLERGIES:  Allergies   Allergen Reactions    Methotrexate Hives       REVIEW OF SYSTEMS:  Pertinent items are noted in HPI    A comprehensive review of systems was negative  LABS:  HgA1c: No results found for: HGBA1C  BMP:   Lab Results   Component Value Date    GLUCOSE 89 09/28/2016    CALCIUM 9 9 05/30/2018     09/28/2016    K 4 4 01/13/2022    CO2 26 01/13/2022     01/13/2022    BUN 18 01/13/2022    CREATININE 0 70 01/13/2022         _____________________________________________________  PHYSICAL EXAMINATION:  Vital signs: /76   Ht 5' 4" (1 626 m)   Wt 64 1 kg (141 lb 6 4 oz)   LMP  (LMP Unknown)   BMI 24 27 kg/m²   General: well developed and well nourished, alert, oriented times 3 and appears comfortable  Psychiatric: Normal  HEENT: Trachea Midline, No torticollis  Cardiovascular: No discernable arrhythmia  Pulmonary: No wheezing or stridor  Abdomen: No rebound or guarding  Extremities: No peripheral edema  Skin:  See complete musculoskeletal examination below  Neurovascular:  See complete musculoskeletal examination below    MUSCULOSKELETAL EXAMINATION:  Left wrist and hand  · Skin intact  · Mild thenar atrophy  · Positive Tinel's at the wrist  · Positive Durkan compression  · 4/5 thumb opposition  · 5/5 motor otherwise  · Slightly diminished sensation median nerve distribution  · Digits warm and well perfused    _____________________________________________________  STUDIES REVIEWED:  No Studies to review      PROCEDURES PERFORMED:  Procedures  No Procedures performed today       I interviewed, took the history and examined the patient  I discussed the case with the Resident and reviewed the Resident's note  I supervised the Resident and I agree with the Resident management plan as it was presented to me  I ws present in the clinic and examined the patient

## 2022-06-01 ENCOUNTER — TELEPHONE (OUTPATIENT)
Dept: OBGYN CLINIC | Facility: HOSPITAL | Age: 62
End: 2022-06-01

## 2022-06-01 NOTE — TELEPHONE ENCOUNTER
Actually please let her know since she is only on [place] and [place] she does not need wood work done

## 2022-06-25 DIAGNOSIS — L40.50 PSORIATIC ARTHRITIS (HCC): ICD-10-CM

## 2022-06-25 RX ORDER — ABATACEPT 125 MG/ML
INJECTION, SOLUTION SUBCUTANEOUS
Qty: 4 ML | Refills: 0 | Status: SHIPPED | OUTPATIENT
Start: 2022-06-25

## 2022-06-28 NOTE — PROGRESS NOTES
Assessment and Plan:   Ms Gustavo Pacheco a 64year-old  female with history significant for psoriatic arthritis, mild right knee osteoarthritis, right knee meniscal tear status post surgery and right shoulder rotator cuff tears with subdeltoid/subacromial bursitis, who presents for a follow-up   She is currently on adalimumab 40 mg every 2 weeks that was started in October 2021         Rheumatic summary:  1) Diagnosed with PsA (subtle erosive changes on hand XRs) in 8/2019 - started on MTX, but d/c'ed in 9/2019 due to side effects of skin rash  Started on LEF 10/2019-present  - 7/20/21: presenting with flare up x 1 month - prednisone taper x 15 days and continue LEF  If no sustained improvement will d/c LEF and start adalimumab  - 2/1/22: adalimumab started in 10/2021 - ineffective, will d/c and start Orencia  Steroid taper  2) Co-morbidities: knee OA, right carpal tunnel syndrome, lumbar DJD         # Psoriatic arthritis  - Shante presents today for a follow-up of psoriatic arthritis which unfortunately has been flaring since June 2021  In view of this the leflunomide was discontinued and she was started on subcutaneous adalimumab 40 mg every 2 weeks in October 2021 but this has also not resulted in any benefit  She appears to have multiple joints with soft tissue swelling noted and in view of this I would like to transition her to an alternate DMARD  She will discontinue the adalimumab and be started on subcutaneous Orencia 125 mg once weekly  In the meanwhile I also offered her a steroid taper starting at 40 mg once daily with a decrease of 10 mg every 5 days      - If she continues to be resistant to treatment by the next follow-up visit I will consider obtaining advanced imaging studies of certain joints        Plan:  Diagnoses and all orders for this visit:    Psoriatic arthritis ([facility] [address] )    Long-term use of immunosuppressant medication    Primary generalized (osteo)arthritis      Activities as tolerated  Exercise: try to maintain a low impact exercise regimen as much as possible  Continue other medications as prescribed by PCP and other specialists  RTC in 3 months         HPI    INITIAL VISIT NOTE:  Ms Daxa Hurtado a 71-year-old  female with history significant for mild right knee osteoarthritis, right knee meniscal tear status post surgery and right shoulder rotator cuff tears with subdeltoid/subacromial bursitis, who presents for further evaluation of diffuse joint pains and an elevated C reactive protein of 81   She is referred by Dr Jessi Rosales      Patient states in November 2017 she had an accidental injury resulting in a concussion, and states overall she has not felt well since then  Meme Billings is able to recall more diffuse joint pains starting in December 2018, and states they have been more prominent as well as progressive since then   She experiences her symptoms on a daily basis in a mostly constant manner   She states it is significantly interfering with her activities of daily living, as well as with her sleeping habits   She describes pain most prominently affecting her bilateral shoulders, bilateral wrists, to a lesser degree affecting her bilateral hands, low back region, hips and knees   She does not really experience joint pains affecting her elbows, ankles or feet   She has subjectively only noticed swelling affecting her knees   She experiences morning stiffness which affects her diffusely and persists throughout the day   She states any sort of activity aggravates her symptoms, but she does not necessarily obtain relief with resting   At the onset of her symptoms in February/March 2019 she was prescribed a 10 day course of prednisone starting at 40 mg daily, which she states significantly helped her  Tommas Finders was then represcribed a course of prednisone of the same duration in June 2019 which also helped her  Tommas Finders states the symptoms recur quickly after cessation of the steroids   She was recently also prescribed tramadol and tizanidine for the pain, but this has not really been helping her  [place] has also tried over-the-counter ibuprofen, Advil, Aleve and naproxen without significant benefit of her symptoms      She denies any recent fevers, chills, night sweats, unintentional weight loss or infections prior to the onset of her joint complaints   She denies significant dry eyes, inflammatory eye disease, dry mouth, skin rashes, psoriasis, mouth/nose ulcers, shortness of breath, abdominal pain, vomiting, diarrhea, blood in stools, inflammatory bowel disease or family history of rheumatoid arthritis/autoimmune disease      She was seen by her primary care physician for the ongoing complaints and had recent testing done which showed an elevated C reactive protein of 81   RIGO screen, rheumatoid factor, uric acid and Lyme antibody profile were unremarkable   In view of the ongoing right shoulder pain she recently had an MRI of her right shoulder done which showed partial-thickness rotator cuff tears as well as subacromial/subdeltoid bursitis   She has been following up with Orthopedics for this, and it is not thought that the rotator cuff tears should correspond to the symptoms she has been experiencing  Deven Arnold also received bilateral intra-articular cortisone shoulder injections which only provided her with temporary relief   She was advised to follow-up with Rheumatology first, and if her symptoms are not to improve then there may be consideration for an arthroscopic procedure         8/27/2019:  Patient presents for a follow-up today  Santiago Eugene reviewed her recently done labs which showed an unremarkable CBC, CMP, ESR, anti CCP antibody, Sjogren's antibodies and chronic hepatitis panel   X-ray of her right hand showed periarticular soft tissue swelling of the 2nd through 5th PIP joints, as well as subtle juxta-articular erosions of the 2nd through 5th DIP joints   X-ray of her left hand and bilateral feet did not show any signs of inflammatory arthritis      Following the prior office visit patient had started prednisone 40 mg daily for 7 days, and is currently on prednisone 20 mg daily   She states being on the steroids has significantly improved her symptoms, and she is currently denying any joint pains, swelling or morning stiffness   She is feeling well today and denies any complaints   Of note she denies a history of psoriasis or skin rashes   She denies a family history of psoriasis         11/27/2019:  Patient presents for a follow-up of psoriatic arthritis  Sima Antonio is currently on leflunomide 20 mg once daily and prednisone 10 mg once daily      At the last office visit I had started her on methotrexate but after taking a few doses she called the office with an outbreak of a skin rash affecting her right upper extremity   I requested she try the medication once more to ensure this was related to a side effect of the methotrexate, and she states that the rash persisted and worsened   In view of this we discontinued the methotrexate entirely and I switched her to leflunomide 20 mg once daily which she has been on for approximately 1 month now  Sima Antonio has been tolerating the medication well without any concerns for side effects or infections      She reports overall her joint pains have significantly improved   She did try to taper down on the prednisone to 5 mg once daily and then discontinue it, but states upon discontinuing her joint pains recurred   In view of this she restarted prednisone at 5 mg once daily, but over the past few days further increased to 10 mg once daily due to increased activities causing worsening joint pains   She is willing to try and taper down on the prednisone at this time   She reports pain that she usually experiences will affect her shoulders and knees   She denies any other significant areas of joint pains or persistent swelling   On occasion she will notice swelling of her knees   She states that the morning stiffness has mostly resolved as well      She denies any other history of skin rash or psoriasis         3/3/2020:  Patient presents for a follow-up of psoriatic arthritis  Eliot Esparza is currently on leflunomide 20 mg once daily   I reviewed her recently done CBC and CMP which were unremarkable      Patient reports overall in terms of the arthritis she has been doing well and denies any significant peripheral joint pains, swelling or morning stiffness   For the past 3-4 months she has been dealing with left lower back pain and was seen in the emergency room for this   On occasion the pain will radiate down her leg   An x-ray of her lumbar spine was done which showed mild degenerative arthritis at two levels   She is following up with her primary care physician for this and completing a course of physical therapy  Eliot Esparza is unsure if the PT has really helped her so far  Eliot Esparza has been taking ibuprofen on a daily basis to take the edge off her symptoms      She also reports 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), she may develop achiness in her upper arm, chest and back region   Typically when this happens she may take prednisone 5-10 mg as needed which will help her  Eliot Esparza uses the prednisone on a very infrequent basis      She denies a history of skin rash or psoriasis   She has been tolerating the leflunomide well without any concerns for side effects or infections         6/2/2020:  Patient presents for a follow-up of psoriatic arthritis  Eliot Esparza is currently on leflunomide 20 mg once daily and prednisone 5-10 mg daily as needed      Patient reports in April she was experiencing a flare-up diffuse hives, for which she increased the prednisone to 10 mg once daily and ran out of her script early  Eliot Esparza has been out of the prednisone for the past few weeks   She reports without the steroids she does notice a change in her joint pains, which will primarily affect her shoulders, neck and knees   She has noticed intermittent swelling affecting her knees   She does not really experience any morning stiffness, but will have a sensation of diffuse achiness as well as fatigue   She is not taking any over-the-counter pain medications   She reports for maintenance therapy she is mostly taking the leflunomide 20 mg once daily as well as prednisone 5 mg once daily, and this seems to control her symptoms      She has been tolerating her medications well without any concerns for side effects or infections         11/9/2020:  Patient presents for a follow-up of psoriatic arthritis  Olga Lidia Morales is currently on leflunomide 20 mg once daily and prednisone 5-10 mg once daily as needed   I reviewed her recently done CBC and CMP which were normal      Patient reports she has overall been doing well and this is the best that she has felt  Olga Lidia Morales was able to start exercising at the gym again and thinks that this has helped with her shoulder pain   Over the past few weeks she has noticed pain and swelling affecting her left knee but cannot recall any injuries   Other than the left knee pain she is denying any other joint pains and has not noticed joint swelling   She is no longer experiencing morning stiffness   She still takes the prednisone as needed but thinks she is now able to taper off it completely   She will also take Tylenol as needed      She has been tolerating the leflunomide well without any concerns for side effects or infections   She has not had any psoriasis         3/10/2021:  Patient presents for a follow-up of psoriatic arthritis  Olga Lidia Morales is currently on leflunomide 20 mg once daily   I reviewed her recently done CBC and CMP which were normal        She reports since the last office visit she was able to discontinue the steroids without a flare-up, and manages her joint pains with Tylenol as needed   She was experiencing pain affecting her left knee and right shoulder, but she was seen by Orthopedics and received intra-articular cortisone injections which helped   Last week she did have an accidental fall on the ice and fell on her right hand   She was seen in urgent care and had an x-ray done which did not show any fractures   She was advised that she probably sprained her right wrist and is continuing with conservative measures   She has been unable to rest her wrist as due to her occupation as a  she is constantly using her hands   Recently she has also noticed numbness affecting the lateral fingers on her right hand mostly at night, but at certain times during the day as well while she is working      She denies any other joint pains, joint swelling or morning stiffness   She has been tolerating the leflunomide well without any concerns for side effects or infections   She has not had any psoriasis        7/20/2021:  Patient presents for a follow-up of psoriatic arthritis  She is currently on leflunomide 20 mg once daily  She has not had her recent high risk medication lab monitoring done        She reports since the last visit, especially in the past 1 month she has noticed a flare-up in her joint pains  She describes an overall body achiness associated with fatigue but also mentions specific joint pains involving her left knee and right ankle  She has noticed swelling to affect her right wrist, left knee and right ankle  She experiences morning stiffness which affects her diffusely and can take about an hour to improve  She takes over-the-counter Tylenol as needed which helps to some degree  She has previously been told not to take NSAIDs but is unsure the reason why  She has not been on any recent steroids      She has also been following up with [facility] for right hand and wrist pain which has persisted following a fall and sprain in March 2021    She did have an ultrasound of her right wrist done looking for carpal tunnel syndrome and this was suspicious for the condition  An MRI of her right thumb in June 2021 showed a partial tear of the ulnar collateral ligament as well as mild 1st MCP joint osteoarthritis      She has been tolerating the leflunomide well without any concerns for side effects or infections   She has not had any psoriasis  2/1/2022:  Patient presents for a follow-up of psoriatic arthritis  She is currently on subcutaneous adalimumab 40 mg every 2 weeks that was started in October 2021  Unfortunately there has been no improvement in her symptoms and apart from experiencing widespread body aches, she is also noticing significant pain in her hands, wrists and right ankle  She feels like her hands, wrists and right ankle have also been swollen  She experiences morning stiffness which affects her diffusely and can take a few hours to improve  She does take over-the-counter NSAIDs as needed but this does not really help  A few weeks ago she was prescribed a tapering course of prednisone starting at 20 mg once daily but she states that this did not really help her         6/29/2022: The following portions of the patient's history were reviewed and updated as appropriate: allergies, current medications, past family history, past medical history, past social history, past surgical history and problem list       Review of Systems  Constitutional: Negative for weight change, fevers, chills, night sweats, fatigue  ENT/Mouth: Negative for hearing changes, ear pain, nasal congestion, sinus pain, hoarseness, sore throat, rhinorrhea, swallowing difficulty  Eyes: Negative for pain, redness, discharge, vision changes  Cardiovascular: Negative for chest pain, SOB, palpitations  Respiratory: Negative for cough, sputum, wheezing, dyspnea  Gastrointestinal: Negative for nausea, vomiting, diarrhea, constipation, pain, heartburn  Genitourinary: Negative for dysuria, urinary frequency, hematuria  Musculoskeletal: As per HPI    Skin: Negative for skin rash, color changes  Neuro: Negative for weakness, numbness, tingling, loss of consciousness  Psych: Negative for anxiety, depression  Heme/Lymph: Negative for easy bruising, bleeding, lymphadenopathy          Past Medical History:   Diagnosis Date    Chronic left-sided low back pain without sciatica 3/18/2020    Concussion     Hyperlipidemia     Psoriatic arthritis ([facility] [address] ) 2019    [facility]    Tendinitis        Past Surgical History:   Procedure Laterality Date     SECTION      FOOT SURGERY Left     HAND SURGERY Left 10/2021    HEMORROIDECTOMY      KNEE ARTHROSCOPY Right 2018    KS FIX COLAT LIG,I-P JT,GRAFT,EACH Right 10/21/2021    Procedure: right thumb Ulnar collateral ligament repair;  Surgeon: Magan Whitaker MD;  Location: [facility];  Service: Orthopedics    KS INCISE FINGER TENDON SHEATH Right 10/21/2021    Procedure: Right small finger trigger finger release;  Surgeon: Magan Whitaker MD;  Location: [facility];  Service: Orthopedics    KS KNEE SCOPE,MED/LAT MENISECTOMY Right 2018    Procedure: ARTHROSCOPY KNEE, PARTIAL MEDIAL MENISCECTOMY: ABRASION CHONDROPLASTY;  Surgeon: Dilan Chester MD;  Location: [facility];  Service: Orthopedics    KS WRIST Sergio Moo LIG Right 10/21/2021    Procedure: Right endoscopic carpal tunnel release;  Surgeon: Magan Whitaker MD;  Location: [facility];  Service: Orthopedics    TUBAL LIGATION         Social History     Socioeconomic History    Marital status:      Spouse name: Not on file    Number of children: Not on file    Years of education: Not on file    Highest education level: Not on file   Occupational History    Not on file   Tobacco Use    Smoking status: Former Smoker     Packs/day: 0 33     Years: 5 00     Pack years: 1 65     Quit date:      Years since quittin 5    Smokeless tobacco: Never Used   Vaping Use    Vaping Use: Never used Substance and Sexual Activity    Alcohol use: Yes     Comment: social    Drug use: No    Sexual activity: Not on file   Other Topics Concern    Not on file   Social History Narrative    Not on file     Social Determinants of Health     Financial Resource Strain: Not on file   Food Insecurity: Not on file   Transportation Needs: Not on file   Physical Activity: Not on file   Stress: Not on file   Social Connections: Not on file   Intimate Partner Violence: Not on file   Housing Stability: Not on file       Family History   Problem Relation Age of Onset    Coronary artery disease Mother     Other Mother         CABG    Hiatal hernia Father     Stroke Maternal Grandmother     Hyperlipidemia Family     Migraines Family     Thyroid disease Family     No Known Problems Sister     No Known Problems Brother     No Known Problems Son     No Known Problems Daughter     No Known Problems Paternal Grandmother     No Known Problems Paternal Grandfather     No Known Problems Maternal Aunt     No Known Problems Maternal Uncle     No Known Problems Paternal Aunt     No Known Problems Paternal Uncle     No Known Problems Cousin        Allergies   Allergen Reactions    Methotrexate Hives       Current Outpatient Medications:     Glucosamine-Chondroitin (GLUCOSAMINE CHONDR COMPLEX PO), Take by mouth daily  , Disp: , Rfl:     multivitamin (THERAGRAN) TABS, Take 1 tablet by mouth daily, Disp: , Rfl:     naproxen sodium (ALEVE) 220 MG tablet, Take 1 tablet (220 mg total) by mouth 2 (two) times a day with meals, Disp: 20 tablet, Rfl: 0    Orencia ClickJect 903 MG/ML SOAJ, Inject 1 pen (125 mg total) under the skin once a week, Disp: 4 mL, Rfl: 0    pravastatin (PRAVACHOL) 40 mg tablet, TAKE 1 TABLET BY MOUTH AT BEDTIME, Disp: 30 tablet, Rfl: 3    predniSONE 10 mg tablet, Take 4 tabs once daily x 5 days, then 3 tabs once daily x 5 days, then 2 tabs once daily x 5 days, then 1 tab once daily x 5 days, then stop , Disp: 100 tablet, Rfl: 0      Objective: There were no vitals filed for this visit  Physical Exam  General: Well appearing, well nourished, in no distress  Oriented x 3, normal mood and affect  Ambulating without difficulty  Skin: Good turgor, no rash, unusual bruising or prominent lesions  Hair: Normal texture and distribution  Nails: Normal color, no deformities  HEENT:  Head: Normocephalic, atraumatic  Eyes: Conjunctiva clear, sclera non-icteric, EOM intact  Extremities: No amputations or deformities, cyanosis, edema  Musculoskeletal:   Hands - there are multiple PIP joints noted with mild soft tissue swelling  The DIP and MCP joints are unremarkable  Triggering of her digits noted  Wrists - there are chronic synovial changes noted at her bilateral wrists, more prominent on the right side  There appears to be soft tissue swelling bilaterally again more prominent at the right wrist   She has limitation with full flexion and extension at her right wrist along with tenderness  There is no restriction in range of motion or tenderness at the left wrist   Elbows and shoulders - unremarkable  Knees -  unremarkable  Ankles - there is soft tissue swelling present at the right ankle without significant tenderness or restriction in range of motion  The left ankle is unremarkable  Feet - negative MTP squeeze test bilaterally  There is no enthesitis or dactylitis  Neurologic: Alert and oriented  No focal neurological deficits appreciated  Psychiatric: Normal mood and affect  JASON Bermudez    Rheumatology

## 2022-06-29 ENCOUNTER — TELEMEDICINE (OUTPATIENT)
Dept: RHEUMATOLOGY | Facility: CLINIC | Age: 62
End: 2022-06-29
Payer: COMMERCIAL

## 2022-06-29 ENCOUNTER — TELEPHONE (OUTPATIENT)
Dept: RHEUMATOLOGY | Facility: CLINIC | Age: 62
End: 2022-06-29

## 2022-06-29 DIAGNOSIS — Z79.899 HIGH RISK MEDICATION USE: ICD-10-CM

## 2022-06-29 DIAGNOSIS — L40.50 PSORIATIC ARTHRITIS (HCC): Primary | ICD-10-CM

## 2022-06-29 DIAGNOSIS — Z79.899 LONG-TERM USE OF IMMUNOSUPPRESSANT MEDICATION: ICD-10-CM

## 2022-06-29 DIAGNOSIS — Z79.52 CURRENT USE OF STEROID MEDICATION: ICD-10-CM

## 2022-06-29 DIAGNOSIS — M15.0 PRIMARY GENERALIZED (OSTEO)ARTHRITIS: ICD-10-CM

## 2022-06-29 PROCEDURE — 1036F TOBACCO NON-USER: CPT | Performed by: INTERNAL MEDICINE

## 2022-06-29 PROCEDURE — 99215 OFFICE O/P EST HI 40 MIN: CPT | Performed by: INTERNAL MEDICINE

## 2022-06-29 RX ORDER — LEFLUNOMIDE 20 MG/1
20 TABLET ORAL DAILY
Qty: 90 TABLET | Refills: 1 | Status: SHIPPED | OUTPATIENT
Start: 2022-06-29

## 2022-06-29 RX ORDER — PREDNISONE 10 MG/1
TABLET ORAL
Qty: 100 TABLET | Refills: 0 | Status: SHIPPED | OUTPATIENT
Start: 2022-06-29

## 2022-06-29 NOTE — TELEPHONE ENCOUNTER
Please schedule her for a follow-up visit in 4 months  I ordered 2 sets of labs, 1 set of CBC and CMP should be marked to have done in 4 weeks and then the 2nd set of CBC and CMP should be marked to be done in 3 months  Please mail to her, thank you

## 2022-06-29 NOTE — PROGRESS NOTES
Virtual Regular Visit    Verification of patient location:    Patient is located in the following state in which I hold an active license [place]      Assessment/Plan:    Problem List Items Addressed This Visit        Musculoskeletal and Integument    Psoriatic arthritis ([address] ) - Primary    Relevant Medications    leflunomide (ARAVA) 20 MG tablet    predniSONE 10 mg tablet      Other Visit Diagnoses     Long-term use of immunosuppressant medication        High risk medication use        Relevant Orders    CBC and differential    Comprehensive metabolic panel    CBC and differential    Comprehensive metabolic panel    Current use of steroid medication        Primary generalized (osteo)arthritis                   Reason for visit is follow up  Chief Complaint   Patient presents with    Virtual Regular Visit        Encounter provider Jenelle Rios MD    Provider located at [address] [address]      Recent Visits  No visits were found meeting these conditions  Showing recent visits within past 7 days and meeting all other requirements  Today's Visits  Date Type Provider Dept   06/29/22 Telemedicine Jenelle Rios MD [facility] today's visits and meeting all other requirements  Future Appointments  No visits were found meeting these conditions  Showing future appointments within next 150 days and meeting all other requirements       The patient was identified by name and date of birth  Brittany Comfort was informed that this is a telemedicine visit and that the visit is being conducted through Telephone  My office door was closed  No one else was in the room  She acknowledged consent and understanding of privacy and security of the video platform  The patient has agreed to participate and understands they can discontinue the visit at any time      Patient is aware this is a billable service         Subjective    HPI     INITIAL VISIT NOTE:  Ms Jovita Jaramillo a 66-year-old  female with history significant for mild right knee osteoarthritis, right knee meniscal tear status post surgery and right shoulder rotator cuff tears with subdeltoid/subacromial bursitis, who presents for further evaluation of diffuse joint pains and an elevated C reactive protein of 81   She is referred by Dr Meeks Naval      Patient states in November 2017 she had an accidental injury resulting in a concussion, and states overall she has not felt well since then  Mayte Mcdermott is able to recall more diffuse joint pains starting in December 2018, and states they have been more prominent as well as progressive since then   She experiences her symptoms on a daily basis in a mostly constant manner   She states it is significantly interfering with her activities of daily living, as well as with her sleeping habits   She describes pain most prominently affecting her bilateral shoulders, bilateral wrists, to a lesser degree affecting her bilateral hands, low back region, hips and knees   She does not really experience joint pains affecting her elbows, ankles or feet   She has subjectively only noticed swelling affecting her knees   She experiences morning stiffness which affects her diffusely and persists throughout the day   She states any sort of activity aggravates her symptoms, but she does not necessarily obtain relief with resting   At the onset of her symptoms in February/March 2019 she was prescribed a 10 day course of prednisone starting at 40 mg daily, which she states significantly helped her  Mayte Adameser was then represcribed a course of prednisone of the same duration in June 2019 which also helped her  Mayteabundio Adamesnarciso states the symptoms recur quickly after cessation of the steroids   She was recently also prescribed tramadol and tizanidine for the pain, but this has not really been helping her  Mayte Mcdermott has also tried over-the-counter ibuprofen, Advil, Aleve and naproxen without significant benefit of her symptoms      She denies any recent fevers, chills, night sweats, unintentional weight loss or infections prior to the onset of her joint complaints   She denies significant dry eyes, inflammatory eye disease, dry mouth, skin rashes, psoriasis, mouth/nose ulcers, shortness of breath, abdominal pain, vomiting, diarrhea, blood in stools, inflammatory bowel disease or family history of rheumatoid arthritis/autoimmune disease      She was seen by her primary care physician for the ongoing complaints and had recent testing done which showed an elevated C reactive protein of 81   RIGO screen, rheumatoid factor, uric acid and Lyme antibody profile were unremarkable   In view of the ongoing right shoulder pain she recently had an MRI of her right shoulder done which showed partial-thickness rotator cuff tears as well as subacromial/subdeltoid bursitis   She has been following up with Orthopedics for this, and it is not thought that the rotator cuff tears should correspond to the symptoms she has been experiencing  Tammie Menendez also received bilateral intra-articular cortisone shoulder injections which only provided her with temporary relief   She was advised to follow-up with Rheumatology first, and if her symptoms are not to improve then there may be consideration for an arthroscopic procedure         8/27/2019:  Patient presents for a follow-up today  Chente Rizo reviewed her recently done labs which showed an unremarkable CBC, CMP, ESR, anti CCP antibody, Sjogren's antibodies and chronic hepatitis panel   X-ray of her right hand showed periarticular soft tissue swelling of the 2nd through 5th PIP joints, as well as subtle juxta-articular erosions of the 2nd through 5th DIP joints   X-ray of her left hand and bilateral feet did not show any signs of inflammatory arthritis      Following the prior office visit patient had started prednisone 40 mg daily for 7 days, and is currently on prednisone 20 mg daily   She states being on the steroids has significantly improved her symptoms, and she is currently denying any joint pains, swelling or morning stiffness   She is feeling well today and denies any complaints   Of note she denies a history of psoriasis or skin rashes   She denies a family history of psoriasis         11/27/2019:  Patient presents for a follow-up of psoriatic arthritis  Chris Bee is currently on leflunomide 20 mg once daily and prednisone 10 mg once daily      At the last office visit I had started her on methotrexate but after taking a few doses she called the office with an outbreak of a skin rash affecting her right upper extremity   I requested she try the medication once more to ensure this was related to a side effect of the methotrexate, and she states that the rash persisted and worsened   In view of this we discontinued the methotrexate entirely and I switched her to leflunomide 20 mg once daily which she has been on for approximately 1 month now  Chris Bee has been tolerating the medication well without any concerns for side effects or infections      She reports overall her joint pains have significantly improved   She did try to taper down on the prednisone to 5 mg once daily and then discontinue it, but states upon discontinuing her joint pains recurred   In view of this she restarted prednisone at 5 mg once daily, but over the past few days further increased to 10 mg once daily due to increased activities causing worsening joint pains   She is willing to try and taper down on the prednisone at this time   She reports pain that she usually experiences will affect her shoulders and knees   She denies any other significant areas of joint pains or persistent swelling   On occasion she will notice swelling of her knees   She states that the morning stiffness has mostly resolved as well      She denies any other history of skin rash or psoriasis         3/3/2020:  Patient presents for a follow-up of psoriatic arthritis  Torey Tamez is currently on leflunomide 20 mg once daily   I reviewed her recently done CBC and CMP which were unremarkable      Patient reports overall in terms of the arthritis she has been doing well and denies any significant peripheral joint pains, swelling or morning stiffness   For the past 3-4 months she has been dealing with left lower back pain and was seen in the emergency room for this   On occasion the pain will radiate down her leg   An x-ray of her lumbar spine was done which showed mild degenerative arthritis at two levels   She is following up with her primary care physician for this and completing a course of physical therapy  Torey Tamez is unsure if the PT has really helped her so far  Torey Tamez has been taking ibuprofen on a daily basis to take the edge off her symptoms      She also reports 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), she may develop achiness in her upper arm, chest and back region   Typically when this happens she may take prednisone 5-10 mg as needed which will help her  Torey Tamez uses the prednisone on a very infrequent basis      She denies a history of skin rash or psoriasis   She has been tolerating the leflunomide well without any concerns for side effects or infections         6/2/2020:  Patient presents for a follow-up of psoriatic arthritis  Torey Tamez is currently on leflunomide 20 mg once daily and prednisone 5-10 mg daily as needed      Patient reports in April she was experiencing a flare-up diffuse hives, for which she increased the prednisone to 10 mg once daily and ran out of her script early  Torey Tamez has been out of the prednisone for the past few weeks   She reports without the steroids she does notice a change in her joint pains, which will primarily affect her shoulders, neck and knees   She has noticed intermittent swelling affecting her knees   She does not really experience any morning stiffness, but will have a sensation of diffuse achiness as well as fatigue   She is not taking any over-the-counter pain medications   She reports for maintenance therapy she is mostly taking the leflunomide 20 mg once daily as well as prednisone 5 mg once daily, and this seems to control her symptoms      She has been tolerating her medications well without any concerns for side effects or infections         11/9/2020:  Patient presents for a follow-up of psoriatic arthritis  Radu Villasenor is currently on leflunomide 20 mg once daily and prednisone 5-10 mg once daily as needed   I reviewed her recently done CBC and CMP which were normal      Patient reports she has overall been doing well and this is the best that she has felt  Radu Villasenor was able to start exercising at the gym again and thinks that this has helped with her shoulder pain   Over the past few weeks she has noticed pain and swelling affecting her left knee but cannot recall any injuries   Other than the left knee pain she is denying any other joint pains and has not noticed joint swelling   She is no longer experiencing morning stiffness   She still takes the prednisone as needed but thinks she is now able to taper off it completely   She will also take Tylenol as needed      She has been tolerating the leflunomide well without any concerns for side effects or infections   She has not had any psoriasis         3/10/2021:  Patient presents for a follow-up of psoriatic arthritis  Radu Villasenor is currently on leflunomide 20 mg once daily   I reviewed her recently done CBC and CMP which were normal        She reports since the last office visit she was able to discontinue the steroids without a flare-up, and manages her joint pains with Tylenol as needed   She was experiencing pain affecting her left knee and right shoulder, but she was seen by Orthopedics and received intra-articular cortisone injections which helped   Last week she did have an accidental fall on the ice and fell on her right hand   She was seen in urgent care and had an x-ray done which did not show any fractures   She was advised that she probably sprained her right wrist and is continuing with conservative measures   She has been unable to rest her wrist as due to her occupation as a  she is constantly using her hands   Recently she has also noticed numbness affecting the lateral fingers on her right hand mostly at night, but at certain times during the day as well while she is working      She denies any other joint pains, joint swelling or morning stiffness   She has been tolerating the leflunomide well without any concerns for side effects or infections   She has not had any psoriasis        7/20/2021:  Patient presents for a follow-up of psoriatic arthritis  She is currently on leflunomide 20 mg once daily  She has not had her recent high risk medication lab monitoring done        She reports since the last visit, especially in the past 1 month she has noticed a flare-up in her joint pains  She describes an overall body achiness associated with fatigue but also mentions specific joint pains involving her left knee and right ankle  She has noticed swelling to affect her right wrist, left knee and right ankle  She experiences morning stiffness which affects her diffusely and can take about an hour to improve  She takes over-the-counter Tylenol as needed which helps to some degree  She has previously been told not to take NSAIDs but is unsure the reason why  She has not been on any recent steroids      She has also been following up with [facility] for right hand and wrist pain which has persisted following a fall and sprain in March 2021  She did have an ultrasound of her right wrist done looking for carpal tunnel syndrome and this was suspicious for the condition    An MRI of her right thumb in June 2021 showed a partial tear of the ulnar collateral ligament as well as mild 1st MCP joint osteoarthritis      She has been tolerating the leflunomide well without any concerns for side effects or infections   She has not had any psoriasis  2/1/2022:  Patient presents for a follow-up of psoriatic arthritis  She is currently on subcutaneous adalimumab 40 mg every 2 weeks that was started in October 2021  Unfortunately there has been no improvement in her symptoms and apart from experiencing widespread body aches, she is also noticing significant pain in her hands, wrists and right ankle  She feels like her hands, wrists and right ankle have also been swollen  She experiences morning stiffness which affects her diffusely and can take a few hours to improve  She does take over-the-counter NSAIDs as needed but this does not really help  A few weeks ago she was prescribed a tapering course of prednisone starting at 20 mg once daily but she states that this did not really help her         6/29/2022:  Patient presents for a follow-up of psoriatic arthritis  She is currently on subcutaneous Orencia 125 mg once weekly that was started in February 2022  She reports with switching from the adalimumab to Orencia she did notice some improvement in her joint pains but she is still quite symptomatic with pain in her hands, wrists, knees and ankles  She still has swelling in her hands and ankles  She experiences morning stiffness affecting her diffusely that takes about 30 minutes to improve  No recent steroid use  She is taking Aleve twice a day as needed but this does not help significantly      The following portions of the patient's history were reviewed and updated as appropriate: allergies, current medications, past family history, past medical history, past social history, past surgical history and problem list       Past Medical History:   Diagnosis Date    Chronic left-sided low back pain without sciatica 3/18/2020    Concussion     Hyperlipidemia     Psoriatic [facility] [address] ) 2019    Seronegative rheumatoid arthritis ([facility] [address] ) 2019    Tendinitis        Past Surgical History:   Procedure Laterality Date     SECTION      FOOT SURGERY Left     HAND SURGERY Left 10/2021    HEMORROIDECTOMY      KNEE ARTHROSCOPY Right 2018    OK FIX COLAT LIG,I-P JT,GRAFT,EACH Right 10/21/2021    Procedure: right thumb Ulnar collateral ligament repair;  Surgeon: Florentin Fraser MD;  Location: [facility];  Service: Orthopedics    OK INCISE FINGER TENDON SHEATH Right 10/21/2021    Procedure: Right small finger trigger finger release;  Surgeon: Florentin Fraser MD;  Location: [facility];  Service: Orthopedics    OK KNEE SCOPE,MED/LAT MENISECTOMY Right 2018    Procedure: ARTHROSCOPY KNEE, PARTIAL MEDIAL MENISCECTOMY: ABRASION CHONDROPLASTY;  Surgeon: Tana Jacobson MD;  Location: [facility];  Service: Orthopedics    OK WRIST Vidal Hutching LIG Right 10/21/2021    Procedure: Right endoscopic carpal tunnel release;  Surgeon: Florentin Fraser MD;  Location: [facility];  Service: Orthopedics    TUBAL LIGATION         Current Outpatient Medications   Medication Sig Dispense Refill    leflunomide (ARAVA) 20 MG tablet Take 1 tablet (20 mg total) by mouth daily 90 tablet 1    predniSONE 10 mg tablet Take 4 tabs once daily x 5 days, then 3 tabs once daily x 5 days, then 2 tabs once daily x 5 days, then 1 tab once daily x 5 days, then stop   100 tablet 0    Glucosamine-Chondroitin (GLUCOSAMINE CHONDR COMPLEX PO) Take by mouth daily        multivitamin (THERAGRAN) TABS Take 1 tablet by mouth daily      naproxen sodium (ALEVE) 220 MG tablet Take 1 tablet (220 mg total) by mouth 2 (two) times a day with meals 20 tablet 0    Orencia ClickJect 055 MG/ML SOAJ Inject 1 pen (125 mg total) under the skin once a week 4 mL 0    pravastatin (PRAVACHOL) 40 mg tablet TAKE 1 TABLET BY MOUTH AT BEDTIME 30 tablet 3     No current facility-administered medications for this visit  Allergies   Allergen Reactions    Methotrexate Hives       Review of Systems  Constitutional: Negative for weight change, fevers, chills, night sweats, fatigue  ENT/Mouth: Negative for hearing changes, ear pain, nasal congestion, sinus pain, hoarseness, sore throat, rhinorrhea, swallowing difficulty  Eyes: Negative for pain, redness, discharge, vision changes  Cardiovascular: Negative for chest pain, SOB, palpitations  Respiratory: Negative for cough, sputum, wheezing, dyspnea  Gastrointestinal: Negative for nausea, vomiting, diarrhea, constipation, pain, heartburn  Genitourinary: Negative for dysuria, urinary frequency, hematuria  Musculoskeletal: As per HPI  Skin: Negative for skin rash, color changes  Neuro: Negative for weakness, numbness, tingling, loss of consciousness  Psych: Negative for anxiety, depression  Heme/Lymph: Negative for easy bruising, bleeding, lymphadenopathy  Assessment and Plan:   Ms Franky Uriarte female with history significant for psoriatic arthritis, mild right knee osteoarthritis, right knee meniscal tear status post surgery and right shoulder rotator cuff tears with subdeltoid/subacromial bursitis, who presents for a follow-up   She is currently on subcutaneous Orencia 125 mg once weekly that was started in February 2022         Rheumatic summary:  1) Diagnosed with PsA (subtle erosive changes on hand XRs) in 8/2019 - started on MTX, but d/c'ed in 9/2019 due to side effects of skin rash  Started on LEF 10/2019-present    - 7/20/21: presenting with flare up x 1 month - prednisone taper x 15 days and continue LEF  If no sustained improvement will d/c LEF and start adalimumab    - 2/1/22: adalimumab started in 10/2021 - ineffective, will d/c and start Orencia  Steroid taper    - 6/29/22: Kevin Mon started in 2/22 - still symptomatic, will add on LEF 20 mg and steroid taper     2) Co-morbidities: knee OA, right carpal tunnel syndrome, lumbar DJD         # Psoriatic arthritis  - Shante presents today for a follow-up of psoriatic arthritis which overall has been flaring since June 2021 and we have had a difficult time getting this under control  At the last office visit I switched her from [facility] to [facility] but this has also not helped her significantly  I would recommend she continue the [facility] for now but I will add on leflunomide 20 mg once daily as this helped her in the past before the efficacy declined  I will assess her response to the combination of medications  She will update high-risk medication lab monitoring in 4 weeks and then again in 3 months  - In the meanwhile to help with her ongoing symptoms I have prescribed her a steroid taper starting at 40 mg once daily with a decrease of 10 mg every 5 days  She may continue the naproxen as needed      - If she continues to be resistant to treatment by the next follow-up visit I will consider obtaining advanced imaging studies of certain joints  I spent 11 minutes directly with the patient during this visit  VIRTUAL VISIT DISCLAIMER      Zelda Gallagher verbally agrees to participate in [employer]  Pt is aware that [address] could be limited without vital signs or the ability to perform a full hands-on physical exam  Shante Price understands she or the provider may request at any time to terminate the video visit and request the patient to seek care or treatment in person

## 2022-07-14 ENCOUNTER — HOSPITAL ENCOUNTER (OUTPATIENT)
Dept: RADIOLOGY | Facility: HOSPITAL | Age: 62
Discharge: HOME | End: 2022-07-14
Payer: COMMERCIAL

## 2022-07-14 DIAGNOSIS — G56.02 CARPAL TUNNEL SYNDROME OF LEFT WRIST: ICD-10-CM

## 2022-07-14 PROCEDURE — 76882 US LMTD JT/FCL EVL NVASC XTR: CPT

## 2022-07-26 DIAGNOSIS — L40.50 PSORIATIC ARTHRITIS (HCC): ICD-10-CM

## 2022-07-26 RX ORDER — PREDNISONE 10 MG/1
TABLET ORAL
Qty: 100 TABLET | Refills: 0 | OUTPATIENT
Start: 2022-07-26

## 2022-08-18 ENCOUNTER — TELEPHONE (OUTPATIENT)
Dept: OBGYN CLINIC | Facility: MEDICAL CENTER | Age: 62
End: 2022-08-18

## 2022-08-18 DIAGNOSIS — L40.50 PSORIATIC ARTHRITIS (HCC): ICD-10-CM

## 2022-08-18 RX ORDER — ABATACEPT 125 MG/ML
125 INJECTION, SOLUTION SUBCUTANEOUS WEEKLY
Qty: 4 ML | Refills: 5 | Status: SHIPPED | OUTPATIENT
Start: 2022-08-18

## 2022-08-18 NOTE — TELEPHONE ENCOUNTER
Ct at [facility] calling for a refill on [address] 518 mg ML Soaj, please send electronically, or fax to [phone], or call in [phone]

## 2022-08-29 ENCOUNTER — OFFICE VISIT (OUTPATIENT)
Dept: OBGYN CLINIC | Facility: CLINIC | Age: 62
End: 2022-08-29
Payer: COMMERCIAL

## 2022-08-29 VITALS
HEIGHT: 64 IN | DIASTOLIC BLOOD PRESSURE: 80 MMHG | BODY MASS INDEX: 24.21 KG/M2 | WEIGHT: 141.8 LBS | SYSTOLIC BLOOD PRESSURE: 118 MMHG

## 2022-08-29 DIAGNOSIS — G56.02 CARPAL TUNNEL SYNDROME OF LEFT WRIST: Primary | ICD-10-CM

## 2022-08-29 PROCEDURE — 99214 OFFICE O/P EST MOD 30 MIN: CPT | Performed by: ORTHOPAEDIC SURGERY

## 2022-08-29 NOTE — PROGRESS NOTES
ASSESSMENT/PLAN:    Assessment:   Carpal Tunnel Syndrome  left    Plan:   Endoscopic Carpal Tunnel Release  Left - local    Follow Up: After surgery     To Do Next Visit:  Suture removal    Operative Discussions:  Cubital Tunnel Release: The anatomy and physiology of cubital tunnel syndrome were discussed with the patient today in the office  Typically, increased elbow flexion activities decrease blood flow within the intraneural spaces, resulting in a feeling of numbness, tingling, weakness, or clumsiness within the hand and fingers  Occasionally, anatomic structures such as medial elbow osteophytes, the medial head of the triceps, were subluxing ulnar nerve may result in increased pressure or aggravation at the cubital tunnel  Typical signs and symptoms usually include numbness and tingling within the ring and small finger, weakness with , and weakness with pinch  Conservative treatment and includes nocturnal bracing to keep the elbow in a semi-extended position, activity modification, therapy, and avoiding excessive elbow flexion activities  A majority of patients typically respond to conservative treatment over a period of approximately 3-6 months  EMG/NCV testing of the ulnar nerve at the elbow is not as reliable as carpal tunnel syndrome  Surgical intervention in the form of in situ release of the ulnar nerve at the elbow or ulnar nerve transposition may be required in up to 20% of patients  The patient has elected to undergo an cubital tunnel release  The possibility of converting to an open procedure, or a subcutaneous or submuscular ulnar nerve transposition depending on the nerve stability was discussed with the patient  Typically, in the postoperative period, light activities are allowed immediately, driving is allowed when narcotic medications have stopped, and the incision may get wet after 5 days  Heavy activities will be allowed after follow up appointment in 1-2 weeks  Anti-inflammatory medications should be held for approximately 5 days postoperative  While the pain within the ring and small finger of the hands generally improves rapidly, the numbness and tingling, as well as the strength, will slowly improve over a period of weeks to months  Total recovery can take up to 18 months from the time of surgery  Numbness and tingling near the incision, or near the medial aspect of the forearm was discussed with the patient  The patient has an understanding of the above mentioned discussion  The risks and benefits of the procedure were explained to the patient, which include, but are not limited to: Bleeding, infection, recurrence, pain, scar, damage to tendons, damage to nerves, and damage to blood vessels, failure to give desired results and complications related to anesthesia   These risks, along with alternative conservative treatment options, and postoperative protocols were voiced back and understood by the patient   All questions were answered to the patient's satisfaction   The patient agrees to comply with a standard postoperative protocol, and is willing to proceed   Education was provided via written and auditory forms   There were no barriers to learning  Written handouts regarding wound care, incision and scar care, and general preoperative information was provided to the patient   Prior to surgery, the patient may be requested to stop all anti-inflammatory medications   Prophylactic aspirin, Plavix, and Coumadin may be allowed to be continued   Medications including vitamin E , ginkgo, and fish oil are requested to be stopped approximately one week prior to surgery   Hypertensive medications and beta blockers, if taken, should be continued      _____________________________________________________  CHIEF COMPLAINT:  Chief Complaint   Patient presents with    Left Wrist - Follow-up, Carpal Tunnel, Results     U/S 7/14/22    Left Hand - Mass         SUBJECTIVE:  Matthew SWAN Raj Jorge is a 58 y o  female who presents as an established patient for continued evaluation treatment of left carpal tunnel syndrome  Patient did obtain an ultrasound of the left wrist to evaluate for carpal tunnel  There was evidence of median nerve thickening  She states she continues to have symptoms at nighttime into the median nerve distribution  She states she would like to proceed with surgical intervention at this time  She has tried braces with minimal relief of her symptoms      PAST MEDICAL HISTORY:  Past Medical History:   Diagnosis Date    Chronic left-sided low back pain without sciatica 3/18/2020    Concussion     Hyperlipidemia     [facility] [address] ) 2019    Seronegative rheumatoid arthritis ([facility] [address] ) 2019    Tendinitis        PAST SURGICAL HISTORY:  Past Surgical History:   Procedure Laterality Date     SECTION      FOOT SURGERY Left     HAND SURGERY Left 10/2021    HEMORROIDECTOMY      KNEE ARTHROSCOPY Right 2018    UT FIX COLAT LIG,I-P JT,GRAFT,EACH Right 10/21/2021    Procedure: right thumb Ulnar collateral ligament repair;  Surgeon: Roger Woodson MD;  [facility];  Service: Orthopedics    UT INCISE FINGER TENDON SHEATH Right 10/21/2021    Procedure: Right small finger trigger finger release;  Surgeon: Roger Woodson MD;  [facility];  Service: Orthopedics    UT KNEE SCOPE,MED/LAT MENISECTOMY Right 2018    Procedure: ARTHROSCOPY KNEE, PARTIAL MEDIAL MENISCECTOMY: ABRASION CHONDROPLASTY;  Surgeon: Elle Eaton MD;  [facility];  Service: Orthopedics    UT WRIST Alexus Bud LIG Right 10/21/2021    Procedure: Right endoscopic carpal tunnel release;  Surgeon: Roger Woodson MD;  [facility];  Service: Orthopedics    TUBAL LIGATION         FAMILY HISTORY:  Family History   Problem Relation Age of Onset    Coronary artery disease Mother     Other Mother         CABG    Hiatal hernia Father     Stroke Maternal Grandmother     Hyperlipidemia Family     Migraines Family     Thyroid disease Family     No Known Problems Sister     No Known Problems Brother     No Known Problems Son     No Known Problems Daughter     No Known Problems Paternal Grandmother     No Known Problems Paternal Grandfather     No Known Problems Maternal Aunt     No Known Problems Maternal Uncle     No Known Problems Paternal Aunt     No Known Problems Paternal Uncle     No Known Problems Cousin        SOCIAL HISTORY:  Social History     Tobacco Use    Smoking status: Former Smoker     Packs/day: 0 33     Years: 5 00     Pack years: 1 65     Quit date:      Years since quittin 6    Smokeless tobacco: Never Used   Vaping Use    Vaping Use: Never used   Substance Use Topics    Alcohol use: Yes     Comment: social    Drug use: No       MEDICATIONS:    Current Outpatient Medications:     Abatacept (Orencia ClickJect) 391 MG/ML SOAJ, Inject 1 mL (125 mg total) under the skin once a week, Disp: 4 mL, Rfl: 5    Glucosamine-Chondroitin (GLUCOSAMINE CHONDR COMPLEX PO), Take by mouth daily  , Disp: , Rfl:     leflunomide (ARAVA) 20 MG tablet, Take 1 tablet (20 mg total) by mouth daily, Disp: 90 tablet, Rfl: 1    multivitamin (THERAGRAN) TABS, Take 1 tablet by mouth daily, Disp: , Rfl:     naproxen sodium (ALEVE) 220 MG tablet, Take 1 tablet (220 mg total) by mouth 2 (two) times a day with meals, Disp: 20 tablet, Rfl: 0    pravastatin (PRAVACHOL) 40 mg tablet, TAKE 1 TABLET BY MOUTH AT BEDTIME, Disp: 30 tablet, Rfl: 3    ALLERGIES:  Allergies   Allergen Reactions    Methotrexate Hives       REVIEW OF SYSTEMS:  Pertinent items are noted in HPI  A comprehensive review of systems was negative      LABS:  HgA1c: No results found for: HGBA1C  BMP:   Lab Results   Component Value Date    GLUCOSE 89 2016    CALCIUM 9 9 2018     2016    K 4 4 2022    CO2 26 2022     01/13/2022    BUN 18 01/13/2022    CREATININE 0 70 01/13/2022         _____________________________________________________  PHYSICAL EXAMINATION:  Vital signs: /80   Ht 5' 4" (1 626 m)   Wt 64 3 kg (141 lb 12 8 oz)   LMP  (LMP Unknown)   BMI 24 34 kg/m²   General: well developed and well nourished, alert, oriented times 3 and appears comfortable  Psychiatric: Normal  HEENT: Trachea Midline, No torticollis  Cardiovascular: No discernable arrhythmia  Pulmonary: No wheezing or stridor  Abdomen: No rebound or guarding  Extremities: No peripheral edema  Skin:  See complete musculoskeletal examination below  Neurovascular:  See complete musculoskeletal examination below    MUSCULOSKELETAL EXAMINATION:  Left wrist and hand  · Skin intact  · Mild thenar atrophy  · Positive Tinel's at the wrist  · Positive Durkan compression  · 4/5 thumb opposition  · 5/5 motor otherwise  · Slightly diminished sensation median nerve distribution  · Digits warm and well perfused  · Nodule 5x5 over the long finger tendon consistant with dupuytren's disease with no contracture    _____________________________________________________  STUDIES REVIEWED:  No Studies to review      PROCEDURES PERFORMED:  Procedures  No Procedures performed today     Scribe Attestation    I,:  Shital Chambers PA-C am acting as a scribe while in the presence of the attending physician :       I,:  Twila Carrero MD personally performed the services described in this documentation    as scribed in my presence :           I interviewed, took the history and examined the patient  I discussed the case with the Resident and reviewed the Resident's note  I supervised the Resident and I agree with the Resident management plan as it was presented to me  I ws present in the clinic and examined the patient        Scribe Attestation    I,:  Shital Chambers PA-C am acting as a scribe while in the presence of the attending physician :       I,:  [employer] Lisseth Valdez MD personally performed the services described in this documentation    as scribed in my presence :

## 2022-11-09 ENCOUNTER — PREP FOR PROCEDURE (OUTPATIENT)
Dept: OBGYN CLINIC | Facility: CLINIC | Age: 62
End: 2022-11-09

## 2022-11-17 ENCOUNTER — HOSPITAL ENCOUNTER (OUTPATIENT)
Facility: HOSPITAL | Age: 62
Setting detail: OUTPATIENT SURGERY
Discharge: HOME | End: 2022-11-17
Attending: ORTHOPAEDIC SURGERY | Admitting: ORTHOPAEDIC SURGERY

## 2022-11-17 VITALS
RESPIRATION RATE: 18 BRPM | OXYGEN SATURATION: 98 % | DIASTOLIC BLOOD PRESSURE: 74 MMHG | SYSTOLIC BLOOD PRESSURE: 159 MMHG | TEMPERATURE: 97.6 F | HEART RATE: 65 BPM

## 2022-11-17 DIAGNOSIS — G56.02 CARPAL TUNNEL SYNDROME ON LEFT: Primary | ICD-10-CM

## 2022-11-17 RX ORDER — HYDROCODONE BITARTRATE AND ACETAMINOPHEN 5; 325 MG/1; MG/1
1 TABLET ORAL EVERY 6 HOURS PRN
Qty: 5 TABLET | Refills: 0 | Status: SHIPPED | OUTPATIENT
Start: 2022-11-17 | End: 2022-11-27

## 2022-11-17 RX ORDER — TRAMADOL HYDROCHLORIDE 50 MG/1
50 TABLET ORAL EVERY 6 HOURS PRN
Status: CANCELLED | OUTPATIENT
Start: 2022-11-17

## 2022-11-17 RX ORDER — LIDOCAINE HYDROCHLORIDE AND EPINEPHRINE 10; 10 MG/ML; UG/ML
20 INJECTION, SOLUTION INFILTRATION; PERINEURAL ONCE
Status: COMPLETED | OUTPATIENT
Start: 2022-11-17 | End: 2022-11-17

## 2022-11-17 RX ORDER — ONDANSETRON 2 MG/ML
4 INJECTION INTRAMUSCULAR; INTRAVENOUS EVERY 6 HOURS PRN
Status: CANCELLED | OUTPATIENT
Start: 2022-11-17

## 2022-11-17 RX ORDER — MAGNESIUM HYDROXIDE 1200 MG/15ML
LIQUID ORAL AS NEEDED
Status: DISCONTINUED | OUTPATIENT
Start: 2022-11-17 | End: 2022-11-17 | Stop reason: HOSPADM

## 2022-11-17 RX ORDER — SENNOSIDES 8.6 MG
650 CAPSULE ORAL EVERY 8 HOURS PRN
Qty: 30 TABLET | Refills: 0 | Status: SHIPPED | OUTPATIENT
Start: 2022-11-17

## 2022-11-17 RX ORDER — ACETAMINOPHEN 325 MG/1
650 TABLET ORAL EVERY 6 HOURS PRN
Status: CANCELLED | OUTPATIENT
Start: 2022-11-17

## 2022-11-17 RX ORDER — COVID-19 ANTIGEN TEST
220 KIT MISCELLANEOUS 2 TIMES DAILY
Qty: 60 CAPSULE | Refills: 0 | Status: SHIPPED | OUTPATIENT
Start: 2022-11-17 | End: 2022-12-17

## 2022-11-17 RX ADMIN — LIDOCAINE HYDROCHLORIDE,EPINEPHRINE BITARTRATE 15 ML: 10; .01 INJECTION, SOLUTION INFILTRATION; PERINEURAL at 07:14

## 2022-11-17 NOTE — OP NOTE
OPERATIVE REPORT  PATIENT NAME: Kevin Urena  :  1960  MRN: [id number]  Pt Location: [facility]    SURGERY DATE: 22    Surgeon(s) and Role:     * Kodi Cabezas MD - Primary     * Brown Mahoney PA-C - Assisting    Pre-Op Diagnosis:  Carpal tunnel syndrome of left wrist [G56 02]    Post-Op Diagnosis:   Carpal tunnel syndrome of left wrist [G56 02]    Procedure(s) (LRB):  RELEASE CARPAL TUNNEL ENDOSCOPIC, LEFT (Left)    Specimen(s):  * No orders in the log *    Estimated Blood Loss:   Minimal      Anesthesia Type:   Local    Operative Indications: The patient has a history of Carpal Tunnel Syndrome  left that was recalcitrant to conservative management  The decision was made to bring the patient to the operating room for Endoscopic Carpal Tunnel Release  left  Risks of the procedure were explained which include, but are not limited to bleeding; infection; damage to nerves, arteries,veins, tendons; scar; pain; need for reoperation; failure to give desired result; and risks of anaesthesia  All questions were answered to satisfaction and they were willing to proceed  Operative Findings:  Left carpal tunnel syndrome    Complications:   None    Procedure and Technique:  After the patient, site, and procedure were identified, the patient was brought into the operating room in a supine position  Local anaesthesia was adminstered in the preoperative holding area  A tourniquet was not used  The  left upper extremity was then prepped and drapped in a normal, sterile, orthopedic fashion  After reconfirmation of the patient, site, and surgical procedure, which was agreed upon by the entire surgical team, attention was turned to the left wrist   The sites of the proximal and distal incisions were marked    The cam of the proximal incision was placed horizontally at the midline of the wrist   The distal incision cam was longitudinal extending distally from the point of intersection of the line between the long finger and ring finger and the line along the distal border of the fully abducted thumb  The proximal incision was performed  Subcutaneous tissues were dissected  Then the transverse volar antebrachial fascia was perforated with a scalpel  The edges of the skin incision where retracted and the forearm fascia was incised for approximately 1 5 cm proximally with care taken to identify and protect the median nerve  Retractors were used to inspect the transverse carpal ligament distally  A curved Conley dissector was used to glide under the transverse carpal ligament and superficial to the median nerve with confirmation via the washboard feeling  Then the curved Conley was pushed into the palm toward the distal incision site  When the location of the distal skin cam was adequate, the distal incision was made  Then with retraction of the skin, further dissection and perforation of the palmar fascia was performed with the use of tenotomy scissors  The curved Conley was guided from proximal to distal out the distal incisions without any twisting to allow for dilation of the tract  The curved Conley was removed, and the cannula for the camera was inserted along the same tract, making sure to keep the alignment post on the cannula perpendicular to the plane of the hand without twisting  Then while keeping the wrist in extension, and holding the cannula of the camera in place, the wrist was placed on the hyperextension board  The scope was inserted distally, and a cotton-tip applicator was used proximally to clean the tract as well as the scope  A curved cutting knife was introduced from proximal to distal while keeping visualization with the use of the camera  Without twisting of the canula, the knife was used to cut the transverse carpal ligament completely, making sure there were no remnant fibers  Then after this was accomplished, the hand was removed from the extension block    Three maneuvers were used to confirm the full release of the transverse carpal ligament  First, the ease of twisting the trocar of the camera confirmed the release of the ligament  Second, the curved Conley was introduced to make sure there were no remnant fibers that could be felt palmarly  Third, the scope was introduced again to visualize that the whole ligament was released proximally to distally  Additional confirmation of full release included retraction and inspection in the distal and proximal incisions to make sure there were no remnant fibers distally or proximally respectively  At the completion of the procedure, hemostasis was obtained with cautery and direct pressure  The wounds were copiously irrigated with sterile solution  The wounds were closed with Prolene  Sterile dressings were applied, including Xeroform, gauze, tweeners, webril, ACE  Please note, all sponge, needle, and instrument counts were correct prior to closure  Loupe magnification was utilized  The patient tolerated the procedure well       I was present for all critical portions of the procedure, A qualified resident physician was not available and A physician assistant was required during the procedure for retraction tissue handling,dissection and suturing    Patient Disposition:  PACU  and hemodynamically stable    SIGNATURE: Priscila Jimenez MD  DATE: 11/17/22  TIME: 8:38 AM

## 2022-11-17 NOTE — H&P
ASSESSMENT/PLAN:    Assessment:   Carpal Tunnel Syndrome  left    Plan:   US left wrist   Endoscopic Carpal Tunnel Release  left    Follow Up: After surgery    To Do Next Visit:  Sutures out    Operative Discussions:  Cubital Tunnel Release: The anatomy and physiology of cubital tunnel syndrome were discussed with the patient today in the office  Typically, increased elbow flexion activities decrease blood flow within the intraneural spaces, resulting in a feeling of numbness, tingling, weakness, or clumsiness within the hand and fingers  Occasionally, anatomic structures such as medial elbow osteophytes, the medial head of the triceps, were subluxing ulnar nerve may result in increased pressure or aggravation at the cubital tunnel  Typical signs and symptoms usually include numbness and tingling within the ring and small finger, weakness with , and weakness with pinch  Conservative treatment and includes nocturnal bracing to keep the elbow in a semi-extended position, activity modification, therapy, and avoiding excessive elbow flexion activities  A majority of patients typically respond to conservative treatment over a period of approximately 3-6 months  EMG/NCV testing of the ulnar nerve at the elbow is not as reliable as carpal tunnel syndrome  Surgical intervention in the form of in situ release of the ulnar nerve at the elbow or ulnar nerve transposition may be required in up to 20% of patients  The patient has elected to undergo an cubital tunnel release  The possibility of converting to an open procedure, or a subcutaneous or submuscular ulnar nerve transposition depending on the nerve stability was discussed with the patient  Typically, in the postoperative period, light activities are allowed immediately, driving is allowed when narcotic medications have stopped, and the incision may get wet after 5 days  Heavy activities will be allowed after follow up appointment in 1-2 weeks  Anti-inflammatory medications should be held for approximately 5 days postoperative  While the pain within the ring and small finger of the hands generally improves rapidly, the numbness and tingling, as well as the strength, will slowly improve over a period of weeks to months  Total recovery can take up to 18 months from the time of surgery  Numbness and tingling near the incision, or near the medial aspect of the forearm was discussed with the patient  The patient has an understanding of the above mentioned discussion  The risks and benefits of the procedure were explained to the patient, which include, but are not limited to: Bleeding, infection, recurrence, pain, scar, damage to tendons, damage to nerves, and damage to blood vessels, failure to give desired results and complications related to anesthesia   These risks, along with alternative conservative treatment options, and postoperative protocols were voiced back and understood by the patient   All questions were answered to the patient's satisfaction   The patient agrees to comply with a standard postoperative protocol, and is willing to proceed   Education was provided via written and auditory forms   There were no barriers to learning  Written handouts regarding wound care, incision and scar care, and general preoperative information was provided to the patient   Prior to surgery, the patient may be requested to stop all anti-inflammatory medications   Prophylactic aspirin, Plavix, and Coumadin may be allowed to be continued   Medications including vitamin E , ginkgo, and fish oil are requested to be stopped approximately one week prior to surgery   Hypertensive medications and beta blockers, if taken, should be continued  _____________________________________________________  CHIEF COMPLAINT:  No chief complaint on file          SUBJECTIVE:  Jessi Meeks is a 58 y o  female who presents as an established patient for continued evaluation treatment of left carpal tunnel syndrome  Of note, patient has a history of rheumatoid arthritis and during her last visit was adjusting some medications to see if it would alleviate any of her carpal tunnel syndrome symptoms  She continues to have numbness and tingling to the median nerve distribution as well as weakness in the hand despite these medication changes  She endorses some clumsiness in the hand  None of the symptoms have responded to nighttime splinting  No other complaints      PAST MEDICAL HISTORY:  Past Medical History:   Diagnosis Date   • Chronic left-sided low back pain without sciatica 3/18/2020   • Concussion    • Hyperlipidemia    • Psoriatic arthritis ([facility] [address] ) 2019   • [facility]   • Tendinitis        PAST SURGICAL HISTORY:  Past Surgical History:   Procedure Laterality Date   •  SECTION     • FOOT SURGERY Left    • HAND SURGERY Left 10/2021   • HEMORROIDECTOMY     • KNEE ARTHROSCOPY Right 2018   • NE FIX COLAT LIG,I-P JT,GRAFT,EACH Right 10/21/2021    Procedure: right thumb Ulnar collateral ligament repair;  Surgeon: Ace Mcdermott MD;  [facility];  Service: Orthopedics   • NE INCISE FINGER TENDON SHEATH Right 10/21/2021    Procedure: Right small finger trigger finger release;  Surgeon: Ace Mcdermott MD;  [facility]: [facility];  Service: Orthopedics   • NE KNEE SCOPE,MED/LAT MENISECTOMY Right 2018    Procedure: ARTHROSCOPY KNEE, PARTIAL MEDIAL MENISCECTOMY: ABRASION CHONDROPLASTY;  Surgeon: Rashid Sneed MD;  [facility];  Service: Orthopedics   • NE WRIST Gale Rave LIG Right 10/21/2021    Procedure: Right endoscopic carpal tunnel release;  Surgeon: Ace Mcdermott MD;  [facility]: [facility];  Service: Orthopedics   • TUBAL LIGATION         FAMILY HISTORY:  Family History   Problem Relation Age of Onset   • Coronary artery disease Mother    • Other Mother         CABG   • Hiatal hernia Father    • Stroke Maternal Grandmother    • Hyperlipidemia Family    • Migraines Family    • Thyroid disease Family    • No Known Problems Sister    • No Known Problems Brother    • No Known Problems Son    • No Known Problems Daughter    • No Known Problems Paternal Grandmother    • No Known Problems Paternal Grandfather    • No Known Problems Maternal Aunt    • No Known Problems Maternal Uncle    • No Known Problems Paternal Aunt    • No Known Problems Paternal Uncle    • No Known Problems Cousin        SOCIAL HISTORY:  Social History     Tobacco Use   • Smoking status: Former     Packs/day: 0 33     Years: 5 00     Pack years: 1 65     Types: Cigarettes     Quit date:      Years since quittin 9   • Smokeless tobacco: Never   Vaping Use   • Vaping Use: Never used   Substance Use Topics   • Alcohol use: Yes     Comment: social   • Drug use: No       MEDICATIONS:  No current facility-administered medications for this encounter  ALLERGIES:  Allergies   Allergen Reactions   • Methotrexate Hives       REVIEW OF SYSTEMS:  Pertinent items are noted in HPI  A comprehensive review of systems was negative      LABS:  HgA1c: No results found for: HGBA1C  BMP:   Lab Results   Component Value Date    GLUCOSE 89 2016    CALCIUM 9 9 2018     2016    K 4 4 2022    CO2 26 2022     2022    BUN 18 2022    CREATININE 0 70 2022         _____________________________________________________  PHYSICAL EXAMINATION:  Vital signs: /80   Pulse 75   Temp 97 9 °F (36 6 °C) (Temporal)   Resp 16   LMP  (LMP Unknown)   SpO2 98%   General: well developed and well nourished, alert, oriented times 3 and appears comfortable  Psychiatric: Normal  HEENT: Trachea Midline, No torticollis  Cardiovascular: No discernable arrhythmia  Pulmonary: No wheezing or stridor  Abdomen: No rebound or guarding  Extremities: No peripheral edema  Skin:  See complete musculoskeletal examination below  Neurovascular:  See complete musculoskeletal examination below    MUSCULOSKELETAL EXAMINATION:  Left wrist and hand  · Skin intact  · Mild thenar atrophy  · Positive Tinel's at the wrist  · Positive Durkan compression  · 4/5 thumb opposition  · 5/5 motor otherwise  · Slightly diminished sensation median nerve distribution  · Digits warm and well perfused    _____________________________________________________  STUDIES REVIEWED:  No Studies to review      PROCEDURES PERFORMED:  Procedures  No Procedures performed today       I interviewed, took the history and examined the patient  I discussed the case with the Resident and reviewed the Resident's note  I supervised the Resident and I agree with the Resident management plan as it was presented to me  I ws present in the clinic and examined the patient

## 2022-11-18 ENCOUNTER — TELEPHONE (OUTPATIENT)
Dept: OBGYN CLINIC | Facility: HOSPITAL | Age: 62
End: 2022-11-18

## 2022-11-18 NOTE — TELEPHONE ENCOUNTER
Caller: Mirella Tompkins    Doctor: Chey Garland    Reason for call: She would like her 11/30 appt moved to [facility]       Call back#: [phone]

## 2022-11-30 ENCOUNTER — OFFICE VISIT (OUTPATIENT)
Dept: OBGYN CLINIC | Facility: HOSPITAL | Age: 62
End: 2022-11-30

## 2022-11-30 VITALS — BODY MASS INDEX: 24.07 KG/M2 | HEIGHT: 64 IN | WEIGHT: 141 LBS

## 2022-11-30 DIAGNOSIS — G56.02 CARPAL TUNNEL SYNDROME ON LEFT: Primary | ICD-10-CM

## 2022-11-30 NOTE — PROGRESS NOTES
Assessment:   S/P Release Carpal Tunnel Endoscopic, Left - Left on 11/17/2022    Plan:   Patient was advised that she can have palmar pain for 3-4 months after surgery which is normal   She was advised to avoid soaking for at least another week to allow the incisions to heal   She was advised use heat massage as demonstrated in the office  She was provided a carpal gel sleeve  She will follow-up with us as needed    Follow Up:  PRN    To Do Next Visit:  Re-evaluation      CHIEF COMPLAINT:  Chief Complaint   Patient presents with   • Left Wrist - Post-op, Suture / Staple Removal     ECTR- 11/17/22         SUBJECTIVE:  Ana Hogue is a 58 y o  female who presents for follow up after Release Carpal Tunnel Endoscopic, Left - Left on 11/17/2022  Today patient has minimal pain and discomfort  She states she has improvement in her numbness and her tingling  She does not wake up from sleep at night         PHYSICAL EXAMINATION:  Vital signs: Ht 5' 4" (1 626 m)   Wt 64 kg (141 lb)   LMP  (LMP Unknown)   BMI 24 20 kg/m²   General: well developed and well nourished, alert, oriented times 3 and appears comfortable  Psychiatric: Normal    MUSCULOSKELETAL EXAMINATION:  Incision: Clean, dry, intact  Range of Motion: As expected, opposition intact and full composite fist possible  Neurovascular status: Neuro intact, good cap refill  Activity Restrictions: No restrictions  Done today: Sutures out      STUDIES REVIEWED:  No Studies to review      PROCEDURES PERFORMED:  Procedures  No Procedures performed today

## 2022-12-05 NOTE — PROGRESS NOTES
Assessment and Plan:   Ms Jame Parker female with history significant for psoriatic arthritis, bilateral knee osteoarthritis, right knee meniscal tear status post surgery and right shoulder rotator cuff tears with subdeltoid/subacromial bursitis, who presents for a follow-up   She is currently on subcutaneous Orencia 125 mg once weekly that was started in February 2022 and leflunomide 20 mg once daily that was started in June 2022         Rheumatic summary:  1) Diagnosed with PsA (subtle erosive changes on hand XRs) in 8/2019 - started on MTX, but d/c'ed in 9/2019 due to side effects of skin rash  Started on LEF 10/2019-present    - 7/20/21: presenting with flare up x 1 month - prednisone taper x 15 days and continue LEF  If no sustained improvement will d/c LEF and start adalimumab    - 2/1/22: adalimumab started in 10/2021 - ineffective, will d/c and start Orencia  Steroid taper    - 6/29/22: Deisi Cassette started in 2/22 - still symptomatic, will add on LEF 20 mg and steroid taper  - 12/7/22: d/c Orencia and LEF, will start infliximab infusions and obtain hand/wrist US  2) Co-morbidities: knee OA, right carpal tunnel syndrome, lumbar DJD         # Psoriatic arthritis  - Shante presents today for a follow-up of psoriatic arthritis which overall has been flaring since June 2021 and with changes in DMARD therapy has not improved her symptoms at all  Her presentation is still concerning to me for an inflammatory arthritis but I would like to obtain an ultrasound of her hands and wrist to confirm the diagnosis as well as assess the degree of inflammation  In the meanwhile as she has not obtained any benefit with the Orencia and leflunomide I requested she discontinue this and I will start her on infliximab infusions 3 mg/kg 8 weeks 0, 2, 6 and then every 8 weeks    She is up-to-date with a baseline hepatitis and TB test   I will also provide her with a prescription of prednisone to take 10 to 20 mg once a day as needed  She is due to update high risk medication lab monitoring at this time as she has not had any blood work done while on the leflunomide  Plan:  Diagnoses and all orders for this visit:    Psoriatic arthritis ([facility] )  -     [address] MSK limited; Future  -     predniSONE 10 mg tablet; Take 1-2 tabs daily as needed  Arthralgia of hands, bilateral  -     [place] MSK limited; Future    Long-term use of immunosuppressant medication    High risk medication use  -     CBC and differential; Future  -     Comprehensive metabolic panel; Future  -     C-reactive protein; Future  -     Sedimentation rate, automated; Future  -     CBC and differential  -     Comprehensive metabolic panel  -     C-reactive protein  -     Sedimentation rate, automated    Primary generalized (osteo)arthritis      Activities as tolerated  Exercise: try to maintain a low impact exercise regimen as much as possible  Continue other medications as prescribed by PCP and other specialists  RTC in 3 months          HPI    INITIAL VISIT NOTE:  Ms Heriberto Harrison a 45-year-old  female with history significant for mild right knee osteoarthritis, right knee meniscal tear status post surgery and right shoulder rotator cuff tears with subdeltoid/subacromial bursitis, who presents for further evaluation of diffuse joint pains and an elevated C reactive protein of 81   She is referred by Dr Dennys Tapia      Patient states in November 2017 she had an accidental injury resulting in a concussion, and states overall she has not felt well since then  Danyel Browne is able to recall more diffuse joint pains starting in December 2018, and states they have been more prominent as well as progressive since then   She experiences her symptoms on a daily basis in a mostly constant manner   She states it is significantly interfering with her activities of daily living, as well as with her sleeping habits   She describes pain most prominently affecting her bilateral shoulders, bilateral wrists, to a lesser degree affecting her bilateral hands, low back region, hips and knees   She does not really experience joint pains affecting her elbows, ankles or feet   She has subjectively only noticed swelling affecting her knees   She experiences morning stiffness which affects her diffusely and persists throughout the day   She states any sort of activity aggravates her symptoms, but she does not necessarily obtain relief with resting   At the onset of her symptoms in February/March 2019 she was prescribed a 10 day course of prednisone starting at 40 mg daily, which she states significantly helped her  Denniskevin Song was then represcribed a course of prednisone of the same duration in June 2019 which also helped her  Sonny Song states the symptoms recur quickly after cessation of the steroids   She was recently also prescribed tramadol and tizanidine for the pain, but this has not really been helping her  Denniskevin Song has also tried over-the-counter ibuprofen, Advil, Aleve and naproxen without significant benefit of her symptoms      She denies any recent fevers, chills, night sweats, unintentional weight loss or infections prior to the onset of her joint complaints   She denies significant dry eyes, inflammatory eye disease, dry mouth, skin rashes, psoriasis, mouth/nose ulcers, shortness of breath, abdominal pain, vomiting, diarrhea, blood in stools, inflammatory bowel disease or family history of rheumatoid arthritis/autoimmune disease      She was seen by her primary care physician for the ongoing complaints and had recent testing done which showed an elevated C reactive protein of 81   RIGO screen, rheumatoid factor, uric acid and Lyme antibody profile were unremarkable   In view of the ongoing right shoulder pain she recently had an MRI of her right shoulder done which showed partial-thickness rotator cuff tears as well as subacromial/subdeltoid bursitis   She has been following up with Orthopedics for this, and it is not thought that the rotator cuff tears should correspond to the symptoms she has been experiencing  Hammad Alves also received bilateral intra-articular cortisone shoulder injections which only provided her with temporary relief   She was advised to follow-up with Rheumatology first, and if her symptoms are not to improve then there may be consideration for an arthroscopic procedure         8/27/2019:  Patient presents for a follow-up today  Mark Mauro reviewed her recently done labs which showed an unremarkable CBC, CMP, ESR, anti CCP antibody, Sjogren's antibodies and chronic hepatitis panel   X-ray of her right hand showed periarticular soft tissue swelling of the 2nd through 5th PIP joints, as well as subtle juxta-articular erosions of the 2nd through 5th DIP joints   X-ray of her left hand and bilateral feet did not show any signs of inflammatory arthritis      Following the prior office visit patient had started prednisone 40 mg daily for 7 days, and is currently on prednisone 20 mg daily   She states being on the steroids has significantly improved her symptoms, and she is currently denying any joint pains, swelling or morning stiffness   She is feeling well today and denies any complaints   Of note she denies a history of psoriasis or skin rashes   She denies a family history of psoriasis         11/27/2019:  Patient presents for a follow-up of psoriatic arthritis  Hammad Alves is currently on leflunomide 20 mg once daily and prednisone 10 mg once daily      At the last office visit I had started her on methotrexate but after taking a few doses she called the office with an outbreak of a skin rash affecting her right upper extremity   I requested she try the medication once more to ensure this was related to a side effect of the methotrexate, and she states that the rash persisted and worsened   In view of this we discontinued the methotrexate entirely and I switched her to leflunomide 20 mg once daily which she has been on for approximately 1 month now  Kacey Garza has been tolerating the medication well without any concerns for side effects or infections      She reports overall her joint pains have significantly improved   She did try to taper down on the prednisone to 5 mg once daily and then discontinue it, but states upon discontinuing her joint pains recurred   In view of this she restarted prednisone at 5 mg once daily, but over the past few days further increased to 10 mg once daily due to increased activities causing worsening joint pains   She is willing to try and taper down on the prednisone at this time   She reports pain that she usually experiences will affect her shoulders and knees   She denies any other significant areas of joint pains or persistent swelling   On occasion she will notice swelling of her knees   She states that the morning stiffness has mostly resolved as well      She denies any other history of skin rash or psoriasis         3/3/2020:  Patient presents for a follow-up of psoriatic arthritis  Kacey Garza is currently on leflunomide 20 mg once daily   I reviewed her recently done CBC and CMP which were unremarkable      Patient reports overall in terms of the arthritis she has been doing well and denies any significant peripheral joint pains, swelling or morning stiffness   For the past 3-4 months she has been dealing with left lower back pain and was seen in the emergency room for this   On occasion the pain will radiate down her leg   An x-ray of her lumbar spine was done which showed mild degenerative arthritis at two levels   She is following up with her primary care physician for this and completing a course of physical therapy  Kacey Garza is unsure if the PT has really helped her so far  Kacey Garza has been taking ibuprofen on a daily basis to take the edge off her symptoms      She also reports 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), she may develop achiness in her upper arm, chest and back region   Typically when this happens she may take prednisone 5-10 mg as needed which will help her  Liana Brown uses the prednisone on a very infrequent basis      She denies a history of skin rash or psoriasis  Liana Brown has been tolerating the leflunomide well without any concerns for side effects or infections         6/2/2020:  Patient presents for a follow-up of psoriatic arthritis  Liana Brown is currently on leflunomide 20 mg once daily and prednisone 5-10 mg daily as needed      Patient reports in April she was experiencing a flare-up diffuse hives, for which she increased the prednisone to 10 mg once daily and ran out of her script early  Liana Brown has been out of the prednisone for the past few weeks   She reports without the steroids she does notice a change in her joint pains, which will primarily affect her shoulders, neck and knees   She has noticed intermittent swelling affecting her knees   She does not really experience any morning stiffness, but will have a sensation of diffuse achiness as well as fatigue   She is not taking any over-the-counter pain medications   She reports for maintenance therapy she is mostly taking the leflunomide 20 mg once daily as well as prednisone 5 mg once daily, and this seems to control her symptoms      She has been tolerating her medications well without any concerns for side effects or infections         11/9/2020:  Patient presents for a follow-up of psoriatic arthritis  Liana Brown is currently on leflunomide 20 mg once daily and prednisone 5-10 mg once daily as needed   I reviewed her recently done CBC and CMP which were normal      Patient reports she has overall been doing well and this is the best that she has felt  Liana Brown was able to start exercising at the gym again and thinks that this has helped with her shoulder pain   Over the past few weeks she has noticed pain and swelling affecting her left knee but cannot recall any injuries   Other than the left knee pain she is denying any other joint pains and has not noticed joint swelling   She is no longer experiencing morning stiffness   She still takes the prednisone as needed but thinks she is now able to taper off it completely   She will also take Tylenol as needed      She has been tolerating the leflunomide well without any concerns for side effects or infections   She has not had any psoriasis         3/10/2021:  Patient presents for a follow-up of psoriatic arthritis  Dimitri Marrufo is currently on leflunomide 20 mg once daily   I reviewed her recently done CBC and CMP which were normal        She reports since the last office visit she was able to discontinue the steroids without a flare-up, and manages her joint pains with Tylenol as needed   She was experiencing pain affecting her left knee and right shoulder, but she was seen by Orthopedics and received intra-articular cortisone injections which helped   Last week she did have an accidental fall on the ice and fell on her right hand   She was seen in urgent care and had an x-ray done which did not show any fractures   She was advised that she probably sprained her right wrist and is continuing with conservative measures   She has been unable to rest her wrist as due to her occupation as a  she is constantly using her hands   Recently she has also noticed numbness affecting the lateral fingers on her right hand mostly at night, but at certain times during the day as well while she is working      She denies any other joint pains, joint swelling or morning stiffness   She has been tolerating the leflunomide well without any concerns for side effects or infections   She has not had any psoriasis          7/20/2021:  Patient presents for a follow-up of psoriatic arthritis  Dimitri Marrufo is currently on leflunomide 20 mg once daily   She has not had her recent high risk medication lab monitoring done        She reports since the last visit, especially in the past 1 month she has noticed a flare-up in her joint pains   She describes an overall body achiness associated with fatigue but also mentions specific joint pains involving her left knee and right ankle   She has noticed swelling to affect her right wrist, left knee and right ankle   She experiences morning stiffness which affects her diffusely and can take about an hour to improve   She takes over-the-counter Tylenol as needed which helps to some degree   She has previously been told not to take NSAIDs but is unsure the reason why   She has not been on any recent steroids      She has also been following up with [facility] for right hand and wrist pain which has persisted following a fall and sprain in March 2021   She did have an ultrasound of her right wrist done looking for carpal tunnel syndrome and this was suspicious for the condition   An MRI of her right thumb in June 2021 showed a partial tear of the ulnar collateral ligament as well as mild 1st MCP joint osteoarthritis      She has been tolerating the leflunomide well without any concerns for side effects or infections   She has not had any psoriasis         2/1/2022:  Patient presents for a follow-up of psoriatic arthritis  She is currently on subcutaneous adalimumab 40 mg every 2 weeks that was started in October 2021        Unfortunately there has been no improvement in her symptoms and apart from experiencing widespread body aches, she is also noticing significant pain in her hands, wrists and right ankle  She feels like her hands, wrists and right ankle have also been swollen  She experiences morning stiffness which affects her diffusely and can take a few hours to improve  She does take over-the-counter NSAIDs as needed but this does not really help    A few weeks ago she was prescribed a tapering course of prednisone starting at 20 mg once daily but she states that this did not really help her           6/29/2022:  Patient presents for a follow-up of psoriatic arthritis  She is currently on subcutaneous Orencia 125 mg once weekly that was started in February 2022        She reports with switching from the adalimumab to [place] she did notice some improvement in her joint pains but she is still quite symptomatic with pain in her hands, wrists, knees and ankles  She still has swelling in her hands and ankles  She experiences morning stiffness affecting her diffusely that takes about 30 minutes to improve  No recent steroid use  She is taking Aleve twice a day as needed but this does not help significantly  12/7/2022:  Patient presents for a follow-up of psoriatic arthritis  She is currently on subcutaneous Orencia 125 mg once weekly that was started in February 2022 and leflunomide 20 mg once daily that was started in June 2022  She has not updated her high risk medication lab monitoring recently  She reports with this regimen she has not noticed any improvement in her symptoms  She is primarily complaining of continued swelling and stiffness affecting her hands and recently has had more pain in her knees and ankles  Her left knee has also been swollen  She is not describing particular morning stiffness and reports that the stiffness in her hands persists throughout the day  No recent steroid use and no consistent NSAID use  She has some dryness present on the hands but no clear psoriasis  The following portions of the patient's history were reviewed and updated as appropriate: allergies, current medications, past family history, past medical history, past social history, past surgical history and problem list       Review of Systems  Constitutional: Negative for weight change, fevers, chills, night sweats, fatigue  ENT/Mouth: Negative for hearing changes, ear pain, nasal congestion, sinus pain, hoarseness, sore throat, rhinorrhea, swallowing difficulty  Eyes: Negative for pain, redness, discharge, vision changes     Cardiovascular: Negative for chest pain, SOB, palpitations  Respiratory: Negative for cough, sputum, wheezing, dyspnea  Gastrointestinal: Negative for nausea, vomiting, diarrhea, constipation, pain, heartburn  Genitourinary: Negative for dysuria, urinary frequency, hematuria  Musculoskeletal: As per HPI  Skin: Negative for skin rash, color changes  Neuro: Negative for weakness, numbness, tingling, loss of consciousness  Psych: Negative for anxiety, depression  Heme/Lymph: Negative for easy bruising, bleeding, lymphadenopathy          Past Medical History:   Diagnosis Date   • Chronic left-sided low back pain without sciatica 3/18/2020   • Concussion    • Hyperlipidemia    • Psoriatic arthritis ([facility] ) 2019   • Seronegative rheumatoid arthritis ([facility] ) 2019   • Tendinitis        Past Surgical History:   Procedure Laterality Date   •  SECTION     • FOOT SURGERY Left    • HAND SURGERY Left 10/2021   • HEMORROIDECTOMY     • KNEE ARTHROSCOPY Right 2018   • MA FIX COLAT LIG,I-P JT,GRAFT,EACH Right 10/21/2021    Procedure: right thumb Ulnar collateral ligament repair;  Surgeon: Jearldine Goodell, MD;  Location: [facility];  Service: Orthopedics   • MA INCISE FINGER TENDON SHEATH Right 10/21/2021    Procedure: Right small finger trigger finger release;  Surgeon: Jearldine Goodell, MD;  Location: [facility];  Service: Orthopedics   • MA KNEE SCOPE,MED/LAT MENISECTOMY Right 2018    Procedure: ARTHROSCOPY KNEE, PARTIAL MEDIAL MENISCECTOMY: ABRASION CHONDROPLASTY;  Surgeon: Arlene Orantes MD;  Location: [facility];  Service: Orthopedics   • MA WRIST [place] Carol LIG Right 10/21/2021    Procedure: Right endoscopic carpal tunnel release;  Surgeon: Jearldine Goodell, MD;  Location: [facility];  Service: Orthopedics   • MA WRIST [place] Carol LIG Left 2022    Procedure: RELEASE CARPAL TUNNEL ENDOSCOPIC, LEFT;  Surgeon: Jearldine Goodell, MD;  Location: [facility];  Service: Orthopedics   • TUBAL LIGATION         Social History     Socioeconomic History   • Marital status:      Spouse name: Not on file   • Number of children: Not on file   • Years of education: Not on file   • Highest education level: Not on file   Occupational History   • Not on file   Tobacco Use   • Smoking status: Former     Packs/day: 0 33     Years: 5 00     Pack years: 1 65     Types: Cigarettes     Quit date: 46     Years since quittin 9   • Smokeless tobacco: Never   Vaping Use   • Vaping Use: Never used   Substance and Sexual Activity   • Alcohol use: Yes     Comment: social   • Drug use: No   • Sexual activity: Not on file   Other Topics Concern   • Not on file   Social History Narrative   • Not on file     Social Determinants of Health     Financial Resource Strain: Not on file   Food Insecurity: Not on file   Transportation Needs: Not on file   Physical Activity: Not on file   Stress: Not on file   Social Connections: Not on file   Intimate Partner Violence: Not on file   Housing Stability: Not on file       Family History   Problem Relation Age of Onset   • Coronary artery disease Mother    • Other Mother         CABG   • Hiatal hernia Father    • Stroke Maternal Grandmother    • Hyperlipidemia Family    • Migraines Family    • Thyroid disease Family    • No Known Problems Sister    • No Known Problems Brother    • No Known Problems Son    • No Known Problems Daughter    • No Known Problems Paternal Grandmother    • No Known Problems Paternal Grandfather    • No Known Problems Maternal Aunt    • No Known Problems Maternal Uncle    • No Known Problems Paternal Aunt    • No Known Problems Paternal Uncle    • No Known Problems Cousin        Allergies   Allergen Reactions   • Methotrexate Hives       Current Outpatient Medications:   •  predniSONE 10 mg tablet, Take 1-2 tabs daily as needed  , Disp: 60 tablet, Rfl: 0  •  Abatacept (Orencia ClickJect) 723 MG/ML SOAJ, Inject 1 mL (125 mg total) under the skin once a week, Disp: 4 mL, Rfl: 5  •  acetaminophen (TYLENOL) 650 mg CR tablet, Take 1 tablet (650 mg total) by mouth every 8 (eight) hours as needed for mild pain, Disp: 30 tablet, Rfl: 0  •  Glucosamine-Chondroitin (GLUCOSAMINE CHONDR COMPLEX PO), Take by mouth daily  , Disp: , Rfl:   •  multivitamin (THERAGRAN) TABS, Take 1 tablet by mouth daily, Disp: , Rfl:   •  naproxen sodium (ALEVE) 220 MG tablet, Take 1 tablet (220 mg total) by mouth 2 (two) times a day with meals, Disp: 20 tablet, Rfl: 0  •  Naproxen Sodium 220 MG CAPS, Take 1 capsule (220 mg total) by mouth 2 (two) times a day, Disp: 60 capsule, Rfl: 0  •  pravastatin (PRAVACHOL) 40 mg tablet, TAKE 1 TABLET BY MOUTH AT BEDTIME, Disp: 30 tablet, Rfl: 3      Objective:    Vitals:    12/07/22 1243   BP: 158/80   Pulse: 94   SpO2: 97%   Weight: 63 9 kg (140 lb 12 8 oz)   Height: 5' 4" (1 626 m)       Physical Exam  General: Well appearing, well nourished, in no distress  Oriented x 3, normal mood and affect  Ambulating without difficulty  Skin: Good turgor, no rash, unusual bruising or prominent lesions  She has peeling noted on her left palm but reports this could be after her recent carpal tunnel surgery  Dry spots noted on the dorsum of her left hand  Hair: Normal texture and distribution  Nails: Normal color, no deformities  HEENT:  Head: Normocephalic, atraumatic  Eyes: Conjunctiva clear, sclera non-icteric, EOM intact  Extremities: No amputations or deformities, cyanosis, edema  Musculoskeletal:   Hands -she has a stiff appearance present to her hands with slight boutonniere's type deformities noted  There is mild soft tissue swelling appreciated at her bilateral PIP joints without tenderness  The MCPs are unremarkable  She is unable to make a full fist with the left hand  Wrists -there is soft tissue swelling present bilaterally, although she is status post recent left-sided carpal tunnel release  No tenderness    Knees -there is soft tissue swelling with likely an effusion present at her left knee  No tenderness  Neurologic: Alert and oriented  No focal neurological deficits appreciated  Psychiatric: Normal mood and affect  JASON Powell    Rheumatology

## 2022-12-07 ENCOUNTER — OFFICE VISIT (OUTPATIENT)
Dept: RHEUMATOLOGY | Facility: CLINIC | Age: 62
End: 2022-12-07

## 2022-12-07 ENCOUNTER — TELEPHONE (OUTPATIENT)
Dept: RHEUMATOLOGY | Facility: CLINIC | Age: 62
End: 2022-12-07

## 2022-12-07 VITALS
OXYGEN SATURATION: 97 % | DIASTOLIC BLOOD PRESSURE: 80 MMHG | WEIGHT: 140.8 LBS | HEIGHT: 64 IN | BODY MASS INDEX: 24.04 KG/M2 | HEART RATE: 94 BPM | SYSTOLIC BLOOD PRESSURE: 158 MMHG

## 2022-12-07 DIAGNOSIS — M15.0 PRIMARY GENERALIZED (OSTEO)ARTHRITIS: ICD-10-CM

## 2022-12-07 DIAGNOSIS — M25.542 ARTHRALGIA OF HANDS, BILATERAL: ICD-10-CM

## 2022-12-07 DIAGNOSIS — M25.541 ARTHRALGIA OF HANDS, BILATERAL: ICD-10-CM

## 2022-12-07 DIAGNOSIS — Z79.60 LONG-TERM USE OF IMMUNOSUPPRESSANT MEDICATION: ICD-10-CM

## 2022-12-07 DIAGNOSIS — Z79.899 HIGH RISK MEDICATION USE: ICD-10-CM

## 2022-12-07 DIAGNOSIS — L40.50 PSORIATIC ARTHRITIS (HCC): Primary | ICD-10-CM

## 2022-12-07 RX ORDER — PREDNISONE 10 MG/1
TABLET ORAL
Qty: 60 TABLET | Refills: 0 | Status: SHIPPED | OUTPATIENT
Start: 2022-12-07

## 2022-12-07 NOTE — TELEPHONE ENCOUNTER
Please start prior authorization for infliximab infusions 3 mg/kg at weeks 0, 2, 6 and then every 8 weeks for psoriatic arthritis  She has a TB and hepatitis test in her chart  She has previously been on methotrexate, leflunomide, Humira and Orencia

## 2022-12-09 NOTE — TELEPHONE ENCOUNTER
Please let her know this was approved and check with her when she would like to be set up for approximately as she is traveling to [place]  Once we have an approximate date, please send back to me and I will place a therapy plan

## 2022-12-09 NOTE — TELEPHONE ENCOUNTER
Inflectra Approved from 12/15/2022 through 1/26/23 for 6 vials  Approved from 1/27/23 through 12/7/23 2 vials for 56 days with 3 refills    Please place therapy plan for Kandace Millan

## 2022-12-15 ENCOUNTER — TELEPHONE (OUTPATIENT)
Dept: RHEUMATOLOGY | Facility: CLINIC | Age: 62
End: 2022-12-15

## 2022-12-15 NOTE — TELEPHONE ENCOUNTER
Can you please send over a script for Inf[facility] to [facility] they will be delivering it to the infusion center   Thank you

## 2023-01-03 DIAGNOSIS — L40.50 PSORIATIC ARTHRITIS (HCC): ICD-10-CM

## 2023-01-03 RX ORDER — PREDNISONE 10 MG/1
TABLET ORAL
Qty: 60 TABLET | Refills: 0 | Status: SHIPPED | OUTPATIENT
Start: 2023-01-03

## 2023-01-05 ENCOUNTER — TELEPHONE (OUTPATIENT)
Dept: OBGYN CLINIC | Facility: HOSPITAL | Age: 63
End: 2023-01-05

## 2023-01-05 NOTE — TELEPHONE ENCOUNTER
Caller: Patient     Doctor/Office: Nargis Pichardo    Call regarding :  Returning a call to [employer] was transferred to: [employer]

## 2023-01-16 ENCOUNTER — TRANSCRIBE ORDERS (OUTPATIENT)
Dept: ADMINISTRATIVE | Facility: HOSPITAL | Age: 63
End: 2023-01-16

## 2023-01-19 ENCOUNTER — TELEPHONE (OUTPATIENT)
Dept: RHEUMATOLOGY | Facility: CLINIC | Age: 63
End: 2023-01-19

## 2023-01-19 NOTE — TELEPHONE ENCOUNTER
Verbal given to [facility] regarding inflectra, I also faxed over all scipt details for delivery to the infusion center for patients appointment on 1/23

## 2023-01-20 ENCOUNTER — ANCILLARY ORDERS (OUTPATIENT)
Dept: RHEUMATOLOGY | Facility: CLINIC | Age: 63
End: 2023-01-20

## 2023-01-20 ENCOUNTER — HOSPITAL ENCOUNTER (OUTPATIENT)
Dept: RADIOLOGY | Facility: HOSPITAL | Age: 63
Discharge: HOME | End: 2023-01-20
Attending: INTERNAL MEDICINE

## 2023-01-20 DIAGNOSIS — M25.541 ARTHRALGIA OF HANDS, BILATERAL: ICD-10-CM

## 2023-01-20 DIAGNOSIS — L40.50 PSORIATIC ARTHRITIS (HCC): ICD-10-CM

## 2023-01-20 DIAGNOSIS — M25.542 ARTHRALGIA OF HANDS, BILATERAL: ICD-10-CM

## 2023-01-20 DIAGNOSIS — L40.50 PSORIATIC ARTHRITIS (HCC): Primary | ICD-10-CM

## 2023-01-23 ENCOUNTER — HOSPITAL ENCOUNTER (OUTPATIENT)
Dept: INFUSION CENTER | Facility: CLINIC | Age: 63
Discharge: HOME | End: 2023-01-23

## 2023-01-23 ENCOUNTER — TELEPHONE (OUTPATIENT)
Dept: RHEUMATOLOGY | Facility: CLINIC | Age: 63
End: 2023-01-23

## 2023-01-23 NOTE — TELEPHONE ENCOUNTER
----- Message from Sekou Ferguson MD sent at 1/20/2023  5:40 PM EST -----  Please let her know the ultrasound shows multiple areas of inflammation, we will see how she does with the new medication

## 2023-01-23 NOTE — TELEPHONE ENCOUNTER
Spoke with [facility] and gave them all the information they required for delivery of her inflectra medication   Medication will be shipped today to be delivered tomorrow  Between 8-10 30 am

## 2023-02-02 ENCOUNTER — TELEPHONE (OUTPATIENT)
Dept: RHEUMATOLOGY | Facility: CLINIC | Age: 63
End: 2023-02-02

## 2023-02-03 ENCOUNTER — TELEPHONE (OUTPATIENT)
Dept: RHEUMATOLOGY | Facility: CLINIC | Age: 63
End: 2023-02-03

## 2023-02-03 DIAGNOSIS — L40.50 PSORIATIC ARTHRITIS (HCC): Primary | ICD-10-CM

## 2023-02-03 RX ORDER — SODIUM CHLORIDE 9 MG/ML
20 INJECTION, SOLUTION INTRAVENOUS ONCE
Status: CANCELLED | OUTPATIENT
Start: 2023-02-07

## 2023-02-03 RX ORDER — METHYLPREDNISOLONE SODIUM SUCCINATE 40 MG/ML
40 INJECTION, POWDER, LYOPHILIZED, FOR SOLUTION INTRAMUSCULAR; INTRAVENOUS ONCE
Status: CANCELLED | OUTPATIENT
Start: 2023-02-07

## 2023-02-03 RX ORDER — ACETAMINOPHEN 325 MG/1
650 TABLET ORAL ONCE
Status: CANCELLED | OUTPATIENT
Start: 2023-02-07

## 2023-02-03 RX ORDER — DIPHENHYDRAMINE HCL 25 MG
25 TABLET ORAL ONCE
Status: CANCELLED | OUTPATIENT
Start: 2023-02-07

## 2023-02-03 NOTE — TELEPHONE ENCOUNTER
Can u please place a brand new referral for patients inflectra infusion, there is only a remicade one in there, thank u

## 2023-02-04 ENCOUNTER — TELEPHONE (OUTPATIENT)
Dept: INFUSION CENTER | Facility: CLINIC | Age: 63
End: 2023-02-04

## 2023-02-07 ENCOUNTER — HOSPITAL ENCOUNTER (OUTPATIENT)
Dept: INFUSION CENTER | Facility: CLINIC | Age: 63
Discharge: HOME | End: 2023-02-07

## 2023-02-07 VITALS
HEART RATE: 66 BPM | DIASTOLIC BLOOD PRESSURE: 81 MMHG | WEIGHT: 143.3 LBS | RESPIRATION RATE: 20 BRPM | TEMPERATURE: 97.6 F | SYSTOLIC BLOOD PRESSURE: 130 MMHG | BODY MASS INDEX: 24.6 KG/M2

## 2023-02-07 DIAGNOSIS — L40.50 PSORIATIC ARTHRITIS (HCC): Primary | ICD-10-CM

## 2023-02-07 RX ORDER — ACETAMINOPHEN 325 MG/1
650 TABLET ORAL ONCE
OUTPATIENT
Start: 2023-02-21

## 2023-02-07 RX ORDER — METHYLPREDNISOLONE SODIUM SUCCINATE 40 MG/ML
40 INJECTION, POWDER, LYOPHILIZED, FOR SOLUTION INTRAMUSCULAR; INTRAVENOUS ONCE
OUTPATIENT
Start: 2023-02-21

## 2023-02-07 RX ORDER — SODIUM CHLORIDE 9 MG/ML
20 INJECTION, SOLUTION INTRAVENOUS ONCE
OUTPATIENT
Start: 2023-02-21

## 2023-02-07 RX ORDER — DIPHENHYDRAMINE HCL 25 MG
25 TABLET ORAL ONCE
OUTPATIENT
Start: 2023-02-21

## 2023-02-07 RX ORDER — SODIUM CHLORIDE 9 MG/ML
20 INJECTION, SOLUTION INTRAVENOUS ONCE
Status: COMPLETED | OUTPATIENT
Start: 2023-02-07 | End: 2023-02-07

## 2023-02-07 RX ORDER — DIPHENHYDRAMINE HCL 25 MG
25 TABLET ORAL ONCE
Status: COMPLETED | OUTPATIENT
Start: 2023-02-07 | End: 2023-02-07

## 2023-02-07 RX ORDER — ACETAMINOPHEN 325 MG/1
650 TABLET ORAL ONCE
Status: COMPLETED | OUTPATIENT
Start: 2023-02-07 | End: 2023-02-07

## 2023-02-07 RX ORDER — METHYLPREDNISOLONE SODIUM SUCCINATE 40 MG/ML
40 INJECTION, POWDER, LYOPHILIZED, FOR SOLUTION INTRAMUSCULAR; INTRAVENOUS ONCE
Status: COMPLETED | OUTPATIENT
Start: 2023-02-07 | End: 2023-02-07

## 2023-02-07 RX ADMIN — DIPHENHYDRAMINE HYDROCHLORIDE 25 MG: 25 TABLET ORAL at 08:49

## 2023-02-07 RX ADMIN — SODIUM CHLORIDE 20 ML/HR: 0.9 INJECTION, SOLUTION INTRAVENOUS at 08:42

## 2023-02-07 RX ADMIN — ACETAMINOPHEN 650 MG: 325 TABLET ORAL at 08:49

## 2023-02-07 RX ADMIN — METHYLPREDNISOLONE SODIUM SUCCINATE 40 MG: 40 INJECTION, POWDER, FOR SOLUTION INTRAMUSCULAR; INTRAVENOUS at 08:51

## 2023-02-07 RX ADMIN — SODIUM CHLORIDE 195 MG: 9 INJECTION, SOLUTION INTRAVENOUS at 09:21

## 2023-02-07 NOTE — PROGRESS NOTES
Patient tolerated treatment without incident and was discharged post, no c/o offered, patient will schedule dose 3 on discharge and return for dose 2 2/22/23, appt confirmed

## 2023-02-07 NOTE — PATIENT INSTRUCTIONS
February 2023 Sunday Monday Tuesday Wednesday Thursday Friday Saturday                  1     2     3     4       5     6     7    INF THERAPY PLAN   8:30 AM   (240 min )   [facility] 5   [address] 8     9     10     11       12     13     14     15     16     17     18       19     20     21     22    INF THERAPY PLAN   8:00 AM   (240 min )   [facility] 4 [address] 23     24     25       26     27     28

## 2023-02-22 ENCOUNTER — HOSPITAL ENCOUNTER (OUTPATIENT)
Dept: INFUSION CENTER | Facility: CLINIC | Age: 63
Discharge: HOME | End: 2023-02-22

## 2023-02-22 VITALS
RESPIRATION RATE: 18 BRPM | DIASTOLIC BLOOD PRESSURE: 82 MMHG | HEART RATE: 74 BPM | SYSTOLIC BLOOD PRESSURE: 118 MMHG | WEIGHT: 145.5 LBS | BODY MASS INDEX: 24.98 KG/M2 | TEMPERATURE: 97 F

## 2023-02-22 DIAGNOSIS — L40.50 PSORIATIC ARTHRITIS (HCC): Primary | ICD-10-CM

## 2023-02-22 RX ORDER — SODIUM CHLORIDE 9 MG/ML
20 INJECTION, SOLUTION INTRAVENOUS ONCE
Status: CANCELLED | OUTPATIENT
Start: 2023-03-07

## 2023-02-22 RX ORDER — ACETAMINOPHEN 325 MG/1
650 TABLET ORAL ONCE
Status: COMPLETED | OUTPATIENT
Start: 2023-02-22 | End: 2023-02-22

## 2023-02-22 RX ORDER — SODIUM CHLORIDE 9 MG/ML
20 INJECTION, SOLUTION INTRAVENOUS ONCE
Status: COMPLETED | OUTPATIENT
Start: 2023-02-22 | End: 2023-02-22

## 2023-02-22 RX ORDER — METHYLPREDNISOLONE SODIUM SUCCINATE 40 MG/ML
40 INJECTION, POWDER, LYOPHILIZED, FOR SOLUTION INTRAMUSCULAR; INTRAVENOUS ONCE
Status: CANCELLED | OUTPATIENT
Start: 2023-03-07

## 2023-02-22 RX ORDER — DIPHENHYDRAMINE HCL 25 MG
25 TABLET ORAL ONCE
Status: CANCELLED | OUTPATIENT
Start: 2023-03-07

## 2023-02-22 RX ORDER — DIPHENHYDRAMINE HCL 25 MG
25 TABLET ORAL ONCE
OUTPATIENT
Start: 2023-03-07

## 2023-02-22 RX ORDER — SODIUM CHLORIDE 9 MG/ML
20 INJECTION, SOLUTION INTRAVENOUS ONCE
OUTPATIENT
Start: 2023-03-07

## 2023-02-22 RX ORDER — METHYLPREDNISOLONE SODIUM SUCCINATE 40 MG/ML
40 INJECTION, POWDER, LYOPHILIZED, FOR SOLUTION INTRAMUSCULAR; INTRAVENOUS ONCE
Status: COMPLETED | OUTPATIENT
Start: 2023-02-22 | End: 2023-02-22

## 2023-02-22 RX ORDER — DIPHENHYDRAMINE HCL 25 MG
25 TABLET ORAL ONCE
Status: COMPLETED | OUTPATIENT
Start: 2023-02-22 | End: 2023-02-22

## 2023-02-22 RX ORDER — ACETAMINOPHEN 325 MG/1
650 TABLET ORAL ONCE
OUTPATIENT
Start: 2023-03-07

## 2023-02-22 RX ORDER — METHYLPREDNISOLONE SODIUM SUCCINATE 40 MG/ML
40 INJECTION, POWDER, LYOPHILIZED, FOR SOLUTION INTRAMUSCULAR; INTRAVENOUS ONCE
OUTPATIENT
Start: 2023-03-07

## 2023-02-22 RX ORDER — ACETAMINOPHEN 325 MG/1
650 TABLET ORAL ONCE
Status: CANCELLED | OUTPATIENT
Start: 2023-03-07

## 2023-02-22 RX ADMIN — ACETAMINOPHEN 650 MG: 325 TABLET ORAL at 08:20

## 2023-02-22 RX ADMIN — SODIUM CHLORIDE 20 ML/HR: 0.9 INJECTION, SOLUTION INTRAVENOUS at 08:16

## 2023-02-22 RX ADMIN — DIPHENHYDRAMINE HYDROCHLORIDE 25 MG: 25 TABLET ORAL at 08:20

## 2023-02-22 RX ADMIN — METHYLPREDNISOLONE SODIUM SUCCINATE 40 MG: 40 INJECTION, POWDER, FOR SOLUTION INTRAMUSCULAR; INTRAVENOUS at 08:22

## 2023-02-22 RX ADMIN — SODIUM CHLORIDE 198 MG: 9 INJECTION, SOLUTION INTRAVENOUS at 09:05

## 2023-02-22 NOTE — PROGRESS NOTES
Patient tolerated treatment without incident and was discharged post, no c/o offered  Next dosing 3/21/23 and confirmed

## 2023-02-22 NOTE — PATIENT INSTRUCTIONS
February 2023 Sunday Monday Tuesday Wednesday Thursday Friday Saturday                  1     2     3     4       5     6     7    INF THERAPY PLAN   8:30 AM   (240 min )   [facility] 5   [address] 8     9     10     11       12     13     14     15     16     17     18       19     20     21     22    INF THERAPY PLAN   8:00 AM   (240 min )   AN INF [facility]IR [address] 53     12     76       96     34     61                                       March 2023 Sunday Monday Tuesday Wednesday Thursday Friday Saturday                  1     2     3     4       5     6     7     8     9     10     11       12     13     14     15    FOLLOW UP PG   3:00 PM   (30 min )   Maxi Deleon MD   [id number] [facility] 16     17     18       19     20     21    INF THERAPY PLAN   8:00 AM   (240 min )   AN INF [facility] [id number] [facility] 22     23     24     25       26     27     28     29     30     31

## 2023-02-22 NOTE — PROGRESS NOTES
Patient here for dose 2 of inflectra and is doing well, she reports feeling that the medication has already been effective for her  She denies recent illness with use of antibiotic

## 2023-03-08 ENCOUNTER — TELEPHONE (OUTPATIENT)
Dept: OBGYN CLINIC | Facility: HOSPITAL | Age: 63
End: 2023-03-08

## 2023-03-08 NOTE — TELEPHONE ENCOUNTER
Caller: [facility]    Doctor:  Nevin Restrepo    Reason for call: calling to set up delivery for patients medication    Call back#: transferred to MA at office

## 2023-03-09 ENCOUNTER — TELEPHONE (OUTPATIENT)
Dept: RHEUMATOLOGY | Facility: CLINIC | Age: 63
End: 2023-03-09

## 2023-03-09 NOTE — TELEPHONE ENCOUNTER
Caller: Farrah Portillo ([facility])    Callback number: [phone] option 2    Calling to schedule delivery for inflectra

## 2023-03-13 NOTE — PROGRESS NOTES
Assessment and Plan:   Ms Linn Rebollar female with history significant for psoriatic arthritis, bilateral knee osteoarthritis, right knee meniscal tear status post surgery and right shoulder rotator cuff tears with subdeltoid/subacromial bursitis who presents for a follow-up   She is currently on infliximab infusions 3 mg/kg that was started on 2/7/2023         Rheumatic summary:  1) Diagnosed with PsA (subtle erosive changes on hand XRs) in 8/2019 - started on MTX, but d/c'ed in 9/2019 due to side effects of skin rash  Started on LEF 10/2019-present    - 7/20/21: presenting with flare up x 1 month - prednisone taper x 15 days and continue LEF  If no sustained improvement will d/c LEF and start adalimumab    - 2/1/22: adalimumab started in 10/2021 - ineffective, will d/c and start Orencia  Steroid taper    - 6/29/22: Patience Proffer started in 2/22 - still symptomatic, will add on LEF 20 mg and steroid taper  - 12/7/22: d/c Orencia and LEF, will start infliximab infusions and obtain hand/wrist US   - 3/15/23: imfliximab started on 2/7/23 with great improvement, continue 3 mg/kg every 8 weeks  US hands/wrists with active inflammation prior to starting infliximab  2) Co-morbidities: knee OA, right carpal tunnel syndrome, lumbar DJD         # Psoriatic arthritis  Destiny Frey presents today for a follow-up of psoriatic arthritis for which she is currently on infliximab infusions 3 mg/kg initial dosing that was started on 2/7/2023  She reports after receiving the first infusion she started to notice an improvement in her joint pains, swelling, stiffness as well as overall wellbeing  Although she still has evidence of synovitis on her examination today this may be expected as she has only received 2 doses so far and I will continue to monitor for improvement with subsequent infusions  She will continue infliximab at 3 mg/kg every 8 weeks for maintenance dosing    Depending on ongoing symptoms the dose and frequency can be changed if required  - I requested she update high risk medication lab monitoring prior to the follow-up visit  Plan:  Diagnoses and all orders for this visit:    Psoriatic arthritis ([address] )    Long-term use of immunosuppressant medication  -     CBC and differential; Future  -     Comprehensive metabolic panel; Future  -     C-reactive protein; Future  -     Sedimentation rate, automated; Future  -     CBC and differential  -     Comprehensive metabolic panel  -     C-reactive protein  -     Sedimentation rate, automated    Encounter for long-term (current) use of NSAIDs    Primary generalized (osteo)arthritis    Other orders  -     Inflectra 100 MG      Activities as tolerated  Exercise: try to maintain a low impact exercise regimen as much as possible  Continue other medications as prescribed by PCP and other specialists  RTC in 4 months          HPI    INITIAL VISIT NOTE:  Ms Rebeca Hill a 66-year-old  female with history significant for mild right knee osteoarthritis, right knee meniscal tear status post surgery and right shoulder rotator cuff tears with subdeltoid/subacromial bursitis, who presents for further evaluation of diffuse joint pains and an elevated C reactive protein of 81   She is referred by Dr Aniceto Chahal      Patient states in November 2017 she had an accidental injury resulting in a concussion, and states overall she has not felt well since then  Marco Antonio Doherty is able to recall more diffuse joint pains starting in December 2018, and states they have been more prominent as well as progressive since then   She experiences her symptoms on a daily basis in a mostly constant manner   She states it is significantly interfering with her activities of daily living, as well as with her sleeping habits   She describes pain most prominently affecting her bilateral shoulders, bilateral wrists, to a lesser degree affecting her bilateral hands, low back region, hips and knees   She does not really experience joint pains affecting her elbows, ankles or feet   She has subjectively only noticed swelling affecting her knees   She experiences morning stiffness which affects her diffusely and persists throughout the day   She states any sort of activity aggravates her symptoms, but she does not necessarily obtain relief with resting   At the onset of her symptoms in February/March 2019 she was prescribed a 10 day course of prednisone starting at 40 mg daily, which she states significantly helped her  Kian Winchester was then represcribed a course of prednisone of the same duration in June 2019 which also helped her  Kian Winchester states the symptoms recur quickly after cessation of the steroids   She was recently also prescribed tramadol and tizanidine for the pain, but this has not really been helping her  Kian Winchester has also tried over-the-counter ibuprofen, Advil, Aleve and naproxen without significant benefit of her symptoms      She denies any recent fevers, chills, night sweats, unintentional weight loss or infections prior to the onset of her joint complaints   She denies significant dry eyes, inflammatory eye disease, dry mouth, skin rashes, psoriasis, mouth/nose ulcers, shortness of breath, abdominal pain, vomiting, diarrhea, blood in stools, inflammatory bowel disease or family history of rheumatoid arthritis/autoimmune disease      She was seen by her primary care physician for the ongoing complaints and had recent testing done which showed an elevated C reactive protein of 81   RIGO screen, rheumatoid factor, uric acid and Lyme antibody profile were unremarkable   In view of the ongoing right shoulder pain she recently had an MRI of her right shoulder done which showed partial-thickness rotator cuff tears as well as subacromial/subdeltoid bursitis   She has been following up with Orthopedics for this, and it is not thought that the rotator cuff tears should correspond to the symptoms she has been experiencing  Kian Winchester also received bilateral intra-articular cortisone shoulder injections which only provided her with temporary relief   She was advised to follow-up with Rheumatology first, and if her symptoms are not to improve then there may be consideration for an arthroscopic procedure         8/27/2019:  Patient presents for a follow-up today  Meme Jonas reviewed her recently done labs which showed an unremarkable CBC, CMP, ESR, anti CCP antibody, Sjogren's antibodies and chronic hepatitis panel   X-ray of her right hand showed periarticular soft tissue swelling of the 2nd through 5th PIP joints, as well as subtle juxta-articular erosions of the 2nd through 5th DIP joints   X-ray of her left hand and bilateral feet did not show any signs of inflammatory arthritis      Following the prior office visit patient had started prednisone 40 mg daily for 7 days, and is currently on prednisone 20 mg daily   She states being on the steroids has significantly improved her symptoms, and she is currently denying any joint pains, swelling or morning stiffness   She is feeling well today and denies any complaints   Of note she denies a history of psoriasis or skin rashes   She denies a family history of psoriasis         11/27/2019:  Patient presents for a follow-up of psoriatic arthritis  Jodie Rhodes is currently on leflunomide 20 mg once daily and prednisone 10 mg once daily      At the last office visit I had started her on methotrexate but after taking a few doses she called the office with an outbreak of a skin rash affecting her right upper extremity   I requested she try the medication once more to ensure this was related to a side effect of the methotrexate, and she states that the rash persisted and worsened   In view of this we discontinued the methotrexate entirely and I switched her to leflunomide 20 mg once daily which she has been on for approximately 1 month now  Jodie Rhodes has been tolerating the medication well without any concerns for side effects or infections      She reports overall her joint pains have significantly improved   She did try to taper down on the prednisone to 5 mg once daily and then discontinue it, but states upon discontinuing her joint pains recurred   In view of this she restarted prednisone at 5 mg once daily, but over the past few days further increased to 10 mg once daily due to increased activities causing worsening joint pains   She is willing to try and taper down on the prednisone at this time   She reports pain that she usually experiences will affect her shoulders and knees   She denies any other significant areas of joint pains or persistent swelling   On occasion she will notice swelling of her knees   She states that the morning stiffness has mostly resolved as well      She denies any other history of skin rash or psoriasis         3/3/2020:  Patient presents for a follow-up of psoriatic arthritis  Lynette Nesbitt is currently on leflunomide 20 mg once daily   I reviewed her recently done CBC and CMP which were unremarkable      Patient reports overall in terms of the arthritis she has been doing well and denies any significant peripheral joint pains, swelling or morning stiffness   For the past 3-4 months she has been dealing with left lower back pain and was seen in the emergency room for this   On occasion the pain will radiate down her leg   An x-ray of her lumbar spine was done which showed mild degenerative arthritis at two levels   She is following up with her primary care physician for this and completing a course of physical therapy  Lynette Nesbitt is unsure if the PT has really helped her so far  Lynette Nesbitt has been taking ibuprofen on a daily basis to take the edge off her symptoms      She also reports 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), she may develop achiness in her upper arm, chest and back region   Typically when this happens she may take prednisone 5-10 mg as needed which will help her  Lynette Nesbitt uses the prednisone on a very infrequent basis      She denies a history of skin rash or psoriasis   She has been tolerating the leflunomide well without any concerns for side effects or infections         6/2/2020:  Patient presents for a follow-up of psoriatic arthritis  Kian Winchester is currently on leflunomide 20 mg once daily and prednisone 5-10 mg daily as needed      Patient reports in April she was experiencing a flare-up diffuse hives, for which she increased the prednisone to 10 mg once daily and ran out of her script early  Kian Winchester has been out of the prednisone for the past few weeks   She reports without the steroids she does notice a change in her joint pains, which will primarily affect her shoulders, neck and knees   She has noticed intermittent swelling affecting her knees   She does not really experience any morning stiffness, but will have a sensation of diffuse achiness as well as fatigue   She is not taking any over-the-counter pain medications   She reports for maintenance therapy she is mostly taking the leflunomide 20 mg once daily as well as prednisone 5 mg once daily, and this seems to control her symptoms      She has been tolerating her medications well without any concerns for side effects or infections         11/9/2020:  Patient presents for a follow-up of psoriatic arthritis  Kian Winchester is currently on leflunomide 20 mg once daily and prednisone 5-10 mg once daily as needed   I reviewed her recently done CBC and CMP which were normal      Patient reports she has overall been doing well and this is the best that she has felt  Kian Winchester was able to start exercising at the gym again and thinks that this has helped with her shoulder pain   Over the past few weeks she has noticed pain and swelling affecting her left knee but cannot recall any injuries   Other than the left knee pain she is denying any other joint pains and has not noticed joint swelling   She is no longer experiencing morning stiffness   She still takes the prednisone as needed but thinks she is now able to taper off it completely   She will also take Tylenol as needed      She has been tolerating the leflunomide well without any concerns for side effects or infections   She has not had any psoriasis         3/10/2021:  Patient presents for a follow-up of psoriatic arthritis  Nohemi De Los Santos is currently on leflunomide 20 mg once daily   I reviewed her recently done CBC and CMP which were normal        She reports since the last office visit she was able to discontinue the steroids without a flare-up, and manages her joint pains with Tylenol as needed   She was experiencing pain affecting her left knee and right shoulder, but she was seen by Orthopedics and received intra-articular cortisone injections which helped   Last week she did have an accidental fall on the ice and fell on her right hand   She was seen in urgent care and had an x-ray done which did not show any fractures   She was advised that she probably sprained her right wrist and is continuing with conservative measures   She has been unable to rest her wrist as due to her occupation as a  she is constantly using her hands   Recently she has also noticed numbness affecting the lateral fingers on her right hand mostly at night, but at certain times during the day as well while she is working      She denies any other joint pains, joint swelling or morning stiffness   She has been tolerating the leflunomide well without any concerns for side effects or infections   She has not had any psoriasis          7/20/2021:  Patient presents for a follow-up of psoriatic arthritis  Nohemi De Los Santos is currently on leflunomide 20 mg once daily   She has not had her recent high risk medication lab monitoring done        She reports since the last visit, especially in the past 1 month she has noticed a flare-up in her joint pains   She describes an overall body achiness associated with fatigue but also mentions specific joint pains involving her left knee and right ankle   She has noticed swelling to affect her right wrist, left knee and right ankle   She experiences morning stiffness which affects her diffusely and can take about an hour to improve   She takes over-the-counter Tylenol as needed which helps to some degree   She has previously been told not to take NSAIDs but is unsure the reason why  Dalton Sportsman has not been on any recent steroids      She has also been following up with [facility] for right hand and wrist pain which has persisted following a fall and sprain in March 2021   She did have an ultrasound of her right wrist done looking for carpal tunnel syndrome and this was suspicious for the condition   An MRI of her right thumb in June 2021 showed a partial tear of the ulnar collateral ligament as well as mild 1st MCP joint osteoarthritis      She has been tolerating the leflunomide well without any concerns for side effects or infections   She has not had any psoriasis         2/1/2022:  Patient presents for a follow-up of psoriatic arthritis  She is currently on subcutaneous adalimumab 40 mg every 2 weeks that was started in October 2021        Unfortunately there has been no improvement in her symptoms and apart from experiencing widespread body aches, she is also noticing significant pain in her hands, wrists and right ankle  She feels like her hands, wrists and right ankle have also been swollen  She experiences morning stiffness which affects her diffusely and can take a few hours to improve  She does take over-the-counter NSAIDs as needed but this does not really help  A few weeks ago she was prescribed a tapering course of prednisone starting at 20 mg once daily but she states that this did not really help her           6/29/2022:  Patient presents for a follow-up of psoriatic arthritis    She is currently on subcutaneous Orencia 125 mg once weekly that was started in February 2022        She reports with switching from the adalimumab to Orencia she did notice some improvement in her joint pains but she is still quite symptomatic with pain in her hands, wrists, knees and ankles  She still has swelling in her hands and ankles  She experiences morning stiffness affecting her diffusely that takes about 30 minutes to improve  No recent steroid use  She is taking Aleve twice a day as needed but this does not help significantly  12/7/2022:  Patient presents for a follow-up of psoriatic arthritis  She is currently on subcutaneous Orencia 125 mg once weekly that was started in February 2022 and leflunomide 20 mg once daily that was started in June 2022  She has not updated her high risk medication lab monitoring recently  She reports with this regimen she has not noticed any improvement in her symptoms  She is primarily complaining of continued swelling and stiffness affecting her hands and recently has had more pain in her knees and ankles  Her left knee has also been swollen  She is not describing particular morning stiffness and reports that the stiffness in her hands persists throughout the day  No recent steroid use and no consistent NSAID use  She has some dryness present on the hands but no clear psoriasis  3/15/2023:  Patient presents for a follow-up of psoriatic arthritis  She is currently on infliximab infusions 3 mg/kg that was started on 2/7/2023  She has completed 2 doses so far and is due for the last dose of the initial cycle on 3/21/2023  Following this she will receive it every 8 weeks  I reviewed the blood work done after the last office visit which showed an unremarkable CBC, CMP and ESR  The C-reactive protein was slightly elevated at 14  I reviewed the ultrasound of her hands and wrists which showed findings consistent with active inflammatory arthritis evidenced by synovial hypertrophy and hyperemia involving multiple joints primarily on the left hand      She reports after receiving the initial dose of the infliximab infusion she started to notice an improvement in her overall wellbeing  She mentions the pain, swelling and stiffness in her hands and wrists has significantly improved but she does experience some degree of pain and swelling still  Overall she is feeling great at this time  The following portions of the patient's history were reviewed and updated as appropriate: allergies, current medications, past family history, past medical history, past social history, past surgical history and problem list       Review of Systems  Constitutional: Negative for weight change, fevers, chills, night sweats, fatigue  ENT/Mouth: Negative for hearing changes, ear pain, nasal congestion, sinus pain, hoarseness, sore throat, rhinorrhea, swallowing difficulty  Eyes: Negative for pain, redness, discharge, vision changes  Cardiovascular: Negative for chest pain, SOB, palpitations  Respiratory: Negative for cough, sputum, wheezing, dyspnea  Gastrointestinal: Negative for nausea, vomiting, diarrhea, constipation, pain, heartburn  Genitourinary: Negative for dysuria, urinary frequency, hematuria  Musculoskeletal: As per HPI  Skin: Negative for skin rash, color changes  Neuro: Negative for weakness, numbness, tingling, loss of consciousness  Psych: Negative for anxiety, depression  Heme/Lymph: Negative for easy bruising, bleeding, lymphadenopathy          Past Medical History:   Diagnosis Date   • Chronic left-sided low back pain without sciatica 3/18/2020   • Concussion    • Hyperlipidemia    • Psoriatic arthritis ([facility] ) 2019   • Seronegative rheumatoid arthritis ([facility] ) 2019   • Tendinitis        Past Surgical History:   Procedure Laterality Date   •  SECTION     • FOOT SURGERY Left    • HAND SURGERY Left 10/2021   • HEMORROIDECTOMY     • KNEE ARTHROSCOPY Right 2018   • OK ARTHRS KNE SURG W/MENISCECTOMY MED/LAT W/SHVG Right 2018    Procedure: ARTHROSCOPY KNEE, PARTIAL MEDIAL MENISCECTOMY: ABRASION CHONDROPLASTY;  Surgeon: John Powell MD;  Location: [facility];  Service: Orthopedics   • AL [address] WRST SURG W/RLS TRANSVRS CARPL LIGM Right 10/21/2021    Procedure: Right endoscopic carpal tunnel release;  Surgeon: Isabel Schuster MD;  Location: [facility];  Service: Orthopedics   • AL [address] WRST SURG W/RLS TRANSVRS CARPL LIGM Left 2022    Procedure: RELEASE CARPAL TUNNEL ENDOSCOPIC, LEFT;  Surgeon: Isabel Schuster MD;  Location: [facility];  Service: Orthopedics   • AL RCNSTJ COLTRL LIGM [address] 1 W/GRF EA JT Right 10/21/2021    Procedure: right thumb Ulnar collateral ligament repair;  Surgeon: Isabel Schuster MD;  Location: [facility];  Service: Orthopedics   • AL TENDON SHEATH INCISION Right 10/21/2021    Procedure: Right small finger trigger finger release;  Surgeon: Isabel Schuster MD;  Location: [facility];  Service: Orthopedics   • TUBAL LIGATION         Social History     Socioeconomic History   • Marital status:      Spouse name: Not on file   • Number of children: Not on file   • Years of education: Not on file   • Highest education level: Not on file   Occupational History   • Not on file   Tobacco Use   • Smoking status: Former     Packs/day: 0 33     Years: 5 00     Pack years: 1 65     Types: Cigarettes     Quit date: 46     Years since quittin 2   • Smokeless tobacco: Never   Vaping Use   • Vaping Use: Never used   Substance and Sexual Activity   • Alcohol use: Yes     Comment: social   • Drug use: No   • Sexual activity: Not on file   Other Topics Concern   • Not on file   Social History Narrative   • Not on file     Social Determinants of Health     Financial Resource Strain: Not on file   Food Insecurity: Not on file   Transportation Needs: Not on file   Physical Activity: Not on file   Stress: Not on file   Social Connections: Not on file   Intimate Partner Violence: Not on file   Housing Stability: Not on file       Family History   Problem Relation Age of Onset   • Coronary artery disease Mother    • Other Mother         CABG   • Hiatal hernia Father    • Stroke Maternal Grandmother    • Hyperlipidemia Family    • Migraines Family    • Thyroid disease Family    • No Known Problems Sister    • No Known Problems Brother    • No Known Problems Son    • No Known Problems Daughter    • No Known Problems Paternal Grandmother    • No Known Problems Paternal Grandfather    • No Known Problems Maternal Aunt    • No Known Problems Maternal Uncle    • No Known Problems Paternal Aunt    • No Known Problems Paternal Uncle    • No Known Problems Cousin        Allergies   Allergen Reactions   • Methotrexate Hives       Current Outpatient Medications:   •  acetaminophen (TYLENOL) 650 mg CR tablet, Take 1 tablet (650 mg total) by mouth every 8 (eight) hours as needed for mild pain, Disp: 30 tablet, Rfl: 0  •  Glucosamine-Chondroitin (GLUCOSAMINE CHONDR COMPLEX PO), Take by mouth daily  , Disp: , Rfl:   •  Inflectra 100 MG, , Disp: , Rfl:   •  multivitamin (THERAGRAN) TABS, Take 1 tablet by mouth daily, Disp: , Rfl:   •  naproxen sodium (ALEVE) 220 MG tablet, Take 1 tablet (220 mg total) by mouth 2 (two) times a day with meals, Disp: 20 tablet, Rfl: 0  •  pravastatin (PRAVACHOL) 40 mg tablet, TAKE 1 TABLET BY MOUTH AT BEDTIME, Disp: 30 tablet, Rfl: 3  •  predniSONE 10 mg tablet, TAKE 1-2 TABLETS BY MOUTH DAILY AS NEEDED, Disp: 60 tablet, Rfl: 0  •  Naproxen Sodium 220 MG CAPS, Take 1 capsule (220 mg total) by mouth 2 (two) times a day, Disp: 60 capsule, Rfl: 0      Objective:    Vitals:    03/15/23 1509   BP: 130/78   Pulse: 70   Weight: 65 7 kg (144 lb 12 8 oz)   Height: 5' 4" (1 626 m)       Physical Exam  General: Well appearing, well nourished, in no distress  Oriented x 3, normal mood and affect  Ambulating without difficulty  Skin: Good turgor, no rash, unusual bruising or prominent lesions      Hair: Normal texture and distribution  Nails: Normal color, no deformities  HEENT:  Head: Normocephalic, atraumatic  Eyes: Conjunctiva clear, sclera non-icteric, EOM intact  Extremities: No amputations or deformities, cyanosis, edema  Musculoskeletal:   Hands - there is mild fullness present at her left hand PIP joints without tenderness  The right hand PIP joints are unremarkable  The MCPs bilaterally are unremarkable  Wrists - there is soft tissue swelling present bilaterally with chronic synovial changes, no tenderness  Neurologic: Alert and oriented  No focal neurological deficits appreciated  Psychiatric: Normal mood and affect  JASON Tesfaye    Rheumatology

## 2023-03-15 ENCOUNTER — OFFICE VISIT (OUTPATIENT)
Dept: RHEUMATOLOGY | Facility: CLINIC | Age: 63
End: 2023-03-15

## 2023-03-15 VITALS
WEIGHT: 144.8 LBS | DIASTOLIC BLOOD PRESSURE: 78 MMHG | SYSTOLIC BLOOD PRESSURE: 130 MMHG | BODY MASS INDEX: 24.72 KG/M2 | HEART RATE: 70 BPM | HEIGHT: 64 IN

## 2023-03-15 DIAGNOSIS — Z79.1 ENCOUNTER FOR LONG-TERM (CURRENT) USE OF NSAIDS: ICD-10-CM

## 2023-03-15 DIAGNOSIS — L40.50 PSORIATIC ARTHRITIS (HCC): Primary | ICD-10-CM

## 2023-03-15 DIAGNOSIS — M15.0 PRIMARY GENERALIZED (OSTEO)ARTHRITIS: ICD-10-CM

## 2023-03-15 DIAGNOSIS — Z79.60 LONG-TERM USE OF IMMUNOSUPPRESSANT MEDICATION: ICD-10-CM

## 2023-03-15 RX ORDER — INFLIXIMAB-DYYB 100 MG/10ML
INJECTION, POWDER, LYOPHILIZED, FOR SOLUTION INTRAVENOUS
COMMUNITY
Start: 2023-03-14

## 2023-03-21 ENCOUNTER — HOSPITAL ENCOUNTER (OUTPATIENT)
Dept: INFUSION CENTER | Facility: CLINIC | Age: 63
Discharge: HOME | End: 2023-03-21

## 2023-03-21 VITALS
OXYGEN SATURATION: 99 % | WEIGHT: 146 LBS | BODY MASS INDEX: 25.06 KG/M2 | RESPIRATION RATE: 18 BRPM | HEART RATE: 76 BPM | SYSTOLIC BLOOD PRESSURE: 143 MMHG | DIASTOLIC BLOOD PRESSURE: 81 MMHG | TEMPERATURE: 98.3 F

## 2023-03-21 DIAGNOSIS — L40.50 PSORIATIC ARTHRITIS (HCC): Primary | ICD-10-CM

## 2023-03-21 RX ORDER — SODIUM CHLORIDE 9 MG/ML
20 INJECTION, SOLUTION INTRAVENOUS ONCE
Status: COMPLETED | OUTPATIENT
Start: 2023-03-21 | End: 2023-03-21

## 2023-03-21 RX ORDER — METHYLPREDNISOLONE SODIUM SUCCINATE 40 MG/ML
40 INJECTION, POWDER, LYOPHILIZED, FOR SOLUTION INTRAMUSCULAR; INTRAVENOUS ONCE
Status: COMPLETED | OUTPATIENT
Start: 2023-03-21 | End: 2023-03-21

## 2023-03-21 RX ORDER — ACETAMINOPHEN 325 MG/1
650 TABLET ORAL ONCE
OUTPATIENT
Start: 2023-05-16

## 2023-03-21 RX ORDER — ACETAMINOPHEN 325 MG/1
650 TABLET ORAL ONCE
Status: COMPLETED | OUTPATIENT
Start: 2023-03-21 | End: 2023-03-21

## 2023-03-21 RX ORDER — DIPHENHYDRAMINE HCL 25 MG
25 TABLET ORAL ONCE
OUTPATIENT
Start: 2023-05-16

## 2023-03-21 RX ORDER — DIPHENHYDRAMINE HCL 25 MG
25 TABLET ORAL ONCE
Status: COMPLETED | OUTPATIENT
Start: 2023-03-21 | End: 2023-03-21

## 2023-03-21 RX ORDER — SODIUM CHLORIDE 9 MG/ML
20 INJECTION, SOLUTION INTRAVENOUS ONCE
OUTPATIENT
Start: 2023-05-16

## 2023-03-21 RX ORDER — METHYLPREDNISOLONE SODIUM SUCCINATE 40 MG/ML
40 INJECTION, POWDER, LYOPHILIZED, FOR SOLUTION INTRAMUSCULAR; INTRAVENOUS ONCE
OUTPATIENT
Start: 2023-05-16

## 2023-03-21 RX ADMIN — DIPHENHYDRAMINE HCL 25 MG: 25 TABLET, COATED ORAL at 08:15

## 2023-03-21 RX ADMIN — METHYLPREDNISOLONE SODIUM SUCCINATE 40 MG: 40 INJECTION, POWDER, FOR SOLUTION INTRAMUSCULAR; INTRAVENOUS at 08:15

## 2023-03-21 RX ADMIN — ACETAMINOPHEN 650 MG: 325 TABLET ORAL at 08:15

## 2023-03-21 RX ADMIN — SODIUM CHLORIDE 20 ML/HR: 0.9 INJECTION, SOLUTION INTRAVENOUS at 08:12

## 2023-03-21 RX ADMIN — SODIUM CHLORIDE 199 MG: 9 INJECTION, SOLUTION INTRAVENOUS at 08:51

## 2023-03-21 NOTE — PROGRESS NOTES
Pt tolerated Inflectra treatment well  Aware that she needs to make next appointment  AVS declined  Yes

## 2023-05-15 ENCOUNTER — HOSPITAL ENCOUNTER (OUTPATIENT)
Dept: INFUSION CENTER | Facility: CLINIC | Age: 63
Discharge: HOME | End: 2023-05-15

## 2023-05-15 VITALS — BODY MASS INDEX: 25.23 KG/M2 | WEIGHT: 147 LBS

## 2023-05-15 DIAGNOSIS — L40.50 PSORIATIC ARTHRITIS (HCC): Primary | ICD-10-CM

## 2023-05-15 RX ORDER — SODIUM CHLORIDE 9 MG/ML
20 INJECTION, SOLUTION INTRAVENOUS ONCE
Status: COMPLETED | OUTPATIENT
Start: 2023-05-15 | End: 2023-05-15

## 2023-05-15 RX ORDER — ACETAMINOPHEN 325 MG/1
650 TABLET ORAL ONCE
Status: COMPLETED | OUTPATIENT
Start: 2023-05-15 | End: 2023-05-15

## 2023-05-15 RX ORDER — DIPHENHYDRAMINE HCL 25 MG
25 TABLET ORAL ONCE
OUTPATIENT
Start: 2023-05-16

## 2023-05-15 RX ORDER — METHYLPREDNISOLONE SODIUM SUCCINATE 40 MG/ML
40 INJECTION, POWDER, LYOPHILIZED, FOR SOLUTION INTRAMUSCULAR; INTRAVENOUS ONCE
OUTPATIENT
Start: 2023-05-16

## 2023-05-15 RX ORDER — METHYLPREDNISOLONE SODIUM SUCCINATE 40 MG/ML
40 INJECTION, POWDER, LYOPHILIZED, FOR SOLUTION INTRAMUSCULAR; INTRAVENOUS ONCE
Status: COMPLETED | OUTPATIENT
Start: 2023-05-15 | End: 2023-05-15

## 2023-05-15 RX ORDER — DIPHENHYDRAMINE HCL 25 MG
25 TABLET ORAL ONCE
Status: COMPLETED | OUTPATIENT
Start: 2023-05-15 | End: 2023-05-15

## 2023-05-15 RX ORDER — ACETAMINOPHEN 325 MG/1
650 TABLET ORAL ONCE
OUTPATIENT
Start: 2023-05-16

## 2023-05-15 RX ORDER — SODIUM CHLORIDE 9 MG/ML
20 INJECTION, SOLUTION INTRAVENOUS ONCE
OUTPATIENT
Start: 2023-05-16

## 2023-05-15 RX ADMIN — ACETAMINOPHEN 650 MG: 325 TABLET ORAL at 08:20

## 2023-05-15 RX ADMIN — SODIUM CHLORIDE 20 ML/HR: 0.9 INJECTION, SOLUTION INTRAVENOUS at 09:04

## 2023-05-15 RX ADMIN — SODIUM CHLORIDE 200 MG: 9 INJECTION, SOLUTION INTRAVENOUS at 09:09

## 2023-05-15 RX ADMIN — METHYLPREDNISOLONE SODIUM SUCCINATE 40 MG: 40 INJECTION, POWDER, FOR SOLUTION INTRAMUSCULAR; INTRAVENOUS at 08:20

## 2023-05-15 RX ADMIN — DIPHENHYDRAMINE HYDROCHLORIDE 25 MG: 25 TABLET ORAL at 08:20

## 2023-05-15 NOTE — PROGRESS NOTES
Patient tolerated Inflectra today without issue  PIV removed intact, coban wrap in place  Patient aware to schedule next appointment upon DC  Patient ambulatory upon DC without gait disturbance noted

## 2023-05-15 NOTE — PROGRESS NOTES
Patient arrives to infusion center for Inflectra today  Patient denies S/S infection/illness/recent antibiotic use  PIV inserted without issue  Patient resting on recliner chair, call bell within reach

## 2023-06-04 NOTE — TELEPHONE ENCOUNTER
----- Message from Melinda Sexton MD sent at 9/19/2021  7:01 PM EDT -----  TB test negative, can do Humira prior auth  Her liver enzymes are slightly elevated, please ask her to follow up with her PCP for this  Gen: Well appearing in NAD  Head: NC/AT  Neck: trachea midline  Card: regular rate and rhythm, no pedal edema  Resp:   crackles bilaterally  Abd: soft, non-distended, non-tender  Ext: no deformities above reported baseline  Neuro:  A&O, no motor or sensory deficits above reported baseline  Skin:  Warm and dry as visualized  Psych:  Normal affect and mood

## 2023-07-11 ENCOUNTER — HOSPITAL ENCOUNTER (OUTPATIENT)
Dept: INFUSION CENTER | Facility: CLINIC | Age: 63
Discharge: HOME | End: 2023-07-11
Payer: COMMERCIAL

## 2023-07-11 VITALS
OXYGEN SATURATION: 98 % | HEART RATE: 86 BPM | SYSTOLIC BLOOD PRESSURE: 146 MMHG | RESPIRATION RATE: 18 BRPM | DIASTOLIC BLOOD PRESSURE: 76 MMHG | BODY MASS INDEX: 25.06 KG/M2 | WEIGHT: 146 LBS | TEMPERATURE: 97.1 F

## 2023-07-11 DIAGNOSIS — L40.50 PSORIATIC ARTHRITIS (HCC): Primary | ICD-10-CM

## 2023-07-11 PROCEDURE — 96415 CHEMO IV INFUSION ADDL HR: CPT

## 2023-07-11 PROCEDURE — 96413 CHEMO IV INFUSION 1 HR: CPT

## 2023-07-11 PROCEDURE — 96372 THER/PROPH/DIAG INJ SC/IM: CPT

## 2023-07-11 RX ORDER — SODIUM CHLORIDE 9 MG/ML
20 INJECTION, SOLUTION INTRAVENOUS ONCE
Status: CANCELLED | OUTPATIENT
Start: 2023-09-04

## 2023-07-11 RX ORDER — ACETAMINOPHEN 325 MG/1
650 TABLET ORAL ONCE
Status: COMPLETED | OUTPATIENT
Start: 2023-07-11 | End: 2023-07-11

## 2023-07-11 RX ORDER — DIPHENHYDRAMINE HCL 25 MG
25 TABLET ORAL ONCE
Status: CANCELLED | OUTPATIENT
Start: 2023-09-04

## 2023-07-11 RX ORDER — DIPHENHYDRAMINE HCL 25 MG
25 TABLET ORAL ONCE
Status: COMPLETED | OUTPATIENT
Start: 2023-07-11 | End: 2023-07-11

## 2023-07-11 RX ORDER — METHYLPREDNISOLONE SODIUM SUCCINATE 40 MG/ML
40 INJECTION, POWDER, LYOPHILIZED, FOR SOLUTION INTRAMUSCULAR; INTRAVENOUS ONCE
Status: COMPLETED | OUTPATIENT
Start: 2023-07-11 | End: 2023-07-11

## 2023-07-11 RX ORDER — ACETAMINOPHEN 325 MG/1
650 TABLET ORAL ONCE
Status: CANCELLED | OUTPATIENT
Start: 2023-09-04

## 2023-07-11 RX ORDER — METHYLPREDNISOLONE SODIUM SUCCINATE 40 MG/ML
40 INJECTION, POWDER, LYOPHILIZED, FOR SOLUTION INTRAMUSCULAR; INTRAVENOUS ONCE
Status: CANCELLED | OUTPATIENT
Start: 2023-09-04

## 2023-07-11 RX ORDER — SODIUM CHLORIDE 9 MG/ML
20 INJECTION, SOLUTION INTRAVENOUS ONCE
Status: COMPLETED | OUTPATIENT
Start: 2023-07-11 | End: 2023-07-11

## 2023-07-11 RX ADMIN — METHYLPREDNISOLONE SODIUM SUCCINATE 40 MG: 40 INJECTION, POWDER, FOR SOLUTION INTRAMUSCULAR; INTRAVENOUS at 10:59

## 2023-07-11 RX ADMIN — ACETAMINOPHEN 650 MG: 325 TABLET, FILM COATED ORAL at 10:59

## 2023-07-11 RX ADMIN — DIPHENHYDRAMINE HYDROCHLORIDE 25 MG: 25 TABLET ORAL at 10:59

## 2023-07-11 RX ADMIN — SODIUM CHLORIDE 200 MG: 9 INJECTION, SOLUTION INTRAVENOUS at 11:31

## 2023-07-11 RX ADMIN — SODIUM CHLORIDE 20 ML/HR: 0.9 INJECTION, SOLUTION INTRAVENOUS at 11:00

## 2023-07-11 NOTE — PROGRESS NOTES
Patient here for rinfectra infusion. Patient resting with no complaints. Vitals stable. Patient denies antibiotic use or illness/infection. Call bell within reach of patient. Patient tolerated infusion with no issue. She is aware to schedule next appointment.  Declined AVS.

## 2023-07-13 ENCOUNTER — OFFICE VISIT (OUTPATIENT)
Dept: FAMILY MEDICINE CLINIC | Facility: CLINIC | Age: 63
End: 2023-07-13
Payer: COMMERCIAL

## 2023-07-13 VITALS
BODY MASS INDEX: 24.92 KG/M2 | WEIGHT: 146 LBS | HEART RATE: 77 BPM | OXYGEN SATURATION: 97 % | HEIGHT: 64 IN | SYSTOLIC BLOOD PRESSURE: 120 MMHG | DIASTOLIC BLOOD PRESSURE: 70 MMHG | TEMPERATURE: 98.4 F

## 2023-07-13 DIAGNOSIS — M25.562 ACUTE PAIN OF LEFT KNEE: ICD-10-CM

## 2023-07-13 DIAGNOSIS — Z00.00 ANNUAL PHYSICAL EXAM: Primary | ICD-10-CM

## 2023-07-13 DIAGNOSIS — Z13.29 SCREENING FOR THYROID DISORDER: ICD-10-CM

## 2023-07-13 DIAGNOSIS — Z12.31 ENCOUNTER FOR SCREENING MAMMOGRAM FOR MALIGNANT NEOPLASM OF BREAST: ICD-10-CM

## 2023-07-13 DIAGNOSIS — Z12.11 SCREENING FOR COLON CANCER: ICD-10-CM

## 2023-07-13 DIAGNOSIS — M25.462 EFFUSION, LEFT KNEE: ICD-10-CM

## 2023-07-13 DIAGNOSIS — E78.2 MIXED HYPERLIPIDEMIA: ICD-10-CM

## 2023-07-13 PROCEDURE — 99396 PREV VISIT EST AGE 40-64: CPT | Performed by: NURSE PRACTITIONER

## 2023-07-13 RX ORDER — PRAVASTATIN SODIUM 40 MG
40 TABLET ORAL
Qty: 30 TABLET | Refills: 3 | Status: SHIPPED | OUTPATIENT
Start: 2023-07-13

## 2023-07-13 NOTE — PROGRESS NOTES
[address]    NAME: César Torres  AGE: 61 y.o. SEX: female  : 1960     DATE: 2023     Assessment and Plan:     Problem List Items Addressed This Visit        Musculoskeletal and Integument    Effusion, left knee    Relevant Orders    XR knee 3 vw left non injury    Ambulatory Referral to Orthopedic Surgery       Other    Hyperlipidemia     Continue pravastatin  Check lipids with next labs         Relevant Medications    pravastatin (PRAVACHOL) 40 mg tablet    Other Relevant Orders    Lipid panel    Acute pain of left knee     Check xray left knee schedule with ortho         Relevant Orders    XR knee 3 vw left non injury    Ambulatory Referral to Orthopedic Surgery   Other Visit Diagnoses     Annual physical exam    -  Primary    Screening for thyroid disorder        Relevant Orders    TSH, 3rd generation with Free T4 reflex    Screening for colon cancer        Relevant Orders    Cologuard    Encounter for screening mammogram for malignant neoplasm of breast        Relevant Orders    Mammo screening bilateral w 3d & cad          Immunizations and preventive care screenings were discussed with patient today. Appropriate education was printed on patient's after visit summary. Counseling:  Dental Health: discussed importance of regular tooth brushing, flossing, and dental visits. Injury prevention: discussed safety/seat belts, safety helmets, smoke detectors, carbon dioxide detectors, and smoking near bedding or upholstery. · Exercise: the importance of regular exercise/physical activity was discussed. Recommend exercise 3-5 times per week for at least 30 minutes. No follow-ups on file.      Chief Complaint:     Chief Complaint   Patient presents with   • Physical Exam     Physical yearly, having trouble with her knee started been 1 month left knee swelling can not bend it the pain goes down to her ankle History of Present Illness:     Adult Annual Physical   Patient here for a comprehensive physical exam. The patient reports problems - left knee pain. Left knee pain has some swelling, pain is across the bottom aspect of knee. No trauma, she is very active and does gardening but no specific incident to cause this flare. Some instability at times. Pain is worse in the morning but gets better as the day goes. Has difficulty straightening her knee. The pool helps it feel better. Had her inflectra infusion on Tuesday 7/11/23. That seemed to help but still swollen and uncomofrtable     Diet and Physical Activity  · Diet/Nutrition: well balanced diet. · Exercise: very active. Depression Screening  PHQ-2/9 Depression Screening    Little interest or pleasure in doing things: 0 - not at all  Feeling down, depressed, or hopeless: 0 - not at all  PHQ-2 Score: 0  PHQ-2 Interpretation: Negative depression screen       General Health  · Sleep: sleeps well. · Hearing: normal - bilateral.  · Vision: goes for regular eye exams. · Dental: brushes teeth twice daily. /GYN Health  · Patient is: postmenopausal       Review of Systems:     Review of Systems   Constitutional: Negative for activity change, chills and fever. HENT: Negative for ear pain and sore throat. Eyes: Negative for pain and visual disturbance. Respiratory: Negative for cough and shortness of breath. Cardiovascular: Negative for chest pain and palpitations. Gastrointestinal: Negative for abdominal pain and vomiting. Genitourinary: Negative for dysuria and hematuria. Musculoskeletal: Positive for arthralgias and back pain. Skin: Negative for color change and rash. Neurological: Negative for seizures and syncope. All other systems reviewed and are negative.      Past Medical History:     Past Medical History:   Diagnosis Date   • Chronic left-sided low back pain without sciatica 3/18/2020   • Concussion    • Hyperlipidemia • Psoriatic arthritis ([facility]) 2019   • Seronegative rheumatoid arthritis ([facility]) 2019   • Tendinitis       Past Surgical History:     Past Surgical History:   Procedure Laterality Date   •  SECTION     • FOOT SURGERY Left    • HAND SURGERY Left 10/2021   • HEMORROIDECTOMY     • KNEE ARTHROSCOPY Right 2018   • SC ARTHRS KNE SURG W/MENISCECTOMY MED/LAT W/SHVG Right 2018    Procedure: ARTHROSCOPY KNEE, PARTIAL MEDIAL MENISCECTOMY: ABRASION CHONDROPLASTY;  Surgeon: Bryan Granger MD;  Location:  MAIN OR;  Service: Orthopedics   • [place] [address] WRST SURG W/RLS TRANSVRS CARPL LIGM Right 10/21/2021    Procedure: Right endoscopic carpal tunnel release;  Surgeon: Sudha Keane MD;  Location: [facility] MAIN OR;  Service: Orthopedics   • [place] [address] WRST SURG W/RLS TRANSVRS CARPL LIGM Left 2022    Procedure: RELEASE CARPAL TUNNEL ENDOSCOPIC, LEFT;  Surgeon: Sudha Keane MD;  Location: [facility] MAIN OR;  Service: Orthopedics   • [place] Juannn Honeydew 1 W/GRF EA JT Right 10/21/2021    Procedure: right thumb Ulnar collateral ligament repair;  Surgeon: Sudha Keane MD;  Location: [facility] MAIN OR;  Service: Orthopedics   • SC TENDON SHEATH INCISION Right 10/21/2021    Procedure: Right small finger trigger finger release;  Surgeon: Sudha Keane MD;  Location: [facility] MAIN OR;  Service: Orthopedics   • TUBAL LIGATION        Social History:     Social History     Socioeconomic History   • Marital status:      Spouse name: None   • Number of children: None   • Years of education: None   • Highest education level: None   Occupational History   • None   Tobacco Use   • Smoking status: Former     Packs/day: 0.33     Years: 5.00     Total pack years: 1.65     Types: Cigarettes     Quit date:      Years since quittin.5   • Smokeless tobacco: Never   Vaping Use   • Vaping Use: Never used   Substance and Sexual Activity   • Alcohol use: Yes     Comment: social   • Drug use: No   • Sexual activity: None   Other Topics Concern   • None   Social History Narrative   • None     Social Determinants of Health     Financial Resource Strain: Not on file   Food Insecurity: Not on file   Transportation Needs: Not on file   Physical Activity: Not on file   Stress: Not on file   Social Connections: Not on file   Intimate Partner Violence: Not on file   Housing Stability: Not on file      Family History:     Family History   Problem Relation Age of Onset   • Coronary artery disease Mother    • Other Mother         CABG   • Hiatal hernia Father    • Stroke Maternal Grandmother    • Hyperlipidemia Family    • Migraines Family    • Thyroid disease Family    • No Known Problems Sister    • No Known Problems Brother    • No Known Problems Son    • No Known Problems Daughter    • No Known Problems Paternal Grandmother    • No Known Problems Paternal Grandfather    • No Known Problems Maternal Aunt    • No Known Problems Maternal Uncle    • No Known Problems Paternal Aunt    • No Known Problems Paternal Uncle    • No Known Problems Cousin       Current Medications:     Current Outpatient Medications   Medication Sig Dispense Refill   • acetaminophen (TYLENOL) 650 mg CR tablet Take 1 tablet (650 mg total) by mouth every 8 (eight) hours as needed for mild pain 30 tablet 0   • Glucosamine-Chondroitin (GLUCOSAMINE CHONDR COMPLEX PO) Take by mouth daily       • Inflectra 100 MG      • multivitamin (THERAGRAN) TABS Take 1 tablet by mouth daily     • naproxen sodium (ALEVE) 220 MG tablet Take 1 tablet (220 mg total) by mouth 2 (two) times a day with meals 20 tablet 0   • pravastatin (PRAVACHOL) 40 mg tablet Take 1 tablet (40 mg total) by mouth daily at bedtime 30 tablet 3   • predniSONE 10 mg tablet TAKE 1-2 TABLETS BY MOUTH DAILY AS NEEDED 60 tablet 0   • Naproxen Sodium 220 MG CAPS Take 1 capsule (220 mg total) by mouth 2 (two) times a day 60 capsule 0     No current facility-administered medications for this visit. Allergies: Allergies   Allergen Reactions   • Methotrexate Hives      Physical Exam:     /70   Pulse 77   Temp 98.4 °F (36.9 °C) (Tympanic)   Ht 5' 4" (1.626 m)   Wt 66.2 kg (146 lb)   LMP  (LMP Unknown)   SpO2 97%   BMI 25.06 kg/m²     Physical Exam  Vitals and nursing note reviewed. Constitutional:       General: She is not in acute distress. Appearance: Normal appearance. She is well-developed. HENT:      Head: Normocephalic and atraumatic. Right Ear: Ear canal normal.      Left Ear: Ear canal normal.   Eyes:      Conjunctiva/sclera: Conjunctivae normal.   Neck:      Thyroid: No thyromegaly or thyroid tenderness. Cardiovascular:      Rate and Rhythm: Normal rate and regular rhythm. Pulses:           Radial pulses are 2+ on the right side and 2+ on the left side. Heart sounds: Normal heart sounds. No murmur heard. Pulmonary:      Effort: Pulmonary effort is normal. No respiratory distress. Breath sounds: Normal breath sounds. Abdominal:      General: Bowel sounds are normal.      Palpations: Abdomen is soft. Tenderness: There is no abdominal tenderness. Musculoskeletal:         General: Swelling and tenderness present. Cervical back: Neck supple. Right lower leg: No edema. Left lower leg: No edema. Skin:     General: Skin is warm and dry. Neurological:      Mental Status: She is alert and oriented to person, place, and time.    Psychiatric:         Behavior: Behavior normal.          Anil Koenig, [address] [place] [address]

## 2023-07-25 PROBLEM — M25.562 ACUTE PAIN OF LEFT KNEE: Status: ACTIVE | Noted: 2023-07-25

## 2023-07-25 PROBLEM — M25.462 EFFUSION, LEFT KNEE: Status: ACTIVE | Noted: 2023-07-25

## 2023-07-25 NOTE — PROGRESS NOTES
Assessment and Plan:   Ms. Genet Jaime female with history significant for psoriatic arthritis, bilateral knee osteoarthritis, right knee meniscal tear status post surgery and right shoulder rotator cuff tears with subdeltoid/subacromial bursitis who presents for a follow-up.  She is currently on infliximab infusions 3 mg/kg every 8 weeks that was started on 2/7/2023.        Rheumatic summary:  1) Diagnosed with PsA (subtle erosive changes on hand XRs) in 8/2019 - started on MTX, but d/c'ed in 9/2019 due to side effects of skin rash. Started on LEF 10/2019-present.   - 7/20/21: presenting with flare up x 1 month - prednisone taper x 15 days and continue LEF. If no sustained improvement will d/c LEF and start adalimumab.   - 2/1/22: adalimumab started in 10/2021 - ineffective, will d/c and start Orencia. Steroid taper.   - 6/29/22: Noralylul Loer started in 2/22 - still symptomatic, will add on LEF 20 mg and steroid taper. - 12/7/22: d/c Orencia and LEF, will start infliximab infusions and obtain hand/wrist US.  - 3/15/23: imfliximab started on 2/7/23 with great improvement, continue 3 mg/kg every 8 weeks. US hands/wrists with active inflammation prior to starting infliximab. - 7/26/23: change infliximab to 5 mg/kg every 6 weeks. 2) Co-morbidities: knee OA, right carpal tunnel syndrome, lumbar DJD.        # Psoriatic arthritis  - Shante presents today for a follow-up of psoriatic arthritis for which she is currently on infliximab infusions 3 mg/kg every 8 weeks. Unfortunately since the last visit she has again been experiencing joint pains and flareups which appears representative of the inflammatory arthritis. In view of this I will change the infliximab infusions to 5 mg/kg every 6 weeks. I will also represcribe her prednisone to take 10 to 20 mg once daily as needed. High risk medication lab monitoring will be updated after the next visit.       Plan:  Diagnoses and all orders for this visit:    Psoriatic arthritis ([address])  -     predniSONE 10 mg tablet; TAKE 1-2 TABLETS BY MOUTH DAILY AS NEEDED    Long-term use of immunosuppressant medication  -     CBC and differential; Future  -     Comprehensive metabolic panel; Future  -     C-reactive protein; Future  -     Sedimentation rate, automated; Future  -     CBC and differential  -     Comprehensive metabolic panel  -     C-reactive protein  -     Sedimentation rate, automated    Current use of steroid medication    Primary generalized (osteo)arthritis      Activities as tolerated. Exercise: try to maintain a low impact exercise regimen as much as possible. Continue other medications as prescribed by PCP and other specialists. RTC in 4 months.         HPI    INITIAL VISIT NOTE:  Ms. Hernandez Car a 66-year-old  female with history significant for mild right knee osteoarthritis, right knee meniscal tear status post surgery and right shoulder rotator cuff tears with subdeltoid/subacromial bursitis, who presents for further evaluation of diffuse joint pains and an elevated C reactive protein of 81.  She is referred by Dr. Brian Bernstein.     Patient states in November 2017 she had an accidental injury resulting in a concussion, and states overall she has not felt well since then. Cyn Lafleur is able to recall more diffuse joint pains starting in December 2018, and states they have been more prominent as well as progressive since then.  She experiences her symptoms on a daily basis in a mostly constant manner.  She states it is significantly interfering with her activities of daily living, as well as with her sleeping habits.  She describes pain most prominently affecting her bilateral shoulders, bilateral wrists, to a lesser degree affecting her bilateral hands, low back region, hips and knees.  She does not really experience joint pains affecting her elbows, ankles or feet.  She has subjectively only noticed swelling affecting her knees. Cyn Lafleur experiences morning stiffness which affects her diffusely and persists throughout the day.  She states any sort of activity aggravates her symptoms, but she does not necessarily obtain relief with resting.  At the onset of her symptoms in February/March 2019 she was prescribed a 10 day course of prednisone starting at 40 mg daily, which she states significantly helped her. Rafael Garcia was then represcribed a course of prednisone of the same duration in June 2019 which also helped her. Rafael Garcia states the symptoms recur quickly after cessation of the steroids.  She was recently also prescribed tramadol and tizanidine for the pain, but this has not really been helping her. Rafael Garcia has also tried over-the-counter ibuprofen, Advil, Aleve and naproxen without significant benefit of her symptoms.     She denies any recent fevers, chills, night sweats, unintentional weight loss or infections prior to the onset of her joint complaints.  She denies significant dry eyes, inflammatory eye disease, dry mouth, skin rashes, psoriasis, mouth/nose ulcers, shortness of breath, abdominal pain, vomiting, diarrhea, blood in stools, inflammatory bowel disease or family history of rheumatoid arthritis/autoimmune disease.     She was seen by her primary care physician for the ongoing complaints and had recent testing done which showed an elevated C reactive protein of 81.  RIGO screen, rheumatoid factor, uric acid and Lyme antibody profile were unremarkable.  In view of the ongoing right shoulder pain she recently had an MRI of her right shoulder done which showed partial-thickness rotator cuff tears as well as subacromial/subdeltoid bursitis.  She has been following up with Orthopedics for this, and it is not thought that the rotator cuff tears should correspond to the symptoms she has been experiencing. Rafael Garcia also received bilateral intra-articular cortisone shoulder injections which only provided her with temporary relief.  She was advised to follow-up with Rheumatology first, and if her symptoms are not to improve then there may be consideration for an arthroscopic procedure.        8/27/2019:  Patient presents for a follow-up today. Arvind Youssef reviewed her recently done labs which showed an unremarkable CBC, CMP, ESR, anti CCP antibody, Sjogren's antibodies and chronic hepatitis panel.  X-ray of her right hand showed periarticular soft tissue swelling of the 2nd through 5th PIP joints, as well as subtle juxta-articular erosions of the 2nd through 5th DIP joints.  X-ray of her left hand and bilateral feet did not show any signs of inflammatory arthritis.     Following the prior office visit patient had started prednisone 40 mg daily for 7 days, and is currently on prednisone 20 mg daily.  She states being on the steroids has significantly improved her symptoms, and she is currently denying any joint pains, swelling or morning stiffness.  She is feeling well today and denies any complaints.  Of note she denies a history of psoriasis or skin rashes.  She denies a family history of psoriasis.        11/27/2019:  Patient presents for a follow-up of psoriatic arthritis. Livia García is currently on leflunomide 20 mg once daily and prednisone 10 mg once daily.     At the last office visit I had started her on methotrexate but after taking a few doses she called the office with an outbreak of a skin rash affecting her right upper extremity.  I requested she try the medication once more to ensure this was related to a side effect of the methotrexate, and she states that the rash persisted and worsened.  In view of this we discontinued the methotrexate entirely and I switched her to leflunomide 20 mg once daily which she has been on for approximately 1 month now. Livia García has been tolerating the medication well without any concerns for side effects or infections.     She reports overall her joint pains have significantly improved.  She did try to taper down on the prednisone to 5 mg once daily and then discontinue it, but states upon discontinuing her joint pains recurred.  In view of this she restarted prednisone at 5 mg once daily, but over the past few days further increased to 10 mg once daily due to increased activities causing worsening joint pains.  She is willing to try and taper down on the prednisone at this time.  She reports pain that she usually experiences will affect her shoulders and knees.  She denies any other significant areas of joint pains or persistent swelling.  On occasion she will notice swelling of her knees.  She states that the morning stiffness has mostly resolved as well.     She denies any other history of skin rash or psoriasis.        3/3/2020:  Patient presents for a follow-up of psoriatic arthritis. Brian Oakes is currently on leflunomide 20 mg once daily.  I reviewed her recently done CBC and CMP which were unremarkable.     Patient reports overall in terms of the arthritis she has been doing well and denies any significant peripheral joint pains, swelling or morning stiffness.  For the past 3-4 months she has been dealing with left lower back pain and was seen in the emergency room for this.  On occasion the pain will radiate down her leg.  An x-ray of her lumbar spine was done which showed mild degenerative arthritis at two levels.  She is following up with her primary care physician for this and completing a course of physical therapy. Brian Oakes is unsure if the PT has really helped her so far. Brian Oakes has been taking ibuprofen on a daily basis to take the edge off her symptoms.     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  Typically when this happens she may take prednisone 5-10 mg as needed which will help her. Brian Oakes uses the prednisone on a very infrequent basis.     She denies a history of skin rash or psoriasis.  She has been tolerating the leflunomide well without any concerns for side effects or infections.        6/2/2020:  Patient presents for a follow-up of psoriatic arthritis. Harini Negrete is currently on leflunomide 20 mg once daily and prednisone 5-10 mg daily as needed.     Patient reports in April she was experiencing a flare-up diffuse hives, for which she increased the prednisone to 10 mg once daily and ran out of her script early. Harini Negrete has been out of the prednisone for the past few weeks.  She reports without the steroids she does notice a change in her joint pains, which will primarily affect her shoulders, neck and knees.  She has noticed intermittent swelling affecting her knees.  She does not really experience any morning stiffness, but will have a sensation of diffuse achiness as well as fatigue.  She is not taking any over-the-counter pain medications.  She reports for maintenance therapy she is mostly taking the leflunomide 20 mg once daily as well as prednisone 5 mg once daily, and this seems to control her symptoms.     She has been tolerating her medications well without any concerns for side effects or infections.        11/9/2020:  Patient presents for a follow-up of psoriatic arthritis. Harini Negrete is currently on leflunomide 20 mg once daily and prednisone 5-10 mg once daily as needed.  I reviewed her recently done CBC and CMP which were normal.     Patient reports she has overall been doing well and this is the best that she has felt. Harini Negrete was able to start exercising at the gym again and thinks that this has helped with her shoulder pain.  Over the past few weeks she has noticed pain and swelling affecting her left knee but cannot recall any injuries.  Other than the left knee pain she is denying any other joint pains and has not noticed joint swelling.  She is no longer experiencing morning stiffness.  She still takes the prednisone as needed but thinks she is now able to taper off it completely.  She will also take Tylenol as needed.     She has been tolerating the leflunomide well without any concerns for side effects or infections.  She has not had any psoriasis.        3/10/2021:  Patient presents for a follow-up of psoriatic arthritis. Dung Wilson is currently on leflunomide 20 mg once daily.  I reviewed her recently done CBC and CMP which were normal.       She reports since the last office visit she was able to discontinue the steroids without a flare-up, and manages her joint pains with Tylenol as needed.  She was experiencing pain affecting her left knee and right shoulder, but she was seen by Orthopedics and received intra-articular cortisone injections which helped.  Last week she did have an accidental fall on the ice and fell on her right hand.  She was seen in urgent care and had an x-ray done which did not show any fractures.  She was advised that she probably sprained her right wrist and is continuing with conservative measures.  She has been unable to rest her wrist as due to her occupation as a  she is constantly using her hands.  Recently she has also noticed numbness affecting the lateral fingers on her right hand mostly at night, but at certain times during the day as well while she is working.     She denies any other joint pains, joint swelling or morning stiffness.  She has been tolerating the leflunomide well without any concerns for side effects or infections.  She has not had any psoriasis.         7/20/2021:  Patient presents for a follow-up of psoriatic arthritis. Dung Wilson is currently on leflunomide 20 mg once daily.  She has not had her recent high risk medication lab monitoring done.       She reports since the last visit, especially in the past 1 month she has noticed a flare-up in her joint pains.  She describes an overall body achiness associated with fatigue but also mentions specific joint pains involving her left knee and right ankle.  She has noticed swelling to affect her right wrist, left knee and right ankle. Dung Wilson experiences morning stiffness which affects her diffusely and can take about an hour to improve.  She takes over-the-counter Tylenol as needed which helps to some degree.  She has previously been told not to take NSAIDs but is unsure the reason why. Lucila Bender has not been on any recent steroids.     She has also been following up with [facility] for right hand and wrist pain which has persisted following a fall and sprain in March 2021.  She did have an ultrasound of her right wrist done looking for carpal tunnel syndrome and this was suspicious for the condition.  An MRI of her right thumb in June 2021 showed a partial tear of the ulnar collateral ligament as well as mild 1st MCP joint osteoarthritis.     She has been tolerating the leflunomide well without any concerns for side effects or infections.  She has not had any psoriasis.        2/1/2022:  Patient presents for a follow-up of psoriatic arthritis. She is currently on subcutaneous adalimumab 40 mg every 2 weeks that was started in October 2021.       Unfortunately there has been no improvement in her symptoms and apart from experiencing widespread body aches, she is also noticing significant pain in her hands, wrists and right ankle. She feels like her hands, wrists and right ankle have also been swollen. She experiences morning stiffness which affects her diffusely and can take a few hours to improve. She does take over-the-counter NSAIDs as needed but this does not really help. A few weeks ago she was prescribed a tapering course of prednisone starting at 20 mg once daily but she states that this did not really help her.          6/29/2022:  Patient presents for a follow-up of psoriatic arthritis.   She is currently on subcutaneous Orencia 125 mg once weekly that was started in February 2022.       She reports with switching from the adalimumab to [address] she did notice some improvement in her joint pains but she is still quite symptomatic with pain in her hands, wrists, knees and ankles. She still has swelling in her hands and ankles. She experiences morning stiffness affecting her diffusely that takes about 30 minutes to improve. No recent steroid use. She is taking Aleve twice a day as needed but this does not help significantly. 12/7/2022:  Patient presents for a follow-up of psoriatic arthritis. She is currently on subcutaneous Orencia 125 mg once weekly that was started in February 2022 and leflunomide 20 mg once daily that was started in June 2022. She has not updated her high risk medication lab monitoring recently. She reports with this regimen she has not noticed any improvement in her symptoms. She is primarily complaining of continued swelling and stiffness affecting her hands and recently has had more pain in her knees and ankles. Her left knee has also been swollen. She is not describing particular morning stiffness and reports that the stiffness in her hands persists throughout the day. No recent steroid use and no consistent NSAID use. She has some dryness present on the hands but no clear psoriasis. 3/15/2023:  Patient presents for a follow-up of psoriatic arthritis. She is currently on infliximab infusions 3 mg/kg that was started on 2/7/2023. She has completed 2 doses so far and is due for the last dose of the initial cycle on 3/21/2023. Following this she will receive it every 8 weeks. I reviewed the blood work done after the last office visit which showed an unremarkable CBC, CMP and ESR. The C-reactive protein was slightly elevated at 14. I reviewed the ultrasound of her hands and wrists which showed findings consistent with active inflammatory arthritis evidenced by synovial hypertrophy and hyperemia involving multiple joints primarily on the left hand. She reports after receiving the initial dose of the infliximab infusion she started to notice an improvement in her overall wellbeing.   She mentions the pain, swelling and stiffness in her hands and wrists has significantly improved but she does experience some degree of pain and swelling still. Overall she is feeling great at this time. 7/26/2023:  Patient presents for a follow-up of psoriatic arthritis. She is currently on infliximab infusions 3 mg/kg every 8 weeks that was started on 2/7/2023. She had labs done this morning and I am still waiting for the results. She mentions shortly after our last visit she started to experience a flareup of joint pains primarily affecting her left knee and left ankle. They have been swollen along with some swelling in her hands. Morning stiffness is taking a few hours to improve. No recent steroid or NSAID use. She also scheduled an appointment with orthopedics to evaluate her symptoms. She has been tolerating the infliximab infusions well without any concerns for side effects or infections. The following portions of the patient's history were reviewed and updated as appropriate: allergies, current medications, past family history, past medical history, past social history, past surgical history and problem list.      Review of Systems  Constitutional: Negative for weight change, fevers, chills, night sweats, fatigue. ENT/Mouth: Negative for hearing changes, ear pain, nasal congestion, sinus pain, hoarseness, sore throat, rhinorrhea, swallowing difficulty. Eyes: Negative for pain, redness, discharge, vision changes. Cardiovascular: Negative for chest pain, SOB, palpitations. Respiratory: Negative for cough, sputum, wheezing, dyspnea. Gastrointestinal: Negative for nausea, vomiting, diarrhea, constipation, pain, heartburn. Genitourinary: Negative for dysuria, urinary frequency, hematuria. Musculoskeletal: As per HPI. Skin: Negative for skin rash, color changes. Neuro: Negative for weakness, numbness, tingling, loss of consciousness. Psych: Negative for anxiety, depression.    Heme/Lymph: Negative for easy bruising, bleeding, lymphadenopathy.         Past Medical History:   Diagnosis Date   • Chronic left-sided low back pain without sciatica 3/18/2020   • Concussion    • Hyperlipidemia    • Psoriatic arthritis ([address] [facility]) 2019   • Seronegative rheumatoid arthritis ([address] [facility]) 2019   • Tendinitis        Past Surgical History:   Procedure Laterality Date   •  SECTION     • FOOT SURGERY Left    • HAND SURGERY Left 10/2021   • HEMORROIDECTOMY     • KNEE ARTHROSCOPY Right 2018   • [place] ARTHRS KNE SURG W/MENISCECTOMY MED/LAT W/SHVG Right 2018    Procedure: ARTHROSCOPY KNEE, PARTIAL MEDIAL MENISCECTOMY: ABRASION CHONDROPLASTY;  Surgeon: Iraj Be MD;  Location:  MAIN OR;  Service: Orthopedics   • [place] [address] [address] WRST SURG W/RLS TRANSVRS CARPL LIGM Right 10/21/2021    Procedure: Right endoscopic carpal tunnel release;  Surgeon: Katie Ho MD;  Location: [facility] MAIN OR;  Service: Orthopedics   • [place] [address] [address] WRST SURG W/RLS TRANSVRS CARPL LIGM Left 2022    Procedure: RELEASE CARPAL TUNNEL ENDOSCOPIC, LEFT;  Surgeon: Katie Ho MD;  Location: [facility] MAIN OR;  Service: Orthopedics   • [place] RCNSTJ COLTRL LIGM [place] [address] W/GRF EA JT Right 10/21/2021    Procedure: right thumb Ulnar collateral ligament repair;  Surgeon: Katie Ho MD;  Location: [facility] MAIN OR;  Service: Orthopedics   • [place] TENDON SHEATH INCISION Right 10/21/2021    Procedure: Right small finger trigger finger release;  Surgeon: Katie Ho MD;  Location: [facility] MAIN OR;  Service: Orthopedics   • TUBAL LIGATION         Social History     Socioeconomic History   • Marital status:      Spouse name: Not on file   • Number of children: Not on file   • Years of education: Not on file   • Highest education level: Not on file   Occupational History   • Not on file   Tobacco Use   • Smoking status: Former     Packs/day: 0.33     Years: 5.00     Total pack years: 1.65     Types: Cigarettes     Quit date: 46     Years since quittin.5   • Smokeless tobacco: Never   Vaping Use   • Vaping Use: Never used   Substance and Sexual Activity   • Alcohol use: Yes     Comment: social   • Drug use: No   • Sexual activity: Not on file   Other Topics Concern   • Not on file   Social History Narrative   • Not on file     Social Determinants of Health     Financial Resource Strain: Not on file   Food Insecurity: Not on file   Transportation Needs: Not on file   Physical Activity: Not on file   Stress: Not on file   Social Connections: Not on file   Intimate Partner Violence: Not on file   Housing Stability: Not on file       Family History   Problem Relation Age of Onset   • Coronary artery disease Mother    • Other Mother         CABG   • Hiatal hernia Father    • Stroke Maternal Grandmother    • Hyperlipidemia Family    • Migraines Family    • Thyroid disease Family    • No Known Problems Sister    • No Known Problems Brother    • No Known Problems Son    • No Known Problems Daughter    • No Known Problems Paternal Grandmother    • No Known Problems Paternal Grandfather    • No Known Problems Maternal Aunt    • No Known Problems Maternal Uncle    • No Known Problems Paternal Aunt    • No Known Problems Paternal Uncle    • No Known Problems Cousin        Allergies   Allergen Reactions   • Methotrexate Hives       Current Outpatient Medications:   •  acetaminophen (TYLENOL) 650 mg CR tablet, Take 1 tablet (650 mg total) by mouth every 8 (eight) hours as needed for mild pain, Disp: 30 tablet, Rfl: 0  •  Glucosamine-Chondroitin (GLUCOSAMINE CHONDR COMPLEX PO), Take by mouth daily  , Disp: , Rfl:   •  multivitamin (THERAGRAN) TABS, Take 1 tablet by mouth daily, Disp: , Rfl:   •  naproxen sodium (ALEVE) 220 MG tablet, Take 1 tablet (220 mg total) by mouth 2 (two) times a day with meals, Disp: 20 tablet, Rfl: 0  •  pravastatin (PRAVACHOL) 40 mg tablet, Take 1 tablet (40 mg total) by mouth daily at bedtime, Disp: 30 tablet, Rfl: 3  •  predniSONE 10 mg tablet, TAKE 1-2 TABLETS BY MOUTH DAILY AS NEEDED, Disp: 60 tablet, Rfl: 0  •  Inflectra 100 MG, , Disp: , Rfl:   •  Naproxen Sodium 220 MG CAPS, Take 1 capsule (220 mg total) by mouth 2 (two) times a day, Disp: 60 capsule, Rfl: 0      Objective:    Vitals:    07/26/23 1432   BP: 128/82   Weight: 66.2 kg (146 lb)   Height: 5' 4" (1.626 m)       Physical Exam  General: Well appearing, well nourished, in no distress. Oriented x 3, normal mood and affect. Ambulating without difficulty. Skin: Good turgor, no rash, unusual bruising or prominent lesions. Hair: Normal texture and distribution. Nails: Normal color, no deformities. HEENT:  Head: Normocephalic, atraumatic. Eyes: Conjunctiva clear, sclera non-icteric, EOM intact. Extremities: No amputations or deformities, cyanosis, edema. Musculoskeletal:   Left knee and ankle are both swollen. Neurologic: Alert and oriented. No focal neurological deficits appreciated. Psychiatric: Normal mood and affect. Stevie Matos M.D.   Rheumatology

## 2023-07-26 ENCOUNTER — OFFICE VISIT (OUTPATIENT)
Dept: RHEUMATOLOGY | Facility: CLINIC | Age: 63
End: 2023-07-26
Payer: COMMERCIAL

## 2023-07-26 ENCOUNTER — TELEPHONE (OUTPATIENT)
Dept: RHEUMATOLOGY | Facility: CLINIC | Age: 63
End: 2023-07-26

## 2023-07-26 VITALS
SYSTOLIC BLOOD PRESSURE: 128 MMHG | DIASTOLIC BLOOD PRESSURE: 82 MMHG | WEIGHT: 146 LBS | HEIGHT: 64 IN | BODY MASS INDEX: 24.92 KG/M2

## 2023-07-26 DIAGNOSIS — Z79.60 LONG-TERM USE OF IMMUNOSUPPRESSANT MEDICATION: ICD-10-CM

## 2023-07-26 DIAGNOSIS — Z79.52 CURRENT USE OF STEROID MEDICATION: ICD-10-CM

## 2023-07-26 DIAGNOSIS — L40.50 PSORIATIC ARTHRITIS (HCC): Primary | ICD-10-CM

## 2023-07-26 DIAGNOSIS — M15.0 PRIMARY GENERALIZED (OSTEO)ARTHRITIS: ICD-10-CM

## 2023-07-26 PROCEDURE — 99215 OFFICE O/P EST HI 40 MIN: CPT | Performed by: INTERNAL MEDICINE

## 2023-07-26 RX ORDER — PREDNISONE 10 MG/1
TABLET ORAL
Qty: 60 TABLET | Refills: 0 | Status: SHIPPED | OUTPATIENT
Start: 2023-07-26

## 2023-07-26 NOTE — TELEPHONE ENCOUNTER
Can you please call and schedule her for a 4-month visit with me. Please do a new prior authorization for the infliximab infusions. She is currently receiving this but I would like to change the dose to 5 mg/kg every 6 weeks.

## 2023-07-27 ENCOUNTER — TELEPHONE (OUTPATIENT)
Dept: FAMILY MEDICINE CLINIC | Facility: CLINIC | Age: 63
End: 2023-07-27

## 2023-07-27 LAB
CHOLEST SERPL-MCNC: 233 MG/DL (ref 100–199)
CHOLEST/HDLC SERPL: 3 RATIO (ref 0–4.4)
HDLC SERPL-MCNC: 77 MG/DL
LDLC SERPL CALC-MCNC: 140 MG/DL (ref 0–99)
SL AMB VLDL CHOLESTEROL CALC: 16 MG/DL (ref 5–40)
TRIGL SERPL-MCNC: 91 MG/DL (ref 0–149)
TSH SERPL DL<=0.005 MIU/L-ACNC: 2.62 UIU/ML (ref 0.45–4.5)

## 2023-07-27 NOTE — TELEPHONE ENCOUNTER
----- Message from Shantelle [place] sent at 7/27/2023 12:08 PM EDT -----  Patrick Albright, Your thyroid function is normal. Your cholesterol is stable, still slightly high. Recheck in 1 year.

## 2023-07-27 NOTE — RESULT ENCOUNTER NOTE
Hi Shante, Your thyroid function is normal. Your cholesterol is stable, still slightly high. Recheck in 1 year.

## 2023-07-29 RX ORDER — DIPHENHYDRAMINE HCL 25 MG
25 TABLET ORAL ONCE
OUTPATIENT
Start: 2023-08-22

## 2023-07-29 RX ORDER — SODIUM CHLORIDE 9 MG/ML
20 INJECTION, SOLUTION INTRAVENOUS ONCE
OUTPATIENT
Start: 2023-08-22

## 2023-07-29 RX ORDER — ACETAMINOPHEN 325 MG/1
650 TABLET ORAL ONCE
OUTPATIENT
Start: 2023-08-22

## 2023-07-29 RX ORDER — METHYLPREDNISOLONE SODIUM SUCCINATE 40 MG/ML
40 INJECTION, POWDER, LYOPHILIZED, FOR SOLUTION INTRAMUSCULAR; INTRAVENOUS ONCE
OUTPATIENT
Start: 2023-08-22

## 2023-07-31 NOTE — TELEPHONE ENCOUNTER
Spoke with infusion center the next available is 8/29 @ noon. I asked if there was cancellations they call the patient and I also left a message for patient with new date and time.

## 2023-08-02 ENCOUNTER — OFFICE VISIT (OUTPATIENT)
Dept: OBGYN CLINIC | Facility: CLINIC | Age: 63
End: 2023-08-02
Payer: COMMERCIAL

## 2023-08-02 ENCOUNTER — APPOINTMENT (OUTPATIENT)
Dept: RADIOLOGY | Facility: CLINIC | Age: 63
End: 2023-08-02
Payer: COMMERCIAL

## 2023-08-02 VITALS
WEIGHT: 149 LBS | BODY MASS INDEX: 25.44 KG/M2 | SYSTOLIC BLOOD PRESSURE: 124 MMHG | HEIGHT: 64 IN | DIASTOLIC BLOOD PRESSURE: 80 MMHG

## 2023-08-02 DIAGNOSIS — M17.12 PRIMARY OSTEOARTHRITIS OF LEFT KNEE: Primary | ICD-10-CM

## 2023-08-02 DIAGNOSIS — M25.562 ACUTE PAIN OF LEFT KNEE: ICD-10-CM

## 2023-08-02 DIAGNOSIS — M25.462 EFFUSION, LEFT KNEE: ICD-10-CM

## 2023-08-02 PROCEDURE — 99243 OFF/OP CNSLTJ NEW/EST LOW 30: CPT | Performed by: ORTHOPAEDIC SURGERY

## 2023-08-02 PROCEDURE — 73564 X-RAY EXAM KNEE 4 OR MORE: CPT

## 2023-08-02 PROCEDURE — 20610 DRAIN/INJ JOINT/BURSA W/O US: CPT | Performed by: ORTHOPAEDIC SURGERY

## 2023-08-02 RX ORDER — BETAMETHASONE SODIUM PHOSPHATE AND BETAMETHASONE ACETATE 3; 3 MG/ML; MG/ML
6 INJECTION, SUSPENSION INTRA-ARTICULAR; INTRALESIONAL; INTRAMUSCULAR; SOFT TISSUE
Status: COMPLETED | OUTPATIENT
Start: 2023-08-02 | End: 2023-08-02

## 2023-08-02 RX ORDER — LIDOCAINE HYDROCHLORIDE 10 MG/ML
7 INJECTION, SOLUTION INFILTRATION; PERINEURAL
Status: COMPLETED | OUTPATIENT
Start: 2023-08-02 | End: 2023-08-02

## 2023-08-02 RX ADMIN — LIDOCAINE HYDROCHLORIDE 7 ML: 10 INJECTION, SOLUTION INFILTRATION; PERINEURAL at 08:30

## 2023-08-02 RX ADMIN — BETAMETHASONE SODIUM PHOSPHATE AND BETAMETHASONE ACETATE 6 MG: 3; 3 INJECTION, SUSPENSION INTRA-ARTICULAR; INTRALESIONAL; INTRAMUSCULAR; SOFT TISSUE at 08:30

## 2023-08-02 NOTE — ASSESSMENT & PLAN NOTE
Findings today are consistent with severe osteoarthritis of Left knee with medial joint narrowing, bone-on-bone appearance. New plain films obtained and independently reviewed. I discussed prognosis of her knee condition. I provided patient aspiration and cortisone injection today, which patient tolerated well. Discussed that she cannot have TKA for 3 months. We had a brief discussion that she has synovitis in her knee as well. Brief discussion about TKA as well. We discussed Visco supplement injection if the cortisone injection does not offer her good pain relief. Ice and post injection protocol advised. Weigh bearing activities as tolerated. Discussed low impact activities such a biking and swimming. She was advised to use Aspercreme or Voltaren gel for pain relief. F/U in 3 months. All patient's questions were answered to her satisfaction. This note is created using dictation transcription. It may contain typographical errors, grammatical errors, improperly dictated words, background noise and other errors.

## 2023-08-02 NOTE — PROGRESS NOTES
Assessment:     1. Primary osteoarthritis of left knee    2. Acute pain of left knee    3. Effusion, left knee        Plan:      Problem List Items Addressed This Visit        Musculoskeletal and Integument    Effusion, left knee    Relevant Orders    XR knee 4+ vw left injury    Primary osteoarthritis of left knee - Primary     Findings today are consistent with severe osteoarthritis of Left knee with medial joint narrowing, bone-on-bone appearance. New plain films obtained and independently reviewed. I discussed prognosis of her knee condition. I provided patient aspiration and cortisone injection today, which patient tolerated well. Discussed that she cannot have TKA for 3 months. We had a brief discussion that she has synovitis in her knee as well. Brief discussion about TKA as well. We discussed Visco supplement injection if the cortisone injection does not offer her good pain relief. Ice and post injection protocol advised. Weigh bearing activities as tolerated. Discussed low impact activities such a biking and swimming. She was advised to use Aspercreme or Voltaren gel for pain relief. F/U in 3 months. All patient's questions were answered to her satisfaction. This note is created using dictation transcription. It may contain typographical errors, grammatical errors, improperly dictated words, background noise and other errors. Relevant Medications    lidocaine (XYLOCAINE) 1 % injection 7 mL (Completed)    betamethasone acetate-betamethasone sodium phosphate (CELESTONE) injection 6 mg (Completed)    Other Relevant Orders    Large joint arthrocentesis: L knee (Completed)       Other    Acute pain of left knee    Relevant Orders    XR knee 4+ vw left injury      Subjective:     Patient ID: Ruby King is a 61 y.o. female. Chief Complaint:  61 y o female who presents for Left knee pain. Patient is referred here by her PCP, GUILLERMINA Sparks. Pain started one month ago.  She denies any injury or traumas. Pain in in her anterior knee. She reports popping in her knee and pain with weight bearing activities. She has decreased extension. No prior treatments. No injections. She cannot get her knee straight. She denies fever, chill, or sweat. She is on Prednisone and has know Psoriatic arthritis. Information on patient's intake form was reviewed.     Allergy:  Allergies   Allergen Reactions   • Methotrexate Hives     Medications:  all current active meds have been reviewed  Past Medical History:  Past Medical History:   Diagnosis Date   • Chronic left-sided low back pain without sciatica 3/18/2020   • Concussion    • Hyperlipidemia    • Psoriatic arthritis ([address]) 2019   • Seronegative rheumatoid arthritis ([address]) 2019   • Tendinitis      Past Surgical History:  Past Surgical History:   Procedure Laterality Date   •  SECTION     • FOOT SURGERY Left    • HAND SURGERY Left 10/2021   • HEMORROIDECTOMY     • KNEE ARTHROSCOPY Right 2018   • [place] ARTHRS KNE SURG W/MENISCECTOMY MED/LAT W/SHVG Right 2018    Procedure: ARTHROSCOPY KNEE, PARTIAL MEDIAL MENISCECTOMY: ABRASION CHONDROPLASTY;  Surgeon: Iraj Be MD;  Location:  MAIN OR;  Service: Orthopedics   • [place] [id number] [place] [address],[address] WRST SURG W/RLS TRANSVRS CARPL LIGM Right 10/21/2021    Procedure: Right endoscopic carpal tunnel release;  Surgeon: Katie Ho MD;  Location:  MAIN OR;  Service: Orthopedics   • [place] [id number] [place] [address] [address] WRST SURG W/RLS TRANSVRS CARPL LIGM Left 2022    Procedure: RELEASE CARPAL TUNNEL ENDOSCOPIC, LEFT;  Surgeon: Katie Ho MD;  Location:  MAIN OR;  Service: Orthopedics   • [place] Magno Aquino 1 W/GRF EA JT Right 10/21/2021    Procedure: right thumb Ulnar collateral ligament repair;  Surgeon: Katie Ho MD;  Location:  MAIN OR;  Service: Orthopedics   • [place] TENDON SHEATH INCISION Right 10/21/2021    Procedure: Right small finger trigger finger release;  Surgeon: Katie Ho MD; Location: [facility] OR;  Service: Orthopedics   • TUBAL LIGATION       Family History:  Family History   Problem Relation Age of Onset   • Coronary artery disease Mother    • Other Mother         CABG   • Hiatal hernia Father    • Stroke Maternal Grandmother    • Hyperlipidemia Family    • Migraines Family    • Thyroid disease Family    • No Known Problems Sister    • No Known Problems Brother    • No Known Problems Son    • No Known Problems Daughter    • No Known Problems Paternal Grandmother    • No Known Problems Paternal Grandfather    • No Known Problems Maternal Aunt    • No Known Problems Maternal Uncle    • No Known Problems Paternal Aunt    • No Known Problems Paternal Uncle    • No Known Problems Cousin      Social History:  Social History     Substance and Sexual Activity   Alcohol Use Yes    Comment: social     Social History     Substance and Sexual Activity   Drug Use No     Social History     Tobacco Use   Smoking Status Former   • Packs/day: 0.33   • Years: 5.00   • Total pack years: 1.65   • Types: Cigarettes   • Quit date:    • Years since quittin.6   Smokeless Tobacco Never     Review of Systems   Constitutional: Negative for chills and fever. HENT: Negative for ear pain and sore throat. Eyes: Negative for pain and visual disturbance. Respiratory: Negative for cough and shortness of breath. Cardiovascular: Negative for chest pain and palpitations. Gastrointestinal: Negative for abdominal pain and vomiting. Genitourinary: Negative for dysuria and hematuria. Musculoskeletal: Positive for arthralgias (Left knee), gait problem (Antalgic) and joint swelling (Left knee). Negative for back pain. Skin: Negative for color change and rash. Neurological: Negative for seizures and syncope. Psychiatric/Behavioral: Negative. All other systems reviewed and are negative.         Objective:  BP Readings from Last 1 Encounters:   23 124/80      Wt Readings from Last 1 Encounters:   08/02/23 67.6 kg (149 lb)      BMI:   Estimated body mass index is 25.58 kg/m² as calculated from the following:    Height as of this encounter: 5' 4" (1.626 m). Weight as of this encounter: 67.6 kg (149 lb). BSA:   Estimated body surface area is 1.73 meters squared as calculated from the following:    Height as of this encounter: 5' 4" (1.626 m). Weight as of this encounter: 67.6 kg (149 lb). Physical Exam  Vitals and nursing note reviewed. Constitutional:       Appearance: Normal appearance. She is well-developed. HENT:      Head: Normocephalic and atraumatic. Right Ear: External ear normal.      Left Ear: External ear normal.   Eyes:      Extraocular Movements: Extraocular movements intact. Conjunctiva/sclera: Conjunctivae normal.   Pulmonary:      Effort: Pulmonary effort is normal.   Musculoskeletal:         General: Swelling present. Cervical back: Neck supple. Left knee: Effusion (Grade 1) present. Instability Tests: Medial Darlin test negative and lateral Darlin test negative. Skin:     General: Skin is warm and dry. Neurological:      Mental Status: She is alert and oriented to person, place, and time. Deep Tendon Reflexes: Reflexes are normal and symmetric. Psychiatric:         Mood and Affect: Mood normal.         Behavior: Behavior normal.       Left Knee Exam     Tenderness   The patient is experiencing tenderness in the medial joint line (anterior).     Range of Motion   Extension: -5 (Pain)   Flexion: 110 (Pain)     Tests   Darlin:  Medial - negative Lateral - negative  Varus: negative Valgus: negative  Patellar apprehension: negative    Other   Sensation: normal  Pulse: present  Swelling: mild  Effusion: effusion (Grade 1) present    Comments:  Varus alignment              I have personally reviewed pertinent films in PACS and my interpretation is Left knee x-ray show severe osteoarthritis with medial joint narrowing, bone-on-bone appearance. Varus alignment. Large joint arthrocentesis: L knee  Universal Protocol:  Consent: Verbal consent obtained.   Risks and benefits: risks, benefits and alternatives were discussed  Consent given by: patient  Patient understanding: patient states understanding of the procedure being performed  Site marked: the operative site was marked  Patient identity confirmed: verbally with patient    Supporting Documentation  Indications: pain   Procedure Details  Location: knee - L knee  Needle size: 22 G  Ultrasound guidance: no  Approach: Superolateral.  Medications administered: 7 mL lidocaine 1 %; 6 mg betamethasone acetate-betamethasone sodium phosphate 6 (3-3) mg/mL    Aspirate amount: 5 mL  Aspirate: blood-tinged    Patient tolerance: patient tolerated the procedure well with no immediate complications  Dressing:  Sterile dressing applied

## 2023-08-21 LAB — COLOGUARD RESULT REPORTABLE: NEGATIVE

## 2023-08-22 ENCOUNTER — TELEPHONE (OUTPATIENT)
Dept: FAMILY MEDICINE CLINIC | Facility: CLINIC | Age: 63
End: 2023-08-22

## 2023-08-22 NOTE — TELEPHONE ENCOUNTER
Called Pt.  Spoke to Pt, and Pt advised on results that cologuard test came back negative, recommend recheck in 3 years

## 2023-08-22 NOTE — TELEPHONE ENCOUNTER
----- Message from Ricardo Nguyen, [address] [facility] sent at 8/21/2023  5:07 PM EDT -----  Brendan Wynn, Your cologuard test came back negative, recommend recheck in 3 years

## 2023-08-25 ENCOUNTER — TELEPHONE (OUTPATIENT)
Age: 63
End: 2023-08-25

## 2023-08-29 ENCOUNTER — HOSPITAL ENCOUNTER (OUTPATIENT)
Dept: INFUSION CENTER | Facility: CLINIC | Age: 63
Discharge: HOME | End: 2023-08-29
Payer: COMMERCIAL

## 2023-08-29 VITALS
TEMPERATURE: 96.9 F | OXYGEN SATURATION: 99 % | SYSTOLIC BLOOD PRESSURE: 153 MMHG | HEART RATE: 83 BPM | DIASTOLIC BLOOD PRESSURE: 77 MMHG | BODY MASS INDEX: 25.81 KG/M2 | WEIGHT: 150.35 LBS

## 2023-08-29 DIAGNOSIS — L40.50 PSORIATIC ARTHRITIS (HCC): Primary | ICD-10-CM

## 2023-08-29 PROCEDURE — 96413 CHEMO IV INFUSION 1 HR: CPT

## 2023-08-29 PROCEDURE — 96375 TX/PRO/DX INJ NEW DRUG ADDON: CPT

## 2023-08-29 PROCEDURE — 96415 CHEMO IV INFUSION ADDL HR: CPT

## 2023-08-29 RX ORDER — ACETAMINOPHEN 325 MG/1
650 TABLET ORAL ONCE
Status: DISCONTINUED | OUTPATIENT
Start: 2023-08-29 | End: 2023-09-01 | Stop reason: HOSPADM

## 2023-08-29 RX ORDER — DIPHENHYDRAMINE HCL 25 MG
25 TABLET ORAL ONCE
Status: CANCELLED | OUTPATIENT
Start: 2023-10-24

## 2023-08-29 RX ORDER — DIPHENHYDRAMINE HCL 25 MG
25 TABLET ORAL ONCE
Status: COMPLETED | OUTPATIENT
Start: 2023-08-29 | End: 2023-08-29

## 2023-08-29 RX ORDER — SODIUM CHLORIDE 9 MG/ML
20 INJECTION, SOLUTION INTRAVENOUS ONCE
Status: CANCELLED | OUTPATIENT
Start: 2023-10-24

## 2023-08-29 RX ORDER — METHYLPREDNISOLONE SODIUM SUCCINATE 40 MG/ML
40 INJECTION, POWDER, LYOPHILIZED, FOR SOLUTION INTRAMUSCULAR; INTRAVENOUS ONCE
Status: COMPLETED | OUTPATIENT
Start: 2023-08-29 | End: 2023-08-29

## 2023-08-29 RX ORDER — ACETAMINOPHEN 325 MG/1
650 TABLET ORAL ONCE
Status: CANCELLED | OUTPATIENT
Start: 2023-10-24

## 2023-08-29 RX ORDER — DIPHENHYDRAMINE HCL 25 MG
25 TABLET ORAL ONCE
OUTPATIENT
Start: 2023-10-10

## 2023-08-29 RX ORDER — METHYLPREDNISOLONE SODIUM SUCCINATE 40 MG/ML
40 INJECTION, POWDER, LYOPHILIZED, FOR SOLUTION INTRAMUSCULAR; INTRAVENOUS ONCE
Status: CANCELLED | OUTPATIENT
Start: 2023-10-24

## 2023-08-29 RX ORDER — SODIUM CHLORIDE 9 MG/ML
20 INJECTION, SOLUTION INTRAVENOUS ONCE
Status: COMPLETED | OUTPATIENT
Start: 2023-08-29 | End: 2023-08-29

## 2023-08-29 RX ORDER — SODIUM CHLORIDE 9 MG/ML
20 INJECTION, SOLUTION INTRAVENOUS ONCE
OUTPATIENT
Start: 2023-10-10

## 2023-08-29 RX ORDER — ACETAMINOPHEN 325 MG/1
650 TABLET ORAL ONCE
OUTPATIENT
Start: 2023-10-10

## 2023-08-29 RX ORDER — METHYLPREDNISOLONE SODIUM SUCCINATE 40 MG/ML
40 INJECTION, POWDER, LYOPHILIZED, FOR SOLUTION INTRAMUSCULAR; INTRAVENOUS ONCE
OUTPATIENT
Start: 2023-10-10

## 2023-08-29 RX ADMIN — SODIUM CHLORIDE 341 MG: 9 INJECTION, SOLUTION INTRAVENOUS at 13:07

## 2023-08-29 RX ADMIN — SODIUM CHLORIDE 20 ML/HR: 0.9 INJECTION, SOLUTION INTRAVENOUS at 12:31

## 2023-08-29 RX ADMIN — METHYLPREDNISOLONE SODIUM SUCCINATE 40 MG: 40 INJECTION, POWDER, FOR SOLUTION INTRAMUSCULAR; INTRAVENOUS at 12:36

## 2023-08-29 RX ADMIN — DIPHENHYDRAMINE HYDROCHLORIDE 25 MG: 25 TABLET ORAL at 12:36

## 2023-08-29 NOTE — PLAN OF CARE
Problem: Potential for Falls  Goal: Patient will remain free of falls  Description: INTERVENTIONS:  - Educate patient/family on patient safety including physical limitations  - Instruct patient to call for assistance with activity   - Consult OT/PT to assist with strengthening/mobility   - Keep Call bell within reach  - Keep bed low and locked with side rails adjusted as appropriate  - Keep care items and personal belongings within reach  - Initiate and maintain comfort rounds  - Make Fall Risk Sign visible to staff    - Apply yellow socks and bracelet for high fall risk patients  - Consider moving patient to room near nurses station  Outcome: Progressing     Problem: Knowledge Deficit  Goal: Patient/family/caregiver demonstrates understanding of disease process, treatment plan, medications, and discharge instructions  Description: Complete learning assessment and assess knowledge base.   Interventions:  - Provide teaching at level of understanding  - Provide teaching via preferred learning methods  Outcome: Progressing

## 2023-08-29 NOTE — PROGRESS NOTES
Patient here for inflectra and is well, no c/o or changes to report. Patient denies recent illness with use of antibiotic.

## 2023-08-29 NOTE — PATIENT INSTRUCTIONS
August 2023 Sunday Monday Tuesday Wednesday Thursday Friday Saturday             1     2    NEW PATIENT PG   8:15 AM   (15 min.)   José Antonio Bellamy MD   [address] [facility] [facility] [place]    XR GENERAL PROCEDURE   8:45 AM   (15 min.)   [facility] XR ORTHO 1   [facility] 3     4     5       6     7     8     9     10     11     12       13     14     15     16     17     18     19       20     21     22     23     24     25     26       27     28     29    INF THERAPY PLAN  12:00 PM   (240 min.)   AN INF CHAIR [address] [place] [address] 30     51

## 2023-08-29 NOTE — PROGRESS NOTES
Patient tolerated treatment today without incident and was discharged post, no c/o offered. She will RTO for same 10/10/23.

## 2023-10-02 ENCOUNTER — TELEPHONE (OUTPATIENT)
Dept: RHEUMATOLOGY | Facility: CLINIC | Age: 63
End: 2023-10-02

## 2023-10-02 NOTE — TELEPHONE ENCOUNTER
Spoke with [facility] will be reaching out to the patient to get permission to deliver [address] [facility].

## 2023-10-03 NOTE — TELEPHONE ENCOUNTER
10/3 called [facility], confirmed delivery of med to [facility] this morning.

## 2023-10-10 ENCOUNTER — HOSPITAL ENCOUNTER (OUTPATIENT)
Dept: INFUSION CENTER | Facility: CLINIC | Age: 63
Discharge: HOME | End: 2023-10-10
Payer: COMMERCIAL

## 2023-10-10 VITALS
RESPIRATION RATE: 16 BRPM | DIASTOLIC BLOOD PRESSURE: 78 MMHG | TEMPERATURE: 97.4 F | OXYGEN SATURATION: 98 % | HEART RATE: 84 BPM | BODY MASS INDEX: 25.75 KG/M2 | WEIGHT: 150 LBS | SYSTOLIC BLOOD PRESSURE: 128 MMHG

## 2023-10-10 DIAGNOSIS — L40.50 PSORIATIC ARTHRITIS (HCC): Primary | ICD-10-CM

## 2023-10-10 PROCEDURE — 96413 CHEMO IV INFUSION 1 HR: CPT

## 2023-10-10 PROCEDURE — 96415 CHEMO IV INFUSION ADDL HR: CPT

## 2023-10-10 PROCEDURE — 96375 TX/PRO/DX INJ NEW DRUG ADDON: CPT

## 2023-10-10 RX ORDER — METHYLPREDNISOLONE SODIUM SUCCINATE 40 MG/ML
40 INJECTION, POWDER, LYOPHILIZED, FOR SOLUTION INTRAMUSCULAR; INTRAVENOUS ONCE
OUTPATIENT
Start: 2023-11-21

## 2023-10-10 RX ORDER — ACETAMINOPHEN 325 MG/1
650 TABLET ORAL ONCE
Status: COMPLETED | OUTPATIENT
Start: 2023-10-10 | End: 2023-10-10

## 2023-10-10 RX ORDER — METHYLPREDNISOLONE SODIUM SUCCINATE 40 MG/ML
40 INJECTION, POWDER, LYOPHILIZED, FOR SOLUTION INTRAMUSCULAR; INTRAVENOUS ONCE
Status: COMPLETED | OUTPATIENT
Start: 2023-10-10 | End: 2023-10-10

## 2023-10-10 RX ORDER — ACETAMINOPHEN 325 MG/1
650 TABLET ORAL ONCE
OUTPATIENT
Start: 2023-11-21

## 2023-10-10 RX ORDER — DIPHENHYDRAMINE HCL 25 MG
25 TABLET ORAL ONCE
OUTPATIENT
Start: 2023-11-21

## 2023-10-10 RX ORDER — SODIUM CHLORIDE 9 MG/ML
20 INJECTION, SOLUTION INTRAVENOUS ONCE
Status: COMPLETED | OUTPATIENT
Start: 2023-10-10 | End: 2023-10-10

## 2023-10-10 RX ORDER — SODIUM CHLORIDE 9 MG/ML
20 INJECTION, SOLUTION INTRAVENOUS ONCE
OUTPATIENT
Start: 2023-11-21

## 2023-10-10 RX ORDER — DIPHENHYDRAMINE HCL 25 MG
25 TABLET ORAL ONCE
Status: COMPLETED | OUTPATIENT
Start: 2023-10-10 | End: 2023-10-10

## 2023-10-10 RX ADMIN — ACETAMINOPHEN 650 MG: 325 TABLET ORAL at 08:55

## 2023-10-10 RX ADMIN — SODIUM CHLORIDE 20 ML/HR: 0.9 INJECTION, SOLUTION INTRAVENOUS at 08:49

## 2023-10-10 RX ADMIN — DIPHENHYDRAMINE HYDROCHLORIDE 25 MG: 25 TABLET ORAL at 08:55

## 2023-10-10 RX ADMIN — METHYLPREDNISOLONE SODIUM SUCCINATE 40 MG: 40 INJECTION, POWDER, FOR SOLUTION INTRAMUSCULAR; INTRAVENOUS at 09:44

## 2023-10-10 RX ADMIN — SODIUM CHLORIDE 340 MG: 9 INJECTION, SOLUTION INTRAVENOUS at 09:56

## 2023-10-10 NOTE — PROGRESS NOTES
Pt here for inflectra infusion, offers no complaints, no recent infection or antibiotic use, IV access obtained, pt resting comfortably in chair.

## 2023-10-10 NOTE — PROGRESS NOTES
Patient tolerated her treatment without any adverse reactions.  Patient stated she would make her next appointment on the way out

## 2023-11-07 ENCOUNTER — OFFICE VISIT (OUTPATIENT)
Dept: OBGYN CLINIC | Facility: CLINIC | Age: 63
End: 2023-11-07
Payer: COMMERCIAL

## 2023-11-07 VITALS
HEIGHT: 64 IN | SYSTOLIC BLOOD PRESSURE: 126 MMHG | DIASTOLIC BLOOD PRESSURE: 82 MMHG | BODY MASS INDEX: 25.61 KG/M2 | WEIGHT: 150 LBS

## 2023-11-07 DIAGNOSIS — M17.12 PRIMARY OSTEOARTHRITIS OF LEFT KNEE: Primary | ICD-10-CM

## 2023-11-07 PROCEDURE — 20610 DRAIN/INJ JOINT/BURSA W/O US: CPT | Performed by: ORTHOPAEDIC SURGERY

## 2023-11-07 PROCEDURE — 99214 OFFICE O/P EST MOD 30 MIN: CPT | Performed by: ORTHOPAEDIC SURGERY

## 2023-11-07 RX ORDER — LIDOCAINE HYDROCHLORIDE 10 MG/ML
7 INJECTION, SOLUTION INFILTRATION; PERINEURAL
Status: COMPLETED | OUTPATIENT
Start: 2023-11-07 | End: 2023-11-07

## 2023-11-07 RX ORDER — BETAMETHASONE SODIUM PHOSPHATE AND BETAMETHASONE ACETATE 3; 3 MG/ML; MG/ML
6 INJECTION, SUSPENSION INTRA-ARTICULAR; INTRALESIONAL; INTRAMUSCULAR; SOFT TISSUE
Status: COMPLETED | OUTPATIENT
Start: 2023-11-07 | End: 2023-11-07

## 2023-11-07 RX ADMIN — LIDOCAINE HYDROCHLORIDE 7 ML: 10 INJECTION, SOLUTION INFILTRATION; PERINEURAL at 13:15

## 2023-11-07 RX ADMIN — BETAMETHASONE SODIUM PHOSPHATE AND BETAMETHASONE ACETATE 6 MG: 3; 3 INJECTION, SUSPENSION INTRA-ARTICULAR; INTRALESIONAL; INTRAMUSCULAR; SOFT TISSUE at 13:15

## 2023-11-07 NOTE — PROGRESS NOTES
Assessment:     1. Primary osteoarthritis of left knee        Plan:      Problem List Items Addressed This Visit          Musculoskeletal and Integument    Primary osteoarthritis of left knee - Primary     Findings consistent with left knee advanced osteoarthritis. Findings and treatment options were discussed with the patient. Aspiration was attempted of the left knee joint but no fluid was yielded. Repeat cortisone injection was given today. She tolerated the procedure well. Advised to apply cold compress today. Follow-up as needed if symptoms return. Advised patient as soon as she can have another cortisone injection is in 3 to 4 months. All patient's questions were answered to her satisfaction. This note is created using dictation transcription. It may contain typographical errors, grammatical errors, improperly dictated words, background noise and other errors. Relevant Medications    lidocaine (XYLOCAINE) 1 % injection 7 mL (Completed)    betamethasone acetate-betamethasone sodium phosphate (CELESTONE) injection 6 mg (Completed)    Other Relevant Orders    Large joint arthrocentesis: L knee (Completed)      Subjective:     Patient ID: Francheska Mckeon is a 61 y.o. female. Chief Complaint:  61 y o female following up for advanced left knee osteoarthritis. Last seen 3 months ago and given a cortisone injection. She did get good relief from the injection and it started wearing off 1 week ago. She denies any new injury. She is requesting a repeat cortisone injection today.     Allergy:  Allergies   Allergen Reactions    Methotrexate Hives     Medications:  all current active meds have been reviewed  Past Medical History:  Past Medical History:   Diagnosis Date    Chronic left-sided low back pain without sciatica 3/18/2020    Concussion     Hyperlipidemia     Psoriatic arthritis ([address]) 9/4/2019    Seronegative rheumatoid arthritis ([address]) 9/4/2019    Tendinitis      Past Surgical History:  Past Surgical History:   Procedure Laterality Date     SECTION      FOOT SURGERY Left     HAND SURGERY Left 10/2021    HEMORROIDECTOMY      KNEE ARTHROSCOPY Right 2018    [place] ARTHRS KNE SURG W/MENISCECTOMY MED/LAT W/SHVG Right 2018    Procedure: ARTHROSCOPY KNEE, PARTIAL MEDIAL MENISCECTOMY: ABRASION CHONDROPLASTY;  Surgeon: Jason Mcmullen MD;  Location:  MAIN OR;  Service: Orthopedics    [place] [address] WRST SURG W/RLS TRANSVRS CARPL LIGM Right 10/21/2021    Procedure: Right endoscopic carpal tunnel release;  Surgeon: Wilfred Duarte MD;  Location: [facility];  Service: Orthopedics    [place] [address] WRST SURG W/RLS TRANSVRS CARPL LIGM Left 2022    Procedure: RELEASE CARPAL TUNNEL ENDOSCOPIC, LEFT;  Surgeon: Wilfred Duarte MD;  Location: [facility] OR;  Service: Orthopedics    [place] RCNSTJ COLTRL LIGM IPHAL JT 1 W/GRF EA JT Right 10/21/2021    Procedure: right thumb Ulnar collateral ligament repair;  Surgeon: Wilfred Duarte MD;  Location: [facility];  Service: Orthopedics    [place] TENDON SHEATH INCISION Right 10/21/2021    Procedure: Right small finger trigger finger release;  Surgeon: Wilfred Duarte MD;  Location:  MAIN OR;  Service: Orthopedics    TUBAL LIGATION       Family History:  Family History   Problem Relation Age of Onset    Coronary artery disease Mother     Other Mother         CABG    Hiatal hernia Father     Stroke Maternal Grandmother     Hyperlipidemia Family     Migraines Family     Thyroid disease Family     No Known Problems Sister     No Known Problems Brother     No Known Problems Son     No Known Problems Daughter     No Known Problems Paternal Grandmother     No Known Problems Paternal Grandfather     No Known Problems Maternal Aunt     No Known Problems Maternal Uncle     No Known Problems Paternal Aunt     No Known Problems Paternal Uncle     No Known Problems Cousin      Social History:  Social History     Substance and Sexual Activity   Alcohol Use Yes    Comment: social Social History     Substance and Sexual Activity   Drug Use No     Social History     Tobacco Use   Smoking Status Former    Packs/day: 0.33    Years: 5.00    Total pack years: 1.65    Types: Cigarettes    Quit date:     Years since quittin.8   Smokeless Tobacco Never     Review of Systems   Constitutional:  Negative for chills and fever. HENT:  Negative for ear pain and sore throat. Eyes:  Negative for pain and visual disturbance. Respiratory:  Negative for cough and shortness of breath. Cardiovascular:  Negative for chest pain and palpitations. Gastrointestinal:  Negative for abdominal pain and vomiting. Genitourinary:  Negative for dysuria and hematuria. Musculoskeletal:  Positive for arthralgias (Left knee), gait problem (Antalgic) and joint swelling (Left knee). Negative for back pain. Skin:  Negative for color change and rash. Neurological:  Negative for seizures and syncope. Psychiatric/Behavioral: Negative. All other systems reviewed and are negative. Objective:  BP Readings from Last 1 Encounters:   23 126/82      Wt Readings from Last 1 Encounters:   23 68 kg (150 lb)      BMI:   Estimated body mass index is 25.75 kg/m² as calculated from the following:    Height as of this encounter: 5' 4" (1.626 m). Weight as of this encounter: 68 kg (150 lb). BSA:   Estimated body surface area is 1.73 meters squared as calculated from the following:    Height as of this encounter: 5' 4" (1.626 m). Weight as of this encounter: 68 kg (150 lb). Physical Exam  Vitals and nursing note reviewed. Constitutional:       Appearance: Normal appearance. She is well-developed. HENT:      Head: Normocephalic and atraumatic. Right Ear: External ear normal.      Left Ear: External ear normal.   Eyes:      Extraocular Movements: Extraocular movements intact.       Conjunctiva/sclera: Conjunctivae normal.   Pulmonary:      Effort: Pulmonary effort is normal. Musculoskeletal:      Cervical back: Neck supple. Left knee: Effusion (trace) present. Instability Tests: Medial Darlin test negative and lateral Darlin test negative. Skin:     General: Skin is warm and dry. Neurological:      Mental Status: She is alert and oriented to person, place, and time. Deep Tendon Reflexes: Reflexes are normal and symmetric. Psychiatric:         Mood and Affect: Mood normal.         Behavior: Behavior normal.       Left Knee Exam     Tenderness   The patient is experiencing tenderness in the medial joint line (anterior). Range of Motion   Extension:  -5 (Pain)   Flexion:  120 (Pain)     Tests   Darlin:  Medial - negative Lateral - negative  Varus: negative Valgus: negative  Patellar apprehension: negative    Other   Sensation: normal  Pulse: present  Swelling: mild  Effusion: effusion (trace) present    Comments:  Varus alignment              No new imaging. Large joint arthrocentesis: L knee  Universal Protocol:  Consent: Verbal consent obtained.   Risks and benefits: risks, benefits and alternatives were discussed  Consent given by: patient  Patient understanding: patient states understanding of the procedure being performed  Supporting Documentation  Indications: pain and joint swelling   Procedure Details  Location: knee - L knee  Preparation: Patient was prepped and draped in the usual sterile fashion  Needle size: 18 G (25-gauge for anesthetic)  Ultrasound guidance: no  Approach: superior  Medications administered: 7 mL lidocaine 1 %; 6 mg betamethasone acetate-betamethasone sodium phosphate 6 (3-3) mg/mL    Aspirate amount: 0 mL  Patient tolerance: patient tolerated the procedure well with no immediate complications  Dressing:  Sterile dressing applied    5 cc 1% lidocaine used for anesthetic        Scribe Attestation      I,:  Patti Flower PA-C am acting as a scribe while in the presence of the attending physician.:       I,:  Orion Boyd MD personally performed the services described in this documentation    as scribed in my presence.:

## 2023-11-07 NOTE — ASSESSMENT & PLAN NOTE
Findings consistent with left knee advanced osteoarthritis. Findings and treatment options were discussed with the patient. Aspiration was attempted of the left knee joint but no fluid was yielded. Repeat cortisone injection was given today. She tolerated the procedure well. Advised to apply cold compress today. Follow-up as needed if symptoms return. Advised patient as soon as she can have another cortisone injection is in 3 to 4 months. All patient's questions were answered to her satisfaction. This note is created using dictation transcription. It may contain typographical errors, grammatical errors, improperly dictated words, background noise and other errors.

## 2023-11-21 ENCOUNTER — HOSPITAL ENCOUNTER (OUTPATIENT)
Dept: INFUSION CENTER | Facility: CLINIC | Age: 63
Discharge: HOME | End: 2023-11-21
Payer: COMMERCIAL

## 2023-11-21 VITALS
WEIGHT: 146.5 LBS | SYSTOLIC BLOOD PRESSURE: 134 MMHG | TEMPERATURE: 97.3 F | OXYGEN SATURATION: 98 % | RESPIRATION RATE: 18 BRPM | HEART RATE: 82 BPM | BODY MASS INDEX: 25.15 KG/M2 | DIASTOLIC BLOOD PRESSURE: 70 MMHG

## 2023-11-21 DIAGNOSIS — L40.50 PSORIATIC ARTHRITIS (HCC): Primary | ICD-10-CM

## 2023-11-21 PROCEDURE — 96413 CHEMO IV INFUSION 1 HR: CPT

## 2023-11-21 PROCEDURE — 96415 CHEMO IV INFUSION ADDL HR: CPT

## 2023-11-21 RX ORDER — DIPHENHYDRAMINE HCL 25 MG
25 TABLET ORAL ONCE
Status: COMPLETED | OUTPATIENT
Start: 2023-11-21 | End: 2023-11-21

## 2023-11-21 RX ORDER — ACETAMINOPHEN 325 MG/1
650 TABLET ORAL ONCE
OUTPATIENT
Start: 2024-01-02

## 2023-11-21 RX ORDER — ACETAMINOPHEN 325 MG/1
650 TABLET ORAL ONCE
Status: COMPLETED | OUTPATIENT
Start: 2023-11-21 | End: 2023-11-21

## 2023-11-21 RX ORDER — METHYLPREDNISOLONE SODIUM SUCCINATE 40 MG/ML
40 INJECTION, POWDER, LYOPHILIZED, FOR SOLUTION INTRAMUSCULAR; INTRAVENOUS ONCE
Status: COMPLETED | OUTPATIENT
Start: 2023-11-21 | End: 2023-11-21

## 2023-11-21 RX ORDER — SODIUM CHLORIDE 9 MG/ML
20 INJECTION, SOLUTION INTRAVENOUS ONCE
OUTPATIENT
Start: 2024-01-02

## 2023-11-21 RX ORDER — METHYLPREDNISOLONE SODIUM SUCCINATE 40 MG/ML
40 INJECTION, POWDER, LYOPHILIZED, FOR SOLUTION INTRAMUSCULAR; INTRAVENOUS ONCE
OUTPATIENT
Start: 2024-01-02

## 2023-11-21 RX ORDER — SODIUM CHLORIDE 9 MG/ML
20 INJECTION, SOLUTION INTRAVENOUS ONCE
Status: COMPLETED | OUTPATIENT
Start: 2023-11-21 | End: 2023-11-21

## 2023-11-21 RX ORDER — DIPHENHYDRAMINE HCL 25 MG
25 TABLET ORAL ONCE
OUTPATIENT
Start: 2024-01-02

## 2023-11-21 RX ADMIN — ACETAMINOPHEN 650 MG: 325 TABLET ORAL at 12:00

## 2023-11-21 RX ADMIN — DIPHENHYDRAMINE HYDROCHLORIDE 25 MG: 25 TABLET ORAL at 12:00

## 2023-11-21 RX ADMIN — METHYLPREDNISOLONE SODIUM SUCCINATE 40 MG: 40 INJECTION, POWDER, FOR SOLUTION INTRAMUSCULAR; INTRAVENOUS at 12:00

## 2023-11-21 RX ADMIN — SODIUM CHLORIDE 20 ML/HR: 0.9 INJECTION, SOLUTION INTRAVENOUS at 11:55

## 2023-11-21 RX ADMIN — SODIUM CHLORIDE 333 MG: 0.9 INJECTION, SOLUTION INTRAVENOUS at 12:37

## 2023-11-21 NOTE — PLAN OF CARE
-Mgmt as per hospital medicine   Problem: Knowledge Deficit  Goal: Patient/family/caregiver demonstrates understanding of disease process, treatment plan, medications, and discharge instructions  Description: Complete learning assessment and assess knowledge base.   Interventions:  - Provide teaching at level of understanding  - Provide teaching via preferred learning methods  Outcome: Progressing

## 2023-11-21 NOTE — PROGRESS NOTES
Pt given IV Inflectra without incident, confirmed pt has no infections or recent antibiotic use prior to infusion.  Confirmed pts next appt, declines AVS.

## 2023-12-04 NOTE — PROGRESS NOTES
Assessment and Plan:   Ms. Dillan Fisher is a 77-year-old female with history significant for psoriatic arthritis, bilateral knee osteoarthritis, right knee meniscal tear status post surgery and right shoulder rotator cuff tears with subdeltoid/subacromial bursitis who presents for a follow-up. She is currently on infliximab infusions 5 mg/kg every 6 weeks that was started on 2/7/2023. Rheumatic summary:  1) Diagnosed with PsA (subtle erosive changes on hand XRs) in 8/2019 - started on MTX, but d/c'ed in 9/2019 due to side effects of skin rash. Started on LEF 10/2019-present. - 7/20/21: presenting with flare up x 1 month - prednisone taper x 15 days and continue LEF. If no sustained improvement will d/c LEF and start adalimumab. - 2/1/22: adalimumab started in 10/2021 - ineffective, will d/c and start Orencia. Steroid taper.   - 6/29/22: [place] Sport started in 2/22 - still symptomatic, will add on LEF 20 mg and steroid taper. - 12/7/22: d/c Orencia and LEF, will start infliximab infusions and obtain hand/wrist US.  - 3/15/23: imfliximab started on 2/7/23 with great improvement, continue 3 mg/kg every 8 weeks. US hands/wrists with active inflammation prior to starting infliximab. - 7/26/23: change infliximab to 5 mg/kg every 6 weeks. - 12/6/23: d/c infliximab and start Rinvoq. 2) Co-morbidities: knee OA, right carpal tunnel syndrome, lumbar DJD. # Psoriatic arthritis  - Olive Chambers presents today for a follow-up of psoriatic arthritis for which she is currently on infliximab infusions 5 mg/kg every 6 weeks. Unfortunately there has been no significant benefit with the infliximab infusions thus far and she continues to be symptomatic with various joint pains, swelling and stiffness with evidence of synovitis on her examination today. In view of this I would like to discontinue the infliximab and start her on Rinvoq 15 mg once daily.   I reviewed the side effects with her including but not limited to risk for infections, need for regular blood work monitoring, malignancy, CV risk, blood clots and diverticular disease. She will update high risk medication lab monitoring in 1 month and then 3 months after starting the Rinvoq. I will represcribe her prednisone to take 10 to 20 mg once a day as needed for the interim. Plan:  Diagnoses and all orders for this visit:    Psoriatic arthritis ([address])  -     C-reactive protein; Future  -     Sedimentation rate, automated; Future  -     predniSONE 10 mg tablet; TAKE 1-2 TABLETS BY MOUTH DAILY AS NEEDED  -     C-reactive protein  -     Sedimentation rate, automated    Long-term use of immunosuppressant medication  -     CBC and differential; Future  -     Comprehensive metabolic panel; Future  -     CBC and differential  -     Comprehensive metabolic panel  -     CBC and differential; Future  -     Comprehensive metabolic panel; Future  -     CBC and differential  -     Comprehensive metabolic panel    Current use of steroid medication    Primary generalized (osteo)arthritis      Activities as tolerated. Exercise: try to maintain a low impact exercise regimen as much as possible. Continue other medications as prescribed by PCP and other specialists. RTC in 3 months. HPI    INITIAL VISIT NOTE:  Ms. Emely Pedersen is a 71-year-old  female with history significant for mild right knee osteoarthritis, right knee meniscal tear status post surgery and right shoulder rotator cuff tears with subdeltoid/subacromial bursitis, who presents for further evaluation of diffuse joint pains and an elevated C reactive protein of 81. She is referred by Dr. Migdalia Smith. Patient states in November 2017 she had an accidental injury resulting in a concussion, and states overall she has not felt well since then. She is able to recall more diffuse joint pains starting in December 2018, and states they have been more prominent as well as progressive since then.   She experiences her symptoms on a daily basis in a mostly constant manner. She states it is significantly interfering with her activities of daily living, as well as with her sleeping habits. She describes pain most prominently affecting her bilateral shoulders, bilateral wrists, to a lesser degree affecting her bilateral hands, low back region, hips and knees. She does not really experience joint pains affecting her elbows, ankles or feet. She has subjectively only noticed swelling affecting her knees. She experiences morning stiffness which affects her diffusely and persists throughout the day. She states any sort of activity aggravates her symptoms, but she does not necessarily obtain relief with resting. At the onset of her symptoms in February/March 2019 she was prescribed a 10 day course of prednisone starting at 40 mg daily, which she states significantly helped her. She was then represcribed a course of prednisone of the same duration in June 2019 which also helped her. She states the symptoms recur quickly after cessation of the steroids. She was recently also prescribed tramadol and tizanidine for the pain, but this has not really been helping her. She has also tried over-the-counter ibuprofen, Advil, Aleve and naproxen without significant benefit of her symptoms. She denies any recent fevers, chills, night sweats, unintentional weight loss or infections prior to the onset of her joint complaints. She denies significant dry eyes, inflammatory eye disease, dry mouth, skin rashes, psoriasis, mouth/nose ulcers, shortness of breath, abdominal pain, vomiting, diarrhea, blood in stools, inflammatory bowel disease or family history of rheumatoid arthritis/autoimmune disease. She was seen by her primary care physician for the ongoing complaints and had recent testing done which showed an elevated C reactive protein of 81. RIGO screen, rheumatoid factor, uric acid and Lyme antibody profile were unremarkable.   In view of the ongoing right shoulder pain she recently had an MRI of her right shoulder done which showed partial-thickness rotator cuff tears as well as subacromial/subdeltoid bursitis. She has been following up with Orthopedics for this, and it is not thought that the rotator cuff tears should correspond to the symptoms she has been experiencing. She also received bilateral intra-articular cortisone shoulder injections which only provided her with temporary relief. She was advised to follow-up with Rheumatology first, and if her symptoms are not to improve then there may be consideration for an arthroscopic procedure. 8/27/2019:  Patient presents for a follow-up today. We reviewed her recently done labs which showed an unremarkable CBC, CMP, ESR, anti CCP antibody, Sjogren's antibodies and chronic hepatitis panel. X-ray of her right hand showed periarticular soft tissue swelling of the 2nd through 5th PIP joints, as well as subtle juxta-articular erosions of the 2nd through 5th DIP joints. X-ray of her left hand and bilateral feet did not show any signs of inflammatory arthritis. Following the prior office visit patient had started prednisone 40 mg daily for 7 days, and is currently on prednisone 20 mg daily. She states being on the steroids has significantly improved her symptoms, and she is currently denying any joint pains, swelling or morning stiffness. She is feeling well today and denies any complaints. Of note she denies a history of psoriasis or skin rashes. She denies a family history of psoriasis. 11/27/2019:  Patient presents for a follow-up of psoriatic arthritis. She is currently on leflunomide 20 mg once daily and prednisone 10 mg once daily. At the last office visit I had started her on methotrexate but after taking a few doses she called the office with an outbreak of a skin rash affecting her right upper extremity.   I requested she try the medication once more to ensure this was related to a side effect of the methotrexate, and she states that the rash persisted and worsened. In view of this we discontinued the methotrexate entirely and I switched her to leflunomide 20 mg once daily which she has been on for approximately 1 month now. She has been tolerating the medication well without any concerns for side effects or infections. She reports overall her joint pains have significantly improved. She did try to taper down on the prednisone to 5 mg once daily and then discontinue it, but states upon discontinuing her joint pains recurred. In view of this she restarted prednisone at 5 mg once daily, but over the past few days further increased to 10 mg once daily due to increased activities causing worsening joint pains. She is willing to try and taper down on the prednisone at this time. She reports pain that she usually experiences will affect her shoulders and knees. She denies any other significant areas of joint pains or persistent swelling. On occasion she will notice swelling of her knees. She states that the morning stiffness has mostly resolved as well. She denies any other history of skin rash or psoriasis. 3/3/2020:  Patient presents for a follow-up of psoriatic arthritis. She is currently on leflunomide 20 mg once daily. I reviewed her recently done CBC and CMP which were unremarkable. Patient reports overall in terms of the arthritis she has been doing well and denies any significant peripheral joint pains, swelling or morning stiffness. For the past 3-4 months she has been dealing with left lower back pain and was seen in the emergency room for this. On occasion the pain will radiate down her leg. An x-ray of her lumbar spine was done which showed mild degenerative arthritis at two levels. She is following up with her primary care physician for this and completing a course of physical therapy.   She is unsure if the PT has really helped her so far. She has been taking ibuprofen on a daily basis to take the edge off her symptoms. 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 Typically when this happens she may take prednisone 5-10 mg as needed which will help her. She uses the prednisone on a very infrequent basis. She denies a history of skin rash or psoriasis. She has been tolerating the leflunomide well without any concerns for side effects or infections. 6/2/2020:  Patient presents for a follow-up of psoriatic arthritis. She is currently on leflunomide 20 mg once daily and prednisone 5-10 mg daily as needed. Patient reports in April she was experiencing a flare-up diffuse hives, for which she increased the prednisone to 10 mg once daily and ran out of her script early. She has been out of the prednisone for the past few weeks. She reports without the steroids she does notice a change in her joint pains, which will primarily affect her shoulders, neck and knees. She has noticed intermittent swelling affecting her knees. She does not really experience any morning stiffness, but will have a sensation of diffuse achiness as well as fatigue. She is not taking any over-the-counter pain medications. She reports for maintenance therapy she is mostly taking the leflunomide 20 mg once daily as well as prednisone 5 mg once daily, and this seems to control her symptoms. She has been tolerating her medications well without any concerns for side effects or infections. 11/9/2020:  Patient presents for a follow-up of psoriatic arthritis. She is currently on leflunomide 20 mg once daily and prednisone 5-10 mg once daily as needed. I reviewed her recently done CBC and CMP which were normal.     Patient reports she has overall been doing well and this is the best that she has felt.   She was able to start exercising at the gym again and thinks that this has helped with her shoulder pain. Over the past few weeks she has noticed pain and swelling affecting her left knee but cannot recall any injuries. Other than the left knee pain she is denying any other joint pains and has not noticed joint swelling. She is no longer experiencing morning stiffness. She still takes the prednisone as needed but thinks she is now able to taper off it completely. She will also take Tylenol as needed. She has been tolerating the leflunomide well without any concerns for side effects or infections. She has not had any psoriasis. 3/10/2021:  Patient presents for a follow-up of psoriatic arthritis. She is currently on leflunomide 20 mg once daily. I reviewed her recently done CBC and CMP which were normal.       She reports since the last office visit she was able to discontinue the steroids without a flare-up, and manages her joint pains with Tylenol as needed. She was experiencing pain affecting her left knee and right shoulder, but she was seen by Orthopedics and received intra-articular cortisone injections which helped. Last week she did have an accidental fall on the ice and fell on her right hand. She was seen in urgent care and had an x-ray done which did not show any fractures. She was advised that she probably sprained her right wrist and is continuing with conservative measures. She has been unable to rest her wrist as due to her occupation as a  she is constantly using her hands. Recently she has also noticed numbness affecting the lateral fingers on her right hand mostly at night, but at certain times during the day as well while she is working. She denies any other joint pains, joint swelling or morning stiffness. She has been tolerating the leflunomide well without any concerns for side effects or infections. She has not had any psoriasis.         7/20/2021:  Patient presents for a follow-up of psoriatic arthritis. She is currently on leflunomide 20 mg once daily. She has not had her recent high risk medication lab monitoring done. She reports since the last visit, especially in the past 1 month she has noticed a flare-up in her joint pains. She describes an overall body achiness associated with fatigue but also mentions specific joint pains involving her left knee and right ankle. She has noticed swelling to affect her right wrist, left knee and right ankle. She experiences morning stiffness which affects her diffusely and can take about an hour to improve. She takes over-the-counter Tylenol as needed which helps to some degree. She has previously been told not to take NSAIDs but is unsure the reason why. She has not been on any recent steroids. She has also been following up with [facility] for right hand and wrist pain which has persisted following a fall and sprain in March 2021. She did have an ultrasound of her right wrist done looking for carpal tunnel syndrome and this was suspicious for the condition. An MRI of her right thumb in June 2021 showed a partial tear of the ulnar collateral ligament as well as mild 1st MCP joint osteoarthritis. She has been tolerating the leflunomide well without any concerns for side effects or infections. She has not had any psoriasis. 2/1/2022:  Patient presents for a follow-up of psoriatic arthritis. She is currently on subcutaneous adalimumab 40 mg every 2 weeks that was started in October 2021. Unfortunately there has been no improvement in her symptoms and apart from experiencing widespread body aches, she is also noticing significant pain in her hands, wrists and right ankle. She feels like her hands, wrists and right ankle have also been swollen. She experiences morning stiffness which affects her diffusely and can take a few hours to improve.   She does take over-the-counter NSAIDs as needed but this does not really help. A few weeks ago she was prescribed a tapering course of prednisone starting at 20 mg once daily but she states that this did not really help her.          6/29/2022:  Patient presents for a follow-up of psoriatic arthritis. She is currently on subcutaneous Orencia 125 mg once weekly that was started in February 2022. She reports with switching from the adalimumab to Orencia she did notice some improvement in her joint pains but she is still quite symptomatic with pain in her hands, wrists, knees and ankles. She still has swelling in her hands and ankles. She experiences morning stiffness affecting her diffusely that takes about 30 minutes to improve. No recent steroid use. She is taking Aleve twice a day as needed but this does not help significantly. 12/7/2022:  Patient presents for a follow-up of psoriatic arthritis. She is currently on subcutaneous Orencia 125 mg once weekly that was started in February 2022 and leflunomide 20 mg once daily that was started in June 2022. She has not updated her high risk medication lab monitoring recently. She reports with this regimen she has not noticed any improvement in her symptoms. She is primarily complaining of continued swelling and stiffness affecting her hands and recently has had more pain in her knees and ankles. Her left knee has also been swollen. She is not describing particular morning stiffness and reports that the stiffness in her hands persists throughout the day. No recent steroid use and no consistent NSAID use. She has some dryness present on the hands but no clear psoriasis. 3/15/2023:  Patient presents for a follow-up of psoriatic arthritis. She is currently on infliximab infusions 3 mg/kg that was started on 2/7/2023. She has completed 2 doses so far and is due for the last dose of the initial cycle on 3/21/2023. Following this she will receive it every 8 weeks.   I reviewed the blood work done after the last office visit which showed an unremarkable CBC, CMP and ESR. The C-reactive protein was slightly elevated at 14. I reviewed the ultrasound of her hands and wrists which showed findings consistent with active inflammatory arthritis evidenced by synovial hypertrophy and hyperemia involving multiple joints primarily on the left hand. She reports after receiving the initial dose of the infliximab infusion she started to notice an improvement in her overall wellbeing. She mentions the pain, swelling and stiffness in her hands and wrists has significantly improved but she does experience some degree of pain and swelling still. Overall she is feeling great at this time. 7/26/2023:  Patient presents for a follow-up of psoriatic arthritis. She is currently on infliximab infusions 3 mg/kg every 8 weeks that was started on 2/7/2023. She had labs done this morning and I am still waiting for the results. She mentions shortly after our last visit she started to experience a flareup of joint pains primarily affecting her left knee and left ankle. They have been swollen along with some swelling in her hands. Morning stiffness is taking a few hours to improve. No recent steroid or NSAID use. She also scheduled an appointment with orthopedics to evaluate her symptoms. She has been tolerating the infliximab infusions well without any concerns for side effects or infections. 12/6/2023:  Patient presents for a follow-up of psoriatic arthritis. She is currently on infliximab infusions 5 mg/kg every 6 weeks. She reports with changing the dose and frequency of the infliximab infusions there has been no significant benefit in her symptoms. She does feel slightly better for 1 week following the infusion but then the efficacy declines. She is still symptomatic more so in her hands, ankles and feet.   The left knee pain has improved with receiving intra-articular cortisone injections through orthopedics. She still has swelling of her hands, wrists and left knee. She reports morning stiffness affects her joints and to some degree persists throughout the day. She is not taking any over-the-counter pain medications at this time. After our last visit she did use the supply of prednisone at 10 to 20 mg once daily and only had 60 tablets. After completing this she has not been on steroids. The steroids do help anytime she takes it. She has been tolerating the infliximab infusions well without any concerns for side effects or infections. The following portions of the patient's history were reviewed and updated as appropriate: allergies, current medications, past family history, past medical history, past social history, past surgical history and problem list.      Review of Systems  Constitutional: Negative for weight change, fevers, chills, night sweats, fatigue. ENT/Mouth: Negative for hearing changes, ear pain, nasal congestion, sinus pain, hoarseness, sore throat, rhinorrhea, swallowing difficulty. Eyes: Negative for pain, redness, discharge, vision changes. Cardiovascular: Negative for chest pain, SOB, palpitations. Respiratory: Negative for cough, sputum, wheezing, dyspnea. Gastrointestinal: Negative for nausea, vomiting, diarrhea, constipation, pain, heartburn. Genitourinary: Negative for dysuria, urinary frequency, hematuria. Musculoskeletal: As per HPI. Skin: Negative for skin rash, color changes. Neuro: Negative for weakness, numbness, tingling, loss of consciousness. Psych: Negative for anxiety, depression. Heme/Lymph: Negative for easy bruising, bleeding, lymphadenopathy.         Past Medical History:   Diagnosis Date    Chronic left-sided low back pain without sciatica 3/18/2020    Concussion     Hyperlipidemia     Psoriatic arthritis ([address]) 9/4/2019    Seronegative rheumatoid arthritis ([address]) 9/4/2019    Tendinitis        Past Surgical History:   Procedure Laterality Date     SECTION      FOOT SURGERY Left     HAND SURGERY Left 10/2021    HEMORROIDECTOMY      KNEE ARTHROSCOPY Right 2018    [place] ARTHRS KNE SURG W/MENISCECTOMY MED/LAT W/SHVG Right 2018    Procedure: ARTHROSCOPY KNEE, PARTIAL MEDIAL MENISCECTOMY: ABRASION CHONDROPLASTY;  Surgeon: Su Levi MD;  Location: [facility];  Service: Orthopedics    [place] [id number] WRST SURG W/RLS TRANSVRS CARPL LIGM Right 10/21/2021    Procedure: Right endoscopic carpal tunnel release;  Surgeon: Kenya Gramajo MD;  Location: [facility];  Service: Orthopedics    [place] [id number] WRST SURG W/RLS TRANSVRS CARPL LIGM Left 2022    Procedure: RELEASE CARPAL TUNNEL ENDOSCOPIC, LEFT;  Surgeon: Kenya Gramajo MD;  Location: [facility];  Service: Orthopedics    NM RCNSTJ COLTRL LIGM Wil[address] Fraleny 1 W/GRF EA JT Right 10/21/2021    Procedure: right thumb Ulnar collateral ligament repair;  Surgeon: Kenya Gramajo MD;  Location: [facility];  Service: Orthopedics    NM TENDON SHEATH INCISION Right 10/21/2021    Procedure: Right small finger trigger finger release;  Surgeon: Kenya Gramajo MD;  Location: [facility];  Service: Orthopedics    TUBAL LIGATION         Social History     Socioeconomic History    Marital status:      Spouse name: Not on file    Number of children: Not on file    Years of education: Not on file    Highest education level: Not on file   Occupational History    Not on file   Tobacco Use    Smoking status: Former     Packs/day: 0.33     Years: 5.00     Total pack years: 1.65     Types: Cigarettes     Quit date: 46     Years since quittin.9    Smokeless tobacco: Never   Vaping Use    Vaping Use: Never used   Substance and Sexual Activity    Alcohol use: Yes     Comment: social    Drug use: No    Sexual activity: Not on file   Other Topics Concern    Not on file   Social History Narrative    Not on file     Social Determinants of Health     Financial Resource Strain: Not on file   Food Insecurity: Not on file   Transportation Needs: Not on file   Physical Activity: Not on file   Stress: Not on file   Social Connections: Not on file   Intimate Partner Violence: Not on file   Housing Stability: Not on file       Family History   Problem Relation Age of Onset    Coronary artery disease Mother     Other Mother         CABG    Hiatal hernia Father     Stroke Maternal Grandmother     Hyperlipidemia Family     Migraines Family     Thyroid disease Family     No Known Problems Sister     No Known Problems Brother     No Known Problems Son     No Known Problems Daughter     No Known Problems Paternal Grandmother     No Known Problems Paternal Grandfather     No Known Problems Maternal Aunt     No Known Problems Maternal Uncle     No Known Problems Paternal Aunt     No Known Problems Paternal Uncle     No Known Problems Cousin        Allergies   Allergen Reactions    Methotrexate Hives       Current Outpatient Medications:     acetaminophen (TYLENOL) 650 mg CR tablet, Take 1 tablet (650 mg total) by mouth every 8 (eight) hours as needed for mild pain, Disp: 30 tablet, Rfl: 0    Glucosamine-Chondroitin (GLUCOSAMINE CHONDR COMPLEX PO), Take by mouth daily  , Disp: , Rfl:     multivitamin (THERAGRAN) TABS, Take 1 tablet by mouth daily, Disp: , Rfl:     pravastatin (PRAVACHOL) 40 mg tablet, Take 1 tablet (40 mg total) by mouth daily at bedtime, Disp: 30 tablet, Rfl: 3    predniSONE 10 mg tablet, TAKE 1-2 TABLETS BY MOUTH DAILY AS NEEDED, Disp: 60 tablet, Rfl: 2      Objective:    Vitals:    12/06/23 1016   BP: 122/80   Weight: 66.9 kg (147 lb 6.4 oz)   Height: 5' 4" (1.626 m)       Physical Exam  General: Well appearing, well nourished, in no distress. Oriented x 3, normal mood and affect. Ambulating without difficulty. Skin: Good turgor, no rash, unusual bruising or prominent lesions. Hair: Normal texture and distribution. Nails: Normal color, no deformities.   HEENT:  Head: Normocephalic, atraumatic. Eyes: Conjunctiva clear, sclera non-icteric, EOM intact. Extremities: No amputations or deformities, cyanosis, edema. Musculoskeletal:   Hands and wrists - soft tissue swelling appreciated diffusely over her bilateral PIP joints and also at her bilateral wrists. Left knee still appears swollen with chronic synovial changes. Neurologic: Alert and oriented. No focal neurological deficits appreciated. Psychiatric: Normal mood and affect. Corinna Prajapati M.D.   Rheumatology

## 2023-12-06 ENCOUNTER — OFFICE VISIT (OUTPATIENT)
Dept: RHEUMATOLOGY | Facility: CLINIC | Age: 63
End: 2023-12-06
Payer: COMMERCIAL

## 2023-12-06 ENCOUNTER — TELEPHONE (OUTPATIENT)
Dept: RHEUMATOLOGY | Facility: CLINIC | Age: 63
End: 2023-12-06

## 2023-12-06 VITALS
WEIGHT: 147.4 LBS | BODY MASS INDEX: 25.16 KG/M2 | SYSTOLIC BLOOD PRESSURE: 122 MMHG | HEIGHT: 64 IN | DIASTOLIC BLOOD PRESSURE: 80 MMHG

## 2023-12-06 DIAGNOSIS — Z79.60 LONG-TERM USE OF IMMUNOSUPPRESSANT MEDICATION: ICD-10-CM

## 2023-12-06 DIAGNOSIS — L40.50 PSORIATIC ARTHRITIS (HCC): Primary | ICD-10-CM

## 2023-12-06 DIAGNOSIS — M15.0 PRIMARY GENERALIZED (OSTEO)ARTHRITIS: ICD-10-CM

## 2023-12-06 DIAGNOSIS — Z79.52 CURRENT USE OF STEROID MEDICATION: ICD-10-CM

## 2023-12-06 PROCEDURE — 99215 OFFICE O/P EST HI 40 MIN: CPT | Performed by: INTERNAL MEDICINE

## 2023-12-06 RX ORDER — PREDNISONE 10 MG/1
TABLET ORAL
Qty: 60 TABLET | Refills: 2 | Status: SHIPPED | OUTPATIENT
Start: 2023-12-06

## 2023-12-06 NOTE — TELEPHONE ENCOUNTER
Please start prior authorization for Rinvoq 15 mg once daily for psoriatic arthritis. She has previously been on methotrexate, leflunomide, Humira, Remicade and Orencia.   She has a previously negative hepatitis panel and TB test.

## 2023-12-07 RX ORDER — UPADACITINIB 15 MG/1
15 TABLET, EXTENDED RELEASE ORAL DAILY
Qty: 90 TABLET | Refills: 1 | Status: SHIPPED | OUTPATIENT
Start: 2023-12-07

## 2023-12-07 NOTE — TELEPHONE ENCOUNTER
Received approval    Medication: Rinvoq 15mg tablets  Directions: take 1 tablet by mouth daily  Quantity: 30  Day Supply: 30  Insurance: [employer]  How Prior Auth was submitted: Alberto  Authorization Date range: 12/6/2023 - 12/6/2024  Authorization Number: Letter in [address] that fills med: [employer]

## 2024-01-08 ENCOUNTER — HOSPITAL ENCOUNTER (OUTPATIENT)
Dept: INFUSION CENTER | Facility: CLINIC | Age: 64
Discharge: HOME | End: 2024-01-08

## 2024-01-10 NOTE — PROGRESS NOTES
Daily Note     Today's date: 4/15/2021  Patient name: Matthew Smith  : 1960  MRN: [id number]  Referring provider: Surendra Ruiz MD  Dx:   Encounter Diagnosis     ICD-10-CM    1  Sprain of right wrist, initial encounter  S63 501A    2  Carpal tunnel syndrome of right wrist  G56 01                   Subjective: no numbess, but feel pain      Objective: See treatment diary below      Assessment: Tolerated treatment well  Patient has improved symptoms       Plan: Continue per plan of care        Precautions: universal      Manuals 4/6 4/13 4/15          graston wrist /CT 5m 3m 3m          MOB wrist gd 3 3m 3m 3m          MFR  2m 2m 2m          Intrinsic streches 2m 2m 2m          HEP 4-6 x day             Behavior mod discussed 4m                                                                                          Ther Ex             A/PROM wrist  4m 4m 4m          Flex  ext   3x10           Wrist ROm w/cone   2m          Wrist maze   10x                                                              Ther Activity                                       Gait Training                                       Modalities             Pulsed US CT 8m MH  10m MH   10m          CP 5m 5m 5m
Attending Only

## 2024-03-04 NOTE — PROGRESS NOTES
Assessment and Plan:   Ms. Lombardo is a 63-year-old female with history significant for psoriatic arthritis, bilateral knee osteoarthritis, right knee meniscal tear status post surgery and right shoulder rotator cuff tears with subdeltoid/subacromial bursitis who presents for a follow-up.  She is currently on upadacitinib 15 mg once daily that was started in December 2023.        Rheumatic summary:  1) Diagnosed with PsA (subtle erosive changes on hand XRs) in 8/2019 - started on MTX, but d/c'ed in 9/2019 due to side effects of skin rash. Started on LEF 10/2019-present.   - 7/20/21: presenting with flare up x 1 month - prednisone taper x 15 days and continue LEF. If no sustained improvement will d/c LEF and start adalimumab.   - 2/1/22: adalimumab started in 10/2021 - ineffective, will d/c and start Orencia. Steroid taper.   - 6/29/22: Orencia started in 2/22 - still symptomatic, will add on LEF 20 mg and steroid taper.   - 12/7/22: d/c Orencia and LEF, will start infliximab infusions and obtain hand/wrist US.  - 3/15/23: infliximab started on 2/7/23 with great improvement, continue 3 mg/kg every 8 weeks. US hands/wrists with active inflammation prior to starting infliximab.   - 7/26/23: change infliximab to 5 mg/kg every 6 weeks.   - 12/6/23: d/c infliximab and start Rinvoq.  - 3/6/24: continue Rinvoq.  2) Co-morbidities: knee OA, right carpal tunnel syndrome, lumbar DJD.        # Psoriatic arthritis  - Shante presents today for a follow-up of psoriatic arthritis for which she is currently on upadacitinib 15 mg once daily that was started after the last office visit.  Reassuringly she has started to appreciate an improvement in her joint pains, swelling, stiffness as well as fatigue, although there is still evidence of notable synovitis on her examination.  I will monitor her on monotherapy until the follow-up visit and if she still continues to present with significant synovitis then I recommend adding on a  nonbiologic DMARD such as leflunomide, sulfasalazine or hydroxychloroquine.  She is due to update high risk medication lab monitoring to assess for drug toxicities prior to the follow-up visit.  This will include a lipid panel to monitor for hyperlipidemia that can occur as a result of JONNY inhibitor use.  In the meanwhile if required she may take prednisone 10 to 20 mg once a day as needed.    # Screening for osteoporosis  - I recommend obtaining a baseline DEXA scan given her risk factors for osteoporosis including postmenopausal status, inflammatory arthritis and chronic steroid use.      Plan:  Diagnoses and all orders for this visit:    Psoriatic arthritis (HCC)  -     C-reactive protein; Future  -     Sedimentation rate, automated; Future  -     C-reactive protein  -     Sedimentation rate, automated    Long-term use of immunosuppressant medication  -     CBC and differential; Future  -     Comprehensive metabolic panel; Future  -     CBC and differential  -     Comprehensive metabolic panel    Current use of steroid medication    Primary generalized (osteo)arthritis    Hyperlipidemia, unspecified hyperlipidemia type  -     Lipid panel; Future  -     Lipid panel    Age-related osteoporosis without current pathological fracture  -     DXA bone density spine hip and pelvis; Future      Activities as tolerated.   Exercise: try to maintain a low impact exercise regimen as much as possible.   Continue other medications as prescribed by PCP and other specialists.       RTC in 4 months.        HPI    INITIAL VISIT NOTE:  Ms. Lombardo is a 59-year-old  female with history significant for mild right knee osteoarthritis, right knee meniscal tear status post surgery and right shoulder rotator cuff tears with subdeltoid/subacromial bursitis, who presents for further evaluation of diffuse joint pains and an elevated C reactive protein of 81.  She is referred by Dr. Srinivasan.     Patient states in November 2017 she  had an accidental injury resulting in a concussion, and states overall she has not felt well since then.  She is able to recall more diffuse joint pains starting in December 2018, and states they have been more prominent as well as progressive since then.  She experiences her symptoms on a daily basis in a mostly constant manner.  She states it is significantly interfering with her activities of daily living, as well as with her sleeping habits.  She describes pain most prominently affecting her bilateral shoulders, bilateral wrists, to a lesser degree affecting her bilateral hands, low back region, hips and knees.  She does not really experience joint pains affecting her elbows, ankles or feet.  She has subjectively only noticed swelling affecting her knees.  She experiences morning stiffness which affects her diffusely and persists throughout the day.  She states any sort of activity aggravates her symptoms, but she does not necessarily obtain relief with resting.  At the onset of her symptoms in February/March 2019 she was prescribed a 10 day course of prednisone starting at 40 mg daily, which she states significantly helped her.  She was then represcribed a course of prednisone of the same duration in June 2019 which also helped her.  She states the symptoms recur quickly after cessation of the steroids.  She was recently also prescribed tramadol and tizanidine for the pain, but this has not really been helping her.  She has also tried over-the-counter ibuprofen, Advil, Aleve and naproxen without significant benefit of her symptoms.     She denies any recent fevers, chills, night sweats, unintentional weight loss or infections prior to the onset of her joint complaints.  She denies significant dry eyes, inflammatory eye disease, dry mouth, skin rashes, psoriasis, mouth/nose ulcers, shortness of breath, abdominal pain, vomiting, diarrhea, blood in stools, inflammatory bowel disease or family history of rheumatoid  arthritis/autoimmune disease.     She was seen by her primary care physician for the ongoing complaints and had recent testing done which showed an elevated C reactive protein of 81.  RIGO screen, rheumatoid factor, uric acid and Lyme antibody profile were unremarkable.  In view of the ongoing right shoulder pain she recently had an MRI of her right shoulder done which showed partial-thickness rotator cuff tears as well as subacromial/subdeltoid bursitis.  She has been following up with Orthopedics for this, and it is not thought that the rotator cuff tears should correspond to the symptoms she has been experiencing.  She also received bilateral intra-articular cortisone shoulder injections which only provided her with temporary relief.  She was advised to follow-up with Rheumatology first, and if her symptoms are not to improve then there may be consideration for an arthroscopic procedure.        8/27/2019:  Patient presents for a follow-up today.  We reviewed her recently done labs which showed an unremarkable CBC, CMP, ESR, anti CCP antibody, Sjogren's antibodies and chronic hepatitis panel.  X-ray of her right hand showed periarticular soft tissue swelling of the 2nd through 5th PIP joints, as well as subtle juxta-articular erosions of the 2nd through 5th DIP joints.  X-ray of her left hand and bilateral feet did not show any signs of inflammatory arthritis.     Following the prior office visit patient had started prednisone 40 mg daily for 7 days, and is currently on prednisone 20 mg daily.  She states being on the steroids has significantly improved her symptoms, and she is currently denying any joint pains, swelling or morning stiffness.  She is feeling well today and denies any complaints.  Of note she denies a history of psoriasis or skin rashes.  She denies a family history of psoriasis.        11/27/2019:  Patient presents for a follow-up of psoriatic arthritis.  She is currently on leflunomide 20 mg once  daily and prednisone 10 mg once daily.     At the last office visit I had started her on methotrexate but after taking a few doses she called the office with an outbreak of a skin rash affecting her right upper extremity.  I requested she try the medication once more to ensure this was related to a side effect of the methotrexate, and she states that the rash persisted and worsened.  In view of this we discontinued the methotrexate entirely and I switched her to leflunomide 20 mg once daily which she has been on for approximately 1 month now.  She has been tolerating the medication well without any concerns for side effects or infections.     She reports overall her joint pains have significantly improved.  She did try to taper down on the prednisone to 5 mg once daily and then discontinue it, but states upon discontinuing her joint pains recurred.  In view of this she restarted prednisone at 5 mg once daily, but over the past few days further increased to 10 mg once daily due to increased activities causing worsening joint pains.  She is willing to try and taper down on the prednisone at this time.  She reports pain that she usually experiences will affect her shoulders and knees.  She denies any other significant areas of joint pains or persistent swelling.  On occasion she will notice swelling of her knees.  She states that the morning stiffness has mostly resolved as well.     She denies any other history of skin rash or psoriasis.        3/3/2020:  Patient presents for a follow-up of psoriatic arthritis.  She is currently on leflunomide 20 mg once daily.  I reviewed her recently done CBC and CMP which were unremarkable.     Patient reports overall in terms of the arthritis she has been doing well and denies any significant peripheral joint pains, swelling or morning stiffness.  For the past 3-4 months she has been dealing with left lower back pain and was seen in the emergency room for this.  On occasion the  pain will radiate down her leg.  An x-ray of her lumbar spine was done which showed mild degenerative arthritis at two levels.  She is following up with her primary care physician for this and completing a course of physical therapy.  She is unsure if the PT has really helped her so far.  She has been taking ibuprofen on a daily basis to take the edge off her symptoms.     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  Typically when this happens she may take prednisone 5-10 mg as needed which will help her.  She uses the prednisone on a very infrequent basis.     She denies a history of skin rash or psoriasis.  She has been tolerating the leflunomide well without any concerns for side effects or infections.        6/2/2020:  Patient presents for a follow-up of psoriatic arthritis.  She is currently on leflunomide 20 mg once daily and prednisone 5-10 mg daily as needed.     Patient reports in April she was experiencing a flare-up diffuse hives, for which she increased the prednisone to 10 mg once daily and ran out of her script early.  She has been out of the prednisone for the past few weeks.  She reports without the steroids she does notice a change in her joint pains, which will primarily affect her shoulders, neck and knees.  She has noticed intermittent swelling affecting her knees.  She does not really experience any morning stiffness, but will have a sensation of diffuse achiness as well as fatigue.  She is not taking any over-the-counter pain medications.  She reports for maintenance therapy she is mostly taking the leflunomide 20 mg once daily as well as prednisone 5 mg once daily, and this seems to control her symptoms.     She has been tolerating her medications well without any concerns for side effects or infections.        11/9/2020:  Patient presents for a follow-up of psoriatic arthritis.  She is  currently on leflunomide 20 mg once daily and prednisone 5-10 mg once daily as needed.  I reviewed her recently done CBC and CMP which were normal.     Patient reports she has overall been doing well and this is the best that she has felt.  She was able to start exercising at the gym again and thinks that this has helped with her shoulder pain.  Over the past few weeks she has noticed pain and swelling affecting her left knee but cannot recall any injuries.  Other than the left knee pain she is denying any other joint pains and has not noticed joint swelling.  She is no longer experiencing morning stiffness.  She still takes the prednisone as needed but thinks she is now able to taper off it completely.  She will also take Tylenol as needed.     She has been tolerating the leflunomide well without any concerns for side effects or infections.  She has not had any psoriasis.        3/10/2021:  Patient presents for a follow-up of psoriatic arthritis.  She is currently on leflunomide 20 mg once daily.  I reviewed her recently done CBC and CMP which were normal.       She reports since the last office visit she was able to discontinue the steroids without a flare-up, and manages her joint pains with Tylenol as needed.  She was experiencing pain affecting her left knee and right shoulder, but she was seen by Orthopedics and received intra-articular cortisone injections which helped.  Last week she did have an accidental fall on the ice and fell on her right hand.  She was seen in urgent care and had an x-ray done which did not show any fractures.  She was advised that she probably sprained her right wrist and is continuing with conservative measures.  She has been unable to rest her wrist as due to her occupation as a  she is constantly using her hands.  Recently she has also noticed numbness affecting the lateral fingers on her right hand mostly at night, but at certain times during the day as well while she  is working.     She denies any other joint pains, joint swelling or morning stiffness.  She has been tolerating the leflunomide well without any concerns for side effects or infections.  She has not had any psoriasis.        7/20/2021:  Patient presents for a follow-up of psoriatic arthritis.  She is currently on leflunomide 20 mg once daily.  She has not had her recent high risk medication lab monitoring done.       She reports since the last visit, especially in the past 1 month she has noticed a flare-up in her joint pains.  She describes an overall body achiness associated with fatigue but also mentions specific joint pains involving her left knee and right ankle.  She has noticed swelling to affect her right wrist, left knee and right ankle.  She experiences morning stiffness which affects her diffusely and can take about an hour to improve.  She takes over-the-counter Tylenol as needed which helps to some degree.  She has previously been told not to take NSAIDs but is unsure the reason why.  She has not been on any recent steroids.     She has also been following up with [facility] for right hand and wrist pain which has persisted following a fall and sprain in March 2021.  She did have an ultrasound of her right wrist done looking for carpal tunnel syndrome and this was suspicious for the condition.  An MRI of her right thumb in June 2021 showed a partial tear of the ulnar collateral ligament as well as mild 1st MCP joint osteoarthritis.     She has been tolerating the leflunomide well without any concerns for side effects or infections.  She has not had any psoriasis.        2/1/2022:  Patient presents for a follow-up of psoriatic arthritis.  She is currently on subcutaneous adalimumab 40 mg every 2 weeks that was started in October 2021.       Unfortunately there has been no improvement in her symptoms and apart from experiencing widespread body aches, she is also noticing significant pain in her  hands, wrists and right ankle.  She feels like her hands, wrists and right ankle have also been swollen.  She experiences morning stiffness which affects her diffusely and can take a few hours to improve.  She does take over-the-counter NSAIDs as needed but this does not really help.  A few weeks ago she was prescribed a tapering course of prednisone starting at 20 mg once daily but she states that this did not really help her.          6/29/2022:  Patient presents for a follow-up of psoriatic arthritis.  She is currently on subcutaneous Orencia 125 mg once weekly that was started in February 2022.       She reports with switching from the adalimumab to Orencia she did notice some improvement in her joint pains but she is still quite symptomatic with pain in her hands, wrists, knees and ankles.  She still has swelling in her hands and ankles.  She experiences morning stiffness affecting her diffusely that takes about 30 minutes to improve.  No recent steroid use.  She is taking Aleve twice a day as needed but this does not help significantly.      12/7/2022:  Patient presents for a follow-up of psoriatic arthritis.  She is currently on subcutaneous Orencia 125 mg once weekly that was started in February 2022 and leflunomide 20 mg once daily that was started in June 2022.  She has not updated her high risk medication lab monitoring recently.    She reports with this regimen she has not noticed any improvement in her symptoms.  She is primarily complaining of continued swelling and stiffness affecting her hands and recently has had more pain in her knees and ankles.  Her left knee has also been swollen.  She is not describing particular morning stiffness and reports that the stiffness in her hands persists throughout the day.  No recent steroid use and no consistent NSAID use.  She has some dryness present on the hands but no clear psoriasis.      3/15/2023:  Patient presents for a follow-up of psoriatic arthritis.   She is currently on infliximab infusions 3 mg/kg that was started on 2/7/2023.  She has completed 2 doses so far and is due for the last dose of the initial cycle on 3/21/2023.  Following this she will receive it every 8 weeks.  I reviewed the blood work done after the last office visit which showed an unremarkable CBC, CMP and ESR.  The C-reactive protein was slightly elevated at 14.  I reviewed the ultrasound of her hands and wrists which showed findings consistent with active inflammatory arthritis evidenced by synovial hypertrophy and hyperemia involving multiple joints primarily on the left hand.    She reports after receiving the initial dose of the infliximab infusion she started to notice an improvement in her overall wellbeing.  She mentions the pain, swelling and stiffness in her hands and wrists has significantly improved but she does experience some degree of pain and swelling still.  Overall she is feeling great at this time.      7/26/2023:  Patient presents for a follow-up of psoriatic arthritis.  She is currently on infliximab infusions 3 mg/kg every 8 weeks that was started on 2/7/2023.  She had labs done this morning and I am still waiting for the results.    She mentions shortly after our last visit she started to experience a flareup of joint pains primarily affecting her left knee and left ankle.  They have been swollen along with some swelling in her hands.  Morning stiffness is taking a few hours to improve.  No recent steroid or NSAID use.  She also scheduled an appointment with orthopedics to evaluate her symptoms.    She has been tolerating the infliximab infusions well without any concerns for side effects or infections.       12/6/2023:  Patient presents for a follow-up of psoriatic arthritis.  She is currently on infliximab infusions 5 mg/kg every 6 weeks.    She reports with changing the dose and frequency of the infliximab infusions there has been no significant benefit in her  symptoms.  She does feel slightly better for 1 week following the infusion but then the efficacy declines.  She is still symptomatic more so in her hands, ankles and feet.  The left knee pain has improved with receiving intra-articular cortisone injections through orthopedics.  She still has swelling of her hands, wrists and left knee.  She reports morning stiffness affects her joints and to some degree persists throughout the day.  She is not taking any over-the-counter pain medications at this time.  After our last visit she did use the supply of prednisone at 10 to 20 mg once daily and only had 60 tablets.  After completing this she has not been on steroids.  The steroids do help anytime she takes it.    She has been tolerating the infliximab infusions well without any concerns for side effects or infections.      3/6/2024:  Patient presents for a follow-up of psoriatic arthritis.  She is currently on upadacitinib 15 mg once daily that was started in December 2023.  She recently had blood work done at [facility] and I am still awaiting the results.    She reports soon after switching from infliximab to upadacitinib she started to appreciate an overall improvement in her joint pains and swelling.  She no longer has a significant duration of morning stiffness.  She does have a supply of prednisone to take 10 to 20 mg once a day as needed on an infrequent basis.  She reports recently she has noticed the fatigue to also improve.    She has been tolerating the medication well without any concerns for side effects or infections.    The following portions of the patient's history were reviewed and updated as appropriate: allergies, current medications, past family history, past medical history, past social history, past surgical history and problem list.      Review of Systems  Constitutional: Negative for weight change, fevers, chills, night sweats, fatigue.  ENT/Mouth: Negative for hearing changes, ear pain, nasal  congestion, sinus pain, hoarseness, sore throat, rhinorrhea, swallowing difficulty.   Eyes: Negative for pain, redness, discharge, vision changes.   Cardiovascular: Negative for chest pain, SOB, palpitations.   Respiratory: Negative for cough, sputum, wheezing, dyspnea.   Gastrointestinal: Negative for nausea, vomiting, diarrhea, constipation, pain, heartburn.  Genitourinary: Negative for dysuria, urinary frequency, hematuria.   Musculoskeletal: As per HPI.  Skin: Negative for skin rash, color changes.   Neuro: Negative for weakness, numbness, tingling, loss of consciousness.   Psych: Negative for anxiety, depression.   Heme/Lymph: Negative for easy bruising, bleeding, lymphadenopathy.        Past Medical History:   Diagnosis Date    Chronic left-sided low back pain without sciatica 3/18/2020    Concussion     Hyperlipidemia     Psoriatic arthritis (HCC) 2019    Seronegative rheumatoid arthritis (HCC) 2019    Tendinitis        Past Surgical History:   Procedure Laterality Date     SECTION      FOOT SURGERY Left     HAND SURGERY Left 10/2021    HEMORROIDECTOMY      KNEE ARTHROSCOPY Right 2018    GA ARTHRS KNE SURG W/MENISCECTOMY MED/LAT W/SHVG Right 2018    Procedure: ARTHROSCOPY KNEE, PARTIAL MEDIAL MENISCECTOMY: ABRASION CHONDROPLASTY;  Surgeon: Kavya Clark MD;  Location:  MAIN OR;  Service: Orthopedics    GA NDSC WRST SURG W/RLS TRANSVRS CARPL LIGM Right 10/21/2021    Procedure: Right endoscopic carpal tunnel release;  Surgeon: Frantz Conrad MD;  Location: [facility]IN OR;  Service: Orthopedics    GA NDSC WRST SURG W/RLS TRANSVRS CARPL LIGM Left 2022    Procedure: RELEASE CARPAL TUNNEL ENDOSCOPIC, LEFT;  Surgeon: Frantz Conrad MD;  Location: [facility] OR;  Service: Orthopedics    GA RCNSTJ COLTRL LIGM IPHAL JT 1 W/GRF EA JT Right 10/21/2021    Procedure: right thumb Ulnar collateral ligament repair;  Surgeon: Frantz Conrad MD;  Location: [facility] OR;  Service:  Orthopedics    SD TENDON SHEATH INCISION Right 10/21/2021    Procedure: Right small finger trigger finger release;  Surgeon: Frantz Conrad MD;  Location:  MAIN OR;  Service: Orthopedics    TUBAL LIGATION         Social History     Socioeconomic History    Marital status:      Spouse name: Not on file    Number of children: Not on file    Years of education: Not on file    Highest education level: Not on file   Occupational History    Not on file   Tobacco Use    Smoking status: Former     Current packs/day: 0.00     Average packs/day: 0.3 packs/day for 5.0 years (1.7 ttl pk-yrs)     Types: Cigarettes     Start date:      Quit date:      Years since quittin.2    Smokeless tobacco: Never   Vaping Use    Vaping status: Never Used   Substance and Sexual Activity    Alcohol use: Yes     Comment: social    Drug use: No    Sexual activity: Not on file   Other Topics Concern    Not on file   Social History Narrative    Not on file     Social Determinants of Health     Financial Resource Strain: Not on file   Food Insecurity: Not on file   Transportation Needs: Not on file   Physical Activity: Not on file   Stress: Not on file   Social Connections: Not on file   Intimate Partner Violence: Not on file   Housing Stability: Not on file       Family History   Problem Relation Age of Onset    Coronary artery disease Mother     Other Mother         CABG    Hiatal hernia Father     Stroke Maternal Grandmother     Hyperlipidemia Family     Migraines Family     Thyroid disease Family     No Known Problems Sister     No Known Problems Brother     No Known Problems Son     No Known Problems Daughter     No Known Problems Paternal Grandmother     No Known Problems Paternal Grandfather     No Known Problems Maternal Aunt     No Known Problems Maternal Uncle     No Known Problems Paternal Aunt     No Known Problems Paternal Uncle     No Known Problems Cousin        Allergies   Allergen Reactions    Methotrexate  "Hives       Current Outpatient Medications:     acetaminophen (TYLENOL) 650 mg CR tablet, Take 1 tablet (650 mg total) by mouth every 8 (eight) hours as needed for mild pain, Disp: 30 tablet, Rfl: 0    Glucosamine-Chondroitin (GLUCOSAMINE CHONDR COMPLEX PO), Take by mouth daily  , Disp: , Rfl:     multivitamin (THERAGRAN) TABS, Take 1 tablet by mouth daily, Disp: , Rfl:     pravastatin (PRAVACHOL) 40 mg tablet, Take 1 tablet (40 mg total) by mouth daily at bedtime, Disp: 30 tablet, Rfl: 3    predniSONE 10 mg tablet, TAKE 1-2 TABLETS BY MOUTH DAILY AS NEEDED, Disp: 60 tablet, Rfl: 2    Upadacitinib ER (Rinvoq) 15 MG TB24, Take 15 mg by mouth in the morning, Disp: 90 tablet, Rfl: 1      Objective:    Vitals:    03/06/24 1554   BP: 110/70   Pulse: 66   Temp: 98.8 °F (37.1 °C)   SpO2: 99%   Weight: 65.2 kg (143 lb 12.8 oz)   Height: 5' 4\" (1.626 m)         Physical Exam  General: Well appearing, well nourished, in no distress. Oriented x 3, normal mood and affect.  Ambulating without difficulty.  Skin: Good turgor, no rash, unusual bruising or prominent lesions.    Hair: Normal texture and distribution.  Nails: Normal color, no deformities.  HEENT:  Head: Normocephalic, atraumatic.  Eyes: Conjunctiva clear, sclera non-icteric, EOM intact.  Extremities: No amputations or deformities, cyanosis, edema.  Musculoskeletal:   Hands and wrists - soft tissue swelling appreciated diffusely over her bilateral PIP joints and also at her bilateral wrists, but she thinks the swelling has improved.  Neurologic: Alert and oriented. No focal neurological deficits appreciated.   Psychiatric: Normal mood and affect.       Ibis Lion M.D.  Rheumatology  "

## 2024-03-06 ENCOUNTER — OFFICE VISIT (OUTPATIENT)
Dept: RHEUMATOLOGY | Facility: CLINIC | Age: 64
End: 2024-03-06
Payer: COMMERCIAL

## 2024-03-06 VITALS
WEIGHT: 143.8 LBS | HEIGHT: 64 IN | HEART RATE: 66 BPM | BODY MASS INDEX: 24.55 KG/M2 | DIASTOLIC BLOOD PRESSURE: 70 MMHG | TEMPERATURE: 98.8 F | OXYGEN SATURATION: 99 % | SYSTOLIC BLOOD PRESSURE: 110 MMHG

## 2024-03-06 DIAGNOSIS — Z79.60 LONG-TERM USE OF IMMUNOSUPPRESSANT MEDICATION: ICD-10-CM

## 2024-03-06 DIAGNOSIS — L40.50 PSORIATIC ARTHRITIS (HCC): Primary | ICD-10-CM

## 2024-03-06 DIAGNOSIS — Z79.52 CURRENT USE OF STEROID MEDICATION: ICD-10-CM

## 2024-03-06 DIAGNOSIS — M15.0 PRIMARY GENERALIZED (OSTEO)ARTHRITIS: ICD-10-CM

## 2024-03-06 DIAGNOSIS — M81.0 AGE-RELATED OSTEOPOROSIS WITHOUT CURRENT PATHOLOGICAL FRACTURE: ICD-10-CM

## 2024-03-06 DIAGNOSIS — E78.5 HYPERLIPIDEMIA, UNSPECIFIED HYPERLIPIDEMIA TYPE: ICD-10-CM

## 2024-03-06 PROCEDURE — 99215 OFFICE O/P EST HI 40 MIN: CPT | Performed by: INTERNAL MEDICINE

## 2024-03-26 ENCOUNTER — OFFICE VISIT (OUTPATIENT)
Dept: FAMILY MEDICINE CLINIC | Facility: CLINIC | Age: 64
End: 2024-03-26
Payer: COMMERCIAL

## 2024-03-26 VITALS
BODY MASS INDEX: 24.75 KG/M2 | SYSTOLIC BLOOD PRESSURE: 138 MMHG | OXYGEN SATURATION: 99 % | TEMPERATURE: 96.6 F | HEART RATE: 65 BPM | DIASTOLIC BLOOD PRESSURE: 92 MMHG | HEIGHT: 64 IN | WEIGHT: 145 LBS

## 2024-03-26 DIAGNOSIS — L40.50 PSORIATIC ARTHRITIS (HCC): ICD-10-CM

## 2024-03-26 DIAGNOSIS — J01.00 ACUTE NON-RECURRENT MAXILLARY SINUSITIS: Primary | ICD-10-CM

## 2024-03-26 DIAGNOSIS — R05.1 ACUTE COUGH: ICD-10-CM

## 2024-03-26 PROCEDURE — 99214 OFFICE O/P EST MOD 30 MIN: CPT | Performed by: NURSE PRACTITIONER

## 2024-03-26 RX ORDER — BENZONATATE 200 MG/1
200 CAPSULE ORAL 3 TIMES DAILY PRN
Qty: 20 CAPSULE | Refills: 0 | Status: SHIPPED | OUTPATIENT
Start: 2024-03-26

## 2024-03-26 RX ORDER — CEFUROXIME AXETIL 250 MG/1
250 TABLET ORAL EVERY 12 HOURS SCHEDULED
Qty: 20 TABLET | Refills: 0 | Status: SHIPPED | OUTPATIENT
Start: 2024-03-26 | End: 2024-04-05

## 2024-03-26 NOTE — PROGRESS NOTES
Name: Geri A Lombardo      : 1960      MRN: [id number]  Encounter Provider: GUILLERMINA Gomez  Encounter Date: 3/26/2024   Encounter department: [facility]    Assessment & Plan     1. Acute non-recurrent maxillary sinusitis  Assessment & Plan:  Ceftin twice daily for 10 days, take with food  Increase fluids  Symptom management    Orders:  -     cefuroxime (CEFTIN) 250 mg tablet; Take 1 tablet (250 mg total) by mouth every 12 (twelve) hours for 10 days    2. Psoriatic arthritis (HCC)  Assessment & Plan:  Was started on rinvoq- seeing rheumatology      3. Acute cough  -     benzonatate (TESSALON) 200 MG capsule; Take 1 capsule (200 mg total) by mouth 3 (three) times a day as needed for cough         Subjective        Started about 2 weeks ago before leaving for [place]- cold symptoms- sinus pain and pressure worsening, ears feel full. OTC doesn't relieve any symptoms. No fevers. Had sore throat for first few days, that went away. No abd pain,n , v, d. But feels slightly queasy.     Has federal jury duty scheduled for . Has anxiety with travel, she is unable to sit for prolonged periods of time with her arthritis.         Review of Systems   Constitutional:  Positive for fatigue. Negative for chills and fever.   HENT:  Positive for congestion, ear pain, postnasal drip, rhinorrhea, sinus pressure and sinus pain. Negative for ear discharge, hearing loss, sneezing and sore throat.    Eyes:  Negative for pain, discharge and itching.   Respiratory:  Positive for cough. Negative for chest tightness, shortness of breath and wheezing.    Cardiovascular:  Negative for chest pain, palpitations and leg swelling.   Gastrointestinal:  Negative for abdominal pain, diarrhea, nausea and vomiting.   Genitourinary:  Negative for dysuria.   Musculoskeletal:  Positive for arthralgias and joint swelling. Negative for myalgias.   Skin:  Negative for rash.   Allergic/Immunologic: Positive for  "environmental allergies.   Neurological:  Positive for headaches. Negative for dizziness and light-headedness.   Hematological:  Negative for adenopathy.       Current Outpatient Medications on File Prior to Visit   Medication Sig   • acetaminophen (TYLENOL) 650 mg CR tablet Take 1 tablet (650 mg total) by mouth every 8 (eight) hours as needed for mild pain   • Glucosamine-Chondroitin (GLUCOSAMINE CHONDR COMPLEX PO) Take by mouth daily     • multivitamin (THERAGRAN) TABS Take 1 tablet by mouth daily   • pravastatin (PRAVACHOL) 40 mg tablet Take 1 tablet (40 mg total) by mouth daily at bedtime   • predniSONE 10 mg tablet TAKE 1-2 TABLETS BY MOUTH DAILY AS NEEDED   • Upadacitinib ER (Rinvoq) 15 MG TB24 Take 15 mg by mouth in the morning       Objective     /92   Pulse 65   Temp (!) 96.6 °F (35.9 °C) (Tympanic)   Ht 5' 4\" (1.626 m)   Wt 65.8 kg (145 lb)   LMP  (LMP Unknown)   SpO2 99%   BMI 24.89 kg/m²     Physical Exam  Vitals reviewed.   Constitutional:       General: She is not in acute distress.     Appearance: Normal appearance. She is normal weight. She is not ill-appearing.   HENT:      Right Ear: Tympanic membrane, ear canal and external ear normal.      Left Ear: Tympanic membrane, ear canal and external ear normal.      Nose: Congestion present.      Mouth/Throat:      Mouth: Mucous membranes are moist.      Pharynx: Posterior oropharyngeal erythema present.   Eyes:      General: No scleral icterus.        Right eye: No discharge.         Left eye: No discharge.      Extraocular Movements: Extraocular movements intact.      Conjunctiva/sclera: Conjunctivae normal.      Pupils: Pupils are equal, round, and reactive to light.   Cardiovascular:      Rate and Rhythm: Normal rate and regular rhythm.      Heart sounds: Normal heart sounds. No murmur heard.  Pulmonary:      Effort: Pulmonary effort is normal.      Breath sounds: Normal breath sounds. No wheezing or rhonchi.   Abdominal:      Palpations: " Abdomen is soft.      Tenderness: There is no abdominal tenderness.   Musculoskeletal:         General: Tenderness present.      Cervical back: Neck supple.   Skin:     General: Skin is warm.      Findings: No rash.   Neurological:      Mental Status: She is alert.       GUILLERMINA Gomez

## 2024-03-26 NOTE — LETTER
Date: 3/26/2024    To whom it may concern:     This is to certify that Geri A Lombardo has been under my care for the following diagnosis: psoriatic arthritis currently symptomatic with various joint pains, swelling and stiffness with evidence of synovitis on her examination today 3/26/24. She is unable to sit for prolonged periods of time and travel to [place] without exacerbating symptoms further..           I feel that Geri A Lombardo is unable to serve on Jury Duty at this time for the above mentioned medical reasons.      Jury # [id number]  April 1, 2024                Sincerely,  GUILLERMINA Gomez

## 2024-05-01 DIAGNOSIS — L40.50 PSORIATIC ARTHRITIS (HCC): ICD-10-CM

## 2024-05-01 PROBLEM — J01.00 ACUTE NON-RECURRENT MAXILLARY SINUSITIS: Status: RESOLVED | Noted: 2024-03-26 | Resolved: 2024-05-01

## 2024-05-02 RX ORDER — UPADACITINIB 15 MG/1
TABLET, EXTENDED RELEASE ORAL
Qty: 30 TABLET | Refills: 10 | Status: SHIPPED | OUTPATIENT
Start: 2024-05-02

## 2024-08-08 LAB
ALBUMIN SERPL-MCNC: 4.3 G/DL (ref 3.9–4.9)
ALP SERPL-CCNC: 54 IU/L (ref 44–121)
ALT SERPL-CCNC: 16 IU/L (ref 0–32)
AST SERPL-CCNC: 15 IU/L (ref 0–40)
BASOPHILS # BLD AUTO: 0 X10E3/UL (ref 0–0.2)
BASOPHILS NFR BLD AUTO: 1 %
BILIRUB SERPL-MCNC: 0.3 MG/DL (ref 0–1.2)
BUN SERPL-MCNC: 20 MG/DL (ref 8–27)
BUN/CREAT SERPL: 25 (ref 12–28)
CALCIUM SERPL-MCNC: 9.4 MG/DL (ref 8.7–10.3)
CHLORIDE SERPL-SCNC: 103 MMOL/L (ref 96–106)
CHOLEST SERPL-MCNC: 297 MG/DL (ref 100–199)
CHOLEST/HDLC SERPL: 2.8 RATIO (ref 0–4.4)
CO2 SERPL-SCNC: 27 MMOL/L (ref 20–29)
CREAT SERPL-MCNC: 0.8 MG/DL (ref 0.57–1)
CRP SERPL-MCNC: <1 MG/L (ref 0–10)
EGFR: 82 ML/MIN/1.73
EOSINOPHIL # BLD AUTO: 0.1 X10E3/UL (ref 0–0.4)
EOSINOPHIL NFR BLD AUTO: 3 %
ERYTHROCYTE [DISTWIDTH] IN BLOOD BY AUTOMATED COUNT: 12.5 % (ref 11.7–15.4)
ERYTHROCYTE [SEDIMENTATION RATE] IN BLOOD BY WESTERGREN METHOD: 2 MM/HR (ref 0–40)
GLOBULIN SER-MCNC: 2 G/DL (ref 1.5–4.5)
GLUCOSE SERPL-MCNC: 89 MG/DL (ref 70–99)
HCT VFR BLD AUTO: 38.3 % (ref 34–46.6)
HDLC SERPL-MCNC: 106 MG/DL
HGB BLD-MCNC: 13 G/DL (ref 11.1–15.9)
IMM GRANULOCYTES # BLD: 0 X10E3/UL (ref 0–0.1)
IMM GRANULOCYTES NFR BLD: 1 %
LDL CALC COMMENT: ABNORMAL
LDLC SERPL CALC-MCNC: 175 MG/DL (ref 0–99)
LYMPHOCYTES # BLD AUTO: 1.1 X10E3/UL (ref 0.7–3.1)
LYMPHOCYTES NFR BLD AUTO: 27 %
MCH RBC QN AUTO: 32.7 PG (ref 26.6–33)
MCHC RBC AUTO-ENTMCNC: 33.9 G/DL (ref 31.5–35.7)
MCV RBC AUTO: 96 FL (ref 79–97)
MONOCYTES # BLD AUTO: 0.3 X10E3/UL (ref 0.1–0.9)
MONOCYTES NFR BLD AUTO: 8 %
NEUTROPHILS # BLD AUTO: 2.3 X10E3/UL (ref 1.4–7)
NEUTROPHILS NFR BLD AUTO: 60 %
PLATELET # BLD AUTO: 268 X10E3/UL (ref 150–450)
POTASSIUM SERPL-SCNC: 4.6 MMOL/L (ref 3.5–5.2)
PROT SERPL-MCNC: 6.3 G/DL (ref 6–8.5)
RBC # BLD AUTO: 3.98 X10E6/UL (ref 3.77–5.28)
SL AMB VLDL CHOLESTEROL CALC: 16 MG/DL (ref 5–40)
SODIUM SERPL-SCNC: 142 MMOL/L (ref 134–144)
TRIGL SERPL-MCNC: 100 MG/DL (ref 0–149)
WBC # BLD AUTO: 3.8 X10E3/UL (ref 3.4–10.8)

## 2024-08-14 ENCOUNTER — OFFICE VISIT (OUTPATIENT)
Dept: RHEUMATOLOGY | Facility: CLINIC | Age: 64
End: 2024-08-14
Payer: COMMERCIAL

## 2024-08-14 VITALS
WEIGHT: 151 LBS | OXYGEN SATURATION: 98 % | DIASTOLIC BLOOD PRESSURE: 82 MMHG | BODY MASS INDEX: 25.78 KG/M2 | HEIGHT: 64 IN | SYSTOLIC BLOOD PRESSURE: 140 MMHG | HEART RATE: 70 BPM

## 2024-08-14 DIAGNOSIS — L40.50 PSORIATIC ARTHRITIS (HCC): Primary | ICD-10-CM

## 2024-08-14 DIAGNOSIS — E78.5 HYPERLIPIDEMIA, UNSPECIFIED HYPERLIPIDEMIA TYPE: ICD-10-CM

## 2024-08-14 DIAGNOSIS — Z11.1 SCREENING-PULMONARY TB: ICD-10-CM

## 2024-08-14 DIAGNOSIS — Z11.59 NEED FOR HEPATITIS C SCREENING TEST: ICD-10-CM

## 2024-08-14 DIAGNOSIS — Z79.60 LONG-TERM USE OF IMMUNOSUPPRESSANT MEDICATION: ICD-10-CM

## 2024-08-14 DIAGNOSIS — M15.0 PRIMARY GENERALIZED (OSTEO)ARTHRITIS: ICD-10-CM

## 2024-08-14 DIAGNOSIS — Z79.52 CURRENT USE OF STEROID MEDICATION: ICD-10-CM

## 2024-08-14 DIAGNOSIS — Z79.899 HIGH RISK MEDICATION USE: ICD-10-CM

## 2024-08-14 PROCEDURE — 99215 OFFICE O/P EST HI 40 MIN: CPT | Performed by: INTERNAL MEDICINE

## 2024-08-14 RX ORDER — LEFLUNOMIDE 10 MG/1
10 TABLET ORAL DAILY
Qty: 90 TABLET | Refills: 1 | Status: SHIPPED | OUTPATIENT
Start: 2024-08-14

## 2024-08-14 RX ORDER — PREDNISONE 10 MG/1
TABLET ORAL
Qty: 60 TABLET | Refills: 2 | Status: SHIPPED | OUTPATIENT
Start: 2024-08-14

## 2024-08-14 NOTE — PROGRESS NOTES
Assessment and Plan:   Ms. Lombardo is a 64-year-old female with history significant for psoriatic arthritis, bilateral knee osteoarthritis, right knee meniscal tear status post surgery and right shoulder rotator cuff tears with subdeltoid/subacromial bursitis who presents for a follow-up.  She is currently on upadacitinib 15 mg once daily that was started in December 2023.        Rheumatic summary:  1) Diagnosed with PsA (subtle erosive changes on hand XRs) in 8/2019 - started on MTX, but d/c'ed in 9/2019 due to side effects of skin rash. Started on LEF 10/2019-present.   - 7/20/21: presenting with flare up x 1 month - prednisone taper x 15 days and continue LEF. If no sustained improvement will d/c LEF and start adalimumab.   - 2/1/22: adalimumab started in 10/2021 - ineffective, will d/c and start Orencia. Steroid taper.   - 6/29/22: Orencia started in 2/22 - still symptomatic, will add on LEF 20 mg and steroid taper.   - 12/7/22: d/c Orencia and LEF, will start infliximab infusions and obtain hand/wrist US.  - 3/15/23: infliximab started on 2/7/23 with great improvement, continue 3 mg/kg every 8 weeks. US hands/wrists with active inflammation prior to starting infliximab.   - 7/26/23: change infliximab to 5 mg/kg every 6 weeks.   - 12/6/23: d/c infliximab and start Rinvoq.  - 3/6/24: continue Rinvoq.  - 8/14/24: continue Rinvoq and add LEF 10 mg OD.  2) Co-morbidities: knee OA, right carpal tunnel syndrome, lumbar DJD.        # Psoriatic arthritis  - Shante presents today for a follow-up of psoriatic arthritis for which she is currently on upadacitinib 15 mg once daily.  She is feeling great on this medication and many of her acute complaints have resolved.  I am happy with her progress but there is still evidence of synovitis noted on her examination.  In view of this I would like to add on a DMARD and will start with leflunomide 10 mg once daily.  She has previously been on this and is aware of the side effects.   She will update high risk medication lab monitoring to assess for drug toxicities in 2 months.  In the future if alternate agents are required sulfasalazine or hydroxychloroquine can be considered as options.  In the meanwhile if required she may take prednisone 10 to 20 mg once a day as needed.    # Screening for osteoporosis  - I recommend obtaining a baseline DEXA scan given her risk factors for osteoporosis including postmenopausal status, inflammatory arthritis and chronic steroid use.    # Hyperlipidemia  - She admits to taking the pravastatin inconsistently.  I advised her of the risks with being on JONNY inhibitors and requested she continue the pravastatin as prescribed.  She will follow-up with her PCP next week and hopefully update a lipid panel to continue monitoring.  Based on this it can be determined in the future if a change in antihyperlipidemic agents is required.      Plan:  Diagnoses and all orders for this visit:    Psoriatic arthritis (HCC)  -     C-reactive protein; Future  -     Sedimentation rate, automated; Future  -     predniSONE 10 mg tablet; TAKE 1-2 TABLETS BY MOUTH DAILY AS NEEDED  -     C-reactive protein  -     Sedimentation rate, automated  -     leflunomide (ARAVA) 10 MG tablet; Take 1 tablet (10 mg total) by mouth daily    Long-term use of immunosuppressant medication  -     CBC and differential; Future  -     Comprehensive metabolic panel; Future  -     CBC and differential  -     Comprehensive metabolic panel    High risk medication use    Current use of steroid medication    Primary generalized (osteo)arthritis    Hyperlipidemia, unspecified hyperlipidemia type  -     Lipid panel; Future  -     Lipid panel    Need for hepatitis C screening test  -     Viral Hepatitis Screening and Diagnosis (HBV, HCV); Future  -     Viral Hepatitis Screening and Diagnosis (HBV, HCV)    Screening-pulmonary TB  -     QuantiFERON-TB Gold Plus [facility]; Future  -     QuantiFERON-TB Gold Plus  [facility]      Activities as tolerated.   Exercise: try to maintain a low impact exercise regimen as much as possible.   Continue other medications as prescribed by PCP and other specialists.       RTC in 4 months.        HPI    INITIAL VISIT NOTE:  Ms. Lombardo is a 59-year-old  female with history significant for mild right knee osteoarthritis, right knee meniscal tear status post surgery and right shoulder rotator cuff tears with subdeltoid/subacromial bursitis, who presents for further evaluation of diffuse joint pains and an elevated C reactive protein of 81.  She is referred by Dr. Srinivasan.     Patient states in November 2017 she had an accidental injury resulting in a concussion, and states overall she has not felt well since then.  She is able to recall more diffuse joint pains starting in December 2018, and states they have been more prominent as well as progressive since then.  She experiences her symptoms on a daily basis in a mostly constant manner.  She states it is significantly interfering with her activities of daily living, as well as with her sleeping habits.  She describes pain most prominently affecting her bilateral shoulders, bilateral wrists, to a lesser degree affecting her bilateral hands, low back region, hips and knees.  She does not really experience joint pains affecting her elbows, ankles or feet.  She has subjectively only noticed swelling affecting her knees.  She experiences morning stiffness which affects her diffusely and persists throughout the day.  She states any sort of activity aggravates her symptoms, but she does not necessarily obtain relief with resting.  At the onset of her symptoms in February/March 2019 she was prescribed a 10 day course of prednisone starting at 40 mg daily, which she states significantly helped her.  She was then represcribed a course of prednisone of the same duration in June 2019 which also helped her.  She states the symptoms recur quickly  after cessation of the steroids.  She was recently also prescribed tramadol and tizanidine for the pain, but this has not really been helping her.  She has also tried over-the-counter ibuprofen, Advil, Aleve and naproxen without significant benefit of her symptoms.     She denies any recent fevers, chills, night sweats, unintentional weight loss or infections prior to the onset of her joint complaints.  She denies significant dry eyes, inflammatory eye disease, dry mouth, skin rashes, psoriasis, mouth/nose ulcers, shortness of breath, abdominal pain, vomiting, diarrhea, blood in stools, inflammatory bowel disease or family history of rheumatoid arthritis/autoimmune disease.     She was seen by her primary care physician for the ongoing complaints and had recent testing done which showed an elevated C reactive protein of 81.  RIGO screen, rheumatoid factor, uric acid and Lyme antibody profile were unremarkable.  In view of the ongoing right shoulder pain she recently had an MRI of her right shoulder done which showed partial-thickness rotator cuff tears as well as subacromial/subdeltoid bursitis.  She has been following up with Orthopedics for this, and it is not thought that the rotator cuff tears should correspond to the symptoms she has been experiencing.  She also received bilateral intra-articular cortisone shoulder injections which only provided her with temporary relief.  She was advised to follow-up with Rheumatology first, and if her symptoms are not to improve then there may be consideration for an arthroscopic procedure.        8/27/2019:  Patient presents for a follow-up today.  We reviewed her recently done labs which showed an unremarkable CBC, CMP, ESR, anti CCP antibody, Sjogren's antibodies and chronic hepatitis panel.  X-ray of her right hand showed periarticular soft tissue swelling of the 2nd through 5th PIP joints, as well as subtle juxta-articular erosions of the 2nd through 5th DIP joints.   X-ray of her left hand and bilateral feet did not show any signs of inflammatory arthritis.     Following the prior office visit patient had started prednisone 40 mg daily for 7 days, and is currently on prednisone 20 mg daily.  She states being on the steroids has significantly improved her symptoms, and she is currently denying any joint pains, swelling or morning stiffness.  She is feeling well today and denies any complaints.  Of note she denies a history of psoriasis or skin rashes.  She denies a family history of psoriasis.        11/27/2019:  Patient presents for a follow-up of psoriatic arthritis.  She is currently on leflunomide 20 mg once daily and prednisone 10 mg once daily.     At the last office visit I had started her on methotrexate but after taking a few doses she called the office with an outbreak of a skin rash affecting her right upper extremity.  I requested she try the medication once more to ensure this was related to a side effect of the methotrexate, and she states that the rash persisted and worsened.  In view of this we discontinued the methotrexate entirely and I switched her to leflunomide 20 mg once daily which she has been on for approximately 1 month now.  She has been tolerating the medication well without any concerns for side effects or infections.     She reports overall her joint pains have significantly improved.  She did try to taper down on the prednisone to 5 mg once daily and then discontinue it, but states upon discontinuing her joint pains recurred.  In view of this she restarted prednisone at 5 mg once daily, but over the past few days further increased to 10 mg once daily due to increased activities causing worsening joint pains.  She is willing to try and taper down on the prednisone at this time.  She reports pain that she usually experiences will affect her shoulders and knees.  She denies any other significant areas of joint pains or persistent swelling.  On occasion  she will notice swelling of her knees.  She states that the morning stiffness has mostly resolved as well.     She denies any other history of skin rash or psoriasis.        3/3/2020:  Patient presents for a follow-up of psoriatic arthritis.  She is currently on leflunomide 20 mg once daily.  I reviewed her recently done CBC and CMP which were unremarkable.     Patient reports overall in terms of the arthritis she has been doing well and denies any significant peripheral joint pains, swelling or morning stiffness.  For the past 3-4 months she has been dealing with left lower back pain and was seen in the emergency room for this.  On occasion the pain will radiate down her leg.  An x-ray of her lumbar spine was done which showed mild degenerative arthritis at two levels.  She is following up with her primary care physician for this and completing a course of physical therapy.  She is unsure if the PT has really helped her so far.  She has been taking ibuprofen on a daily basis to take the edge off her symptoms.     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  Typically when this happens she may take prednisone 5-10 mg as needed which will help her.  She uses the prednisone on a very infrequent basis.     She denies a history of skin rash or psoriasis.  She has been tolerating the leflunomide well without any concerns for side effects or infections.        6/2/2020:  Patient presents for a follow-up of psoriatic arthritis.  She is currently on leflunomide 20 mg once daily and prednisone 5-10 mg daily as needed.     Patient reports in April she was experiencing a flare-up diffuse hives, for which she increased the prednisone to 10 mg once daily and ran out of her script early.  She has been out of the prednisone for the past few weeks.  She reports without the steroids she does notice a change in her joint pains,  which will primarily affect her shoulders, neck and knees.  She has noticed intermittent swelling affecting her knees.  She does not really experience any morning stiffness, but will have a sensation of diffuse achiness as well as fatigue.  She is not taking any over-the-counter pain medications.  She reports for maintenance therapy she is mostly taking the leflunomide 20 mg once daily as well as prednisone 5 mg once daily, and this seems to control her symptoms.     She has been tolerating her medications well without any concerns for side effects or infections.        11/9/2020:  Patient presents for a follow-up of psoriatic arthritis.  She is currently on leflunomide 20 mg once daily and prednisone 5-10 mg once daily as needed.  I reviewed her recently done CBC and CMP which were normal.     Patient reports she has overall been doing well and this is the best that she has felt.  She was able to start exercising at the gym again and thinks that this has helped with her shoulder pain.  Over the past few weeks she has noticed pain and swelling affecting her left knee but cannot recall any injuries.  Other than the left knee pain she is denying any other joint pains and has not noticed joint swelling.  She is no longer experiencing morning stiffness.  She still takes the prednisone as needed but thinks she is now able to taper off it completely.  She will also take Tylenol as needed.     She has been tolerating the leflunomide well without any concerns for side effects or infections.  She has not had any psoriasis.        3/10/2021:  Patient presents for a follow-up of psoriatic arthritis.  She is currently on leflunomide 20 mg once daily.  I reviewed her recently done CBC and CMP which were normal.       She reports since the last office visit she was able to discontinue the steroids without a flare-up, and manages her joint pains with Tylenol as needed.  She was experiencing pain affecting her left knee and right  shoulder, but she was seen by Orthopedics and received intra-articular cortisone injections which helped.  Last week she did have an accidental fall on the ice and fell on her right hand.  She was seen in urgent care and had an x-ray done which did not show any fractures.  She was advised that she probably sprained her right wrist and is continuing with conservative measures.  She has been unable to rest her wrist as due to her occupation as a  she is constantly using her hands.  Recently she has also noticed numbness affecting the lateral fingers on her right hand mostly at night, but at certain times during the day as well while she is working.     She denies any other joint pains, joint swelling or morning stiffness.  She has been tolerating the leflunomide well without any concerns for side effects or infections.  She has not had any psoriasis.        7/20/2021:  Patient presents for a follow-up of psoriatic arthritis.  She is currently on leflunomide 20 mg once daily.  She has not had her recent high risk medication lab monitoring done.       She reports since the last visit, especially in the past 1 month she has noticed a flare-up in her joint pains.  She describes an overall body achiness associated with fatigue but also mentions specific joint pains involving her left knee and right ankle.  She has noticed swelling to affect her right wrist, left knee and right ankle.  She experiences morning stiffness which affects her diffusely and can take about an hour to improve.  She takes over-the-counter Tylenol as needed which helps to some degree.  She has previously been told not to take NSAIDs but is unsure the reason why.  She has not been on any recent steroids.     She has also been following up with [facility] for right hand and wrist pain which has persisted following a fall and sprain in March 2021.  She did have an ultrasound of her right wrist done looking for carpal tunnel syndrome and  this was suspicious for the condition.  An MRI of her right thumb in June 2021 showed a partial tear of the ulnar collateral ligament as well as mild 1st MCP joint osteoarthritis.     She has been tolerating the leflunomide well without any concerns for side effects or infections.  She has not had any psoriasis.        2/1/2022:  Patient presents for a follow-up of psoriatic arthritis.  She is currently on subcutaneous adalimumab 40 mg every 2 weeks that was started in October 2021.       Unfortunately there has been no improvement in her symptoms and apart from experiencing widespread body aches, she is also noticing significant pain in her hands, wrists and right ankle.  She feels like her hands, wrists and right ankle have also been swollen.  She experiences morning stiffness which affects her diffusely and can take a few hours to improve.  She does take over-the-counter NSAIDs as needed but this does not really help.  A few weeks ago she was prescribed a tapering course of prednisone starting at 20 mg once daily but she states that this did not really help her.          6/29/2022:  Patient presents for a follow-up of psoriatic arthritis.  She is currently on subcutaneous Orencia 125 mg once weekly that was started in February 2022.       She reports with switching from the adalimumab to Orencia she did notice some improvement in her joint pains but she is still quite symptomatic with pain in her hands, wrists, knees and ankles.  She still has swelling in her hands and ankles.  She experiences morning stiffness affecting her diffusely that takes about 30 minutes to improve.  No recent steroid use.  She is taking Aleve twice a day as needed but this does not help significantly.      12/7/2022:  Patient presents for a follow-up of psoriatic arthritis.  She is currently on subcutaneous Orencia 125 mg once weekly that was started in February 2022 and leflunomide 20 mg once daily that was started in June 2022.  She  has not updated her high risk medication lab monitoring recently.    She reports with this regimen she has not noticed any improvement in her symptoms.  She is primarily complaining of continued swelling and stiffness affecting her hands and recently has had more pain in her knees and ankles.  Her left knee has also been swollen.  She is not describing particular morning stiffness and reports that the stiffness in her hands persists throughout the day.  No recent steroid use and no consistent NSAID use.  She has some dryness present on the hands but no clear psoriasis.      3/15/2023:  Patient presents for a follow-up of psoriatic arthritis.  She is currently on infliximab infusions 3 mg/kg that was started on 2/7/2023.  She has completed 2 doses so far and is due for the last dose of the initial cycle on 3/21/2023.  Following this she will receive it every 8 weeks.  I reviewed the blood work done after the last office visit which showed an unremarkable CBC, CMP and ESR.  The C-reactive protein was slightly elevated at 14.  I reviewed the ultrasound of her hands and wrists which showed findings consistent with active inflammatory arthritis evidenced by synovial hypertrophy and hyperemia involving multiple joints primarily on the left hand.    She reports after receiving the initial dose of the infliximab infusion she started to notice an improvement in her overall wellbeing.  She mentions the pain, swelling and stiffness in her hands and wrists has significantly improved but she does experience some degree of pain and swelling still.  Overall she is feeling great at this time.      7/26/2023:  Patient presents for a follow-up of psoriatic arthritis.  She is currently on infliximab infusions 3 mg/kg every 8 weeks that was started on 2/7/2023.  She had labs done this morning and I am still waiting for the results.    She mentions shortly after our last visit she started to experience a flareup of joint pains primarily  affecting her left knee and left ankle.  They have been swollen along with some swelling in her hands.  Morning stiffness is taking a few hours to improve.  No recent steroid or NSAID use.  She also scheduled an appointment with orthopedics to evaluate her symptoms.    She has been tolerating the infliximab infusions well without any concerns for side effects or infections.       12/6/2023:  Patient presents for a follow-up of psoriatic arthritis.  She is currently on infliximab infusions 5 mg/kg every 6 weeks.    She reports with changing the dose and frequency of the infliximab infusions there has been no significant benefit in her symptoms.  She does feel slightly better for 1 week following the infusion but then the efficacy declines.  She is still symptomatic more so in her hands, ankles and feet.  The left knee pain has improved with receiving intra-articular cortisone injections through orthopedics.  She still has swelling of her hands, wrists and left knee.  She reports morning stiffness affects her joints and to some degree persists throughout the day.  She is not taking any over-the-counter pain medications at this time.  After our last visit she did use the supply of prednisone at 10 to 20 mg once daily and only had 60 tablets.  After completing this she has not been on steroids.  The steroids do help anytime she takes it.    She has been tolerating the infliximab infusions well without any concerns for side effects or infections.      3/6/2024:  Patient presents for a follow-up of psoriatic arthritis.  She is currently on upadacitinib 15 mg once daily that was started in December 2023.  She recently had blood work done at [facility] and I am still awaiting the results.    She reports soon after switching from infliximab to upadacitinib she started to appreciate an overall improvement in her joint pains and swelling.  She no longer has a significant duration of morning stiffness.  She does have a supply of  prednisone to take 10 to 20 mg once a day as needed on an infrequent basis.  She reports recently she has noticed the fatigue to also improve.    She has been tolerating the medication well without any concerns for side effects or infections.      8/15/2024:  Patient presents for a follow-up of psoriatic arthritis.  She is currently on upadacitinib 15 mg once daily that was started in December 2023.  I reviewed her recent labs which showed a normal CBC, CMP, ESR and CRP.  The lipid panel showed an elevated LDL of 175 but she admits to taking the pravastatin inconsistently.  She will be following up with her PCP next week to review this.    She has been feeling quite well without any significantly concerning joint pains, but states she will still experience pain in her bilateral knees.  Chronic swelling of joints is still present, but no new swelling.  Morning stiffness is negligible.  She does have a supply of prednisone that she takes 10 mg once a day for a few days if needed and she does this on an infrequent basis.  It does help her.    She has been tolerating the upadacitinib well without any concerns for side effects or infections.    The following portions of the patient's history were reviewed and updated as appropriate: allergies, current medications, past family history, past medical history, past social history, past surgical history and problem list.      Review of Systems  Constitutional: Negative for weight change, fevers, chills, night sweats, fatigue.  ENT/Mouth: Negative for hearing changes, ear pain, nasal congestion, sinus pain, hoarseness, sore throat, rhinorrhea, swallowing difficulty.   Eyes: Negative for pain, redness, discharge, vision changes.   Cardiovascular: Negative for chest pain, SOB, palpitations.   Respiratory: Negative for cough, sputum, wheezing, dyspnea.   Gastrointestinal: Negative for nausea, vomiting, diarrhea, constipation, pain, heartburn.  Genitourinary: Negative for dysuria,  urinary frequency, hematuria.   Musculoskeletal: As per HPI.  Skin: Negative for skin rash, color changes.   Neuro: Negative for weakness, numbness, tingling, loss of consciousness.   Psych: Negative for anxiety, depression.   Heme/Lymph: Negative for easy bruising, bleeding, lymphadenopathy.        Past Medical History:   Diagnosis Date    Chronic left-sided low back pain without sciatica 3/18/2020    Concussion     Hyperlipidemia     Psoriatic arthritis (HCC) 2019    Seronegative rheumatoid arthritis (HCC) 2019    Tendinitis        Past Surgical History:   Procedure Laterality Date     SECTION      FOOT SURGERY Left     HAND SURGERY Left 10/2021    HEMORROIDECTOMY      KNEE ARTHROSCOPY Right 2018    MT ARTHRS KNE SURG W/MENISCECTOMY MED/LAT W/SHVG Right 2018    Procedure: ARTHROSCOPY KNEE, PARTIAL MEDIAL MENISCECTOMY: ABRASION CHONDROPLASTY;  Surgeon: Kavya Clark MD;  Location: [facility];  Service: Orthopedics    MT NDSC WRST SURG W/RLS TRANSVRS CARPL LIGM Right 10/21/2021    Procedure: Right endoscopic carpal tunnel release;  Surgeon: Frantz Conrad MD;  Location: [facility];  Service: Orthopedics    MT NDSC WRST SURG W/RLS TRANSVRS CARPL LIGM Left 2022    Procedure: RELEASE CARPAL TUNNEL ENDOSCOPIC, LEFT;  Surgeon: Frantz Conrad MD;  Location: [facility];  Service: Orthopedics    MT RCNSTJ COLTRL LIGM IPHAL JT 1 W/GRF EA JT Right 10/21/2021    Procedure: right thumb Ulnar collateral ligament repair;  Surgeon: Frantz Conrad MD;  Location: [facility];  Service: Orthopedics    MT TENDON SHEATH INCISION Right 10/21/2021    Procedure: Right small finger trigger finger release;  Surgeon: Frantz Conrad MD;  Location: [facility];  Service: Orthopedics    TUBAL LIGATION         Social History     Socioeconomic History    Marital status:      Spouse name: Not on file    Number of children: Not on file    Years of education: Not on file    Highest education  level: Not on file   Occupational History    Not on file   Tobacco Use    Smoking status: Former     Current packs/day: 0.00     Average packs/day: 0.3 packs/day for 5.0 years (1.7 ttl pk-yrs)     Types: Cigarettes     Start date:      Quit date:      Years since quittin.6    Smokeless tobacco: Never   Vaping Use    Vaping status: Never Used   Substance and Sexual Activity    Alcohol use: Yes     Comment: social    Drug use: No    Sexual activity: Not on file   Other Topics Concern    Not on file   Social History Narrative    Not on file     Social Determinants of Health     Financial Resource Strain: Not on file   Food Insecurity: Not on file   Transportation Needs: Not on file   Physical Activity: Not on file   Stress: Not on file   Social Connections: Not on file   Intimate Partner Violence: Not on file   Housing Stability: Not on file       Family History   Problem Relation Age of Onset    Coronary artery disease Mother     Other Mother         CABG    Hiatal hernia Father     Stroke Maternal Grandmother     Hyperlipidemia Family     Migraines Family     Thyroid disease Family     No Known Problems Sister     No Known Problems Brother     No Known Problems Son     No Known Problems Daughter     No Known Problems Paternal Grandmother     No Known Problems Paternal Grandfather     No Known Problems Maternal Aunt     No Known Problems Maternal Uncle     No Known Problems Paternal Aunt     No Known Problems Paternal Uncle     No Known Problems Cousin        Allergies   Allergen Reactions    Methotrexate Hives       Current Outpatient Medications:     acetaminophen (TYLENOL) 650 mg CR tablet, Take 1 tablet (650 mg total) by mouth every 8 (eight) hours as needed for mild pain, Disp: 30 tablet, Rfl: 0    Glucosamine-Chondroitin (GLUCOSAMINE CHONDR COMPLEX PO), Take by mouth daily  , Disp: , Rfl:     leflunomide (ARAVA) 10 MG tablet, Take 1 tablet (10 mg total) by mouth daily, Disp: 90 tablet, Rfl: 1     "pravastatin (PRAVACHOL) 40 mg tablet, Take 1 tablet (40 mg total) by mouth daily at bedtime, Disp: 30 tablet, Rfl: 3    predniSONE 10 mg tablet, TAKE 1-2 TABLETS BY MOUTH DAILY AS NEEDED, Disp: 60 tablet, Rfl: 2    Rinvoq 15 MG TB24, Take 15mg (1 tablet) by mouth every morning., Disp: 30 tablet, Rfl: 10    benzonatate (TESSALON) 200 MG capsule, Take 1 capsule (200 mg total) by mouth 3 (three) times a day as needed for cough (Patient not taking: Reported on 8/14/2024), Disp: 20 capsule, Rfl: 0    multivitamin (THERAGRAN) TABS, Take 1 tablet by mouth daily (Patient not taking: Reported on 8/14/2024), Disp: , Rfl:       Objective:    Vitals:    08/14/24 1550   BP: 140/82   Pulse: 70   SpO2: 98%   Weight: 68.5 kg (151 lb)   Height: 5' 4\" (1.626 m)       Physical Exam  General: Well appearing, well nourished, in no distress. Oriented x 3, normal mood and affect.  Ambulating without difficulty.  Skin: Good turgor, no rash, unusual bruising or prominent lesions.    Hair: Normal texture and distribution.  Nails: Normal color, no deformities.  HEENT:  Head: Normocephalic, atraumatic.  Eyes: Conjunctiva clear, sclera non-icteric, EOM intact.  Extremities: No amputations or deformities, cyanosis, edema.  Musculoskeletal:   Hands and wrists -mild soft tissue swelling of the bilateral PIP joints still noted without tenderness.  The MCPs are unremarkable.  Slight flexion deformity at the left hand fifth DIP joint.  Mild fullness noted at the left wrist without tenderness.  The right wrist has mild chronic synovial changes without tenderness.  Elbows -unremarkable.  Shoulders -soft tissue swelling at the left shoulder noted.  The right shoulder is unremarkable.  Knees -chronic synovial changes with soft tissue swelling noted bilaterally without warmth, tenderness or erythema.  Ankles -bilateral ankle puffiness noted without tenderness.  Feet -negative MTP squeeze test bilaterally.  Neurologic: Alert and oriented. No focal " neurological deficits appreciated.   Psychiatric: Normal mood and affect.       Ibis Lion M.D.  Rheumatology

## 2024-08-28 ENCOUNTER — OFFICE VISIT (OUTPATIENT)
Dept: FAMILY MEDICINE CLINIC | Facility: CLINIC | Age: 64
End: 2024-08-28
Payer: COMMERCIAL

## 2024-08-28 VITALS
TEMPERATURE: 97.8 F | HEART RATE: 74 BPM | OXYGEN SATURATION: 98 % | BODY MASS INDEX: 25.44 KG/M2 | HEIGHT: 64 IN | WEIGHT: 149 LBS | DIASTOLIC BLOOD PRESSURE: 86 MMHG | SYSTOLIC BLOOD PRESSURE: 128 MMHG

## 2024-08-28 DIAGNOSIS — Z12.31 ENCOUNTER FOR SCREENING MAMMOGRAM FOR MALIGNANT NEOPLASM OF BREAST: ICD-10-CM

## 2024-08-28 DIAGNOSIS — E78.2 MIXED HYPERLIPIDEMIA: ICD-10-CM

## 2024-08-28 DIAGNOSIS — Z00.00 ANNUAL PHYSICAL EXAM: Primary | ICD-10-CM

## 2024-08-28 PROCEDURE — 99396 PREV VISIT EST AGE 40-64: CPT | Performed by: NURSE PRACTITIONER

## 2024-08-28 RX ORDER — PRAVASTATIN SODIUM 40 MG
40 TABLET ORAL
Qty: 90 TABLET | Refills: 1 | Status: SHIPPED | OUTPATIENT
Start: 2024-08-28

## 2024-08-28 NOTE — PROGRESS NOTES
Adult Annual Physical  Name: Geri A Lombardo      : 1960      MRN: [id number]  Encounter Provider: GUILLERMINA Gomez  Encounter Date: 2024   Encounter department: [facility]    Assessment & Plan   1. Annual physical exam  2. Encounter for screening mammogram for malignant neoplasm of breast  -     Mammo screening bilateral w 3d & cad; Future  3. Mixed hyperlipidemia  Assessment & Plan:  Elevated has been on pravastatin  Resume and recheck labs in 3-6 months  Continue with exercise  HDL is fantastic  Orders:  -     pravastatin (PRAVACHOL) 40 mg tablet; Take 1 tablet (40 mg total) by mouth daily at bedtime    Immunizations and preventive care screenings were discussed with patient today. Appropriate education was printed on patient's after visit summary.    Counseling:  Alcohol/drug use: discussed moderation in alcohol intake, the recommendations for healthy alcohol use, and avoidance of illicit drug use.  Dental Health: discussed importance of regular tooth brushing, flossing, and dental visits.  Injury prevention: discussed safety/seat belts, safety helmets, smoke detectors, carbon dioxide detectors, and smoking near bedding or upholstery.  Sexual health: discussed sexually transmitted diseases, partner selection, use of condoms, avoidance of unintended pregnancy, and contraceptive alternatives.  Exercise: the importance of regular exercise/physical activity was discussed. Recommend exercise 3-5 times per week for at least 30 minutes.          History of Present Illness     Adult Annual Physical:  Patient presents for annual physical. Follows regularly with rheumatology  Recently started on Arava  She will occasionally get flares with her knees and prednisone    Had been off her pravastatin for about 6 months before labs were drawn    Labs 24: ,   .     Diet and Physical Activity:  - Diet/Nutrition: well balanced diet and consuming 3-5 servings of  "fruits/vegetables daily.  - Exercise:. swimming    Depression Screening:  - PHQ-2 Score: 0  - PHQ-9 Score: 0    General Health:  - Sleep: sleeps poorly. bad sleep, has always been  - Hearing: normal hearing right ear and normal hearing left ear.  - Vision: wears glasses, vision problems and most recent eye exam > 1 year ago.  - Dental: regular dental visits.    /GYN Health:  - Follows with GYN: yes.   - Menopause: postmenopausal.         Review of Systems   Constitutional:  Negative for activity change, chills and fever.   HENT:  Negative for ear pain and sore throat.    Eyes:  Negative for pain and visual disturbance.   Respiratory:  Negative for cough and shortness of breath.    Cardiovascular:  Negative for chest pain and palpitations.   Gastrointestinal:  Negative for abdominal pain and vomiting.   Genitourinary:  Negative for dysuria and hematuria.   Musculoskeletal:  Positive for arthralgias and back pain.   Skin:  Negative for color change and rash.   Neurological:  Negative for seizures and syncope.   All other systems reviewed and are negative.        Objective     /86 (BP Location: Left arm, Patient Position: Sitting, Cuff Size: Standard)   Pulse 74   Temp 97.8 °F (36.6 °C) (Tympanic)   Ht 5' 4\" (1.626 m)   Wt 67.6 kg (149 lb)   LMP  (LMP Unknown)   SpO2 98%   BMI 25.58 kg/m²     Physical Exam  Vitals and nursing note reviewed.   Constitutional:       General: She is not in acute distress.     Appearance: Normal appearance. She is well-developed.   HENT:      Head: Normocephalic and atraumatic.      Right Ear: Tympanic membrane, ear canal and external ear normal.      Left Ear: Tympanic membrane, ear canal and external ear normal.      Nose: Nose normal.      Mouth/Throat:      Mouth: Mucous membranes are moist.      Pharynx: Oropharynx is clear.   Eyes:      Conjunctiva/sclera: Conjunctivae normal.      Pupils: Pupils are equal, round, and reactive to light.   Neck:      Thyroid: No " thyromegaly or thyroid tenderness.   Cardiovascular:      Rate and Rhythm: Normal rate and regular rhythm.      Pulses:           Radial pulses are 2+ on the right side and 2+ on the left side.      Heart sounds: Normal heart sounds. No murmur heard.  Pulmonary:      Effort: Pulmonary effort is normal. No respiratory distress.      Breath sounds: Normal breath sounds.   Abdominal:      General: Bowel sounds are normal.      Palpations: Abdomen is soft.      Tenderness: There is no abdominal tenderness.   Musculoskeletal:         General: Swelling and tenderness present.      Cervical back: Neck supple.      Right lower leg: No edema.      Left lower leg: No edema.   Skin:     General: Skin is warm and dry.   Neurological:      Mental Status: She is alert and oriented to person, place, and time.   Psychiatric:         Behavior: Behavior normal.

## 2024-08-28 NOTE — ASSESSMENT & PLAN NOTE
Elevated has been on pravastatin  Resume and recheck labs in 3-6 months  Continue with exercise  HDL is fantastic

## 2024-08-28 NOTE — PATIENT INSTRUCTIONS
Recommend magnesium glycinate 200-400mg 30 minutes before bedtime  Schedule Mammogram and Dexa Scan [phone]  Recheck cholesterol labs in about  3 months continue pravastatin

## 2024-09-25 ENCOUNTER — PROCEDURE VISIT (OUTPATIENT)
Dept: FAMILY MEDICINE CLINIC | Facility: CLINIC | Age: 64
End: 2024-09-25
Payer: COMMERCIAL

## 2024-09-25 VITALS
TEMPERATURE: 97.9 F | HEART RATE: 63 BPM | WEIGHT: 151 LBS | HEIGHT: 64 IN | OXYGEN SATURATION: 98 % | DIASTOLIC BLOOD PRESSURE: 84 MMHG | SYSTOLIC BLOOD PRESSURE: 138 MMHG | BODY MASS INDEX: 25.78 KG/M2

## 2024-09-25 DIAGNOSIS — M17.12 PRIMARY OSTEOARTHRITIS OF LEFT KNEE: ICD-10-CM

## 2024-09-25 DIAGNOSIS — M25.561 ACUTE PAIN OF RIGHT KNEE: ICD-10-CM

## 2024-09-25 DIAGNOSIS — Z01.419 ENCOUNTER FOR PAPANICOLAOU SMEAR OF VAGINA AS PART OF ROUTINE GYNECOLOGICAL EXAMINATION: Primary | ICD-10-CM

## 2024-09-25 DIAGNOSIS — Z12.72 ENCOUNTER FOR PAPANICOLAOU SMEAR OF VAGINA AS PART OF ROUTINE GYNECOLOGICAL EXAMINATION: Primary | ICD-10-CM

## 2024-09-25 DIAGNOSIS — E78.2 MIXED HYPERLIPIDEMIA: ICD-10-CM

## 2024-09-25 PROCEDURE — 99396 PREV VISIT EST AGE 40-64: CPT | Performed by: NURSE PRACTITIONER

## 2024-09-25 NOTE — PROGRESS NOTES
"Assessment          Encounter Diagnoses   Name Primary?    Encounter for Papanicolaou smear of vagina as part of routine gynecological examination Yes    Acute pain of right knee     Primary osteoarthritis of left knee     Mixed hyperlipidemia           Plan      Discussed healthy lifestyle modifications.  Mammogram.  Pap smear.       Subjective      Geri A Lombardo is a 64 y.o. female who presents for annual exam. She is post menopausal. No post menopausal bleeding, spotting, or discharge. The patient reports that there is not domestic violence in her life.     Current contraception: post menopausal status  History of abnormal Pap smear: no  Family history of uterine or ovarian cancer: no  Regular self breast exam: no  History of abnormal mammogram: no  Family history of breast cancer: no  History of abnormal lipids: yes - treated  Menstrual History:  OB History    No obstetric history on file.        Menarche age: 16  No LMP recorded (lmp unknown). Patient is postmenopausal.       The following portions of the patient's history were reviewed and updated as appropriate: allergies, current medications, past family history, past medical history, past social history, past surgical history, and problem list.    Review of Systems  Musculoskeletal:positive for arthralgias and right knee pain      Objective      /84 (BP Location: Left arm, Patient Position: Sitting, Cuff Size: Standard)   Pulse 63   Temp 97.9 °F (36.6 °C) (Tympanic)   Ht 5' 4\" (1.626 m)   Wt 68.5 kg (151 lb)   LMP  (LMP Unknown)   SpO2 98%   BMI 25.92 kg/m²     General:   alert, appears stated age, and cooperative   Heart: regular rate and rhythm, S1, S2 normal, no murmur, click, rub or gallop   Lungs: clear to auscultation bilaterally   Abdomen: soft, non-tender, without masses or organomegaly   Vulva: normal   Vagina: normal mucosa   Cervix: no cervical motion tenderness and no lesions   Uterus: normal size   Adnexa: no mass, fullness, " tenderness

## 2024-10-02 ENCOUNTER — OFFICE VISIT (OUTPATIENT)
Dept: OBGYN CLINIC | Facility: CLINIC | Age: 64
End: 2024-10-02
Payer: COMMERCIAL

## 2024-10-02 ENCOUNTER — TELEPHONE (OUTPATIENT)
Dept: FAMILY MEDICINE CLINIC | Facility: CLINIC | Age: 64
End: 2024-10-02

## 2024-10-02 ENCOUNTER — APPOINTMENT (OUTPATIENT)
Dept: RADIOLOGY | Facility: CLINIC | Age: 64
End: 2024-10-02
Payer: COMMERCIAL

## 2024-10-02 VITALS
SYSTOLIC BLOOD PRESSURE: 122 MMHG | DIASTOLIC BLOOD PRESSURE: 80 MMHG | HEIGHT: 64 IN | WEIGHT: 150 LBS | BODY MASS INDEX: 25.61 KG/M2

## 2024-10-02 DIAGNOSIS — M25.561 ACUTE PAIN OF RIGHT KNEE: ICD-10-CM

## 2024-10-02 DIAGNOSIS — M17.11 PRIMARY OSTEOARTHRITIS OF RIGHT KNEE: Primary | ICD-10-CM

## 2024-10-02 LAB
CYTOLOGIST CVX/VAG CYTO: NORMAL
DX ICD CODE: NORMAL
HPV I/H RISK 4 DNA CVX QL PROBE+SIG AMP: NEGATIVE
Lab: NORMAL
OTHER STN SPEC: NORMAL
PATH REPORT.FINAL DX SPEC: NORMAL
SL AMB NOTE:: NORMAL
SL AMB SPECIMEN ADEQUACY: NORMAL
SL AMB TEST METHODOLOGY: NORMAL

## 2024-10-02 PROCEDURE — 99213 OFFICE O/P EST LOW 20 MIN: CPT | Performed by: ORTHOPAEDIC SURGERY

## 2024-10-02 PROCEDURE — 73564 X-RAY EXAM KNEE 4 OR MORE: CPT

## 2024-10-02 PROCEDURE — 20610 DRAIN/INJ JOINT/BURSA W/O US: CPT | Performed by: ORTHOPAEDIC SURGERY

## 2024-10-02 RX ORDER — LIDOCAINE HYDROCHLORIDE 10 MG/ML
7 INJECTION, SOLUTION EPIDURAL; INFILTRATION; INTRACAUDAL; PERINEURAL
Status: COMPLETED | OUTPATIENT
Start: 2024-10-02 | End: 2024-10-02

## 2024-10-02 RX ORDER — BETAMETHASONE SODIUM PHOSPHATE AND BETAMETHASONE ACETATE 3; 3 MG/ML; MG/ML
6 INJECTION, SUSPENSION INTRA-ARTICULAR; INTRALESIONAL; INTRAMUSCULAR; SOFT TISSUE
Status: COMPLETED | OUTPATIENT
Start: 2024-10-02 | End: 2024-10-02

## 2024-10-02 RX ADMIN — LIDOCAINE HYDROCHLORIDE 7 ML: 10 INJECTION, SOLUTION EPIDURAL; INFILTRATION; INTRACAUDAL; PERINEURAL at 08:15

## 2024-10-02 RX ADMIN — BETAMETHASONE SODIUM PHOSPHATE AND BETAMETHASONE ACETATE 6 MG: 3; 3 INJECTION, SUSPENSION INTRA-ARTICULAR; INTRALESIONAL; INTRAMUSCULAR; SOFT TISSUE at 08:15

## 2024-10-02 NOTE — TELEPHONE ENCOUNTER
----- Message from GUILLERMINA Romo sent at 10/2/2024  2:10 PM EDT -----  Hi Shante, Your pap smear is negative for malignancy and HPV. Recommend repeat in 5 years

## 2024-10-02 NOTE — PROGRESS NOTES
Assessment:     1. Primary osteoarthritis of right knee       Plan:     Problem List Items Addressed This Visit          Musculoskeletal and Integument    Primary osteoarthritis of right knee - Primary     Findings consistent with right knee osteoarthritis, severe medially.  Findings and treatment options were discussed with the patient.  X-rays were reviewed with her.  Recommend cortisone injections of the right knee joint today to reduce inflammation and pain.  She tolerated the procedure well.  Advised to apply cold compress today.  Discussed importance of her continuing the low impact exercises.  She will continue prednisone as needed.  Discussed that if cortisone injection does not help we can try joint supplement injections.  Last resort would be a total knee arthroplasty.  If she has good results with the cortisone injection, she can have it repeated as long as it has been 3 to 4 months.  Follow-up as needed if symptoms return.  All patient's questions were answered to her satisfaction.  This note is created using dictation transcription.  It may contain typographical errors, grammatical errors, improperly dictated words, background noise and other errors.         Relevant Medications    betamethasone acetate-betamethasone sodium phosphate (CELESTONE) injection 6 mg (Completed)    lidocaine (PF) (XYLOCAINE-MPF) 1 % injection 7 mL (Completed)    Other Relevant Orders    XR knee 4+ vw right injury    Large joint arthrocentesis: R knee (Completed)      Subjective:     Patient ID: Geri A Lombardo is a 64 y.o. female.  Chief Complaint:  This is a 64-year-old white female here for evaluation of her right knee.  She has a history of a right knee arthroscopy with partial medial meniscectomy on June 4, 2018 by myself.  She has been seen for her left knee and treated for osteoarthritis and last was seen in November 2023.  She had a cortisone injection that provided significant relief.  Patient is under the impression  that she was treated for her right knee at that time.  Imaging and documentation shows it was the left knee.  She is complaining of aching pain in the right knee.  It is worse when driving.  It gets worse throughout the day.  She feels her knee gets more swollen by the end of the day.  She denies any injury.  No recent treatment.  She has a history of psoriatic arthritis and is currently on prednisone.    Allergy:  Allergies   Allergen Reactions    Methotrexate Hives     Medications:  all current active meds have been reviewed  Past Medical History:  Past Medical History:   Diagnosis Date    Chronic left-sided low back pain without sciatica 3/18/2020    Concussion     Hyperlipidemia     Psoriatic arthritis (HCC) 2019    Seronegative rheumatoid arthritis (HCC) 2019    Tendinitis      Past Surgical History:  Past Surgical History:   Procedure Laterality Date     SECTION      FOOT SURGERY Left     HAND SURGERY Left 10/2021    HEMORROIDECTOMY      KNEE ARTHROSCOPY Right 2018    MN ARTHRS KNE SURG W/MENISCECTOMY MED/LAT W/SHVG Right 2018    Procedure: ARTHROSCOPY KNEE, PARTIAL MEDIAL MENISCECTOMY: ABRASION CHONDROPLASTY;  Surgeon: Kavya Clark MD;  Location:  MAIN OR;  Service: Orthopedics    MN NDSC WRST SURG W/RLS TRANSVRS CARPL LIGM Right 10/21/2021    Procedure: Right endoscopic carpal tunnel release;  Surgeon: Frantz Conrad MD;  Location: [facility];  Service: Orthopedics    MN NDSC WRST SURG W/RLS TRANSVRS CARPL LIGM Left 2022    Procedure: RELEASE CARPAL TUNNEL ENDOSCOPIC, LEFT;  Surgeon: Frantz Conrad MD;  Location: [facility];  Service: Orthopedics    MN RCNSTJ COLTRL LIGM IPHAL JT 1 W/GRF EA JT Right 10/21/2021    Procedure: right thumb Ulnar collateral ligament repair;  Surgeon: Frantz Conrad MD;  Location: [facility];  Service: Orthopedics    MN TENDON SHEATH INCISION Right 10/21/2021    Procedure: Right small finger trigger finger release;  Surgeon:  "Frantz Conrad MD;  Location: [facility] OR;  Service: Orthopedics    TUBAL LIGATION       Family History:  Family History   Problem Relation Age of Onset    Coronary artery disease Mother     Other Mother         CABG    Hiatal hernia Father     No Known Problems Sister     No Known Problems Brother     No Known Problems Son     No Known Problems Daughter     Stroke Maternal Grandmother     No Known Problems Paternal Grandmother     No Known Problems Paternal Grandfather     No Known Problems Maternal Aunt     No Known Problems Maternal Uncle     No Known Problems Paternal Aunt     No Known Problems Paternal Uncle     No Known Problems Cousin     Hyperlipidemia Family     Migraines Family     Thyroid disease Family      Social History:  Social History     Substance and Sexual Activity   Alcohol Use Yes    Comment: social     Social History     Substance and Sexual Activity   Drug Use No     Social History     Tobacco Use   Smoking Status Former    Current packs/day: 0.00    Average packs/day: 0.3 packs/day for 5.0 years (1.7 ttl pk-yrs)    Types: Cigarettes    Start date:     Quit date:     Years since quittin.7   Smokeless Tobacco Never     Review of Systems   Constitutional: Negative.    HENT: Negative.     Eyes: Negative.    Respiratory: Negative.     Cardiovascular: Negative.    Gastrointestinal: Negative.    Endocrine: Negative.    Genitourinary: Negative.    Musculoskeletal:  Positive for arthralgias (right knee) and joint swelling (right knee).   Skin: Negative.    Allergic/Immunologic: Negative.    Hematological: Negative.    Psychiatric/Behavioral: Negative.           Objective:  BP Readings from Last 1 Encounters:   10/02/24 122/80      Wt Readings from Last 1 Encounters:   10/02/24 68 kg (150 lb)      BMI:   Estimated body mass index is 25.75 kg/m² as calculated from the following:    Height as of this encounter: 5' 4\" (1.626 m).    Weight as of this encounter: 68 kg (150 lb).  BSA: " "  Estimated body surface area is 1.73 meters squared as calculated from the following:    Height as of this encounter: 5' 4\" (1.626 m).    Weight as of this encounter: 68 kg (150 lb).   Physical Exam  Constitutional:       General: She is not in acute distress.     Appearance: She is well-developed.   HENT:      Head: Normocephalic.   Eyes:      Conjunctiva/sclera: Conjunctivae normal.      Pupils: Pupils are equal, round, and reactive to light.   Pulmonary:      Effort: Pulmonary effort is normal. No respiratory distress.   Musculoskeletal:      Right knee: No effusion.      Instability Tests: Medial Darlin test negative and lateral Darlin test negative.   Skin:     General: Skin is warm and dry.   Neurological:      Mental Status: She is alert and oriented to person, place, and time.   Psychiatric:         Behavior: Behavior normal.       Right Knee Exam     Tenderness   Right knee tenderness location: diffuse lateral.    Range of Motion   The patient has normal right knee ROM.    Tests   Darlin:  Medial - negative Lateral - negative  Varus: negative Valgus: negative  Patellar apprehension: negative    Other   Erythema: absent  Scars: present  Sensation: normal  Pulse: present  Swelling: mild  Effusion: no effusion present            I have personally reviewed pertinent films in PACS and my interpretation is x-rays of right knee reveal severe tricompartmental osteoarthritis, near bone-on-bone in medial compartment with marginal osteophyte formation.    Large joint arthrocentesis: R knee  Universal Protocol:  Consent: Verbal consent obtained.  Risks and benefits: risks, benefits and alternatives were discussed  Consent given by: patient  Patient understanding: patient states understanding of the procedure being performed  Site marked: the operative site was marked  Supporting Documentation  Indications: pain   Procedure Details  Location: knee - R knee  Preparation: Patient was prepped and draped in the usual " sterile fashion  Needle size: 22 G  Ultrasound guidance: no  Approach: anterolateral  Medications administered: 6 mg betamethasone acetate-betamethasone sodium phosphate 6 (3-3) mg/mL; 7 mL lidocaine (PF) 1 %    Patient tolerance: patient tolerated the procedure well with no immediate complications  Dressing:  Sterile dressing applied        Scribe Attestation      I,:  Radha Mathew PA-C am acting as a scribe while in the presence of the attending physician.:       I,:  Kavya Clark MD personally performed the services described in this documentation    as scribed in my presence.:

## 2024-10-02 NOTE — ASSESSMENT & PLAN NOTE
Findings consistent with right knee osteoarthritis, severe medially.  Findings and treatment options were discussed with the patient.  X-rays were reviewed with her.  Recommend cortisone injections of the right knee joint today to reduce inflammation and pain.  She tolerated the procedure well.  Advised to apply cold compress today.  Discussed importance of her continuing the low impact exercises.  She will continue prednisone as needed.  Discussed that if cortisone injection does not help we can try joint supplement injections.  Last resort would be a total knee arthroplasty.  If she has good results with the cortisone injection, she can have it repeated as long as it has been 3 to 4 months.  Follow-up as needed if symptoms return.  All patient's questions were answered to her satisfaction.  This note is created using dictation transcription.  It may contain typographical errors, grammatical errors, improperly dictated words, background noise and other errors.

## 2024-11-26 DIAGNOSIS — L40.50 PSORIATIC ARTHRITIS (HCC): ICD-10-CM

## 2024-11-26 RX ORDER — PREDNISONE 10 MG/1
TABLET ORAL
Qty: 60 TABLET | Refills: 2 | Status: SHIPPED | OUTPATIENT
Start: 2024-11-26

## 2024-12-18 ENCOUNTER — TELEPHONE (OUTPATIENT)
Dept: RHEUMATOLOGY | Facility: CLINIC | Age: 64
End: 2024-12-18

## 2024-12-18 NOTE — TELEPHONE ENCOUNTER
PA for [employer] SUBMITTED to [place]    via    []CMM-KEY:    [x]Surescripts-Case ID # [id number]   []Availity-Auth ID #  NDC #    []Faxed to plan   []Other website    []Phone call Case ID #      [x]PA sent as URGENT    All office notes, labs and other pertaining documents and studies sent. Clinical questions answered. Awaiting determination from insurance company.     Turnaround time for your insurance to make a decision on your Prior Authorization can take 7-21 business days.

## 2024-12-19 NOTE — TELEPHONE ENCOUNTER
PA for [employer]  APPROVED     Date(s) approved December 18, 2024 to December 18, 2025     Case #     Patient advised by          [][employer] Message  []Phone call   []LMOM  [x]L/M to call office as no active Communication consent on file  []Unable to leave detailed message as VM not approved on Communication consent       Pharmacy advised by    [x]Fax  []Phone call    Approval letter scanned into Media Yes

## 2025-02-07 ENCOUNTER — OFFICE VISIT (OUTPATIENT)
Dept: FAMILY MEDICINE CLINIC | Facility: CLINIC | Age: 65
End: 2025-02-07
Payer: COMMERCIAL

## 2025-02-07 VITALS
HEIGHT: 64 IN | TEMPERATURE: 101.1 F | DIASTOLIC BLOOD PRESSURE: 84 MMHG | HEART RATE: 101 BPM | OXYGEN SATURATION: 96 % | BODY MASS INDEX: 25.65 KG/M2 | SYSTOLIC BLOOD PRESSURE: 134 MMHG | WEIGHT: 150.25 LBS

## 2025-02-07 DIAGNOSIS — J40 BRONCHITIS: Primary | ICD-10-CM

## 2025-02-07 DIAGNOSIS — R05.1 ACUTE COUGH: ICD-10-CM

## 2025-02-07 LAB
SL AMB POCT RAPID FLU A: NEGATIVE
SL AMB POCT RAPID FLU B: NEGATIVE

## 2025-02-07 PROCEDURE — 87804 INFLUENZA ASSAY W/OPTIC: CPT

## 2025-02-07 PROCEDURE — 99213 OFFICE O/P EST LOW 20 MIN: CPT

## 2025-02-07 RX ORDER — PROMETHAZINE HYDROCHLORIDE AND CODEINE PHOSPHATE 6.25; 1 MG/5ML; MG/5ML
5 SYRUP ORAL EVERY 6 HOURS PRN
Qty: 240 ML | Refills: 0 | Status: SHIPPED | OUTPATIENT
Start: 2025-02-07 | End: 2025-02-07

## 2025-02-07 RX ORDER — AZITHROMYCIN 250 MG/1
TABLET, FILM COATED ORAL
Qty: 6 TABLET | Refills: 0 | Status: SHIPPED | OUTPATIENT
Start: 2025-02-07 | End: 2025-02-12

## 2025-02-07 RX ORDER — DEXTROMETHORPHAN HYDROBROMIDE AND PROMETHAZINE HYDROCHLORIDE 15; 6.25 MG/5ML; MG/5ML
5 SYRUP ORAL 4 TIMES DAILY PRN
Qty: 240 ML | Refills: 1 | Status: SHIPPED | OUTPATIENT
Start: 2025-02-07

## 2025-02-07 RX ORDER — AZITHROMYCIN 250 MG/1
TABLET, FILM COATED ORAL
Qty: 6 TABLET | Refills: 0 | Status: SHIPPED | OUTPATIENT
Start: 2025-02-07 | End: 2025-02-07

## 2025-02-07 RX ORDER — PROMETHAZINE HYDROCHLORIDE AND CODEINE PHOSPHATE 6.25; 1 MG/5ML; MG/5ML
5 SYRUP ORAL EVERY 6 HOURS PRN
Qty: 240 ML | Refills: 0 | Status: SHIPPED | OUTPATIENT
Start: 2025-02-07 | End: 2025-02-07 | Stop reason: ALTCHOICE

## 2025-02-07 NOTE — PROGRESS NOTES
Name: Geri A Lombardo      : 1960      MRN: [id number]  Encounter Provider: Annmarie Olson DO  Encounter Date: 2025   Encounter department: [facility]  :  Assessment & Plan  Bronchitis  -suspect viral illness vs bronchitis, lungs are CTAB with spo2 96% on room air today    Plan:  Recommend suupportive care:  tylenol/ibuprofen prn pain/fever, maintain adequate hydration, rest, steamy showers/humidifier, honey for sore throat, mucinex  Will send in phenergan DM  If symptoms worsen over weekend can try zpack, prednisone taper   Return to office/ED precautions reviewed if symptoms fail to improve/worsen  Orders:    azithromycin (Zithromax) 250 mg tablet; Take 2 tablets (500 mg total) by mouth daily for 1 day, THEN 1 tablet (250 mg total) daily for 4 days.    promethazine-dextromethorphan (PHENERGAN-DM) 6.25-15 mg/5 mL oral syrup; Take 5 mL by mouth 4 (four) times a day as needed for cough    Acute cough  -rapid flu negative in office today  Orders:    POCT rapid flu A and B    promethazine-dextromethorphan (PHENERGAN-DM) 6.25-15 mg/5 mL oral syrup; Take 5 mL by mouth 4 (four) times a day as needed for cough           History of Present Illness   Patient presents for 2 days of cough, congestion, headache, fever/chills.  with similar symptoms. Most bothersome symptoms are cough and headache. No chest pain or SOB.     Cough  Associated symptoms include headaches. Pertinent negatives include no chest pain, chills, ear pain, fever, rash, sore throat or shortness of breath.     Review of Systems   Constitutional:  Negative for chills and fever.   HENT:  Positive for congestion. Negative for ear pain and sore throat.    Eyes:  Negative for pain and visual disturbance.   Respiratory:  Positive for cough. Negative for shortness of breath.    Cardiovascular:  Negative for chest pain and palpitations.   Gastrointestinal:  Negative for abdominal pain and vomiting.   Genitourinary:  Negative for  "dysuria and hematuria.   Musculoskeletal:  Negative for arthralgias and back pain.   Skin:  Negative for color change and rash.   Neurological:  Positive for headaches. Negative for seizures and syncope.   All other systems reviewed and are negative.      Objective   /84 (BP Location: Left arm, Patient Position: Sitting, Cuff Size: Large)   Pulse 101   Temp (!) 101.1 °F (38.4 °C) (Tympanic)   Ht 5' 4\" (1.626 m)   Wt 68.2 kg (150 lb 4 oz)   LMP  (LMP Unknown)   SpO2 96%   BMI 25.79 kg/m²      Physical Exam  Constitutional:       General: She is not in acute distress.     Appearance: Normal appearance. She is not ill-appearing or toxic-appearing.   HENT:      Head: Normocephalic and atraumatic.      Right Ear: Tympanic membrane, ear canal and external ear normal.      Left Ear: Tympanic membrane, ear canal and external ear normal.      Nose: Nose normal.      Mouth/Throat:      Mouth: Mucous membranes are moist.      Pharynx: Oropharynx is clear. No oropharyngeal exudate or posterior oropharyngeal erythema.   Eyes:      General:         Right eye: No discharge.      Conjunctiva/sclera: Conjunctivae normal.   Cardiovascular:      Rate and Rhythm: Normal rate and regular rhythm.      Heart sounds: Normal heart sounds. No murmur heard.  Pulmonary:      Effort: Pulmonary effort is normal. No respiratory distress.      Breath sounds: Normal breath sounds. No wheezing, rhonchi or rales.   Musculoskeletal:         General: Normal range of motion.   Skin:     General: Skin is warm and dry.   Neurological:      General: No focal deficit present.      Mental Status: She is alert and oriented to person, place, and time.   Psychiatric:         Mood and Affect: Mood normal.         Behavior: Behavior normal.         "

## 2025-02-07 NOTE — PATIENT INSTRUCTIONS
-prednisone: Take 40mg (4 tablets) for 2 days, then take 30mg (3 tablets) for 2 days, then take 20mg (2 tablets) for 2 days, then take 10mg (1 tablet) for 2 days.

## 2025-03-06 DIAGNOSIS — E78.2 MIXED HYPERLIPIDEMIA: ICD-10-CM

## 2025-03-06 RX ORDER — PRAVASTATIN SODIUM 40 MG
40 TABLET ORAL
Qty: 90 TABLET | Refills: 1 | Status: SHIPPED | OUTPATIENT
Start: 2025-03-06

## 2025-03-16 DIAGNOSIS — L40.50 PSORIATIC ARTHRITIS (HCC): ICD-10-CM

## 2025-03-17 RX ORDER — LEFLUNOMIDE 10 MG/1
10 TABLET ORAL DAILY
Qty: 30 TABLET | Refills: 5 | Status: SHIPPED | OUTPATIENT
Start: 2025-03-17

## 2025-03-17 NOTE — TELEPHONE ENCOUNTER
She is overdue for labs, please check if she was able to do the testing I ordered in August, otherwise she should do it as soon as possible.

## 2025-03-31 ENCOUNTER — RESULTS FOLLOW-UP (OUTPATIENT)
Dept: RHEUMATOLOGY | Facility: CLINIC | Age: 65
End: 2025-03-31

## 2025-03-31 LAB
ALBUMIN SERPL-MCNC: 4.8 G/DL (ref 3.9–4.9)
ALP SERPL-CCNC: 56 IU/L (ref 44–121)
ALT SERPL-CCNC: 24 IU/L (ref 0–32)
AST SERPL-CCNC: 18 IU/L (ref 0–40)
BASOPHILS # BLD AUTO: 0 X10E3/UL (ref 0–0.2)
BASOPHILS NFR BLD AUTO: 1 %
BILIRUB SERPL-MCNC: 0.4 MG/DL (ref 0–1.2)
BUN SERPL-MCNC: 16 MG/DL (ref 8–27)
BUN/CREAT SERPL: 23 (ref 12–28)
CALCIUM SERPL-MCNC: 9.5 MG/DL (ref 8.7–10.3)
CHLORIDE SERPL-SCNC: 104 MMOL/L (ref 96–106)
CHOLEST SERPL-MCNC: 295 MG/DL (ref 100–199)
CHOLEST/HDLC SERPL: 3.1 RATIO (ref 0–4.4)
CO2 SERPL-SCNC: 23 MMOL/L (ref 20–29)
CREAT SERPL-MCNC: 0.7 MG/DL (ref 0.57–1)
CRP SERPL-MCNC: <1 MG/L (ref 0–10)
EGFR: 97 ML/MIN/1.73
EOSINOPHIL # BLD AUTO: 0.1 X10E3/UL (ref 0–0.4)
EOSINOPHIL NFR BLD AUTO: 2 %
ERYTHROCYTE [DISTWIDTH] IN BLOOD BY AUTOMATED COUNT: 12.5 % (ref 11.7–15.4)
ERYTHROCYTE [SEDIMENTATION RATE] IN BLOOD BY WESTERGREN METHOD: 2 MM/HR (ref 0–40)
GAMMA INTERFERON BACKGROUND BLD IA-ACNC: 0.04 IU/ML
GLOBULIN SER-MCNC: 1.7 G/DL (ref 1.5–4.5)
GLUCOSE SERPL-MCNC: 90 MG/DL (ref 70–99)
HBV CORE AB SERPL QL IA: NEGATIVE
HBV SURFACE AB SER QL: NON REACTIVE
HBV SURFACE AG SERPL QL IA: NEGATIVE
HCT VFR BLD AUTO: 39.1 % (ref 34–46.6)
HCV AB S/CO SERPL IA: NON REACTIVE
HDLC SERPL-MCNC: 94 MG/DL
HGB BLD-MCNC: 13.1 G/DL (ref 11.1–15.9)
IMM GRANULOCYTES # BLD: 0 X10E3/UL (ref 0–0.1)
IMM GRANULOCYTES NFR BLD: 0 %
LDL CALC COMMENT: ABNORMAL
LDLC SERPL CALC-MCNC: 178 MG/DL (ref 0–99)
LYMPHOCYTES # BLD AUTO: 0.6 X10E3/UL (ref 0.7–3.1)
LYMPHOCYTES NFR BLD AUTO: 25 %
M TB IFN-G CD4+ T-CELLS BLD-ACNC: 0.05 IU/ML
M TB IFN-G CD4+ T-CELLS BLD-ACNC: 0.05 IU/ML
MCH RBC QN AUTO: 31.6 PG (ref 26.6–33)
MCHC RBC AUTO-ENTMCNC: 33.5 G/DL (ref 31.5–35.7)
MCV RBC AUTO: 94 FL (ref 79–97)
MITOGEN IGNF BLD-ACNC: 3.73 IU/ML
MONOCYTES # BLD AUTO: 0.3 X10E3/UL (ref 0.1–0.9)
MONOCYTES NFR BLD AUTO: 12 %
NEUTROPHILS # BLD AUTO: 1.5 X10E3/UL (ref 1.4–7)
NEUTROPHILS NFR BLD AUTO: 60 %
PLATELET # BLD AUTO: 252 X10E3/UL (ref 150–450)
POTASSIUM SERPL-SCNC: 4.2 MMOL/L (ref 3.5–5.2)
PROT SERPL-MCNC: 6.5 G/DL (ref 6–8.5)
QUANTIFERON INCUBATION COMMENT: NORMAL
QUANTIFERON-TB GOLD PLUS: NEGATIVE
RBC # BLD AUTO: 4.14 X10E6/UL (ref 3.77–5.28)
SERVICE CMNT-IMP: NORMAL
SL AMB INTERPRETATION: NORMAL
SL AMB VLDL CHOLESTEROL CALC: 23 MG/DL (ref 5–40)
SODIUM SERPL-SCNC: 142 MMOL/L (ref 134–144)
TRIGL SERPL-MCNC: 131 MG/DL (ref 0–149)
WBC # BLD AUTO: 2.6 X10E3/UL (ref 3.4–10.8)

## 2025-04-08 PROBLEM — M15.0 PRIMARY GENERALIZED (OSTEO)ARTHRITIS: Status: ACTIVE | Noted: 2025-04-08

## 2025-04-08 PROBLEM — Z79.69 ON LONG TERM LEFLUNOMIDE THERAPY: Status: ACTIVE | Noted: 2025-04-08

## 2025-04-08 PROBLEM — Z79.60 LONG-TERM USE OF IMMUNOSUPPRESSANT MEDICATION: Status: ACTIVE | Noted: 2025-04-08

## 2025-04-09 ENCOUNTER — OFFICE VISIT (OUTPATIENT)
Dept: RHEUMATOLOGY | Facility: CLINIC | Age: 65
End: 2025-04-09
Payer: COMMERCIAL

## 2025-04-09 VITALS
SYSTOLIC BLOOD PRESSURE: 110 MMHG | HEART RATE: 60 BPM | BODY MASS INDEX: 25.44 KG/M2 | HEIGHT: 64 IN | WEIGHT: 149 LBS | OXYGEN SATURATION: 97 % | DIASTOLIC BLOOD PRESSURE: 62 MMHG

## 2025-04-09 DIAGNOSIS — E78.5 HYPERLIPIDEMIA, UNSPECIFIED HYPERLIPIDEMIA TYPE: ICD-10-CM

## 2025-04-09 DIAGNOSIS — Z79.60 LONG-TERM USE OF IMMUNOSUPPRESSANT MEDICATION: ICD-10-CM

## 2025-04-09 DIAGNOSIS — L40.50 PSORIATIC ARTHRITIS (HCC): Primary | ICD-10-CM

## 2025-04-09 DIAGNOSIS — Z79.69 ON LONG TERM LEFLUNOMIDE THERAPY: ICD-10-CM

## 2025-04-09 DIAGNOSIS — M15.0 PRIMARY GENERALIZED (OSTEO)ARTHRITIS: ICD-10-CM

## 2025-04-09 DIAGNOSIS — Z78.0 POST-MENOPAUSAL: ICD-10-CM

## 2025-04-09 PROCEDURE — 99214 OFFICE O/P EST MOD 30 MIN: CPT | Performed by: INTERNAL MEDICINE

## 2025-04-09 RX ORDER — UPADACITINIB 15 MG/1
15 TABLET, EXTENDED RELEASE ORAL DAILY
Qty: 90 TABLET | Refills: 1 | Status: SHIPPED | OUTPATIENT
Start: 2025-04-09

## 2025-04-09 NOTE — PROGRESS NOTES
Name: Geri A Lombardo      : 1960      MRN: [id number]  Encounter Provider: Ibis Lion MD  Encounter Date: 2025   Encounter department: [facility] RHEUMATOLOGY [facility]  :  Assessment & Plan  Psoriatic arthritis (HCC)  Ms. Lombardo is a 64-year-old female with history significant for psoriatic arthritis, bilateral knee osteoarthritis, right knee meniscal tear status post surgery and right shoulder rotator cuff tears with subdeltoid/subacromial bursitis who presents for a follow-up.  She is currently on upadacitinib 15 mg once daily that was started in 2023 and leflunomide 10 mg once daily that was added in .        Rheumatic summary:  1) Diagnosed with PsA (subtle erosive changes on hand XRs) in 2019 - started on MTX, but d/c'ed in 2019 due to side effects of skin rash. Started on LEF 10/2019-present.   - 21: presenting with flare up x 1 month - prednisone taper x 15 days and continue LEF. If no sustained improvement will d/c LEF and start adalimumab.   - 22: adalimumab started in 10/2021 - ineffective, will d/c and start Orencia. Steroid taper.   - 22: Orencia started in  - still symptomatic, will add on LEF 20 mg and steroid taper.   - 22: d/c Orencia and LEF, will start infliximab infusions and obtain hand/wrist US.  - 3/15/23: infliximab started on 23 with great improvement, continue 3 mg/kg every 8 weeks. US hands/wrists with active inflammation prior to starting infliximab.   - 23: change infliximab to 5 mg/kg every 6 weeks.   - 23: d/c infliximab and start Rinvoq.  - 3/6/24: continue Rinvoq.  - 24: continue Rinvoq and add LEF 10 mg OD.  - 25: continue Rinvoq and LEF 10 mg OD.  2) Co-morbidities: knee OA, right carpal tunnel syndrome, lumbar DJD.        # Psoriatic arthritis  - Shante presents today for a follow-up of psoriatic arthritis for which she is currently on upadacitinib 15 mg once daily and leflunomide 10 mg once  daily.  She has overall been very stable on this regimen without flareups or concerning articular complaints and her physical examination is also without significant synovitis.  Her DMARD regimen will be continued unchanged.  She will update high risk medication lab monitoring to assess for drug toxicities in 3 months.  She has not been taking frequent NSAIDs or prednisone.     # Screening for osteoporosis  - I recommend obtaining a baseline DEXA scan given her risk factors for osteoporosis including postmenopausal status, inflammatory arthritis and chronic intermittent steroid use.  She will schedule this for 8/25.    Orders:    C-reactive protein; Future    Sedimentation rate, automated; Future    Upadacitinib ER (Rinvoq) 15 MG TB24; Take 15 mg by mouth in the morning    Long-term use of immunosuppressant medication    Orders:    CBC and differential; Future    Comprehensive metabolic panel; Future    On long term leflunomide therapy         Primary generalized (osteo)arthritis         Hyperlipidemia, unspecified hyperlipidemia type  - Her lipid panel still shows a significantly elevated LDL despite taking pravastatin 40 mg once daily consistently.  I counseled her on lifestyle modifications with diet and exercise, but also requested she contact her PCP as soon as possible to review medication management.    Orders:    Lipid panel; Future    Post-menopausal    Orders:    DXA bone density spine hip and pelvis; Future      Patient's rheumatologic disease(s) threaten long-term function if not appropriately managed.      History of Present Illness   HPI    INITIAL VISIT NOTE:  Ms. Lombardo is a 59-year-old  female with history significant for mild right knee osteoarthritis, right knee meniscal tear status post surgery and right shoulder rotator cuff tears with subdeltoid/subacromial bursitis, who presents for further evaluation of diffuse joint pains and an elevated C reactive protein of 81.  She is referred  by Dr. Srinivasan.     Patient states in November 2017 she had an accidental injury resulting in a concussion, and states overall she has not felt well since then.  She is able to recall more diffuse joint pains starting in December 2018, and states they have been more prominent as well as progressive since then.  She experiences her symptoms on a daily basis in a mostly constant manner.  She states it is significantly interfering with her activities of daily living, as well as with her sleeping habits.  She describes pain most prominently affecting her bilateral shoulders, bilateral wrists, to a lesser degree affecting her bilateral hands, low back region, hips and knees.  She does not really experience joint pains affecting her elbows, ankles or feet.  She has subjectively only noticed swelling affecting her knees.  She experiences morning stiffness which affects her diffusely and persists throughout the day.  She states any sort of activity aggravates her symptoms, but she does not necessarily obtain relief with resting.  At the onset of her symptoms in February/March 2019 she was prescribed a 10 day course of prednisone starting at 40 mg daily, which she states significantly helped her.  She was then represcribed a course of prednisone of the same duration in June 2019 which also helped her.  She states the symptoms recur quickly after cessation of the steroids.  She was recently also prescribed tramadol and tizanidine for the pain, but this has not really been helping her.  She has also tried over-the-counter ibuprofen, Advil, Aleve and naproxen without significant benefit of her symptoms.     She denies any recent fevers, chills, night sweats, unintentional weight loss or infections prior to the onset of her joint complaints.  She denies significant dry eyes, inflammatory eye disease, dry mouth, skin rashes, psoriasis, mouth/nose ulcers, shortness of breath, abdominal pain, vomiting, diarrhea, blood in stools,  inflammatory bowel disease or family history of rheumatoid arthritis/autoimmune disease.     She was seen by her primary care physician for the ongoing complaints and had recent testing done which showed an elevated C reactive protein of 81.  RIGO screen, rheumatoid factor, uric acid and Lyme antibody profile were unremarkable.  In view of the ongoing right shoulder pain she recently had an MRI of her right shoulder done which showed partial-thickness rotator cuff tears as well as subacromial/subdeltoid bursitis.  She has been following up with Orthopedics for this, and it is not thought that the rotator cuff tears should correspond to the symptoms she has been experiencing.  She also received bilateral intra-articular cortisone shoulder injections which only provided her with temporary relief.  She was advised to follow-up with Rheumatology first, and if her symptoms are not to improve then there may be consideration for an arthroscopic procedure.        8/27/2019:  Patient presents for a follow-up today.  We reviewed her recently done labs which showed an unremarkable CBC, CMP, ESR, anti CCP antibody, Sjogren's antibodies and chronic hepatitis panel.  X-ray of her right hand showed periarticular soft tissue swelling of the 2nd through 5th PIP joints, as well as subtle juxta-articular erosions of the 2nd through 5th DIP joints.  X-ray of her left hand and bilateral feet did not show any signs of inflammatory arthritis.     Following the prior office visit patient had started prednisone 40 mg daily for 7 days, and is currently on prednisone 20 mg daily.  She states being on the steroids has significantly improved her symptoms, and she is currently denying any joint pains, swelling or morning stiffness.  She is feeling well today and denies any complaints.  Of note she denies a history of psoriasis or skin rashes.  She denies a family history of psoriasis.        11/27/2019:  Patient presents for a follow-up of  psoriatic arthritis.  She is currently on leflunomide 20 mg once daily and prednisone 10 mg once daily.     At the last office visit I had started her on methotrexate but after taking a few doses she called the office with an outbreak of a skin rash affecting her right upper extremity.  I requested she try the medication once more to ensure this was related to a side effect of the methotrexate, and she states that the rash persisted and worsened.  In view of this we discontinued the methotrexate entirely and I switched her to leflunomide 20 mg once daily which she has been on for approximately 1 month now.  She has been tolerating the medication well without any concerns for side effects or infections.     She reports overall her joint pains have significantly improved.  She did try to taper down on the prednisone to 5 mg once daily and then discontinue it, but states upon discontinuing her joint pains recurred.  In view of this she restarted prednisone at 5 mg once daily, but over the past few days further increased to 10 mg once daily due to increased activities causing worsening joint pains.  She is willing to try and taper down on the prednisone at this time.  She reports pain that she usually experiences will affect her shoulders and knees.  She denies any other significant areas of joint pains or persistent swelling.  On occasion she will notice swelling of her knees.  She states that the morning stiffness has mostly resolved as well.     She denies any other history of skin rash or psoriasis.        3/3/2020:  Patient presents for a follow-up of psoriatic arthritis.  She is currently on leflunomide 20 mg once daily.  I reviewed her recently done CBC and CMP which were unremarkable.     Patient reports overall in terms of the arthritis she has been doing well and denies any significant peripheral joint pains, swelling or morning stiffness.  For the past 3-4 months she has been dealing with left lower back pain  and was seen in the emergency room for this.  On occasion the pain will radiate down her leg.  An x-ray of her lumbar spine was done which showed mild degenerative arthritis at two levels.  She is following up with her primary care physician for this and completing a course of physical therapy.  She is unsure if the PT has really helped her so far.  She has been taking ibuprofen on a daily basis to take the edge off her symptoms.     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  Typically when this happens she may take prednisone 5-10 mg as needed which will help her.  She uses the prednisone on a very infrequent basis.     She denies a history of skin rash or psoriasis.  She has been tolerating the leflunomide well without any concerns for side effects or infections.        6/2/2020:  Patient presents for a follow-up of psoriatic arthritis.  She is currently on leflunomide 20 mg once daily and prednisone 5-10 mg daily as needed.     Patient reports in April she was experiencing a flare-up diffuse hives, for which she increased the prednisone to 10 mg once daily and ran out of her script early.  She has been out of the prednisone for the past few weeks.  She reports without the steroids she does notice a change in her joint pains, which will primarily affect her shoulders, neck and knees.  She has noticed intermittent swelling affecting her knees.  She does not really experience any morning stiffness, but will have a sensation of diffuse achiness as well as fatigue.  She is not taking any over-the-counter pain medications.  She reports for maintenance therapy she is mostly taking the leflunomide 20 mg once daily as well as prednisone 5 mg once daily, and this seems to control her symptoms.     She has been tolerating her medications well without any concerns for side effects or infections.         11/9/2020:  Patient presents for a follow-up of psoriatic arthritis.  She is currently on leflunomide 20 mg once daily and prednisone 5-10 mg once daily as needed.  I reviewed her recently done CBC and CMP which were normal.     Patient reports she has overall been doing well and this is the best that she has felt.  She was able to start exercising at the gym again and thinks that this has helped with her shoulder pain.  Over the past few weeks she has noticed pain and swelling affecting her left knee but cannot recall any injuries.  Other than the left knee pain she is denying any other joint pains and has not noticed joint swelling.  She is no longer experiencing morning stiffness.  She still takes the prednisone as needed but thinks she is now able to taper off it completely.  She will also take Tylenol as needed.     She has been tolerating the leflunomide well without any concerns for side effects or infections.  She has not had any psoriasis.        3/10/2021:  Patient presents for a follow-up of psoriatic arthritis.  She is currently on leflunomide 20 mg once daily.  I reviewed her recently done CBC and CMP which were normal.       She reports since the last office visit she was able to discontinue the steroids without a flare-up, and manages her joint pains with Tylenol as needed.  She was experiencing pain affecting her left knee and right shoulder, but she was seen by Orthopedics and received intra-articular cortisone injections which helped.  Last week she did have an accidental fall on the ice and fell on her right hand.  She was seen in urgent care and had an x-ray done which did not show any fractures.  She was advised that she probably sprained her right wrist and is continuing with conservative measures.  She has been unable to rest her wrist as due to her occupation as a  she is constantly using her hands.  Recently she has also noticed numbness affecting the lateral fingers on her  right hand mostly at night, but at certain times during the day as well while she is working.     She denies any other joint pains, joint swelling or morning stiffness.  She has been tolerating the leflunomide well without any concerns for side effects or infections.  She has not had any psoriasis.        7/20/2021:  Patient presents for a follow-up of psoriatic arthritis.  She is currently on leflunomide 20 mg once daily.  She has not had her recent high risk medication lab monitoring done.       She reports since the last visit, especially in the past 1 month she has noticed a flare-up in her joint pains.  She describes an overall body achiness associated with fatigue but also mentions specific joint pains involving her left knee and right ankle.  She has noticed swelling to affect her right wrist, left knee and right ankle.  She experiences morning stiffness which affects her diffusely and can take about an hour to improve.  She takes over-the-counter Tylenol as needed which helps to some degree.  She has previously been told not to take NSAIDs but is unsure the reason why.  She has not been on any recent steroids.     She has also been following up with [facility] for right hand and wrist pain which has persisted following a fall and sprain in March 2021.  She did have an ultrasound of her right wrist done looking for carpal tunnel syndrome and this was suspicious for the condition.  An MRI of her right thumb in June 2021 showed a partial tear of the ulnar collateral ligament as well as mild 1st MCP joint osteoarthritis.     She has been tolerating the leflunomide well without any concerns for side effects or infections.  She has not had any psoriasis.        2/1/2022:  Patient presents for a follow-up of psoriatic arthritis.  She is currently on subcutaneous adalimumab 40 mg every 2 weeks that was started in October 2021.       Unfortunately there has been no improvement in her symptoms and apart from  experiencing widespread body aches, she is also noticing significant pain in her hands, wrists and right ankle.  She feels like her hands, wrists and right ankle have also been swollen.  She experiences morning stiffness which affects her diffusely and can take a few hours to improve.  She does take over-the-counter NSAIDs as needed but this does not really help.  A few weeks ago she was prescribed a tapering course of prednisone starting at 20 mg once daily but she states that this did not really help her.          6/29/2022:  Patient presents for a follow-up of psoriatic arthritis.  She is currently on subcutaneous Orencia 125 mg once weekly that was started in February 2022.       She reports with switching from the adalimumab to Orencia she did notice some improvement in her joint pains but she is still quite symptomatic with pain in her hands, wrists, knees and ankles.  She still has swelling in her hands and ankles.  She experiences morning stiffness affecting her diffusely that takes about 30 minutes to improve.  No recent steroid use.  She is taking Aleve twice a day as needed but this does not help significantly.        12/7/2022:  Patient presents for a follow-up of psoriatic arthritis.  She is currently on subcutaneous Orencia 125 mg once weekly that was started in February 2022 and leflunomide 20 mg once daily that was started in June 2022.  She has not updated her high risk medication lab monitoring recently.     She reports with this regimen she has not noticed any improvement in her symptoms.  She is primarily complaining of continued swelling and stiffness affecting her hands and recently has had more pain in her knees and ankles.  Her left knee has also been swollen.  She is not describing particular morning stiffness and reports that the stiffness in her hands persists throughout the day.  No recent steroid use and no consistent NSAID use.  She has some dryness present on the hands but no clear  psoriasis.        3/15/2023:  Patient presents for a follow-up of psoriatic arthritis.  She is currently on infliximab infusions 3 mg/kg that was started on 2/7/2023.  She has completed 2 doses so far and is due for the last dose of the initial cycle on 3/21/2023.  Following this she will receive it every 8 weeks.  I reviewed the blood work done after the last office visit which showed an unremarkable CBC, CMP and ESR.  The C-reactive protein was slightly elevated at 14.  I reviewed the ultrasound of her hands and wrists which showed findings consistent with active inflammatory arthritis evidenced by synovial hypertrophy and hyperemia involving multiple joints primarily on the left hand.     She reports after receiving the initial dose of the infliximab infusion she started to notice an improvement in her overall wellbeing.  She mentions the pain, swelling and stiffness in her hands and wrists has significantly improved but she does experience some degree of pain and swelling still.  Overall she is feeling great at this time.        7/26/2023:  Patient presents for a follow-up of psoriatic arthritis.  She is currently on infliximab infusions 3 mg/kg every 8 weeks that was started on 2/7/2023.  She had labs done this morning and I am still waiting for the results.     She mentions shortly after our last visit she started to experience a flareup of joint pains primarily affecting her left knee and left ankle.  They have been swollen along with some swelling in her hands.  Morning stiffness is taking a few hours to improve.  No recent steroid or NSAID use.  She also scheduled an appointment with orthopedics to evaluate her symptoms.     She has been tolerating the infliximab infusions well without any concerns for side effects or infections.         12/6/2023:  Patient presents for a follow-up of psoriatic arthritis.  She is currently on infliximab infusions 5 mg/kg every 6 weeks.     She reports with changing the dose  and frequency of the infliximab infusions there has been no significant benefit in her symptoms.  She does feel slightly better for 1 week following the infusion but then the efficacy declines.  She is still symptomatic more so in her hands, ankles and feet.  The left knee pain has improved with receiving intra-articular cortisone injections through orthopedics.  She still has swelling of her hands, wrists and left knee.  She reports morning stiffness affects her joints and to some degree persists throughout the day.  She is not taking any over-the-counter pain medications at this time.  After our last visit she did use the supply of prednisone at 10 to 20 mg once daily and only had 60 tablets.  After completing this she has not been on steroids.  The steroids do help anytime she takes it.     She has been tolerating the infliximab infusions well without any concerns for side effects or infections.        3/6/2024:  Patient presents for a follow-up of psoriatic arthritis.  She is currently on upadacitinib 15 mg once daily that was started in December 2023.  She recently had blood work done at [facility] and I am still awaiting the results.     She reports soon after switching from infliximab to upadacitinib she started to appreciate an overall improvement in her joint pains and swelling.  She no longer has a significant duration of morning stiffness.  She does have a supply of prednisone to take 10 to 20 mg once a day as needed on an infrequent basis.  She reports recently she has noticed the fatigue to also improve.     She has been tolerating the medication well without any concerns for side effects or infections.        8/15/2024:  Patient presents for a follow-up of psoriatic arthritis.  She is currently on upadacitinib 15 mg once daily that was started in December 2023.  I reviewed her recent labs which showed a normal CBC, CMP, ESR and CRP.  The lipid panel showed an elevated LDL of 175 but she admits to taking  the pravastatin inconsistently.  She will be following up with her PCP next week to review this.     She has been feeling quite well without any significantly concerning joint pains, but states she will still experience pain in her bilateral knees.  Chronic swelling of joints is still present, but no new swelling.  Morning stiffness is negligible.  She does have a supply of prednisone that she takes 10 mg once a day for a few days if needed and she does this on an infrequent basis.  It does help her.     She has been tolerating the upadacitinib well without any concerns for side effects or infections.      4/9/2025:  Patient presents for a follow-up of psoriatic arthritis.  She is currently on upadacitinib 15 mg once daily that was started in December 2023 and leflunomide 10 mg once daily that was added in 8/24.  I reviewed her recent labs which shows an unremarkable CMP, ESR and CRP.  The CBC shows mild leukopenia with a WBC count of 2.6.  A lipid panel shows an elevated total cholesterol and LDL of 295 and 178, respectively.    With her current medication regimen she reports her joints have been quite stable and she is not reporting any concerning pain, swelling or prolonged morning stiffness of her joints.  She does have chronic pain in her right knee where she is known to have advanced osteoarthritis and is following with orthopedics as needed for periodic intra-articular cortisone injections which are helpful.    She has been tolerating the upadacitinib and leflunomide well without any concerns for side effects or infections.    She mentions she has been taking the pravastatin 40 mg once daily fairly consistently and also working on her diet and exercise.  She will follow-up with her primary care physician in view of the hyperlipidemia.          Review of Systems  Constitutional: Negative for weight change, fevers, chills, night sweats, fatigue.  ENT/Mouth: Negative for hearing changes, ear pain, nasal  congestion, sinus pain, hoarseness, sore throat, rhinorrhea, swallowing difficulty.   Eyes: Negative for pain, redness, discharge, vision changes.   Cardiovascular: Negative for chest pain, SOB, palpitations.   Respiratory: Negative for cough, sputum, wheezing, dyspnea.   Gastrointestinal: Negative for nausea, vomiting, diarrhea, constipation, pain, heartburn.  Genitourinary: Negative for dysuria, urinary frequency, hematuria.   Musculoskeletal: As per HPI.  Skin: Negative for skin rash, color changes.   Neuro: Negative for weakness, numbness, tingling, loss of consciousness.   Psych: Negative for anxiety, depression.   Heme/Lymph: Negative for easy bruising, bleeding, lymphadenopathy.      Past Medical History   Past Medical History:   Diagnosis Date    Chronic left-sided low back pain without sciatica 3/18/2020    Concussion     Hyperlipidemia     Psoriatic arthritis (HCC) 2019    Seronegative rheumatoid arthritis ([facility]) 2019    Tendinitis      Past Surgical History:   Procedure Laterality Date     SECTION      FOOT SURGERY Left     HAND SURGERY Left 10/2021    HEMORROIDECTOMY      KNEE ARTHROSCOPY Right 2018    ND ARTHRS KNE SURG W/MENISCECTOMY MED/LAT W/SHVG Right 2018    Procedure: ARTHROSCOPY KNEE, PARTIAL MEDIAL MENISCECTOMY: ABRASION CHONDROPLASTY;  Surgeon: Kavya Clark MD;  Location:  MAIN OR;  Service: Orthopedics    ND NDSC WRST SURG W/RLS TRANSVRS CARPL LIGM Right 10/21/2021    Procedure: Right endoscopic carpal tunnel release;  Surgeon: Frantz Conrad MD;  Location: [facility] MAIN OR;  Service: Orthopedics    ND NDSC WRST SURG W/RLS TRANSVRS CARPL LIGM Left 2022    Procedure: RELEASE CARPAL TUNNEL ENDOSCOPIC, LEFT;  Surgeon: Frantz Conrad MD;  Location: [facility] MAIN OR;  Service: Orthopedics    ND RCNSTJ COLTRL LIGM IPHAL JT 1 W/GRF EA JT Right 10/21/2021    Procedure: right thumb Ulnar collateral ligament repair;  Surgeon: Frantz Conrad MD;  Location: [facility] MAIN  OR;  Service: Orthopedics    FL TENDON SHEATH INCISION Right 10/21/2021    Procedure: Right small finger trigger finger release;  Surgeon: Frantz Conrad MD;  Location:  MAIN OR;  Service: Orthopedics    TUBAL LIGATION       Family History   Problem Relation Age of Onset    Coronary artery disease Mother     Other Mother         CABG    Hiatal hernia Father     No Known Problems Sister     No Known Problems Brother     No Known Problems Son     No Known Problems Daughter     Stroke Maternal Grandmother     No Known Problems Paternal Grandmother     No Known Problems Paternal Grandfather     No Known Problems Maternal Aunt     No Known Problems Maternal Uncle     No Known Problems Paternal Aunt     No Known Problems Paternal Uncle     No Known Problems Cousin     Hyperlipidemia Family     Migraines Family     Thyroid disease Family       reports that she quit smoking about 41 years ago. Her smoking use included cigarettes. She started smoking about 46 years ago. She has a 1.7 pack-year smoking history. She has never used smokeless tobacco. She reports current alcohol use. She reports that she does not use drugs.  Current Outpatient Medications   Medication Instructions    acetaminophen (TYLENOL) 650 mg, Oral, Every 8 hours PRN    Glucosamine-Chondroitin (GLUCOSAMINE CHONDR COMPLEX PO) Daily    leflunomide (ARAVA) 10 mg, Oral, Daily    multivitamin (THERAGRAN) TABS 1 tablet, Daily    pravastatin (PRAVACHOL) 40 mg, Oral, Daily at bedtime    predniSONE 10 mg tablet TAKE 1 TO 2 TABLETS BY MOUTH DAILY AS NEEDED    promethazine-dextromethorphan (PHENERGAN-DM) 6.25-15 mg/5 mL oral syrup 5 mL, Oral, 4 times daily PRN    Rinvoq 15 mg, Oral, Daily     Allergies   Allergen Reactions    Methotrexate Hives      Current Outpatient Medications on File Prior to Visit   Medication Sig Dispense Refill    acetaminophen (TYLENOL) 650 mg CR tablet Take 1 tablet (650 mg total) by mouth every 8 (eight) hours as needed for mild pain  "30 tablet 0    Glucosamine-Chondroitin (GLUCOSAMINE CHONDR COMPLEX PO) Take by mouth daily   (Patient taking differently: Take by mouth if needed)      leflunomide (ARAVA) 10 MG tablet TAKE 1 TABLET BY MOUTH EVERY DAY 30 tablet 5    pravastatin (PRAVACHOL) 40 mg tablet TAKE 1 TABLET BY MOUTH DAILY AT BEDTIME 90 tablet 1    predniSONE 10 mg tablet TAKE 1 TO 2 TABLETS BY MOUTH DAILY AS NEEDED 60 tablet 2    promethazine-dextromethorphan (PHENERGAN-DM) 6.25-15 mg/5 mL oral syrup Take 5 mL by mouth 4 (four) times a day as needed for cough 240 mL 1    [DISCONTINUED] Rinvoq 15 MG TB24 Take 15mg (1 tablet) by mouth every morning. 30 tablet 10    multivitamin (THERAGRAN) TABS Take 1 tablet by mouth daily (Patient not taking: Reported on 2024)       No current facility-administered medications on file prior to visit.      Social History     Tobacco Use    Smoking status: Former     Current packs/day: 0.00     Average packs/day: 0.3 packs/day for 5.0 years (1.7 ttl pk-yrs)     Types: Cigarettes     Start date:      Quit date:      Years since quittin.2    Smokeless tobacco: Never   Vaping Use    Vaping status: Never Used   Substance and Sexual Activity    Alcohol use: Yes     Comment: social    Drug use: No    Sexual activity: Not on file        Objective   /62   Pulse 60   Ht 5' 4\" (1.626 m)   Wt 67.6 kg (149 lb)   LMP  (LMP Unknown)   SpO2 97%   BMI 25.58 kg/m²      Physical Exam  General: Well appearing, well nourished, in no distress. Oriented x 3, normal mood and affect.  Ambulating without difficulty.  Skin: Good turgor, no rash, unusual bruising or prominent lesions.  Hair: Normal texture and distribution.  Nails: Normal color, no deformities.  HEENT:  Head: Normocephalic, atraumatic.  Eyes: Conjunctiva clear, sclera non-icteric, EOM intact.  Nose: No external lesions.  Neck: Supple.  Extremities: No amputations or deformities, cyanosis, edema.  Musculoskeletal:   Hands and wrists -mild " fullness of various bilateral PIP joints still noted without tenderness.  The MCPs are unremarkable.  Slight flexion deformity at the left hand fifth DIP joint and mild boutonniere type deformities noted at various digits bilaterally.  Wrists appear unremarkable.  Elbows -unremarkable.  Shoulders -unremarkable.  Knees -chronic synovial changes without soft tissue swelling noted bilaterally without warmth, tenderness or erythema.  Ankles -unremarkable.  Feet -negative MTP squeeze test bilaterally.  Neurologic: Alert and oriented. No focal neurological deficits appreciated.   Psychiatric: Normal mood and affect.

## 2025-04-09 NOTE — ASSESSMENT & PLAN NOTE
- Her lipid panel still shows a significantly elevated LDL despite taking pravastatin 40 mg once daily consistently.  I counseled her on lifestyle modifications with diet and exercise, but also requested she contact her PCP as soon as possible to review medication management.    Orders:    Lipid panel; Future

## 2025-04-09 NOTE — ASSESSMENT & PLAN NOTE
Ms. Lombardo is a 64-year-old female with history significant for psoriatic arthritis, bilateral knee osteoarthritis, right knee meniscal tear status post surgery and right shoulder rotator cuff tears with subdeltoid/subacromial bursitis who presents for a follow-up.  She is currently on upadacitinib 15 mg once daily that was started in December 2023 and leflunomide 10 mg once daily that was added in 8/24.        Rheumatic summary:  1) Diagnosed with PsA (subtle erosive changes on hand XRs) in 8/2019 - started on MTX, but d/c'ed in 9/2019 due to side effects of skin rash. Started on LEF 10/2019-present.   - 7/20/21: presenting with flare up x 1 month - prednisone taper x 15 days and continue LEF. If no sustained improvement will d/c LEF and start adalimumab.   - 2/1/22: adalimumab started in 10/2021 - ineffective, will d/c and start Orencia. Steroid taper.   - 6/29/22: Orencia started in 2/22 - still symptomatic, will add on LEF 20 mg and steroid taper.   - 12/7/22: d/c Orencia and LEF, will start infliximab infusions and obtain hand/wrist US.  - 3/15/23: infliximab started on 2/7/23 with great improvement, continue 3 mg/kg every 8 weeks. US hands/wrists with active inflammation prior to starting infliximab.   - 7/26/23: change infliximab to 5 mg/kg every 6 weeks.   - 12/6/23: d/c infliximab and start Rinvoq.  - 3/6/24: continue Rinvoq.  - 8/14/24: continue Rinvoq and add LEF 10 mg OD.  - 4/9/25: continue Rinvoq and LEF 10 mg OD.  2) Co-morbidities: knee OA, right carpal tunnel syndrome, lumbar DJD.        # Psoriatic arthritis  - Shante presents today for a follow-up of psoriatic arthritis for which she is currently on upadacitinib 15 mg once daily and leflunomide 10 mg once daily.  She has overall been very stable on this regimen without flareups or concerning articular complaints and her physical examination is also without significant synovitis.  Her DMARD regimen will be continued unchanged.  She will update high  risk medication lab monitoring to assess for drug toxicities in 3 months.  She has not been taking frequent NSAIDs or prednisone.     # Screening for osteoporosis  - I recommend obtaining a baseline DEXA scan given her risk factors for osteoporosis including postmenopausal status, inflammatory arthritis and chronic intermittent steroid use.  She will schedule this for 8/25.    Orders:    C-reactive protein; Future    Sedimentation rate, automated; Future    Upadacitinib ER (Rinvoq) 15 MG TB24; Take 15 mg by mouth in the morning

## 2025-04-17 ENCOUNTER — HOSPITAL ENCOUNTER (EMERGENCY)
Facility: HOSPITAL | Age: 65
Discharge: HOME | End: 2025-04-17
Attending: EMERGENCY MEDICINE
Payer: COMMERCIAL

## 2025-04-17 ENCOUNTER — APPOINTMENT (EMERGENCY)
Dept: CT IMAGING | Facility: HOSPITAL | Age: 65
End: 2025-04-17
Payer: COMMERCIAL

## 2025-04-17 ENCOUNTER — OFFICE VISIT (OUTPATIENT)
Dept: URGENT CARE | Facility: CLINIC | Age: 65
End: 2025-04-17
Payer: COMMERCIAL

## 2025-04-17 VITALS
BODY MASS INDEX: 24.75 KG/M2 | HEART RATE: 65 BPM | RESPIRATION RATE: 17 BRPM | HEIGHT: 64 IN | TEMPERATURE: 97.9 F | OXYGEN SATURATION: 99 % | WEIGHT: 145 LBS | DIASTOLIC BLOOD PRESSURE: 83 MMHG | SYSTOLIC BLOOD PRESSURE: 175 MMHG

## 2025-04-17 VITALS
OXYGEN SATURATION: 98 % | TEMPERATURE: 97.1 F | HEART RATE: 86 BPM | SYSTOLIC BLOOD PRESSURE: 127 MMHG | RESPIRATION RATE: 18 BRPM | DIASTOLIC BLOOD PRESSURE: 86 MMHG

## 2025-04-17 DIAGNOSIS — S16.1XXA STRAIN OF NECK MUSCLE, INITIAL ENCOUNTER: ICD-10-CM
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
## 2025-04-17 DIAGNOSIS — S01.81XA FACIAL LACERATION, INITIAL ENCOUNTER: ICD-10-CM

## 2025-04-17 DIAGNOSIS — S00.83XA CONTUSION OF FACE, INITIAL ENCOUNTER: ICD-10-CM
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
## 2025-04-17 PROCEDURE — 99213 OFFICE O/P EST LOW 20 MIN: CPT

## 2025-04-17 PROCEDURE — 72125 CT NECK SPINE W/O DYE: CPT

## 2025-04-17 PROCEDURE — 99284 EMERGENCY DEPT VISIT MOD MDM: CPT

## 2025-04-17 PROCEDURE — 70450 CT HEAD/BRAIN W/O DYE: CPT

## 2025-04-17 RX ORDER — ACETAMINOPHEN 325 MG/1
650 TABLET ORAL ONCE
Status: COMPLETED | OUTPATIENT
Start: 2025-04-17 | End: 2025-04-17

## 2025-04-17 RX ADMIN — ACETAMINOPHEN 650 MG: 325 TABLET, FILM COATED ORAL at 19:27

## 2025-04-17 NOTE — PROGRESS NOTES
"  [facility]        NAME: Geri A Lombardo is a 64 y.o. female  : 1960    MRN: [id number]  DATE: 2025  TIME: 5:39 PM    Assessment and Plan   Fall, initial encounter [W19.XXXA]  1. Fall, initial encounter  Transfer to other facility      2. Facial laceration, initial encounter  Transfer to other facility        Patient will report to ED via private vehicle with her  driving for further workup    Patient Instructions       Follow up with PCP in 3-5 days.  Proceed to  ER if symptoms worsen.    If tests have been performed at [facility], our office will contact you with results if changes need to be made to the care plan discussed with you at the visit.  [facility]    Chief Complaint     Chief Complaint   Patient presents with    Head Laceration     Patient tripped and hit right side of her head on the corner of a ladder tonight, she states she was dizzy for a short time after and does have neck pain still as well as a laceration right next to her right eyebrow          History of Present Illness       63 y/o F with PMHx of psoriatic arthritis, presents for fall sustained 1 hour ago.  Patient was she was working in her yard, when she tripped, falling forward onto the ground and then a metal ladder.  Did hit left side of the head.  Denies loss of consciousness, use of blood thinners or aspirin.  Admits to feeling immediate head pain, neck pain.  Shortly after became dizzy and \"feeling off.  \"        Review of Systems   Review of Systems   Skin:  Positive for wound.   Neurological:  Positive for dizziness and headaches.         Current Medications       Current Outpatient Medications:     acetaminophen (TYLENOL) 650 mg CR tablet, Take 1 tablet (650 mg total) by mouth every 8 (eight) hours as needed for mild pain, Disp: 30 tablet, Rfl: 0    Glucosamine-Chondroitin (GLUCOSAMINE CHONDR COMPLEX PO), Take by mouth daily   (Patient taking differently: " Take by mouth if needed), Disp: , Rfl:     leflunomide (ARAVA) 10 MG tablet, TAKE 1 TABLET BY MOUTH EVERY DAY, Disp: 30 tablet, Rfl: 5    multivitamin (THERAGRAN) TABS, Take 1 tablet by mouth daily (Patient not taking: Reported on 2024), Disp: , Rfl:     pravastatin (PRAVACHOL) 40 mg tablet, TAKE 1 TABLET BY MOUTH DAILY AT BEDTIME, Disp: 90 tablet, Rfl: 1    predniSONE 10 mg tablet, TAKE 1 TO 2 TABLETS BY MOUTH DAILY AS NEEDED, Disp: 60 tablet, Rfl: 2    promethazine-dextromethorphan (PHENERGAN-DM) 6.25-15 mg/5 mL oral syrup, Take 5 mL by mouth 4 (four) times a day as needed for cough, Disp: 240 mL, Rfl: 1    Upadacitinib ER (Rinvoq) 15 MG TB24, Take 15 mg by mouth in the morning, Disp: 90 tablet, Rfl: 1    Current Allergies     Allergies as of 2025 - Reviewed 2025   Allergen Reaction Noted    Methotrexate Hives 2020            The following portions of the patient's history were reviewed and updated as appropriate: allergies, current medications, past family history, past medical history, past social history, past surgical history and problem list.     Past Medical History:   Diagnosis Date    Chronic left-sided low back pain without sciatica 3/18/2020    Concussion     Hyperlipidemia     Psoriatic arthritis (HCC) 2019    Seronegative rheumatoid arthritis (HCC) 2019    Tendinitis        Past Surgical History:   Procedure Laterality Date     SECTION      FOOT SURGERY Left     HAND SURGERY Left 10/2021    HEMORROIDECTOMY      KNEE ARTHROSCOPY Right 2018    [place] ARTHRS KNE SURG W/MENISCECTOMY MED/LAT W/SHVG Right 2018    Procedure: ARTHROSCOPY KNEE, PARTIAL MEDIAL MENISCECTOMY: ABRASION CHONDROPLASTY;  Surgeon: Kavya Clark MD;  Location:  MAIN OR;  Service: Orthopedics    [place] NDSC WRST SURG W/RLS TRANSVRS CARPL LIGM Right 10/21/2021    Procedure: Right endoscopic carpal tunnel release;  Surgeon: Frantz Conrad MD;  Location:  MAIN OR;  Service: Orthopedics     MS NDSC WRST SURG W/RLS TRANSVRS CARPL LIGM Left 11/17/2022    Procedure: RELEASE CARPAL TUNNEL ENDOSCOPIC, LEFT;  Surgeon: Frantz Conrad MD;  Location:  MAIN OR;  Service: Orthopedics    MS RCNSTJ COLTRL LIGM IPHAL JT 1 W/GRF EA JT Right 10/21/2021    Procedure: right thumb Ulnar collateral ligament repair;  Surgeon: Frantz Conrad MD;  Location: [facility]IN OR;  Service: Orthopedics    MS TENDON SHEATH INCISION Right 10/21/2021    Procedure: Right small finger trigger finger release;  Surgeon: Frantz Conrad MD;  Location:  MAIN OR;  Service: Orthopedics    TUBAL LIGATION         Family History   Problem Relation Age of Onset    Coronary artery disease Mother     Other Mother         CABG    Hiatal hernia Father     No Known Problems Sister     No Known Problems Brother     No Known Problems Son     No Known Problems Daughter     Stroke Maternal Grandmother     No Known Problems Paternal Grandmother     No Known Problems Paternal Grandfather     No Known Problems Maternal Aunt     No Known Problems Maternal Uncle     No Known Problems Paternal Aunt     No Known Problems Paternal Uncle     No Known Problems Cousin     Hyperlipidemia Family     Migraines Family     Thyroid disease Family          Medications have been verified.        Objective   /86   Pulse 86   Temp (!) 97.1 °F (36.2 °C)   Resp 18   LMP  (LMP Unknown)   SpO2 98%   No LMP recorded (lmp unknown). Patient is postmenopausal.       Physical Exam     Physical Exam  Vitals and nursing note reviewed.   Constitutional:       General: She is not in acute distress.     Appearance: She is not toxic-appearing.   HENT:      Head: Normocephalic.      Comments: Approximately 1 cm linear laceration just superior to the left orbit.  Bony tenderness of the L orbit, without crepitus or step-offs.    Also mild tenderness of left temporal bone.  Eyes:      Extraocular Movements: Extraocular movements intact.      Conjunctiva/sclera: Conjunctivae  normal.      Pupils: Pupils are equal, round, and reactive to light.   Neck:      Comments: Diffuse tenderness of patient of the C-spine  Cardiovascular:      Rate and Rhythm: Normal rate and regular rhythm.   Pulmonary:      Effort: Pulmonary effort is normal.      Breath sounds: Normal breath sounds.   Musculoskeletal:      Cervical back: Tenderness present.   Neurological:      Mental Status: She is alert.      Cranial Nerves: No cranial nerve deficit.      Motor: No weakness.      Coordination: Coordination normal.      Gait: Gait normal.   Psychiatric:         Mood and Affect: Mood normal.         Behavior: Behavior normal.

## 2025-04-17 NOTE — ED PROVIDER NOTES
Time reflects when diagnosis was documented in both MDM as applicable and the Disposition within this note       Time User Action Codes Description Comment    4/17/2025  7:52 PM Joe Valladares Add [W19.XXXA] Fall from standing, initial encounter     4/17/2025  7:52 PM Joe Valladares Add [S00.83XA] Contusion of face, initial encounter     4/17/2025  7:53 PM Joe Valladares Add [S01.81XA] Facial laceration, initial encounter     4/17/2025  7:53 PM Joe Valladares Add [S16.1XXA] Strain of neck muscle, initial encounter           ED Disposition       ED Disposition   Discharge    Condition   Stable    Date/Time   Thu Apr 17, 2025  7:52 PM    Comment   Geri A Lombardo discharge to home/self care.                   Assessment & Plan       Medical Decision Making  63 yo F with trip, ground level fall. Lacerated left orbital rim but without LOC or focal neurologic changes.  On no thinners.  No signs of clinically important intracranial or cervical injury, but will check head and neck CT.  Will clean and repair facial laceration.    Remains hemodynamically and neurologically stable.   Wound glued. Tetanus status documented. Head injury instructions given.    Amount and/or Complexity of Data Reviewed  Independent Historian: spouse  External Data Reviewed: notes.  Radiology: ordered.    Risk  OTC drugs.             Medications   acetaminophen (TYLENOL) tablet 650 mg (650 mg Oral Given 4/17/25 1927)       ED Risk Strat Scores                    No data recorded        SBIRT 20yo+      Flowsheet Row Most Recent Value   Initial Alcohol Screen: US AUDIT-C     1. How often do you have a drink containing alcohol? 0 Filed at: 04/17/2025 1747   2. How many drinks containing alcohol do you have on a typical day you are drinking?  0 Filed at: 04/17/2025 1747   3a. Male UNDER 65: How often do you have five or more drinks on one occasion? 0 Filed at: 04/17/2025 1747   3b. FEMALE Any Age, or MALE 65+: How often do you have 4 or  more drinks on one occassion? 0 Filed at: 2025   Audit-C Score 0 Filed at: 2025   JONATHAN: How many times in the past year have you...    Used an illegal drug or used a prescription medication for non-medical reasons? Never Filed at: 2025                            History of Present Illness       Chief Complaint   Patient presents with    Fall     Pt presents after trip and fall, hit head on ladder. Denies LOC but says she got dizzy afterwards -thinners       Past Medical History:   Diagnosis Date    Chronic left-sided low back pain without sciatica 3/18/2020    Concussion     Hyperlipidemia     Psoriatic arthritis (HCC) 2019    Seronegative rheumatoid arthritis (HCC) 2019    Tendinitis       Past Surgical History:   Procedure Laterality Date     SECTION      FOOT SURGERY Left     HAND SURGERY Left 10/2021    HEMORROIDECTOMY      KNEE ARTHROSCOPY Right 2018    CO ARTHRS KNE SURG W/MENISCECTOMY MED/LAT W/SHVG Right 2018    Procedure: ARTHROSCOPY KNEE, PARTIAL MEDIAL MENISCECTOMY: ABRASION CHONDROPLASTY;  Surgeon: Kavya Clark MD;  Location:  MAIN OR;  Service: Orthopedics    CO NDSC WRST SURG W/RLS TRANSVRS CARPL LIGM Right 10/21/2021    Procedure: Right endoscopic carpal tunnel release;  Surgeon: Frantz Conrad MD;  Location: [facility];  Service: Orthopedics    CO NDSC WRST SURG W/RLS TRANSVRS CARPL LIGM Left 2022    Procedure: RELEASE CARPAL TUNNEL ENDOSCOPIC, LEFT;  Surgeon: Frantz Conrad MD;  Location: [facility];  Service: Orthopedics    CO RCNSTJ COLTRL LIGM IPHAL JT 1 W/GRF EA JT Right 10/21/2021    Procedure: right thumb Ulnar collateral ligament repair;  Surgeon: Frantz Conrad MD;  Location: [facility];  Service: Orthopedics    CO TENDON SHEATH INCISION Right 10/21/2021    Procedure: Right small finger trigger finger release;  Surgeon: Frantz Conrad MD;  Location: [facility];  Service: Orthopedics    TUBAL LIGATION       "  Family History   Problem Relation Age of Onset    Coronary artery disease Mother     Other Mother         CABG    Hiatal hernia Father     No Known Problems Sister     No Known Problems Brother     No Known Problems Son     No Known Problems Daughter     Stroke Maternal Grandmother     No Known Problems Paternal Grandmother     No Known Problems Paternal Grandfather     No Known Problems Maternal Aunt     No Known Problems Maternal Uncle     No Known Problems Paternal Aunt     No Known Problems Paternal Uncle     No Known Problems Cousin     Hyperlipidemia Family     Migraines Family     Thyroid disease Family       Social History     Tobacco Use    Smoking status: Former     Current packs/day: 0.00     Average packs/day: 0.3 packs/day for 5.0 years (1.7 ttl pk-yrs)     Types: Cigarettes     Start date:      Quit date:      Years since quittin.3    Smokeless tobacco: Never   Vaping Use    Vaping status: Never Used   Substance Use Topics    Alcohol use: Yes     Comment: social    Drug use: No      E-Cigarette/Vaping    E-Cigarette Use Never User       E-Cigarette/Vaping Substances    Nicotine No     THC No     CBD No     Flavoring No     Other No     Unknown No       I have reviewed and agree with the history as documented.     64-year-old female with history of psoriatic arthritis, hyperlipidemia was building a deck in the back of her house today.  She tripped approximately 1 hour ago and fell to the deck.  She struck her left orbital rim on a ladder that was standing there.  This was unwitnessed.  Denies loss of consciousness but complains of headache, facial tenderness, neck discomfort, feeling of being \"a little out of it\".  Had \"a lot of bleeding\" from facial laceration, now resolved.  Denies pain to chest, abdomen, extremities.  Denies focal neurologic changes. Last tetanus 2017.        Review of Systems   Constitutional:  Negative for fever.   Eyes:  Negative for visual disturbance. "   Respiratory:  Negative for cough and shortness of breath.    Cardiovascular:  Negative for chest pain.   Gastrointestinal:  Negative for abdominal pain.   Neurological:  Positive for dizziness, light-headedness and headaches. Negative for seizures, syncope, speech difficulty and weakness.           Objective       ED Triage Vitals [04/17/25 1746]   Temperature Pulse Blood Pressure Respirations SpO2 Patient Position - Orthostatic VS   97.9 °F (36.6 °C) 65 (!) 175/83 17 99 % Sitting      Temp Source Heart Rate Source BP Location FiO2 (%) Pain Score    Temporal Monitor Right arm -- 5      Vitals      Date and Time Temp Pulse SpO2 Resp BP Pain Score FACES Pain Rating User   04/17/25 1927 -- -- -- -- -- 6 --    04/17/25 1836 -- -- -- -- -- 6 --    04/17/25 1746 97.9 °F (36.6 °C) 65 99 % 17 175/83 5 -- SS            Physical Exam  Vitals and nursing note reviewed.   Constitutional:       General: She is not in acute distress.     Appearance: She is well-developed. She is not ill-appearing or diaphoretic.   HENT:      Head: Normocephalic.      Comments: 1 cm superficial laceration overlying left orbital rim.  There is surrounding edema without crepitance or deformity.  Bleeding controlled.     Right Ear: Tympanic membrane, ear canal and external ear normal.      Left Ear: Tympanic membrane, ear canal and external ear normal.      Nose: Nose normal.      Mouth/Throat:      Mouth: Mucous membranes are moist.      Pharynx: Oropharynx is clear.   Eyes:      General: No scleral icterus.     Extraocular Movements: Extraocular movements intact.      Conjunctiva/sclera: Conjunctivae normal.      Pupils: Pupils are equal, round, and reactive to light.   Cardiovascular:      Rate and Rhythm: Normal rate and regular rhythm.      Pulses: Normal pulses.      Heart sounds: Normal heart sounds.   Pulmonary:      Effort: Pulmonary effort is normal. No respiratory distress.      Breath sounds: Normal breath sounds.   Chest:       "Chest wall: No tenderness.   Abdominal:      Palpations: Abdomen is soft.      Tenderness: There is no abdominal tenderness.   Musculoskeletal:         General: Swelling, tenderness and signs of injury (left supraorbital rim) present. No deformity. Normal range of motion.      Cervical back: Neck supple. Tenderness (Generalized posterior tenderness without deformity) present.      Right lower leg: No edema.      Left lower leg: No edema.   Skin:     General: Skin is warm and dry.      Capillary Refill: Capillary refill takes less than 2 seconds.      Findings: No rash.   Neurological:      General: No focal deficit present.      Mental Status: She is alert and oriented to person, place, and time. Mental status is at baseline.      Cranial Nerves: No cranial nerve deficit.      Sensory: No sensory deficit.      Motor: No weakness.      Coordination: Coordination normal.      Gait: Gait normal.      Deep Tendon Reflexes: Reflexes are normal and symmetric.   Psychiatric:         Mood and Affect: Mood normal.         Behavior: Behavior normal.         Results Reviewed       None            CT head without contrast   Final Interpretation by Yann Cesar MD (04/17 1910)      No acute intracranial abnormality.                  Workstation performed: [id number]         CT cervical spine without contrast   Final Interpretation by Niranjan De La Cruz MD (04/17 1941)      No cervical spine fracture or traumatic malalignment.                  Workstation performed: [id number]             Universal Protocol:  procedure performed by consultantConsent: Verbal consent obtained.  Risks and benefits: risks, benefits and alternatives were discussed  Consent given by: patient  Time out: Immediately prior to procedure a \"time out\" was called to verify the correct patient, procedure, equipment, support staff and site/side marked as required.  Patient understanding: patient states understanding of the procedure being " performed  Patient consent: the patient's understanding of the procedure matches consent given  Procedure consent: procedure consent matches procedure scheduled  Patient identity confirmed: verbally with patient  Laceration repair    Date/Time: 4/17/2025 6:27 PM    Performed by: Joe Valladares DO  Authorized by: Joe Valladares DO  Body area: head/neck  Location details: forehead  Laceration length: 1 cm  Foreign bodies: no foreign bodies  Tendon involvement: none  Nerve involvement: none  Vascular damage: no    Sedation:  Patient sedated: no        Procedure Details:  Irrigation solution: saline  Irrigation method: syringe  Amount of cleaning: standard  Debridement: none  Degree of undermining: none  Skin closure: glue  Approximation difficulty: simple  Patient tolerance: patient tolerated the procedure well with no immediate complications          ED Medication and Procedure Management   Prior to Admission Medications   Prescriptions Last Dose Informant Patient Reported? Taking?   Glucosamine-Chondroitin (GLUCOSAMINE CHONDR COMPLEX PO)  Self Yes No   Sig: Take by mouth daily     Patient taking differently: Take by mouth if needed   Upadacitinib ER (Rinvoq) 15 MG TB24   No No   Sig: Take 15 mg by mouth in the morning   acetaminophen (TYLENOL) 650 mg CR tablet   No No   Sig: Take 1 tablet (650 mg total) by mouth every 8 (eight) hours as needed for mild pain   leflunomide (ARAVA) 10 MG tablet   No No   Sig: TAKE 1 TABLET BY MOUTH EVERY DAY   multivitamin (THERAGRAN) TABS  Self Yes No   Sig: Take 1 tablet by mouth daily   Patient not taking: Reported on 8/14/2024   pravastatin (PRAVACHOL) 40 mg tablet   No No   Sig: TAKE 1 TABLET BY MOUTH DAILY AT BEDTIME   predniSONE 10 mg tablet   No No   Sig: TAKE 1 TO 2 TABLETS BY MOUTH DAILY AS NEEDED   promethazine-dextromethorphan (PHENERGAN-DM) 6.25-15 mg/5 mL oral syrup   No No   Sig: Take 5 mL by mouth 4 (four) times a day as needed for cough      Facility-Administered  Medications: None     Discharge Medication List as of 4/17/2025  7:56 PM        CONTINUE these medications which have NOT CHANGED    Details   acetaminophen (TYLENOL) 650 mg CR tablet Take 1 tablet (650 mg total) by mouth every 8 (eight) hours as needed for mild pain, Starting Thu 11/17/2022, Normal      Glucosamine-Chondroitin (GLUCOSAMINE CHONDR COMPLEX PO) Take by mouth daily  , Historical Med      leflunomide (ARAVA) 10 MG tablet TAKE 1 TABLET BY MOUTH EVERY DAY, Starting Mon 3/17/2025, Normal      multivitamin (THERAGRAN) TABS Take 1 tablet by mouth daily, Historical Med      pravastatin (PRAVACHOL) 40 mg tablet TAKE 1 TABLET BY MOUTH DAILY AT BEDTIME, Starting Thu 3/6/2025, Normal      predniSONE 10 mg tablet TAKE 1 TO 2 TABLETS BY MOUTH DAILY AS NEEDED, Normal      promethazine-dextromethorphan (PHENERGAN-DM) 6.25-15 mg/5 mL oral syrup Take 5 mL by mouth 4 (four) times a day as needed for cough, Starting Fri 2/7/2025, Normal      Upadacitinib ER (Rinvoq) 15 MG TB24 Take 15 mg by mouth in the morning, Starting Wed 4/9/2025, Normal           No discharge procedures on file.  ED SEPSIS DOCUMENTATION   Time reflects when diagnosis was documented in both MDM as applicable and the Disposition within this note       Time User Action Codes Description Comment    4/17/2025  7:52 PM Joe Valladares [W19.XXXA] Fall from standing, initial encounter     4/17/2025  7:52 PM Joe Valladares [S00.83XA] Contusion of face, initial encounter     4/17/2025  7:53 PM Joe Valladares [S01.81XA] Facial laceration, initial encounter     4/17/2025  7:53 PM Joe Valladares [S16.1XXA] Strain of neck muscle, initial encounter                  Joe Valladares, DO  04/19/25 1132       Joe Valladares,   04/19/25 1138

## 2025-04-17 NOTE — DISCHARGE INSTRUCTIONS
We repaired the laceration with glue.  You can wash your face and shower but do not soak the area.  Do not apply any ointments or other medications to the area until the glue dissolves.    Protect the skin around this wound from the sun for the next 6 to 12 months to avoid discoloration of the scar.    Follow head injury instructions.  Consider sleeping with shoulders and head elevated on extra pillows or in a recliner to minimize facial swelling and headache.  Apply ice to facial contusion as needed.  You can try heat or ice on the neck.    It is safe to take Tylenol or ibuprofen as needed for pain.

## (undated) DEVICE — SPLINT 5 X 30 IN XFAST SET PLASTER

## (undated) DEVICE — TUBING ARTHROSCOPIC WAVE  MAIN PUMP

## (undated) DEVICE — SUT FIBERLOOP 4-0 3/8 CIRCLE TPR 12IN AR-7229-12

## (undated) DEVICE — GLOVE SRG BIOGEL 7

## (undated) DEVICE — OCCLUSIVE GAUZE STRIP,3% BISMUTH TRIBROMOPHENATE IN PETROLATUM BLEND: Brand: XEROFORM

## (undated) DEVICE — SYRINGE 3ML LL

## (undated) DEVICE — SUT PROLENE 4-0 PC-3 18 IN 8634G

## (undated) DEVICE — FILTER STRAW 1.7

## (undated) DEVICE — NEEDLE 25G X 1 1/2

## (undated) DEVICE — NEEDLE HYPO 22G X 1-1/2 IN

## (undated) DEVICE — TUBING SUCTION 5MM X 12 FT

## (undated) DEVICE — VIAL DECANTER

## (undated) DEVICE — CUFF TOURNIQUET 30 X 4 IN QUICK CONNECT DISP 1BLA

## (undated) DEVICE — STERILE BETHLEHEM PLASTIC HAND: Brand: CARDINAL HEALTH

## (undated) DEVICE — SPONGE PVP SCRUB WING STERILE

## (undated) DEVICE — SYRINGE 30ML LL

## (undated) DEVICE — GLOVE INDICATOR PI UNDERGLOVE SZ 8 BLUE

## (undated) DEVICE — ACE WRAP 6 IN UNSTERILE

## (undated) DEVICE — GLOVE INDICATOR PI UNDERGLOVE SZ 7 BLUE

## (undated) DEVICE — IMPERVIOUS STOCKINETTE: Brand: DEROYAL

## (undated) DEVICE — BLADE SHAVER DISSECTOR  3.5MM 13CM CRV COOLCUT

## (undated) DEVICE — ADHESIVE SKIN HIGH VISCOSITY EXOFIN 1ML

## (undated) DEVICE — CUFF TOURNIQUET 18 X 4 IN QUICK CONNECT DISP 1 BLADDER

## (undated) DEVICE — CAST PADDING 6 IN STERILE

## (undated) DEVICE — SUT ETHILON 3-0 PS-1 18 IN 1663H

## (undated) DEVICE — BLADE SHAVER EXCALIBUR 4MM 13CM COOLCUT

## (undated) DEVICE — ACE WRAP 4 IN UNSTERILE

## (undated) DEVICE — PACK UNIVERSAL ARTHRSCOPY PBDS

## (undated) DEVICE — CHLORAPREP HI-LITE 26ML ORANGE

## (undated) DEVICE — 3M™ STERI-STRIP™ REINFORCED ADHESIVE SKIN CLOSURES, R1546, 1/4 IN X 4 IN (6 MM X 100 MM), 10 STRIPS/ENVELOPE: Brand: 3M™ STERI-STRIP™

## (undated) DEVICE — STERILE COTTON TIPPED APPLICATORS: Brand: PURITAN

## (undated) DEVICE — RETROGRADE KNIFE BOX OF 6: Brand: ECTRA

## (undated) DEVICE — GLOVE SRG BIOGEL 7.5

## (undated) DEVICE — SUT VICRYL 3-0 SH 27 IN J416H

## (undated) DEVICE — GLOVE SRG BIOGEL ORTHOPEDIC 7.5

## (undated) DEVICE — INTENDED FOR TISSUE SEPARATION, AND OTHER PROCEDURES THAT REQUIRE A SHARP SURGICAL BLADE TO PUNCTURE OR CUT.: Brand: BARD-PARKER SAFETY BLADES SIZE 11, STERILE

## (undated) DEVICE — CAST PADDING 4 IN UNSTERILE

## (undated) DEVICE — SNAP KOVER: Brand: UNBRANDED

## (undated) DEVICE — KIT F/SWIVELOCK SL 3.5 X 8.5MM DISP

## (undated) DEVICE — NEEDLE 18 G X 1 1/2

## (undated) DEVICE — INTENDED FOR TISSUE SEPARATION, AND OTHER PROCEDURES THAT REQUIRE A SHARP SURGICAL BLADE TO PUNCTURE OR CUT.: Brand: BARD-PARKER SAFETY BLADES SIZE 15, STERILE

## (undated) DEVICE — STERI DRAPE 1000 NON-STERILE ROLL